# Patient Record
Sex: FEMALE | Race: BLACK OR AFRICAN AMERICAN | NOT HISPANIC OR LATINO | URBAN - METROPOLITAN AREA
[De-identification: names, ages, dates, MRNs, and addresses within clinical notes are randomized per-mention and may not be internally consistent; named-entity substitution may affect disease eponyms.]

---

## 2021-09-09 ENCOUNTER — INPATIENT (INPATIENT)
Facility: HOSPITAL | Age: 66
LOS: 13 days | Discharge: NURSING, REHAB OR LONG-TERM CARE | End: 2021-09-23
Attending: FAMILY MEDICINE | Admitting: FAMILY MEDICINE
Payer: COMMERCIAL

## 2021-09-09 VITALS
HEART RATE: 136 BPM | OXYGEN SATURATION: 100 % | DIASTOLIC BLOOD PRESSURE: 90 MMHG | SYSTOLIC BLOOD PRESSURE: 122 MMHG | WEIGHT: 139.99 LBS | HEIGHT: 64 IN | RESPIRATION RATE: 30 BRPM

## 2021-09-09 LAB
ALBUMIN SERPL ELPH-MCNC: 1.3 G/DL — LOW (ref 3.3–5)
ALP SERPL-CCNC: 137 U/L — HIGH (ref 40–120)
ALT FLD-CCNC: 24 U/L — SIGNIFICANT CHANGE UP (ref 12–78)
AMMONIA BLD-MCNC: 87 UMOL/L — HIGH (ref 11–32)
ANION GAP SERPL CALC-SCNC: 39 MMOL/L — HIGH (ref 5–17)
APPEARANCE UR: ABNORMAL
APTT BLD: 25.2 SEC — LOW (ref 27.5–35.5)
AST SERPL-CCNC: 104 U/L — HIGH (ref 15–37)
BACTERIA # UR AUTO: ABNORMAL
BASE EXCESS BLDA CALC-SCNC: -20.8 MMOL/L — LOW (ref -2–2)
BILIRUB SERPL-MCNC: 0.5 MG/DL — SIGNIFICANT CHANGE UP (ref 0.2–1.2)
BILIRUB UR-MCNC: NEGATIVE — SIGNIFICANT CHANGE UP
BLD GP AB SCN SERPL QL: SIGNIFICANT CHANGE UP
BLOOD GAS COMMENTS: SIGNIFICANT CHANGE UP
BLOOD GAS COMMENTS: SIGNIFICANT CHANGE UP
BLOOD GAS SOURCE: SIGNIFICANT CHANGE UP
BUN SERPL-MCNC: 63 MG/DL — HIGH (ref 7–23)
CALCIUM SERPL-MCNC: 8.4 MG/DL — LOW (ref 8.5–10.1)
CHLORIDE SERPL-SCNC: 90 MMOL/L — LOW (ref 96–108)
CK MB BLD-MCNC: 1.5 % — SIGNIFICANT CHANGE UP (ref 0–3.5)
CK MB CFR SERPL CALC: 39.1 NG/ML — HIGH (ref 0.5–3.6)
CK SERPL-CCNC: 2529 U/L — HIGH (ref 26–192)
CO2 SERPL-SCNC: 8 MMOL/L — CRITICAL LOW (ref 22–31)
COLOR SPEC: YELLOW — SIGNIFICANT CHANGE UP
CREAT SERPL-MCNC: 3.42 MG/DL — HIGH (ref 0.5–1.3)
DIFF PNL FLD: ABNORMAL
GLUCOSE BLDC GLUCOMTR-MCNC: >600 MG/DL — CRITICAL HIGH (ref 70–99)
GLUCOSE SERPL-MCNC: 1466 MG/DL — CRITICAL HIGH (ref 70–99)
GLUCOSE UR QL: 1000 MG/DL
HCO3 BLDA-SCNC: 6 MMOL/L — LOW (ref 21–29)
HCT VFR BLD CALC: 24.8 % — LOW (ref 34.5–45)
HGB BLD-MCNC: 7.6 G/DL — LOW (ref 11.5–15.5)
HOROWITZ INDEX BLDA+IHG-RTO: 100 — SIGNIFICANT CHANGE UP
INR BLD: 1.55 RATIO — HIGH (ref 0.88–1.16)
KETONES UR-MCNC: ABNORMAL
LACTATE SERPL-SCNC: 2.4 MMOL/L — HIGH (ref 0.7–2)
LEUKOCYTE ESTERASE UR-ACNC: NEGATIVE — SIGNIFICANT CHANGE UP
MCHC RBC-ENTMCNC: 28 PG — SIGNIFICANT CHANGE UP (ref 27–34)
MCHC RBC-ENTMCNC: 30.6 GM/DL — LOW (ref 32–36)
MCV RBC AUTO: 91.5 FL — SIGNIFICANT CHANGE UP (ref 80–100)
NITRITE UR-MCNC: NEGATIVE — SIGNIFICANT CHANGE UP
PCO2 BLDA: 18 MMHG — LOW (ref 32–46)
PH BLD: 7.17 — CRITICAL LOW (ref 7.35–7.45)
PH UR: 7 — SIGNIFICANT CHANGE UP (ref 5–8)
PLATELET # BLD AUTO: 443 K/UL — HIGH (ref 150–400)
PO2 BLDA: 107 MMHG — SIGNIFICANT CHANGE UP (ref 74–108)
POTASSIUM SERPL-MCNC: 3.4 MMOL/L — LOW (ref 3.5–5.3)
POTASSIUM SERPL-SCNC: 3.4 MMOL/L — LOW (ref 3.5–5.3)
PROT SERPL-MCNC: 6.6 GM/DL — SIGNIFICANT CHANGE UP (ref 6–8.3)
PROT UR-MCNC: 30 MG/DL
PROTHROM AB SERPL-ACNC: 17.6 SEC — HIGH (ref 10.6–13.6)
RBC # BLD: 2.71 M/UL — LOW (ref 3.8–5.2)
RBC # FLD: 16.8 % — HIGH (ref 10.3–14.5)
RBC CASTS # UR COMP ASSIST: SIGNIFICANT CHANGE UP /HPF (ref 0–4)
SAO2 % BLDA: 97 % — HIGH (ref 92–96)
SODIUM SERPL-SCNC: 137 MMOL/L — SIGNIFICANT CHANGE UP (ref 135–145)
SP GR SPEC: 1.01 — SIGNIFICANT CHANGE UP (ref 1.01–1.02)
TROPONIN I SERPL-MCNC: 0.47 NG/ML — HIGH (ref 0.01–0.04)
UROBILINOGEN FLD QL: NEGATIVE MG/DL — SIGNIFICANT CHANGE UP
WBC # BLD: 15.67 K/UL — HIGH (ref 3.8–10.5)
WBC # FLD AUTO: 15.67 K/UL — HIGH (ref 3.8–10.5)
WBC UR QL: SIGNIFICANT CHANGE UP

## 2021-09-09 PROCEDURE — 99291 CRITICAL CARE FIRST HOUR: CPT

## 2021-09-09 PROCEDURE — 71045 X-RAY EXAM CHEST 1 VIEW: CPT | Mod: 26

## 2021-09-09 PROCEDURE — 93010 ELECTROCARDIOGRAM REPORT: CPT

## 2021-09-09 RX ORDER — PIPERACILLIN AND TAZOBACTAM 4; .5 G/20ML; G/20ML
3.38 INJECTION, POWDER, LYOPHILIZED, FOR SOLUTION INTRAVENOUS ONCE
Refills: 0 | Status: COMPLETED | OUTPATIENT
Start: 2021-09-09 | End: 2021-09-09

## 2021-09-09 RX ORDER — ACETAMINOPHEN 500 MG
650 TABLET ORAL ONCE
Refills: 0 | Status: DISCONTINUED | OUTPATIENT
Start: 2021-09-09 | End: 2021-09-09

## 2021-09-09 RX ORDER — SODIUM BICARBONATE 1 MEQ/ML
50 SYRINGE (ML) INTRAVENOUS ONCE
Refills: 0 | Status: COMPLETED | OUTPATIENT
Start: 2021-09-09 | End: 2021-09-09

## 2021-09-09 RX ORDER — INSULIN HUMAN 100 [IU]/ML
10 INJECTION, SOLUTION SUBCUTANEOUS
Qty: 100 | Refills: 0 | Status: DISCONTINUED | OUTPATIENT
Start: 2021-09-09 | End: 2021-09-10

## 2021-09-09 RX ORDER — SODIUM CHLORIDE 9 MG/ML
3000 INJECTION INTRAMUSCULAR; INTRAVENOUS; SUBCUTANEOUS ONCE
Refills: 0 | Status: COMPLETED | OUTPATIENT
Start: 2021-09-09 | End: 2021-09-09

## 2021-09-09 RX ORDER — SODIUM CHLORIDE 9 MG/ML
2000 INJECTION, SOLUTION INTRAVENOUS ONCE
Refills: 0 | Status: DISCONTINUED | OUTPATIENT
Start: 2021-09-09 | End: 2021-09-09

## 2021-09-09 RX ORDER — VANCOMYCIN HCL 1 G
1000 VIAL (EA) INTRAVENOUS ONCE
Refills: 0 | Status: COMPLETED | OUTPATIENT
Start: 2021-09-09 | End: 2021-09-09

## 2021-09-09 RX ORDER — SODIUM BICARBONATE 1 MEQ/ML
100 SYRINGE (ML) INTRAVENOUS ONCE
Refills: 0 | Status: DISCONTINUED | OUTPATIENT
Start: 2021-09-09 | End: 2021-09-09

## 2021-09-09 RX ORDER — SODIUM BICARBONATE 1 MEQ/ML
50 SYRINGE (ML) INTRAVENOUS ONCE
Refills: 0 | Status: DISCONTINUED | OUTPATIENT
Start: 2021-09-09 | End: 2021-09-10

## 2021-09-09 RX ORDER — INSULIN HUMAN 100 [IU]/ML
10 INJECTION, SOLUTION SUBCUTANEOUS ONCE
Refills: 0 | Status: COMPLETED | OUTPATIENT
Start: 2021-09-09 | End: 2021-09-09

## 2021-09-09 RX ADMIN — Medication 250 MILLIGRAM(S): at 21:53

## 2021-09-09 RX ADMIN — SODIUM CHLORIDE 3000 MILLILITER(S): 9 INJECTION INTRAMUSCULAR; INTRAVENOUS; SUBCUTANEOUS at 21:30

## 2021-09-09 RX ADMIN — INSULIN HUMAN 10 UNIT(S): 100 INJECTION, SOLUTION SUBCUTANEOUS at 23:49

## 2021-09-09 RX ADMIN — INSULIN HUMAN 10 UNIT(S)/HR: 100 INJECTION, SOLUTION SUBCUTANEOUS at 23:57

## 2021-09-09 RX ADMIN — Medication 1000 MILLIGRAM(S): at 22:53

## 2021-09-09 RX ADMIN — SODIUM CHLORIDE 3000 MILLILITER(S): 9 INJECTION INTRAMUSCULAR; INTRAVENOUS; SUBCUTANEOUS at 22:30

## 2021-09-09 RX ADMIN — PIPERACILLIN AND TAZOBACTAM 200 GRAM(S): 4; .5 INJECTION, POWDER, LYOPHILIZED, FOR SOLUTION INTRAVENOUS at 21:53

## 2021-09-09 RX ADMIN — PIPERACILLIN AND TAZOBACTAM 3.38 GRAM(S): 4; .5 INJECTION, POWDER, LYOPHILIZED, FOR SOLUTION INTRAVENOUS at 22:23

## 2021-09-09 RX ADMIN — Medication 50 MILLIEQUIVALENT(S): at 23:35

## 2021-09-09 NOTE — ED PROVIDER NOTE - PHYSICAL EXAMINATION
Vitals: tachy at 136, otherwise WNL  Gen: moving extremities, moaning to pain, withdrawing from pain, moving eyes, drowsy, difficult to arouse  Head: ncat, perrla, eomi b/l  Neck: supple, no lymphadenopathy, no midline deviation  Heart: rrr, no m/r/g  Lungs: CTA b/l, no rales/ronchi/wheezes  Abd: soft, nontender, non-distended, no rebound or guarding  Ext: no clubbing/cyanosis/edema, + necrotic L 2nd digit of foot with muscle and tendon visible, dry gangrene, progressed up to anterior ankle, no trailing erythema, no evidence of trauma   Neuro: not cooperative for exam, tone intact b/l, withdrawing/moaning to pain, no apparent focal deficits

## 2021-09-09 NOTE — ED ADULT NURSE NOTE - NS_SISCREENINGSR_GEN_ALL_ED
Preoperative Assessment Center Medication History Note    Medication history completed via phone call on July 28, 2020 by this writer. See Epic admission navigator for prior to admission medications. Operating room staff will still need to confirm medications and last dose information on day of surgery.     Medication history interview sources:  patient, payor information.     Changes made to PTA medication list (reason)  Added: fenbendazole  Deleted: none  Changed: none    Additional medication history information (including reliability of information, actions taken by pharmacist):    -- No recent (within 30 days) course of antibiotics  -- No recent (within 30 days) course of systemic steroids  -- Patient declines being on any other prescription or over-the-counter medications    Prior to Admission medications    Medication Sig Last Dose Taking? Auth Provider   atorvastatin (LIPITOR) 20 MG tablet TAKE 1 TABLET BY MOUTH ONCE DAILY IN THE MORNING Taking Yes Yusra Chavez APRN CNP   Fenbendazole POWD Take 1 g by mouth daily Taking Yes Unknown, Entered By History   lisinopril (PRINIVIL/ZESTRIL) 10 MG tablet TAKE 1 TABLET BY MOUTH ONCE DAILY Taking Yes Yusra Chavez APRN CNP   rivaroxaban ANTICOAGULANT (XARELTO ANTICOAGULANT) 20 MG TABS tablet Take 1 tablet (20 mg) by mouth daily (with dinner) Taking Yes Yusra Chavez APRN CNP     Medication history completed by: Kaleb Stark [facility]     Negative

## 2021-09-09 NOTE — ED PROVIDER NOTE - OBJECTIVE STATEMENT
64 yo F bibems after being found down in home unresponsive.  Pt. was last seen 1.5 days ago.  Pt. reportedly had cut L foot days ago, apparently infection has developed as L foot is now necrotic.  Pt. cannot provide history due to AMS.   ROS: unobtainable from patient   PMH: HTN, HLD, IDDM; Meds: atenolol, atorvastatin, humalog, tresiba; SH: Denies smoking/drinking/drug use   PCP: [facility]

## 2021-09-09 NOTE — ED ADULT NURSE NOTE - ED STAT RN HANDOFF DETAILS
Report given to ICU RN. Patient in no acute distress with vitals within normal limits. Patient has IV access as documented in the EMR, patient received all medications as ordered with no adverse reactions noted. Patient on insulin drip at 10units/hr. Patient has no stat orders pending. All cocnerns endorsed to oncoming RN. Report given to Bharati ICU RN. Patient in no acute distress with vitals within normal limits. Patient has IV access as documented in the EMR, patient received all medications as ordered with no adverse reactions noted. Patient on insulin drip at 10units/hr. Patient has no stat orders pending. All cocnerns endorsed to oncoming RN. Report given to Bharati ICU RN. Patient in no acute distress with vitals within normal limits. Patient has IV access as documented in the EMR, patient received all medications as ordered with no adverse reactions noted. Patient on insulin drip at 10units/hr and LR bolus running at this time. Patient FS taken q1h as ordered. Patient in no acute distress. Patient has no stat orders pending. All concerns endorsed to oncoming RN.

## 2021-09-09 NOTE — ED ADULT NURSE NOTE - BEFAST LAST WELL KNOWN
History of Present Illness:  Emilie is a 38 year old female,      , seen today for   1. Postop check    .  She returns 2 week(s) after a single site robotic assist hysterectomy with bilateral salpingectomy on 18.  She denies fever, chills, nausea or vomiting.  She has good pain control.  She is still struggling with GI issues and has been seeing GI for this.    I have reviewed the patient's vital signs, medications and allergies, past medical, surgical, social and family history, updating these as appropriate.  See Histories section of the EMR for a display of this information.    PHYSICAL EXAM:    VITALS: Blood pressure 92/58, height 5' 2\" (1.575 m), weight 51.1 kg, last menstrual period 2018.  GENERAL: No acute distress.  ABDOMEN: Benign, Soft, non-tender, non-distended, No masses, organomegaly, hernias, bilaterally.  INCISION:  Clean, dry and intact.  EXTREMITIES: Normal and no edema.      ASSESSMENT:    1. Postop check        PLAN:    Return in about 4 weeks (around 10/10/2018).  
Unknown

## 2021-09-09 NOTE — ED ADULT NURSE NOTE - OBJECTIVE STATEMENT
Patient received at bed Patient received at bed 15. Patient unresponsive, patient BIBA and patient has left foot gangrenous and necrotic tissue to the bone. Left second toe is necrotic and bone/ tendons are exposed on the left foot. Patient has positive pulses on the left ankle and knee, patient has bruising and swelling to the left foot. Patient placed on cardiac monitor, patient vitals are within normal limits. Lines and labs drawn, antibiotics to be started. As per family, patient cut foot on glass-unsure of how long ago but they have not heard from the patient in a day and a half so called ambulance- found patient on the floor. Patient has history of HTN, HLD, and DM.

## 2021-09-09 NOTE — ED PROVIDER NOTE - PROGRESS NOTE DETAILS
called ICU for admit/consult  called vascular, Dr. Storm for consult, agrees Dr. Burrows should eval tomorrow morning after pt. is admitted

## 2021-09-09 NOTE — ED PROVIDER NOTE - CARE PLAN
1 Principal Discharge DX:	Other specified sepsis  Secondary Diagnosis:	Diabetic ulcer of foot with necrosis of muscle   Principal Discharge DX:	Other specified sepsis  Secondary Diagnosis:	Diabetic ulcer of foot with necrosis of muscle  Secondary Diagnosis:	Secondary DM with DKA

## 2021-09-09 NOTE — ED ADULT TRIAGE NOTE - CHIEF COMPLAINT QUOTE
Pt biba, found on floor unresponsive,  pt pw open gangrenous open wound to L foot, ems ( fs HI), Pt received NS( 18 G # L AC). h/o dm, htn, hld

## 2021-09-09 NOTE — ED PROVIDER NOTE - CLINICAL SUMMARY MEDICAL DECISION MAKING FREE TEXT BOX
66 yo F with AMS, likely sepsis, necrosis of L foot as source, concerning for ischemia, cva, acs, gangrene  -basic labs, type and screen, ammonia, abg, blood cx, cpk, ua, cx, lactate, trop, ckmb, CT pan scan including LLE + contrast if feasible, ekg, vanc/zosyn for coverage, hydration bolus for sepsis  -f/u results, reeval

## 2021-09-10 DIAGNOSIS — A41.9 SEPSIS, UNSPECIFIED ORGANISM: ICD-10-CM

## 2021-09-10 DIAGNOSIS — A48.0 GAS GANGRENE: ICD-10-CM

## 2021-09-10 DIAGNOSIS — N17.9 ACUTE KIDNEY FAILURE, UNSPECIFIED: ICD-10-CM

## 2021-09-10 DIAGNOSIS — M62.82 RHABDOMYOLYSIS: ICD-10-CM

## 2021-09-10 DIAGNOSIS — M72.6 NECROTIZING FASCIITIS: ICD-10-CM

## 2021-09-10 DIAGNOSIS — E11.10 TYPE 2 DIABETES MELLITUS WITH KETOACIDOSIS WITHOUT COMA: ICD-10-CM

## 2021-09-10 LAB
A1C WITH ESTIMATED AVERAGE GLUCOSE RESULT: 14.1 % — HIGH (ref 4–5.6)
ALBUMIN SERPL ELPH-MCNC: 1.3 G/DL — LOW (ref 3.3–5)
ALBUMIN SERPL ELPH-MCNC: 1.3 G/DL — LOW (ref 3.3–5)
ALBUMIN SERPL ELPH-MCNC: 1.4 G/DL — LOW (ref 3.3–5)
ALP SERPL-CCNC: 148 U/L — HIGH (ref 40–120)
ALP SERPL-CCNC: 158 U/L — HIGH (ref 40–120)
ALP SERPL-CCNC: 161 U/L — HIGH (ref 40–120)
ALT FLD-CCNC: 64 U/L — SIGNIFICANT CHANGE UP (ref 12–78)
ALT FLD-CCNC: 65 U/L — SIGNIFICANT CHANGE UP (ref 12–78)
ALT FLD-CCNC: 67 U/L — SIGNIFICANT CHANGE UP (ref 12–78)
ANION GAP SERPL CALC-SCNC: 12 MMOL/L — SIGNIFICANT CHANGE UP (ref 5–17)
ANION GAP SERPL CALC-SCNC: 12 MMOL/L — SIGNIFICANT CHANGE UP (ref 5–17)
ANION GAP SERPL CALC-SCNC: 27 MMOL/L — HIGH (ref 5–17)
ANION GAP SERPL CALC-SCNC: 6 MMOL/L — SIGNIFICANT CHANGE UP (ref 5–17)
AST SERPL-CCNC: 185 U/L — HIGH (ref 15–37)
AST SERPL-CCNC: 233 U/L — HIGH (ref 15–37)
AST SERPL-CCNC: 253 U/L — HIGH (ref 15–37)
BASE EXCESS BLDA CALC-SCNC: -17.5 MMOL/L — LOW (ref -2–2)
BASE EXCESS BLDA CALC-SCNC: -3.5 MMOL/L — LOW (ref -2–2)
BASOPHILS # BLD AUTO: 0 K/UL — SIGNIFICANT CHANGE UP (ref 0–0.2)
BASOPHILS # BLD AUTO: 0.03 K/UL — SIGNIFICANT CHANGE UP (ref 0–0.2)
BASOPHILS NFR BLD AUTO: 0 % — SIGNIFICANT CHANGE UP (ref 0–2)
BASOPHILS NFR BLD AUTO: 0.2 % — SIGNIFICANT CHANGE UP (ref 0–2)
BILIRUB SERPL-MCNC: 0.3 MG/DL — SIGNIFICANT CHANGE UP (ref 0.2–1.2)
BILIRUB SERPL-MCNC: 0.3 MG/DL — SIGNIFICANT CHANGE UP (ref 0.2–1.2)
BILIRUB SERPL-MCNC: 0.4 MG/DL — SIGNIFICANT CHANGE UP (ref 0.2–1.2)
BLD GP AB SCN SERPL QL: SIGNIFICANT CHANGE UP
BLOOD GAS COMMENTS: SIGNIFICANT CHANGE UP
BLOOD GAS SOURCE: SIGNIFICANT CHANGE UP
BLOOD GAS SOURCE: SIGNIFICANT CHANGE UP
BUN SERPL-MCNC: 49 MG/DL — HIGH (ref 7–23)
BUN SERPL-MCNC: 51 MG/DL — HIGH (ref 7–23)
BUN SERPL-MCNC: 56 MG/DL — HIGH (ref 7–23)
BUN SERPL-MCNC: 68 MG/DL — HIGH (ref 7–23)
BURR CELLS BLD QL SMEAR: PRESENT — SIGNIFICANT CHANGE UP
CALCIUM SERPL-MCNC: 8.7 MG/DL — SIGNIFICANT CHANGE UP (ref 8.5–10.1)
CALCIUM SERPL-MCNC: 9.3 MG/DL — SIGNIFICANT CHANGE UP (ref 8.5–10.1)
CALCIUM SERPL-MCNC: 9.4 MG/DL — SIGNIFICANT CHANGE UP (ref 8.5–10.1)
CALCIUM SERPL-MCNC: 9.4 MG/DL — SIGNIFICANT CHANGE UP (ref 8.5–10.1)
CHLORIDE SERPL-SCNC: 116 MMOL/L — HIGH (ref 96–108)
CHLORIDE SERPL-SCNC: 119 MMOL/L — HIGH (ref 96–108)
CHLORIDE SERPL-SCNC: 120 MMOL/L — HIGH (ref 96–108)
CHLORIDE SERPL-SCNC: 121 MMOL/L — HIGH (ref 96–108)
CK SERPL-CCNC: 7070 U/L — HIGH (ref 26–192)
CK SERPL-CCNC: 8200 U/L — HIGH (ref 26–192)
CO2 SERPL-SCNC: 10 MMOL/L — CRITICAL LOW (ref 22–31)
CO2 SERPL-SCNC: 22 MMOL/L — SIGNIFICANT CHANGE UP (ref 22–31)
CO2 SERPL-SCNC: 24 MMOL/L — SIGNIFICANT CHANGE UP (ref 22–31)
CO2 SERPL-SCNC: 29 MMOL/L — SIGNIFICANT CHANGE UP (ref 22–31)
CREAT SERPL-MCNC: 1.69 MG/DL — HIGH (ref 0.5–1.3)
CREAT SERPL-MCNC: 2.19 MG/DL — HIGH (ref 0.5–1.3)
CREAT SERPL-MCNC: 2.57 MG/DL — HIGH (ref 0.5–1.3)
CREAT SERPL-MCNC: 3.26 MG/DL — HIGH (ref 0.5–1.3)
CRP SERPL-MCNC: 394 MG/L — HIGH
EOSINOPHIL # BLD AUTO: 0 K/UL — SIGNIFICANT CHANGE UP (ref 0–0.5)
EOSINOPHIL # BLD AUTO: 0 K/UL — SIGNIFICANT CHANGE UP (ref 0–0.5)
EOSINOPHIL NFR BLD AUTO: 0 % — SIGNIFICANT CHANGE UP (ref 0–6)
EOSINOPHIL NFR BLD AUTO: 0 % — SIGNIFICANT CHANGE UP (ref 0–6)
ERYTHROCYTE [SEDIMENTATION RATE] IN BLOOD: 124 MM/HR — HIGH (ref 0–20)
ESTIMATED AVERAGE GLUCOSE: 358 MG/DL — HIGH (ref 68–114)
GLUCOSE BLDC GLUCOMTR-MCNC: 358 MG/DL — HIGH (ref 70–99)
GLUCOSE BLDC GLUCOMTR-MCNC: 362 MG/DL — HIGH (ref 70–99)
GLUCOSE BLDC GLUCOMTR-MCNC: 400 MG/DL — HIGH (ref 70–99)
GLUCOSE BLDC GLUCOMTR-MCNC: 409 MG/DL — HIGH (ref 70–99)
GLUCOSE BLDC GLUCOMTR-MCNC: 449 MG/DL — HIGH (ref 70–99)
GLUCOSE BLDC GLUCOMTR-MCNC: 455 MG/DL — CRITICAL HIGH (ref 70–99)
GLUCOSE BLDC GLUCOMTR-MCNC: 459 MG/DL — CRITICAL HIGH (ref 70–99)
GLUCOSE BLDC GLUCOMTR-MCNC: 463 MG/DL — CRITICAL HIGH (ref 70–99)
GLUCOSE BLDC GLUCOMTR-MCNC: 483 MG/DL — CRITICAL HIGH (ref 70–99)
GLUCOSE BLDC GLUCOMTR-MCNC: 490 MG/DL — CRITICAL HIGH (ref 70–99)
GLUCOSE BLDC GLUCOMTR-MCNC: 494 MG/DL — CRITICAL HIGH (ref 70–99)
GLUCOSE BLDC GLUCOMTR-MCNC: 534 MG/DL — CRITICAL HIGH (ref 70–99)
GLUCOSE BLDC GLUCOMTR-MCNC: 569 MG/DL — CRITICAL HIGH (ref 70–99)
GLUCOSE BLDC GLUCOMTR-MCNC: >600 MG/DL — CRITICAL HIGH (ref 70–99)
GLUCOSE SERPL-MCNC: 429 MG/DL — HIGH (ref 70–99)
GLUCOSE SERPL-MCNC: 475 MG/DL — CRITICAL HIGH (ref 70–99)
GLUCOSE SERPL-MCNC: 527 MG/DL — CRITICAL HIGH (ref 70–99)
GLUCOSE SERPL-MCNC: 802 MG/DL — CRITICAL HIGH (ref 70–99)
HCO3 BLDA-SCNC: 21 MMOL/L — SIGNIFICANT CHANGE UP (ref 21–29)
HCO3 BLDA-SCNC: 9 MMOL/L — LOW (ref 21–29)
HCT VFR BLD CALC: 21.5 % — LOW (ref 34.5–45)
HCT VFR BLD CALC: 27.1 % — LOW (ref 34.5–45)
HGB BLD-MCNC: 7.3 G/DL — LOW (ref 11.5–15.5)
HGB BLD-MCNC: 9.2 G/DL — LOW (ref 11.5–15.5)
HOROWITZ INDEX BLDA+IHG-RTO: 100 — SIGNIFICANT CHANGE UP
HOROWITZ INDEX BLDA+IHG-RTO: 40 — SIGNIFICANT CHANGE UP
HYPOCHROMIA BLD QL: SLIGHT — SIGNIFICANT CHANGE UP
IMM GRANULOCYTES NFR BLD AUTO: 0.9 % — SIGNIFICANT CHANGE UP (ref 0–1.5)
LACTATE SERPL-SCNC: 1.8 MMOL/L — SIGNIFICANT CHANGE UP (ref 0.7–2)
LG PLATELETS BLD QL AUTO: SLIGHT — SIGNIFICANT CHANGE UP
LYMPHOCYTES # BLD AUTO: 0.94 K/UL — LOW (ref 1–3.3)
LYMPHOCYTES # BLD AUTO: 1.39 K/UL — SIGNIFICANT CHANGE UP (ref 1–3.3)
LYMPHOCYTES # BLD AUTO: 10.9 % — LOW (ref 13–44)
LYMPHOCYTES # BLD AUTO: 6 % — LOW (ref 13–44)
MACROCYTES BLD QL: SLIGHT — SIGNIFICANT CHANGE UP
MAGNESIUM SERPL-MCNC: 2.7 MG/DL — HIGH (ref 1.6–2.6)
MAGNESIUM SERPL-MCNC: 2.7 MG/DL — HIGH (ref 1.6–2.6)
MAGNESIUM SERPL-MCNC: 2.8 MG/DL — HIGH (ref 1.6–2.6)
MAGNESIUM SERPL-MCNC: 3.1 MG/DL — HIGH (ref 1.6–2.6)
MANUAL SMEAR VERIFICATION: SIGNIFICANT CHANGE UP
MCHC RBC-ENTMCNC: 28.4 PG — SIGNIFICANT CHANGE UP (ref 27–34)
MCHC RBC-ENTMCNC: 28.5 PG — SIGNIFICANT CHANGE UP (ref 27–34)
MCHC RBC-ENTMCNC: 33.9 GM/DL — SIGNIFICANT CHANGE UP (ref 32–36)
MCHC RBC-ENTMCNC: 34 GM/DL — SIGNIFICANT CHANGE UP (ref 32–36)
MCV RBC AUTO: 83.6 FL — SIGNIFICANT CHANGE UP (ref 80–100)
MCV RBC AUTO: 84 FL — SIGNIFICANT CHANGE UP (ref 80–100)
MONOCYTES # BLD AUTO: 0.39 K/UL — SIGNIFICANT CHANGE UP (ref 0–0.9)
MONOCYTES # BLD AUTO: 0.78 K/UL — SIGNIFICANT CHANGE UP (ref 0–0.9)
MONOCYTES NFR BLD AUTO: 3.1 % — SIGNIFICANT CHANGE UP (ref 2–14)
MONOCYTES NFR BLD AUTO: 5 % — SIGNIFICANT CHANGE UP (ref 2–14)
NEUTROPHILS # BLD AUTO: 10.78 K/UL — HIGH (ref 1.8–7.4)
NEUTROPHILS # BLD AUTO: 13.95 K/UL — HIGH (ref 1.8–7.4)
NEUTROPHILS NFR BLD AUTO: 84.9 % — HIGH (ref 43–77)
NEUTROPHILS NFR BLD AUTO: 87 % — HIGH (ref 43–77)
NEUTS BAND # BLD: 2 % — SIGNIFICANT CHANGE UP (ref 0–8)
NRBC # BLD: 0 /100 WBCS — SIGNIFICANT CHANGE UP (ref 0–0)
NRBC # BLD: 0 /100 WBCS — SIGNIFICANT CHANGE UP (ref 0–0)
NRBC # BLD: 0 /100 — SIGNIFICANT CHANGE UP (ref 0–0)
NRBC # BLD: SIGNIFICANT CHANGE UP /100 WBCS (ref 0–0)
PCO2 BLDA: 21 MMHG — LOW (ref 32–46)
PCO2 BLDA: 39 MMHG — SIGNIFICANT CHANGE UP (ref 32–46)
PH BLD: 7.23 — LOW (ref 7.35–7.45)
PH BLD: 7.35 — SIGNIFICANT CHANGE UP (ref 7.35–7.45)
PHOSPHATE SERPL-MCNC: 1.1 MG/DL — LOW (ref 2.5–4.5)
PHOSPHATE SERPL-MCNC: 1.4 MG/DL — LOW (ref 2.5–4.5)
PHOSPHATE SERPL-MCNC: 3.1 MG/DL — SIGNIFICANT CHANGE UP (ref 2.5–4.5)
PHOSPHATE SERPL-MCNC: 3.6 MG/DL — SIGNIFICANT CHANGE UP (ref 2.5–4.5)
PLAT MORPH BLD: NORMAL — SIGNIFICANT CHANGE UP
PLATELET # BLD AUTO: 367 K/UL — SIGNIFICANT CHANGE UP (ref 150–400)
PLATELET # BLD AUTO: 384 K/UL — SIGNIFICANT CHANGE UP (ref 150–400)
PO2 BLDA: 160 MMHG — HIGH (ref 74–108)
PO2 BLDA: 164 MMHG — HIGH (ref 74–108)
POTASSIUM SERPL-MCNC: 3.1 MMOL/L — LOW (ref 3.5–5.3)
POTASSIUM SERPL-MCNC: 3.3 MMOL/L — LOW (ref 3.5–5.3)
POTASSIUM SERPL-MCNC: 3.5 MMOL/L — SIGNIFICANT CHANGE UP (ref 3.5–5.3)
POTASSIUM SERPL-MCNC: 4.9 MMOL/L — SIGNIFICANT CHANGE UP (ref 3.5–5.3)
POTASSIUM SERPL-SCNC: 3.1 MMOL/L — LOW (ref 3.5–5.3)
POTASSIUM SERPL-SCNC: 3.3 MMOL/L — LOW (ref 3.5–5.3)
POTASSIUM SERPL-SCNC: 3.5 MMOL/L — SIGNIFICANT CHANGE UP (ref 3.5–5.3)
POTASSIUM SERPL-SCNC: 4.9 MMOL/L — SIGNIFICANT CHANGE UP (ref 3.5–5.3)
PROT SERPL-MCNC: 6 GM/DL — SIGNIFICANT CHANGE UP (ref 6–8.3)
PROT SERPL-MCNC: 6.1 GM/DL — SIGNIFICANT CHANGE UP (ref 6–8.3)
PROT SERPL-MCNC: 6.4 GM/DL — SIGNIFICANT CHANGE UP (ref 6–8.3)
RAPID RVP RESULT: SIGNIFICANT CHANGE UP
RBC # BLD: 2.56 M/UL — LOW (ref 3.8–5.2)
RBC # BLD: 3.24 M/UL — LOW (ref 3.8–5.2)
RBC # FLD: 15 % — HIGH (ref 10.3–14.5)
RBC # FLD: 15.5 % — HIGH (ref 10.3–14.5)
RBC BLD AUTO: SIGNIFICANT CHANGE UP
SAO2 % BLDA: 99 % — HIGH (ref 92–96)
SAO2 % BLDA: 99 % — HIGH (ref 92–96)
SARS-COV-2 RNA SPEC QL NAA+PROBE: SIGNIFICANT CHANGE UP
SODIUM SERPL-SCNC: 153 MMOL/L — HIGH (ref 135–145)
SODIUM SERPL-SCNC: 154 MMOL/L — HIGH (ref 135–145)
SODIUM SERPL-SCNC: 155 MMOL/L — HIGH (ref 135–145)
SODIUM SERPL-SCNC: 156 MMOL/L — HIGH (ref 135–145)
TROPONIN I SERPL-MCNC: 0.73 NG/ML — HIGH (ref 0.01–0.04)
TROPONIN I SERPL-MCNC: 0.87 NG/ML — HIGH (ref 0.01–0.04)
TROPONIN I SERPL-MCNC: 1.21 NG/ML — HIGH (ref 0.01–0.04)
TROPONIN I SERPL-MCNC: 1.27 NG/ML — HIGH (ref 0.01–0.04)
VANCOMYCIN TROUGH SERPL-MCNC: 9 UG/ML — LOW (ref 10–20)
WBC # BLD: 12.02 K/UL — HIGH (ref 3.8–10.5)
WBC # BLD: 12.71 K/UL — HIGH (ref 3.8–10.5)
WBC # FLD AUTO: 12.02 K/UL — HIGH (ref 3.8–10.5)
WBC # FLD AUTO: 12.71 K/UL — HIGH (ref 3.8–10.5)

## 2021-09-10 PROCEDURE — 73700 CT LOWER EXTREMITY W/O DYE: CPT | Mod: 26,LT

## 2021-09-10 PROCEDURE — 99291 CRITICAL CARE FIRST HOUR: CPT

## 2021-09-10 PROCEDURE — 99223 1ST HOSP IP/OBS HIGH 75: CPT

## 2021-09-10 PROCEDURE — 88311 DECALCIFY TISSUE: CPT | Mod: 26

## 2021-09-10 PROCEDURE — 71045 X-RAY EXAM CHEST 1 VIEW: CPT | Mod: 26

## 2021-09-10 PROCEDURE — 72125 CT NECK SPINE W/O DYE: CPT | Mod: 26

## 2021-09-10 PROCEDURE — 70450 CT HEAD/BRAIN W/O DYE: CPT | Mod: 26

## 2021-09-10 PROCEDURE — 71250 CT THORAX DX C-: CPT | Mod: 26

## 2021-09-10 PROCEDURE — 93010 ELECTROCARDIOGRAM REPORT: CPT

## 2021-09-10 PROCEDURE — 88307 TISSUE EXAM BY PATHOLOGIST: CPT | Mod: 26

## 2021-09-10 PROCEDURE — 74176 CT ABD & PELVIS W/O CONTRAST: CPT | Mod: 26

## 2021-09-10 PROCEDURE — 99253 IP/OBS CNSLTJ NEW/EST LOW 45: CPT

## 2021-09-10 PROCEDURE — 88304 TISSUE EXAM BY PATHOLOGIST: CPT | Mod: 26

## 2021-09-10 RX ORDER — HEPARIN SODIUM 5000 [USP'U]/ML
5000 INJECTION INTRAVENOUS; SUBCUTANEOUS EVERY 8 HOURS
Refills: 0 | Status: DISCONTINUED | OUTPATIENT
Start: 2021-09-10 | End: 2021-09-10

## 2021-09-10 RX ORDER — SODIUM CHLORIDE 9 MG/ML
1000 INJECTION, SOLUTION INTRAVENOUS
Refills: 0 | Status: DISCONTINUED | OUTPATIENT
Start: 2021-09-10 | End: 2021-09-11

## 2021-09-10 RX ORDER — PIPERACILLIN AND TAZOBACTAM 4; .5 G/20ML; G/20ML
3.38 INJECTION, POWDER, LYOPHILIZED, FOR SOLUTION INTRAVENOUS EVERY 8 HOURS
Refills: 0 | Status: COMPLETED | OUTPATIENT
Start: 2021-09-10 | End: 2021-09-16

## 2021-09-10 RX ORDER — POTASSIUM CHLORIDE 20 MEQ
10 PACKET (EA) ORAL
Refills: 0 | Status: COMPLETED | OUTPATIENT
Start: 2021-09-10 | End: 2021-09-10

## 2021-09-10 RX ORDER — SODIUM CHLORIDE 9 MG/ML
1000 INJECTION, SOLUTION INTRAVENOUS ONCE
Refills: 0 | Status: COMPLETED | OUTPATIENT
Start: 2021-09-10 | End: 2021-09-10

## 2021-09-10 RX ORDER — INSULIN HUMAN 100 [IU]/ML
12 INJECTION, SOLUTION SUBCUTANEOUS
Qty: 100 | Refills: 0 | Status: DISCONTINUED | OUTPATIENT
Start: 2021-09-10 | End: 2021-09-12

## 2021-09-10 RX ORDER — HEPARIN SODIUM 5000 [USP'U]/ML
INJECTION INTRAVENOUS; SUBCUTANEOUS
Qty: 25000 | Refills: 0 | Status: DISCONTINUED | OUTPATIENT
Start: 2021-09-10 | End: 2021-09-12

## 2021-09-10 RX ORDER — POTASSIUM PHOSPHATE, MONOBASIC POTASSIUM PHOSPHATE, DIBASIC 236; 224 MG/ML; MG/ML
30 INJECTION, SOLUTION INTRAVENOUS ONCE
Refills: 0 | Status: COMPLETED | OUTPATIENT
Start: 2021-09-10 | End: 2021-09-10

## 2021-09-10 RX ORDER — VANCOMYCIN HCL 1 G
1000 VIAL (EA) INTRAVENOUS ONCE
Refills: 0 | Status: COMPLETED | OUTPATIENT
Start: 2021-09-10 | End: 2021-09-10

## 2021-09-10 RX ORDER — CHLORHEXIDINE GLUCONATE 213 G/1000ML
1 SOLUTION TOPICAL
Refills: 0 | Status: DISCONTINUED | OUTPATIENT
Start: 2021-09-10 | End: 2021-09-23

## 2021-09-10 RX ORDER — HYDROMORPHONE HYDROCHLORIDE 2 MG/ML
0.5 INJECTION INTRAMUSCULAR; INTRAVENOUS; SUBCUTANEOUS
Refills: 0 | Status: DISCONTINUED | OUTPATIENT
Start: 2021-09-10 | End: 2021-09-10

## 2021-09-10 RX ORDER — OXYCODONE AND ACETAMINOPHEN 5; 325 MG/1; MG/1
1 TABLET ORAL EVERY 4 HOURS
Refills: 0 | Status: DISCONTINUED | OUTPATIENT
Start: 2021-09-10 | End: 2021-09-10

## 2021-09-10 RX ORDER — VANCOMYCIN HCL 1 G
1000 VIAL (EA) INTRAVENOUS EVERY 24 HOURS
Refills: 0 | Status: DISCONTINUED | OUTPATIENT
Start: 2021-09-11 | End: 2021-09-11

## 2021-09-10 RX ORDER — ACETAMINOPHEN 500 MG
650 TABLET ORAL EVERY 6 HOURS
Refills: 0 | Status: DISCONTINUED | OUTPATIENT
Start: 2021-09-10 | End: 2021-09-23

## 2021-09-10 RX ORDER — MORPHINE SULFATE 50 MG/1
2 CAPSULE, EXTENDED RELEASE ORAL ONCE
Refills: 0 | Status: DISCONTINUED | OUTPATIENT
Start: 2021-09-10 | End: 2021-09-10

## 2021-09-10 RX ORDER — PIPERACILLIN AND TAZOBACTAM 4; .5 G/20ML; G/20ML
3.38 INJECTION, POWDER, LYOPHILIZED, FOR SOLUTION INTRAVENOUS EVERY 12 HOURS
Refills: 0 | Status: DISCONTINUED | OUTPATIENT
Start: 2021-09-10 | End: 2021-09-10

## 2021-09-10 RX ORDER — FENTANYL CITRATE 50 UG/ML
0.5 INJECTION INTRAVENOUS
Qty: 2500 | Refills: 0 | Status: DISCONTINUED | OUTPATIENT
Start: 2021-09-10 | End: 2021-09-12

## 2021-09-10 RX ORDER — FENTANYL CITRATE 50 UG/ML
100 INJECTION INTRAVENOUS ONCE
Refills: 0 | Status: DISCONTINUED | OUTPATIENT
Start: 2021-09-10 | End: 2021-09-10

## 2021-09-10 RX ORDER — VANCOMYCIN HCL 1 G
VIAL (EA) INTRAVENOUS
Refills: 0 | Status: DISCONTINUED | OUTPATIENT
Start: 2021-09-10 | End: 2021-09-11

## 2021-09-10 RX ADMIN — Medication 100 MILLIEQUIVALENT(S): at 03:38

## 2021-09-10 RX ADMIN — POTASSIUM PHOSPHATE, MONOBASIC POTASSIUM PHOSPHATE, DIBASIC 83.33 MILLIMOLE(S): 236; 224 INJECTION, SOLUTION INTRAVENOUS at 09:17

## 2021-09-10 RX ADMIN — Medication 100 MILLIEQUIVALENT(S): at 09:46

## 2021-09-10 RX ADMIN — SODIUM CHLORIDE 125 MILLILITER(S): 9 INJECTION, SOLUTION INTRAVENOUS at 19:56

## 2021-09-10 RX ADMIN — Medication 250 MILLIGRAM(S): at 17:41

## 2021-09-10 RX ADMIN — SODIUM CHLORIDE 125 MILLILITER(S): 9 INJECTION, SOLUTION INTRAVENOUS at 02:37

## 2021-09-10 RX ADMIN — MORPHINE SULFATE 2 MILLIGRAM(S): 50 CAPSULE, EXTENDED RELEASE ORAL at 09:16

## 2021-09-10 RX ADMIN — MORPHINE SULFATE 2 MILLIGRAM(S): 50 CAPSULE, EXTENDED RELEASE ORAL at 09:31

## 2021-09-10 RX ADMIN — SODIUM CHLORIDE 1000 MILLILITER(S): 9 INJECTION, SOLUTION INTRAVENOUS at 02:37

## 2021-09-10 RX ADMIN — INSULIN HUMAN 12 UNIT(S)/HR: 100 INJECTION, SOLUTION SUBCUTANEOUS at 07:40

## 2021-09-10 RX ADMIN — Medication 100 MILLIEQUIVALENT(S): at 14:03

## 2021-09-10 RX ADMIN — PIPERACILLIN AND TAZOBACTAM 25 GRAM(S): 4; .5 INJECTION, POWDER, LYOPHILIZED, FOR SOLUTION INTRAVENOUS at 15:40

## 2021-09-10 RX ADMIN — INSULIN HUMAN 12 UNIT(S)/HR: 100 INJECTION, SOLUTION SUBCUTANEOUS at 19:56

## 2021-09-10 RX ADMIN — PIPERACILLIN AND TAZOBACTAM 25 GRAM(S): 4; .5 INJECTION, POWDER, LYOPHILIZED, FOR SOLUTION INTRAVENOUS at 22:31

## 2021-09-10 RX ADMIN — INSULIN HUMAN 12 UNIT(S)/HR: 100 INJECTION, SOLUTION SUBCUTANEOUS at 02:30

## 2021-09-10 RX ADMIN — FENTANYL CITRATE 100 MICROGRAM(S): 50 INJECTION INTRAVENOUS at 15:50

## 2021-09-10 RX ADMIN — Medication 100 MILLIGRAM(S): at 15:41

## 2021-09-10 RX ADMIN — Medication 100 MILLIEQUIVALENT(S): at 10:50

## 2021-09-10 RX ADMIN — HEPARIN SODIUM 5000 UNIT(S): 5000 INJECTION INTRAVENOUS; SUBCUTANEOUS at 15:40

## 2021-09-10 RX ADMIN — FENTANYL CITRATE 100 MICROGRAM(S): 50 INJECTION INTRAVENOUS at 15:39

## 2021-09-10 RX ADMIN — HEPARIN SODIUM 1100 UNIT(S)/HR: 5000 INJECTION INTRAVENOUS; SUBCUTANEOUS at 19:50

## 2021-09-10 RX ADMIN — CHLORHEXIDINE GLUCONATE 1 APPLICATION(S): 213 SOLUTION TOPICAL at 03:00

## 2021-09-10 RX ADMIN — PIPERACILLIN AND TAZOBACTAM 25 GRAM(S): 4; .5 INJECTION, POWDER, LYOPHILIZED, FOR SOLUTION INTRAVENOUS at 05:50

## 2021-09-10 RX ADMIN — Medication 100 MILLIGRAM(S): at 09:16

## 2021-09-10 RX ADMIN — FENTANYL CITRATE 3.03 MICROGRAM(S)/KG/HR: 50 INJECTION INTRAVENOUS at 15:41

## 2021-09-10 RX ADMIN — Medication 100 MILLIEQUIVALENT(S): at 02:50

## 2021-09-10 RX ADMIN — Medication 100 MILLIGRAM(S): at 22:31

## 2021-09-10 RX ADMIN — Medication 100 MILLIEQUIVALENT(S): at 04:37

## 2021-09-10 RX ADMIN — HEPARIN SODIUM 5000 UNIT(S): 5000 INJECTION INTRAVENOUS; SUBCUTANEOUS at 05:50

## 2021-09-10 RX ADMIN — SODIUM CHLORIDE 125 MILLILITER(S): 9 INJECTION, SOLUTION INTRAVENOUS at 09:48

## 2021-09-10 RX ADMIN — SODIUM CHLORIDE 1000 MILLILITER(S): 9 INJECTION, SOLUTION INTRAVENOUS at 01:14

## 2021-09-10 NOTE — ED ADULT NURSE REASSESSMENT NOTE - NS ED NURSE REASSESS COMMENT FT1
Patient placed on insulin drip, patient FS prior to insulin drip was HI- repeated twice. Patient in no acute distress, patient vitals are within normal limits. Patient is pending CT scan- delay in CTscan was due to lab work not resulting, CT now changing shift and new shift to take patient. ICU is pending to see the patient.

## 2021-09-10 NOTE — PROVIDER CONTACT NOTE (EICU) - ASSESSMENT
1) Necrotic left foot/dry gangrene.  2) High anion gap metabolic acidosis  3) Diabetic ketoacidosis  4) Lactic acidosis  5) Acute renal failure  6) Mild rhabdomyolysis

## 2021-09-10 NOTE — H&P ADULT - HISTORY OF PRESENT ILLNESS
64yo F with PMH of IDDM, HTN, HLD, diabetic retinopathy [facility] by her family at home. Pt was last seen by her family 2 days ago. Per daughter pt had a wound on her left foot from glass 1-2 weeks ago, which blistered and eventually popped. Subsequently noted to have worsening necrosis of foot. Pt saw her PMD who referred her for outpatient podiatry visit; has not had podiatry appt yet. Daughter last spoke to pt over telephone on Wednesday at which time she sounded altered and mumbled but was answering "I'm fine". Pt lives alone at home; no pets, no smoking/EtOH/illicit drugs.   In ED, pt with open wound of Lt dorsal foot with necrotic 2nd toe. Pt remained with altered mental status on arrival. Labs show hyperglycemia to 1466, serum bicarb 6, AG 39. Also noted to have troponinemia 0.468, lateral TWIs on EKG. Per vascular surgery consult by ED, no immediate OR intervention indicated; recommended pt to be seen by in-house vascular in the morning.

## 2021-09-10 NOTE — H&P ADULT - NSHPSOCIALHISTORY_GEN_ALL_CORE
Pt lives alone at home. No pets. No EtOH/tobacco/illicit drug use per daughter. 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

## 2021-09-10 NOTE — H&P ADULT - ASSESSMENT
66yo F with PMH of IDDM, HTN, HLD, diabetic retinopathy presents to ED [facility] after being found unresponsive by her family at home. Found to be in DKA, with gangrenous left foot wound.  Per vascular surgery consult by ED, no immediate OR intervention indicated. Will admit to ICU for DKA, sepsis 2/2 gangrenous left foot wound, GEOVANNI, lactic acidosis, rhabdomyolysis, and troponinemia.     -Neuro: Pt found unresponsive but per ED nurse & attending mental status now somewhat improved since initial presentation in ED. AMS likely 2/2 acute toxic metabolic encephalopathy and septic encephalopathy. CT head performed; official read pending.   -Cardio: Hemodynamically stable at present; maintain MAP>65. Tachycardia likely 2/2 dehydration in setting of DKA +/- sepsis/SIRS. May resume low dose beta-blocker if BP continues to be stable. Mild troponinemia, trending up 0.46 -> 0.73; possible demand ischemia. EKG shows lateral TWIs; no prior EKG available on record. F/u repeat EKG, trend cardiac enzymes. Continue home statin when taking PO meds.  -Resp: Protecting airway; saturating 100% on room air. CXR grossly clear.   -GI: NPO for now while on insulin gtt and given AMS. LFTs elevated. CT A/P performed, f/u official read. Per outpatient documents in chart, pt was supposed to see gastroenterologist outpatient but unclear why. F/u with PMD during office hours.   -Renal: GEOVANNI, unclear whether any underlying CKD. Likely prerenal azotemia 2/2 dehydration in setting of DKA. Mild rhabdomyolysis after being found unresponsive with unknown downtime. Continue aggressive fluid resuscitation, trend BUN/Cr/CK, monitor UO, avoid nephrotoxic agents. Frequent lytes monitoring and aggressive repletion while on insulin gtt.   -ID: Presumed sepsis 2/2 left foot infection with necrosis/dry gangrene. CT LLE performed; f/u official read. Continue empiric vanco/zosyn for now. Will consider adding anaerobic coverage pending CT results. F/u culture data, trend temp curve.   -Heme: HSC for DVT ppx given GEOVANNI. Normocytic anemia; trend H/H, transfuse prn to maintain Hgb>7.   -Endocrine: DKA, on insulin gtt. q1h fingersticks, frequent BMPs, aggressive electrolyte repletion & fluid resuscitation. When anion gap closed, will bridge to long-acting insulin. F/u A1C; will need endocrine consult.    -MSK/Vascular: Left foot necrotic/dry gangrene infection. Vascular consulted by ED (Dr. Horn), determined no urgent surgical intervention indicated & recommended pt may be seen by in-house vascular attending in AM. Will get podiatry consult as well.   -Dispo: Admit to ICU    Case discussed with eICU attending Dr. Marquez.  64yo F with PMH of IDDM, HTN, HLD, diabetic retinopathy presents to ED [facility] after being found unresponsive by her family at home. Found to be in DKA, with gangrenous left foot wound.  Per vascular surgery consult by ED, no immediate OR intervention indicated. Will admit to ICU for DKA, sepsis 2/2 gangrenous left foot wound, GEOVANNI, lactic acidosis, rhabdomyolysis, and troponinemia.     -Neuro: Pt found unresponsive but per ED nurse & attending mental status now somewhat improved since initial presentation in ED. AMS likely 2/2 acute toxic metabolic encephalopathy and septic encephalopathy. CT head performed; official read pending.   -Cardio: Hemodynamically stable at present; maintain MAP>65. Tachycardia likely 2/2 dehydration in setting of DKA +/- sepsis/SIRS. May resume low dose beta-blocker if BP continues to be stable. Mild troponinemia, trending up 0.46 -> 0.73; possible demand ischemia. EKG shows lateral TWIs; no prior EKG available on record. F/u repeat EKG, trend cardiac enzymes. Continue home statin when taking PO meds.  -Resp: Protecting airway; saturating 100% on room air. CXR grossly clear.   -GI: NPO for now while on insulin gtt and given AMS. LFTs elevated. CT A/P performed, f/u official read. Per outpatient documents in chart, pt was supposed to see gastroenterologist outpatient but unclear why. F/u with PMD during office hours.   -Renal: GEOVANNI, unclear whether any underlying CKD. Likely prerenal azotemia 2/2 dehydration in setting of DKA. Mild rhabdomyolysis after being found unresponsive with unknown downtime. Continue aggressive fluid resuscitation, trend BUN/Cr/CK, monitor UO, avoid nephrotoxic agents. Frequent lytes monitoring and aggressive repletion while on insulin gtt.   -ID: Presumed sepsis 2/2 left foot infection with necrosis/dry gangrene. CT LLE performed; f/u official read. Continue empiric vanco/zosyn for now. Will consider adding anaerobic coverage pending CT results. F/u culture data, trend temp curve.   -Heme: HSC for DVT ppx given GEOVANNI. Normocytic anemia; trend H/H, transfuse prn to maintain Hgb>7.   -Endocrine: DKA, on insulin gtt. q1h fingersticks, frequent BMPs, aggressive electrolyte repletion & fluid resuscitation. When anion gap closed, will bridge to long-acting insulin. F/u A1C; will need endocrine consult.    -MSK/Vascular: Left foot necrotic/dry gangrene infection. Vascular consulted by ED (Dr. Horn), determined no urgent surgical intervention indicated & recommended pt may be seen by in-house vascular attending in AM. On-call vascular surgeon called again by ICU after pt accepted; no answer. Will get podiatry consult as well.   -Dispo: Admit to ICU    Case discussed with eICU attending Dr. Marquez.  64yo F with PMH of IDDM, HTN, HLD, diabetic retinopathy presents to ED [facility] after being found unresponsive by her family at home. Found to be in DKA, with gangrenous left foot wound.  Per vascular surgery consult by ED, no immediate OR intervention indicated. Will admit to ICU for DKA, sepsis 2/2 gangrenous left foot wound, GEOVANNI, lactic acidosis, rhabdomyolysis, and troponinemia.     -Neuro: Pt found unresponsive but per ED nurse & attending mental status now somewhat improved since initial presentation in ED. AMS likely 2/2 acute toxic metabolic encephalopathy and septic encephalopathy. CT head performed; official read pending.   -Cardio: Hemodynamically stable at present; maintain MAP>65. Tachycardia likely 2/2 dehydration in setting of DKA +/- sepsis/SIRS. May resume low dose beta-blocker if BP continues to be stable. Mild troponinemia, trending up 0.46 -> 0.73; possible demand ischemia. EKG shows lateral TWIs; no prior EKG available on record. F/u repeat EKG, trend cardiac enzymes. Continue home statin when taking PO meds.  -Resp: Protecting airway; saturating 100% on room air. CXR grossly clear.   -GI: NPO for now while on insulin gtt and given AMS. LFTs elevated. CT A/P performed, f/u official read. Per outpatient documents in chart, pt was supposed to see gastroenterologist outpatient but unclear why. F/u with PMD during office hours.   -Renal: GEOVANNI, unclear whether any underlying CKD. Likely prerenal azotemia 2/2 dehydration in setting of DKA. Mild rhabdomyolysis after being found unresponsive with unknown downtime. Continue aggressive fluid resuscitation, trend BUN/Cr/CK, monitor UO, avoid nephrotoxic agents. Frequent lytes monitoring and aggressive repletion while on insulin gtt.   -ID: Presumed sepsis 2/2 left foot infection with necrosis/gangrene. CT LLE performed; f/u official read. Continue empiric vanco/zosyn for now. Will consider adding anaerobic coverage pending CT results. F/u culture data, trend temp curve.   -Heme: HSC for DVT ppx given GEOVANNI. Normocytic anemia; trend H/H, transfuse prn to maintain Hgb>7.   -Endocrine: DKA, on insulin gtt. q1h fingersticks, frequent BMPs, aggressive electrolyte repletion & fluid resuscitation. When anion gap closed, will bridge to long-acting insulin. F/u A1C; will need endocrine consult.    -MSK/Vascular: Left foot necrotic/gangrene infection. Vascular consulted by ED (Dr. Horn), determined no urgent surgical intervention indicated & recommended pt may be seen by in-house vascular attending in AM. On-call vascular surgeon called again by ICU after pt accepted; no answer. Will get podiatry consult as well.   -Dispo: Admit to ICU    Case discussed with eICU attending Dr. Marquez.

## 2021-09-10 NOTE — CONSULT NOTE ADULT - PROBLEM SELECTOR RECOMMENDATION 9
Await OR- the crucial intervention  F/U Cx data  Hope to limit clindamycin use here once source control achieved  Continue Zosyn  For now, vancomycin by levels, redose if 'trough' <20

## 2021-09-10 NOTE — PROVIDER CONTACT NOTE (EICU) - BACKGROUND
65F with hypertension, hyperlipidemia, diabetes founds to be unresponsive, last seen 1.5 days ago.  Developed foot wound 1 - 2 weeks ago due to a cut from glass that has progressively worsened. Foot found to be gangrenous as necrotic. No erythema or tracking to be concerned for necrotizing fasciitis. Also with high anion gap metabolic acidosis in setting of DKA, GEOVANNI, lactic acidosis.

## 2021-09-10 NOTE — BRIEF OPERATIVE NOTE - OPERATION/FINDINGS
After removal of all non viable soft tissue, skin and bone there was a collection of dishwater pus (8-10cc) inferior to the talus. Proximal bone and soft tissue was of good quality

## 2021-09-10 NOTE — BRIEF OPERATIVE NOTE - COMMENTS
High concern for residual infection and high concern fro viability. Dishwater pus was tracking up the PT tendon, fascial planes were intact proximally. Pt will most likely need a proximal amputation in the future.

## 2021-09-10 NOTE — H&P ADULT - ATTENDING COMMENTS
65F w/ DM, HTN, HLD, diabtic retinopathy. Found to be unresponsive/AMS at home. In ED, pt in DKA w/ gangrenous L foot (had recent wound to foot 2 weeks ago with increasing necrosis. Admitted to ICU w/ DKA, GEOVANNI, sepsis, gangrenous L foot, rhabdomyolysis, and troponemia.     #Neuro - pt is altered, likely in setting of sepsis and DKA; pain control as needed  #CV - noted to have elevated troponins w/ EKG showing TWI in inferior and lateral leads concerning for possible ACS/NSTEMI- will get TTE, trend cardiac enzymes and start heparin gtt  #Pulm - satting well on RA  #ID- concern for necrotizing fasciitis which is confirmed on CT officially read by radiology - currently on zosyn and clinda and given vancomycin x1; will need to check vanco level as renal function is improving and pt has good UOP; get ID consult; podiatry/vascular consult for definitive surgical mgmt - podiatry at bedside during rounds and I&D performed at bedside and plan for OR today  #Renal/metabolic - GEOVANNI (unknown baseline) likely prerenal ATN, improving; UOP acceptable; replete electrolytes aggressively and f/u repeat BMP prior to OR  #GI- keep NPO for OR today   #Heme - pt anemic, unknown baseline; no active bleeding  #Endo - DKA on admission - on insulin gtt but k is low so will turn off insulin gtt until K is repleted; continue to monitor FS q2  #Skin - podiatry consult for OR for necrotizing fasciitis  #PPx - HSQ - plan to transition to heparin gtt later  #Dispo- prognosis guarded; admit to ICU for DKA and ID mgmt; full code for now

## 2021-09-10 NOTE — CONSULT NOTE ADULT - SUBJECTIVE AND OBJECTIVE BOX
CHIEF COMPLAINT:  Patient is a 65y old  Female who presents with a chief complaint of AMS, DKA, Necrotic foot infection (10 Sep 2021 10:07)      HPI:  66yo F with DM, HTN, HLD, diabetic retinopathy [facility] being found unresponsive by her family at home. Pt was last seen by her family 2 days ago. Per daughter pt had a wound on her left foot from glass 1-2 weeks ago, which blistered and eventually popped. Subsequently noted to have worsening necrosis of foot. Pt saw her PMD who referred her for outpatient podiatry visit; has not had podiatry appt yet. Daughter last spoke to pt over telephone on Wednesday at which time she sounded altered and mumbled but was answering "I'm fine". Pt lives alone at home; no pets, no smoking/EtOH/illicit drugs.   In ED, pt with open wound of Lt dorsal foot with necrotic 2nd toe. Pt remained with altered mental status on arrival. Labs show hyperglycemia to 1466, serum bicarb 6, AG 39. Also noted to have troponinemia 0.468, lateral TWIs on EKG. s/p vascular surgery.    ALLERGIES:  Allergies    avocado (Pruritus)  Carrots (Pruritus)  No Known Drug Allergies      Home Medications:    PAST MEDICAL & SURGICAL HISTORY:  Hypertension    Family history of diabetes mellitus    No significant past surgical history          FAMILY HISTORY:  n/a    SOCIAL HISTORY:  no tobacco use.    REVIEW OF SYSTEMS:  General:  No wt loss, fevers, chills, night sweats  Eyes:  Good vision, no reported pain  ENT:  No sore throat, pain, runny nose, dysphagia  CV:  No pain, palpitations, hypo/hypertension  Resp:  No dyspnea, cough, tachypnea, wheezing  GI:  No pain, nausea, vomiting, diarrhea, constipation  :  No pain, bleeding, incontinence, nocturia  Muscle:  No pain, weakness  Breast:  No pain, abscess, mass, discharge  Neuro:  No weakness, tingling, memory problems  Psych:  No fatigue, insomnia, mood problems, depression  Endocrine:  No polyuria, polydipsia, cold/heat intolerance  Heme:  No petechiae, ecchymosis, easy bruisability  Skin:  No rash, edema    PHYSICAL EXAM:  Vital Signs:  Vital Signs Last 24 Hrs  T(C): 36.6 (10 Sep 2021 13:34), Max: 36.9 (09 Sep 2021 21:25)  T(F): 97.8 (10 Sep 2021 13:34), Max: 98.5 (09 Sep 2021 21:25)  HR: 97 (10 Sep 2021 16:30) (68 - 139)  BP: 80/44 (10 Sep 2021 16:30) (80/44 - 149/73)  BP(mean): 53 (10 Sep 2021 16:30) (53 - 110)  RR: 20 (10 Sep 2021 16:30) (13 - 34)  SpO2: 100% (10 Sep 2021 16:30) (95% - 100%)  I&O's Summary    09 Sep 2021 07:  -  10 Sep 2021 07:00  --------------------------------------------------------  IN: 2079.1 mL / OUT: 2175 mL / NET: -95.9 mL    10 Sep 2021 07:  -  10 Sep 2021 17:13  --------------------------------------------------------  IN: 2258.1 mL / OUT: 400 mL / NET: 1858.1 mL      I&O's Detail    09 Sep 2021 07:01  -  10 Sep 2021 07:00  --------------------------------------------------------  IN:    Insulin: 45.1 mL    Insulin: 11 mL    IV PiggyBack: 350 mL    Lactated Ringers: 673 mL    Lactated Ringers Bolus: 1000 mL  Total IN: 2079.1 mL    OUT:    Indwelling Catheter - Urethral (mL): 2175 mL  Total OUT: 2175 mL    Total NET: -95.9 mL      10 Sep 2021 07:01  -  10 Sep 2021 17:13  --------------------------------------------------------  IN:    FentaNYL: 15.1 mL    Insulin: 22 mL    IV PiggyBack: 1000 mL    Lactated Ringers: 875 mL    PRBCs (Packed Red Blood Cells): 346 mL  Total IN: 2258.1 mL    OUT:    Indwelling Catheter - Urethral (mL): 400 mL  Total OUT: 400 mL    Total NET: 1858.1 mL          Tele:     Constitutional: well developed, normal appearance, well groomed, well nourished, no deformities and no acute distress.   Eyes: the conjunctiva exhibited no abnormalities and the eyelids demonstrated no xanthelasmas.   HEENT: normal oral mucosa, no oral pallor and no oral cyanosis.   Neck: normal jugular venous A waves present, normal jugular venous V waves present and no jugular venous miramontes A waves.   Pulmonary: no respiratory distress, normal respiratory rhythm and effort, no accessory muscle use and lungs were clear to auscultation bilaterally.   Cardiovascular: heart rate and rhythm were normal, normal S1 and S2 and no murmur, gallop, rub, heave or thrill are present.   Abdomen: soft, non-tender  Musculoskeletal: the gait could not be assessed.   Extremities: no clubbing of the fingernails, no localized cyanosis, no petechial hemorrhages and no ischemic changes.   Skin: normal skin color and pigmentation, no rash, no venous stasis, no skin lesions, no skin ulcer and no xanthoma was observed.       LABORATORY:                          9.2    12.02 )-----------( 367      ( 10 Sep 2021 15:42 )             27.1     09-10    154<H>  |  120<H>  |  49<H>  ----------------------------<  527<HH>  4.9   |  22  |  1.69<H>    Ca    8.7      10 Sep 2021 15:42  Phos  3.6     09-10  Mg     2.7     09-10    TPro  6.0  /  Alb  1.3<L>  /  TBili  0.4  /  DBili  x   /  AST  185<H>  /  ALT  67  /  AlkPhos  148<H>  09-10    ABG - ( 10 Sep 2021 14:17 )  pH, Arterial: x     pH, Blood: 7.35  /  pCO2: 39    /  pO2: 160   / HCO3: 21    / Base Excess: -3.5  /  SaO2: 99                CARDIAC MARKERS ( 10 Sep 2021 15:42 )  1.270 ng/mL / x     / x     / x     / x      CARDIAC MARKERS ( 10 Sep 2021 09:59 )  x     / x     / 8200 U/L / x     / x      CARDIAC MARKERS ( 10 Sep 2021 06:44 )  1.210 ng/mL / x     / 7070 U/L / x     / x      CARDIAC MARKERS ( 10 Sep 2021 01:32 )  .729 ng/mL / x     / x     / x     / x      CARDIAC MARKERS ( 09 Sep 2021 22:39 )  .468 ng/mL / x     / 2529 U/L / x     / 39.1 ng/mL      CAPILLARY BLOOD GLUCOSE      POCT Blood Glucose.: 449 mg/dL (10 Sep 2021 10:31)  POCT Blood Glucose.: 455 mg/dL (10 Sep 2021 08:38)  POCT Blood Glucose.: 400 mg/dL (10 Sep 2021 07:37)  POCT Blood Glucose.: 463 mg/dL (10 Sep 2021 06:29)  POCT Blood Glucose.: 494 mg/dL (10 Sep 2021 05:33)  POCT Blood Glucose.: 569 mg/dL (10 Sep 2021 04:22)  POCT Blood Glucose.: >600 mg/dL (10 Sep 2021 03:25)  POCT Blood Glucose.: >600 mg/dL (10 Sep 2021 02:23)  POCT Blood Glucose.: >600 mg/dL (10 Sep 2021 01:03)  POCT Blood Glucose.: >600 mg/dL (09 Sep 2021 23:56)    LIVER FUNCTIONS - ( 10 Sep 2021 15:42 )  Alb: 1.3 g/dL / Pro: 6.0 gm/dL / ALK PHOS: 148 U/L / ALT: 67 U/L / AST: 185 U/L / GGT: x           PT/INR - ( 09 Sep 2021 22:22 )   PT: 17.6 sec;   INR: 1.55 ratio         PTT - ( 09 Sep 2021 22:22 )  PTT:25.2 sec  Urinalysis Basic - ( 09 Sep 2021 22:22 )    Color: Yellow / Appearance: Slightly Turbid / S.010 / pH: x  Gluc: x / Ketone: Large  / Bili: Negative / Urobili: Negative mg/dL   Blood: x / Protein: 30 mg/dL / Nitrite: Negative   Leuk Esterase: Negative / RBC: 0-2 /HPF / WBC 0-2   Sq Epi: x / Non Sq Epi: x / Bacteria: Moderate      BNP    IMAGING:  < from: 12 Lead ECG (09.10.21 @ 04:08) >  Sinus tachycardia with premature atrial complexes  Abnormal QRS-T angle, consider primary T wave abnormality  Abnormal ECG  When compared with ECG of 09-SEP-2021 21:39,  premature atrial complexes are now present  T wave inversion less evident in Inferior leads    < end of copied text >    < from: CT Lower Extremity No Cont, Left (09.10.21 @ 02:21) >  Findings consistent with necrotizing infection within the plantar soft tissues of the left foot extending from the level of the first/second toes proximally to the level of the anterior calcaneus. The medial collateral extent of this area of necrotizing infection extends from the first metatarsal to the level of the fourth metatarsal. No evidence of air within the soft tissues proximal to this region.    Atrophy of the soft tissues about the second toe with mottled intraosseous air extending into the second proximal phalanx also consistent with necrotizing infection.    < end of copied text >      ASSESSMENT:  66yo F with DM, HTN, HLD, diabetic retinopathy presents to ED [facility] after being found unresponsive by her family at home. Pt was last seen by her family 2 days ago. Per daughter pt had a wound on her left foot from glass 1-2 weeks ago, which blistered and eventually popped. Subsequently noted to have worsening necrosis of foot. Pt saw her PMD who referred her for outpatient podiatry visit; has not had podiatry appt yet. Daughter last spoke to pt over telephone on Wednesday at which time she sounded altered and mumbled but was answering "I'm fine". Pt lives alone at home; no pets, no smoking/EtOH/illicit drugs.   In ED, pt with open wound of Lt dorsal foot with necrotic 2nd toe. Pt remained with altered mental status on arrival. Labs show hyperglycemia to 1466, serum bicarb 6, AG 39. Also noted to have troponinemia 0.468, lateral TWIs on EKG. s/p vascular surgery.    PLAN:     Critical care/mechanical ventilatory support  MEDICATIONS  (STANDING):  chlorhexidine 2% Cloths 1 Application(s) Topical <User Schedule>  clindamycin IVPB      clindamycin IVPB 900 milliGRAM(s) IV Intermittent every 8 hours  fentaNYL   Infusion. 0.5 MICROgram(s)/kG/Hr (3.03 mL/Hr) IV Continuous <Continuous>  heparin   Injectable 5000 Unit(s) SubCutaneous every 8 hours  insulin regular Infusion 12 Unit(s)/Hr (12 mL/Hr) IV Continuous <Continuous>  lactated ringers. 1000 milliLiter(s) (125 mL/Hr) IV Continuous <Continuous>  piperacillin/tazobactam IVPB.. 3.375 Gram(s) IV Intermittent every 8 hours  vancomycin  IVPB        add asa 81 mg daily.  ok to add IV Heparin for nstemi  monitor PTT and labs closely.  add statin.  no bb/acei/arb with low blood pressure.    Giovanni Barlow MD, FACC, LUCI, SUNITHA, FACP  Director, Heart Failure Services  [facility]  , Department of Cardiology  [facility]

## 2021-09-10 NOTE — H&P ADULT - NSHPPHYSICALEXAM_GEN_ALL_CORE
PHYSICAL EXAM:    GENERAL: Pt lying in stretcher in NAD,  HEENT: NCAT, PERRL, dry mucous membranes. Neck supple, FROM, no JVD.   NERVOUS SYSTEM:  Altered mental status, moving all four extremities.  CHEST/LUNG: CTABL, no w/r/r  HEART: +S1S2, tachycardic, regular rhythm, no m/r/g  ABDOMEN: Soft, nontender, nondistended; +BS  EXTREMITIES:  LLE with +DP pulse, no PT pulse palpated. Lt foot with open wound on dorsal aspect; muscle and tendon exposed. +edema and erythema. Lt 2nd toe necrotic.

## 2021-09-10 NOTE — CONSULT NOTE ADULT - SUBJECTIVE AND OBJECTIVE BOX
Podiatry pager #: [phone]/ [phone]    Patient is a 65y old  Female who presents with a chief complaint of AMS, DKA, Necrotic foot infection (10 Sep 2021 01:14)      HPI:  64yo F with PMH of IDDM, HTN, HLD, diabetic retinopathy presents to ED BIBEMS after being found unresponsive by her family at home. Pt was last seen by her family 2 days ago. Per daughter pt had a wound on her left foot from glass 1-2 weeks ago, which blistered and eventually popped. Subsequently noted to have worsening necrosis of foot. Pt saw her PMD who referred her for outpatient podiatry visit; has not had podiatry appt yet. Daughter last spoke to pt over telephone on Wednesday at which time she sounded altered and mumbled but was answering "I'm fine". Pt lives alone at home; no pets, no smoking/EtOH/illicit drugs.   In ED, pt with open wound of Lt dorsal foot with necrotic 2nd toe. Pt remained with altered mental status on arrival. Labs show hyperglycemia to 1466, serum bicarb 6, AG 39. Also noted to have troponinemia 0.468, lateral TWIs on EKG. Per vascular surgery consult by ED, no immediate OR intervention indicated; recommended pt to be seen by in-house vascular in the morning.  (10 Sep 2021 01:14)      PAST MEDICAL & SURGICAL HISTORY:      MEDICATIONS  (STANDING):  chlorhexidine 2% Cloths 1 Application(s) Topical <User Schedule>  clindamycin IVPB      clindamycin IVPB 900 milliGRAM(s) IV Intermittent every 8 hours  heparin   Injectable 5000 Unit(s) SubCutaneous every 8 hours  insulin regular Infusion 12 Unit(s)/Hr (12 mL/Hr) IV Continuous <Continuous>  lactated ringers. 1000 milliLiter(s) (125 mL/Hr) IV Continuous <Continuous>  piperacillin/tazobactam IVPB.. 3.375 Gram(s) IV Intermittent every 8 hours  potassium chloride  10 mEq/100 mL IVPB 10 milliEquivalent(s) IV Intermittent every 1 hour    MEDICATIONS  (PRN):      Allergies    No Known Allergies    Intolerances        VITALS:    Vital Signs Last 24 Hrs  T(C): 36.4 (10 Sep 2021 06:30), Max: 36.9 (09 Sep 2021 21:25)  T(F): 97.6 (10 Sep 2021 06:30), Max: 98.5 (09 Sep 2021 21:25)  HR: 107 (10 Sep 2021 09:00) (107 - 139)  BP: 121/58 (10 Sep 2021 09:00) (96/50 - 149/73)  BP(mean): 71 (10 Sep 2021 09:00) (66 - 110)  RR: 27 (10 Sep 2021 09:00) (23 - 34)  SpO2: 99% (10 Sep 2021 09:00) (98% - 100%)    LABS:                          7.3    12.71 )-----------( 384      ( 10 Sep 2021 05:47 )             21.5       09-10    155<H>  |  119<H>  |  56<H>  ----------------------------<  475<HH>  3.3<L>   |  24  |  2.57<H>    Ca    9.4      10 Sep 2021 06:44  Phos  1.1     09-10  Mg     2.8     09-10    TPro  6.1  /  Alb  1.4<L>  /  TBili  0.3  /  DBili  x   /  AST  253<H>  /  ALT  64  /  AlkPhos  161<H>  09-10      CAPILLARY BLOOD GLUCOSE      POCT Blood Glucose.: 455 mg/dL (10 Sep 2021 08:38)  POCT Blood Glucose.: 400 mg/dL (10 Sep 2021 07:37)  POCT Blood Glucose.: 463 mg/dL (10 Sep 2021 06:29)  POCT Blood Glucose.: 494 mg/dL (10 Sep 2021 05:33)  POCT Blood Glucose.: 569 mg/dL (10 Sep 2021 04:22)  POCT Blood Glucose.: >600 mg/dL (10 Sep 2021 03:25)  POCT Blood Glucose.: >600 mg/dL (10 Sep 2021 02:23)  POCT Blood Glucose.: >600 mg/dL (10 Sep 2021 01:03)  POCT Blood Glucose.: >600 mg/dL (09 Sep 2021 23:56)      PT/INR - ( 09 Sep 2021 22:22 )   PT: 17.6 sec;   INR: 1.55 ratio         PTT - ( 09 Sep 2021 22:22 )  PTT:25.2 sec    LOWER EXTREMITY PHYSICAL EXAM:    Vasular: RF DP 1/4, PT NP, LF NP pulses, B/L,Temperature gradient LF warm to touch   Neuro: Epicritic sensation absent to the level of digits B/L.  Musculoskeletal/Ortho: Unremarkable    Left foot full thickness wet gangrene to forefoot extending to midfoot, wound tracks proximally to level of ankle dorsally and plantarly to the calcaneus, purulent drainage noted, and malodor, abscess noted at plantar foot w/ incision to level of subQ using a 15 blade    RADIOLOGY & ADDITIONAL STUDIES:

## 2021-09-10 NOTE — CONSULT NOTE ADULT - SUBJECTIVE AND OBJECTIVE BOX
[facility] NEPHROLOGY SERVICES, [facility]  NEPHROLOGY AND HYPERTENSION  [address]  [address]  [place] [address]  [phone]    MD JUANITA CHEUGN MD ANDREY GONCHARUK, MD MADHU KORRAPATI, MD YELENA ROSENBERG, MD BINNY KOSHY, MD CHRISTOPHER CAPUTO, MD EDWARD BOVER, MD      Information from chart:  "Patient is a 65y old  Female who presents with a chief complaint of AMS, DKA, Necrotic foot infection (10 Sep 2021 17:12)    HPI:  64yo F with PMH of IDDM, HTN, HLD, diabetic retinopathy presents to ED BIBEMS after being found unresponsive by her family at home. Pt was last seen by her family 2 days ago. Per daughter pt had a wound on her left foot from glass 1-2 weeks ago, which blistered and eventually popped. Subsequently noted to have worsening necrosis of foot. Pt saw her PMD who referred her for outpatient podiatry visit; has not had podiatry appt yet. Daughter last spoke to pt over telephone on Wednesday at which time she sounded altered and mumbled but was answering "I'm fine". Pt lives alone at home; no pets, no smoking/EtOH/illicit drugs.   In ED, pt with open wound of Lt dorsal foot with necrotic 2nd toe. Pt remained with altered mental status on arrival. Labs show hyperglycemia to 1466, serum bicarb 6, AG 39. Also noted to have troponinemia 0.468, lateral TWIs on EKG. Per vascular surgery consult by ED, no immediate OR intervention indicated; recommended pt to be seen by in-house vascular in the morning.  (10 Sep 2021 01:14)   "      Renal indices improving with IVF; IV abx    PAST MEDICAL & SURGICAL HISTORY:  Hypertension    Family history of diabetes mellitus    No significant past surgical history      FAMILY HISTORY:    Allergies    avocado (Pruritus)  Carrots (Pruritus)  No Known Drug Allergies    Intolerances      Home Medications:    MEDICATIONS  (STANDING):  chlorhexidine 2% Cloths 1 Application(s) Topical <User Schedule>  clindamycin IVPB      clindamycin IVPB 900 milliGRAM(s) IV Intermittent every 8 hours  fentaNYL   Infusion. 0.5 MICROgram(s)/kG/Hr (3.03 mL/Hr) IV Continuous <Continuous>  heparin  Infusion.  Unit(s)/Hr (11 mL/Hr) IV Continuous <Continuous>  insulin regular Infusion 12 Unit(s)/Hr (12 mL/Hr) IV Continuous <Continuous>  lactated ringers. 1000 milliLiter(s) (125 mL/Hr) IV Continuous <Continuous>  piperacillin/tazobactam IVPB.. 3.375 Gram(s) IV Intermittent every 8 hours  vancomycin  IVPB        MEDICATIONS  (PRN):  acetaminophen   Tablet .. 650 milliGRAM(s) Oral every 6 hours PRN Mild Pain (1 - 3)    Vital Signs Last 24 Hrs  T(C): 36.3 (10 Sep 2021 19:00), Max: 36.7 (10 Sep 2021 10:37)  T(F): 97.4 (10 Sep 2021 19:00), Max: 98 (10 Sep 2021 10:37)  HR: 109 (10 Sep 2021 23:14) (68 - 134)  BP: 87/53 (10 Sep 2021 23:00) (80/44 - 149/73)  BP(mean): 60 (10 Sep 2021 23:00) (53 - 110)  RR: 22 (10 Sep 2021 23:14) (13 - 34)  SpO2: 100% (10 Sep 2021 23:14) (95% - 100%)  Mode: AC/ CMV (Assist Control/ Continuous Mandatory Ventilation)  RR (machine): 12  TV (machine): 400  FiO2: 30  PEEP: 5  ITime: 0.9  MAP: 9  PIP: 20    Daily     Daily     21 @ :01  -  09-10-21 @ 07:00  --------------------------------------------------------  IN: 2079.1 mL / OUT: 2175 mL / NET: -95.9 mL    09-10-21 @ 07:  -  09-10-21 @ 23:38  --------------------------------------------------------  IN: 3837.1 mL / OUT: 1100 mL / NET: 2737.1 mL      CAPILLARY BLOOD GLUCOSE      POCT Blood Glucose.: 358 mg/dL (10 Sep 2021 22:36)  POCT Blood Glucose.: 409 mg/dL (10 Sep 2021 21:34)  POCT Blood Glucose.: 534 mg/dL (10 Sep 2021 20:52)  POCT Blood Glucose.: 459 mg/dL (10 Sep 2021 19:38)  POCT Blood Glucose.: 490 mg/dL (10 Sep 2021 18:43)  POCT Blood Glucose.: 483 mg/dL (10 Sep 2021 17:35)  POCT Blood Glucose.: 449 mg/dL (10 Sep 2021 10:31)  POCT Blood Glucose.: 455 mg/dL (10 Sep 2021 08:38)  POCT Blood Glucose.: 400 mg/dL (10 Sep 2021 07:37)  POCT Blood Glucose.: 463 mg/dL (10 Sep 2021 06:29)  POCT Blood Glucose.: 494 mg/dL (10 Sep 2021 05:33)  POCT Blood Glucose.: 569 mg/dL (10 Sep 2021 04:22)  POCT Blood Glucose.: >600 mg/dL (10 Sep 2021 03:25)  POCT Blood Glucose.: >600 mg/dL (10 Sep 2021 02:23)  POCT Blood Glucose.: >600 mg/dL (10 Sep 2021 01:03)  POCT Blood Glucose.: >600 mg/dL (09 Sep 2021 23:56)    PHYSICAL EXAM:      T(C): 36.3 (09-10-21 @ 19:00), Max: 36.7 (09-10-21 @ 10:37)  HR: 109 (09-10-21 @ 23:14) (68 - 134)  BP: 87/53 (09-10-21 @ 23:00) (80/44 - 149/73)  RR: 22 (09-10-21 @ 23:14) (13 - 34)  SpO2: 100% (09-10-21 @ 23:14) (95% - 100%)  Wt(kg): --  Lungs clear  Heart S1S2  Abd soft NT ND  Extremities:   dressed left foot edema              09-10    154<H>  |  120<H>  |  49<H>  ----------------------------<  527<HH>  4.9   |  22  |  1.69<H>    Ca    8.7      10 Sep 2021 15:42  Phos  3.6     09-10  Mg     2.7     09-10    TPro  6.0  /  Alb  1.3<L>  /  TBili  0.4  /  DBili  x   /  AST  185<H>  /  ALT  67  /  AlkPhos  148<H>  09-10                          9.2    12.02 )-----------( 367      ( 10 Sep 2021 15:42 )             27.1     Creatinine Trend: 1.69<--, 2.19<--, 2.57<--, 3.26<--, 3.42<--  Urinalysis Basic - ( 09 Sep 2021 22:22 )    Color: Yellow / Appearance: Slightly Turbid / S.010 / pH: x  Gluc: x / Ketone: Large  / Bili: Negative / Urobili: Negative mg/dL   Blood: x / Protein: 30 mg/dL / Nitrite: Negative   Leuk Esterase: Negative / RBC: 0-2 /HPF / WBC 0-2   Sq Epi: x / Non Sq Epi: x / Bacteria: Moderate      ABG - ( 10 Sep 2021 14:17 )  pH, Arterial: x     pH, Blood: 7.35  /  pCO2: 39    /  pO2: 160   / HCO3: 21    / Base Excess: -3.5  /  SaO2: 99                    Assessment   GEOVANNI DKA: gas gangrene left foot  Pre renal azotemia; ATN risk for infectious GN    Plan  IVF; IV abx;   For OR today  Will follow course     Jamison León MD

## 2021-09-10 NOTE — PROVIDER CONTACT NOTE (EICU) - RECOMMENDATIONS
1) Admit to ICU  2) Stat repeat labs, including ABG, lactate  3) Follow up CT   3) Continue vancomycin/Zosyn. Follow up cultures.  4) IVF/Insulin gtt/Q1 hour FS  5) NPO

## 2021-09-10 NOTE — CONSULT NOTE ADULT - SUBJECTIVE AND OBJECTIVE BOX
Full note to follow  Gas gangrene  For emergent chopart amputation for source control  Eventual BKA  Severe sepsis  GEOVANNI  DKA  Rhabdomyolysis  Current antibiotics appropriate  Vanco/levels  Continue zosyn  Once source control achieved, hope to limit clindamycin use  F/U Cx data  D/W CCM team in detail.  D/W Podiatry resident  Thank you for the courtesy of this referral.  Cornel Johansen MD  Attending Physician  [facility]  Division of Infectious Diseases  [phone]  ---------------------  [facility]  Division of Infectious Diseases  [phone]    NELSON ZEPEDA  65y, Female  [id number]    HPI--        PMH/PSH--      Allergies--  No Known Allergies      Medications--  Antibiotics: clindamycin IVPB      clindamycin IVPB 900 milliGRAM(s) IV Intermittent every 8 hours  piperacillin/tazobactam IVPB.. 3.375 Gram(s) IV Intermittent every 8 hours    Immunologic:   Other: chlorhexidine 2% Cloths  heparin   Injectable  insulin regular Infusion  lactated ringers.  potassium chloride  10 mEq/100 mL IVPB    Antimicrobials last 90 days per EMR: MEDICATIONS  (STANDING):    clindamycin IVPB   100 mL/Hr IV Intermittent (09-10-21 @ 09:16)    piperacillin/tazobactam IVPB.   200 mL/Hr IV Intermittent (09-09-21 @ 21:53)    piperacillin/tazobactam IVPB..   25 mL/Hr IV Intermittent (09-10-21 @ 05:50)    vancomycin  IVPB   250 mL/Hr IV Intermittent (09-09-21 @ 21:53)        Social History--  EtOH: denies   Tobacco: denies   Drug Use: denies     Family/Marital History--        Travel/Environmental/Occupational History:      Review of Systems:  A >=10-point review of systems was obtained.     Pertinent positives and negatives--  Constitutional: No fevers. No Chills. No Rigors.   Eyes:  ENMT:  Cardiovascular: No chest pain. No palpitations.  Respiratory: No shortness of breath. No cough.  Gastrointestinal: No nausea or vomiting. No diarrhea or constipation.   Genitourinary:  Musculoskeletal:  Skin:  Neurologic:  Psychiatric: Pleasant. Appropriate affect.  Endocrine:  Heme/Lymphatic:  Allergy/Immunologic:    Review of systems otherwise negative except as previously noted.    Physical Exam--  Vital Signs: T(F): 97.6 (09-10-21 @ 06:30), Max: 98.5 (09-09-21 @ 21:25)  HR: 107 (09-10-21 @ 09:00)  BP: 121/58 (09-10-21 @ 09:00)  RR: 27 (09-10-21 @ 09:00)  SpO2: 99% (09-10-21 @ 09:00)  Wt(kg): --  General: Nontoxic-appearing Female in no acute distress.  HEENT: AT/NC. PERRL. EOMI. Anicteric. Conjunctiva pink and moist. Oropharynx clear. Dentition fair.  Neck: Not rigid. No sense of mass.  Nodes: None palpable.  Lungs: Clear bilaterally without rales, wheezing or rhonchi  Heart: Regular rate and rhythm. No Murmur. No rub. No gallop. No palpable thrill.  Abdomen: Bowel sounds present and normoactive. Soft. Nondistended. Nontender. No sense of mass. No organomegaly.  Back: No spinal tenderness. No costovertebral angle tenderness.   Extremities: No cyanosis or clubbing. No edema.   Skin: Warm. Dry. Good turgor. No rash. No vasculitic stigmata.  Psychiatric: Appropriate affect and mood for situation.         Laboratory & Imaging Data--  CBC                        7.3    12.71 )-----------( 384      ( 10 Sep 2021 05:47 )             21.5       Chemistries  09-10    155<H>  |  119<H>  |  56<H>  ----------------------------<  475<HH>  3.3<L>   |  24  |  2.57<H>    Ca    9.4      10 Sep 2021 06:44  Phos  1.1     09-10  Mg     2.8     09-10    TPro  6.1  /  Alb  1.4<L>  /  TBili  0.3  /  DBili  x   /  AST  253<H>  /  ALT  64  /  AlkPhos  161<H>  09-10      Culture Data       Full note to follow  Gas gangrene  For emergent chopart amputation for source control  Eventual BKA  Severe sepsis  GEOVANNI  DKA  Rhabdomyolysis  Current antibiotics appropriate  Vanco/levels  Continue zosyn  Once source control achieved, hope to limit clindamycin use  F/U Cx data  D/W CCM team in detail.  D/W Podiatry resident  Thank you for the courtesy of this referral.  Cornel Johansen MD  Attending Physician  [facility]  Division of Infectious Diseases  [phone]  ---------------------  [facility]  Division of Infectious Diseases  [phone]    NELSON ZEPEDA  65y, Female  [id number]    HPI--  This is a 65-year-old woman who has a medical history significant for diabetes mellitus, dyslipidemia, high blood pressure who is poorly responsive and a nonhistorian. As per history of present illness:   66yo F with PMH of IDDM, HTN, HLD, diabetic retinopathy presents to ED BIBEMS after being found unresponsive by her family at home. Pt was last seen by her family 2 days ago. Per daughter pt had a wound on her left foot from glass 1-2 weeks ago, which blistered and eventually popped. Subsequently noted to have worsening necrosis of foot. Pt saw her PMD who referred her for outpatient podiatry visit; has not had podiatry appt yet. Daughter last spoke to pt over telephone on Wednesday at which time she sounded altered and mumbled but was answering "I'm fine". Pt lives alone at home; no pets, no smoking/EtOH/illicit drugs.   In ED, pt with open wound of Lt dorsal foot with necrotic 2nd toe. Pt remained with altered mental status on arrival. Labs show hyperglycemia to 1466, serum bicarb 6, AG 39. Also noted to have troponinemia 0.468, lateral TWIs on EKG. Per vascular surgery consult by ED, no immediate OR intervention indicated; recommended pt to be seen by in-house vascular in the morning.  (10 Sep 2021 01:14)    Subsequently gas gangrene noted on CT scan of lower extremity. Patient has been seen by podiatry this morning. Plan is for Chopart amputation for source control, with eventual BKA. Per discussion with the podiatry resident, there is infection tracking up to the ankle.    PMH/PSH--      Allergies--  No Known Allergies      Medications--  Antibiotics: clindamycin IVPB      clindamycin IVPB 900 milliGRAM(s) IV Intermittent every 8 hours  piperacillin/tazobactam IVPB.. 3.375 Gram(s) IV Intermittent every 8 hours    Immunologic:   Other: chlorhexidine 2% Cloths  heparin   Injectable  insulin regular Infusion  lactated ringers.  potassium chloride  10 mEq/100 mL IVPB    Antimicrobials last 90 days per EMR: MEDICATIONS  (STANDING):    clindamycin IVPB   100 mL/Hr IV Intermittent (09-10-21 @ 09:16)    piperacillin/tazobactam IVPB.   200 mL/Hr IV Intermittent (09-09-21 @ 21:53)    piperacillin/tazobactam IVPB..   25 mL/Hr IV Intermittent (09-10-21 @ 05:50)    vancomycin  IVPB   250 mL/Hr IV Intermittent (09-09-21 @ 21:53)        Social History--  EtOH: none known.  Tobacco: none known.  Drug use: none known.     Family/Marital History--  Unable      Travel/Environmental/Occupational History:  Unable    Review of Systems:  Unable    Physical Exam--  Vital Signs: T(F): 97.6 (09-10-21 @ 06:30), Max: 98.5 (09-09-21 @ 21:25)  HR: 107 (09-10-21 @ 09:00)  BP: 121/58 (09-10-21 @ 09:00)  RR: 27 (09-10-21 @ 09:00)  SpO2: 99% (09-10-21 @ 09:00)  Wt(kg): --  General: Nontoxic-appearing Female in no acute distress.  HEENT: AT/NC. Pupils/EOM unable secondary to patient compliance. Oropharynx/dentition unable secondary to patient compliance. Resists conj/sclerae  Neck: Not rigid. No sense of mass.  Nodes: None palpable.  Lungs: Poor effort grossly clear bilaterally  Heart: Tachy w regular rhythm. No Murmur. No rub. No gallop. No palpable thrill.  Abdomen: Bowel sounds present and normoactive. Soft. Nondistended. Nontender. No sense of mass. No organomegaly.  Back: No spinal tenderness. No costovertebral angle tenderness.   Extremities: L foot dressed. R foot warm, dry. No cyanosis or clubbing. No edema.   Skin: Warm. Dry. Good turgor. No rash. No vasculitic stigmata.  Psychiatric: Unable      Laboratory & Imaging Data--  CBC                        7.3    12.71 )-----------( 384      ( 10 Sep 2021 05:47 )             21.5       Chemistries  09-10    155<H>  |  119<H>  |  56<H>  ----------------------------<  475<HH>  3.3<L>   |  24  |  2.57<H>      Carbon Dioxide, Serum: 8 mmol/L (09.09.21 @ 22:39)  Creatinine, Serum: 3.42 mg/dL (09.09.21 @ 22:39)    Creatine Kinase, Serum: 8200: TYPE:(C=Critical, N=Notification, A=Abnormal) n  TESTS: ctni  DATE/TIME CALLED: 09/10/2021 11:01:45 EDT  CALLED TO: guadalupe hay rn  READ BACK (2 Patient Identifiers)(Y/N): y  READ BACK VALUES (Y/N): y  CALLED BY: wchin U/L (09.10.21 @ 09:59)    Lactate, Blood: 1.8 mmol/L (09-10-21 @ 01:32)  Lactate, Blood: 2.4 mmol/L (09-09-21 @ 21:45)    Blood Gas Profile - Arterial (09.09.21 @ 23:20)    pH, Blood: 7.17: TYPE:(C=Critical, N=Notification, A=Abnormal) c  TESTS: _ph  DATE/TIME CALLED: _09/09/2021 23:22:16 EDT  CALLED TO: Mikey Bucio  READ BACK (2 Patient Identifiers)(Y/N): _y  READ BACK VALUES (Y/N): _y  CALLED BY: prabhu connelly [facility]    Blood Gas Comments: site held    Blood Gas Source: Arterial    pCO2, Arterial: 18 mmHg    pO2, Arterial: 107 mmHg    HCO3, Arterial: 6 mmol/L    Base Excess, Arterial: -20.8 mmol/L    Oxygen Saturation, Arterial: 97 %    FIO2, Arterial: 100    Vancomycin Level, Trough: 9.0: Vancomycin trough levels should be rapidly reached and maintained at  15-20 ug/ml for life threatening MRSA  infections such as sepsis, endocarditis, osteomyelitis and pneumonia. A  first trough level should be drawn  before the 3rd or 4th dose.  Risk of renal toxicity is increased for levels >15 ug/ml, in patients on  other nephrotoxic drugs, who are  hemodynamically unstable, have unstable renal function, or are on  Vancomycin therapy for >14 days. Renal function with  creatinine levels should be monitored for those patients. ug/mL (09.10.21 @ 09:59)      Urinalysis (09.09.21 @ 22:22)    pH Urine: 7.0    Glucose Qualitative, Urine: 1000 mg/dL    Blood, Urine: Moderate    Color: Yellow    Urine Appearance: Slightly Turbid    Bilirubin: Negative    Ketone - Urine: Large    Specific Gravity: 1.010    Protein, Urine: 30 mg/dL    Urobilinogen: Negative mg/dL    Nitrite: Negative    Leukocyte Esterase Concentration: Negative  Urine Microscopic-Add On (NC) (09.09.21 @ 22:22)    Bacteria: Moderate    Red Blood Cell - Urine: 0-2 /HPF    White Blood Cell - Urine: 0-2      Ca    9.4      10 Sep 2021 06:44  Phos  1.1     09-10  Mg     2.8     09-10    TPro  6.1  /  Alb  1.4<L>  /  TBili  0.3  /  DBili  x   /  AST  253<H>  /  ALT  64  /  AlkPhos  161<H>  09-10      Culture Data  No data    < from: CT Lower Extremity No Cont, Left (09.10.21 @ 02:21) >  EXAM:  CT LWR EXT LT                        PROCEDURE DATE:  09/10/2021    INTERPRETATION:  HISTORY: Left lower extremity second digit necrosis with gangrene of the ankle anteriorly.  Helical CT imaging of the left lower extremity from the level of the distal femur at the level of the foot was performed without intravenous contrast. Sagittal and coronal reformats were provided.    Findings:  There is patchy mottled air within the plantar subcutaneous tissues extending from the first/second toes proximally to the level of the anterior calcaneus. At the level of the mid foot this area of mottled air extends within the plantar soft tissues from the level of the first metatarsal to the level of the fourth metatarsal. There is intraosseous air tracking into the proximal phalanx of the second toe. There is diffuse atrophy of the soft tissues about the second toe consistent with stated history of gangrene. These findings are consistent with necrotizing fasciitis and necrotizing infection within the second proximal phalanx. There is no tracking of air into the ankle or lower leg. No additional areas of osseous erosion or destruction are demonstrated within the imaged lower extremity.    There is also a dorsal wound along the mid foot extending from the base of the first metatarsal laterally and distally to the level of the fourth/fifth metatarsal phalangeal joints. Evaluation for underlying fluid collection is limited by the lack of intravenous contrast.    The knee joint spaces are preserved. There is no knee joint effusion. The visualized hindfoot articulations are intact. There is mild to moderate first metatarsophalangeal and hallux sesamoid joint arthrosis.    IMPRESSION:  Findings consistent with necrotizing infection within the plantar soft tissues of the left foot extending from the level of the first/second toes proximally to the level of the anterior calcaneus. The medial collateral extent of this area of necrotizing infection extends from the first metatarsal to the level of the fourth metatarsal. No evidence of air within the soft tissues proximal to this region.    Atrophy of the soft tissues about the second toe with mottled intraosseous air extending into the second proximal phalanx also consistent with necrotizing infection.    Findings discussed with nurse practitioner Cirilo on September 10, 2021 at 0828 hours.  --- End of Report ---  RAJESH FINNEGAN MD; Attending Radiologist  This document has been electronically signed. Sep 10 2021  8:30AM    < end of copied text >    < from: CT Abdomen and Pelvis No Cont (09.10.21 @ 02:20) >  IMPRESSION:    Wall thickening at the distal stomach/proximal duodenum, may represent acute inflammatory/infectious disease. Recommend endoscopy.    No acute pulmonary disease.  --- End of Report ---  < end of copied text >      < from: CT Cervical Spine No Cont (09.10.21 @ 02:20) >  IMPRESSION:  No acute fracture or malalignment.    < end of copied text >    < from: CT Head No Cont (09.10.21 @ 02:20) >  IMPRESSION:    1.  No acute intracranial disease.    < end of copied text >

## 2021-09-10 NOTE — CONSULT NOTE ADULT - PROBLEM SELECTOR RECOMMENDATION 2
Management of AG/metabolic derangements per CCM  Eventiually good but not overly tight diabetic control to avoid iatrogenic hypoglycemia. Continued uncontrolled hyperglycemia makes control of infections potentially problematic.   Check HbA1c

## 2021-09-10 NOTE — H&P ADULT - NSHPLABSRESULTS_GEN_ALL_CORE
7.6    15.67 )-----------( 443      ( 09 Sep 2021 22:22 )             24.8     09-10    Comprehensive Metabolic Panel (09.09.21 @ 22:39)    Sodium, Serum: 137 mmol/L    Potassium, Serum: 3.4 mmol/L    Chloride, Serum: 90 mmol/L    Carbon Dioxide, Serum: 8 mmol/L    Anion Gap, Serum: 39 mmol/L    Blood Urea Nitrogen, Serum: 63 mg/dL    Creatinine, Serum: 3.42 mg/dL    Glucose, Serum: 1466    Calcium, Total Serum: 8.4 mg/dL    Protein Total, Serum: 6.6 gm/dL    Albumin, Serum: 1.3 g/dL    Bilirubin Total, Serum: 0.5 mg/dL    Alkaline Phosphatase, Serum: 137 U/L    Aspartate Aminotransferase (AST/SGOT): 104 U/L    Alanine Aminotransferase (ALT/SGPT): 24 U/L    eGFR if Non : 13    Troponin I, Serum (09.10.21 @ 01:32)    Troponin I, Serum: .729    Troponin I, Serum (09.09.21 @ 22:39)    Troponin I, Serum: .468    Creatine Kinase, Serum (09.09.21 @ 22:39)    Creatine Kinase, Serum: 2529 U/L

## 2021-09-10 NOTE — CONSULT NOTE ADULT - PROBLEM SELECTOR RECOMMENDATION 4
Multifactorial  Monitor creatinine  Monitor urine output  Dose medications accordingly  Avoid nephrotoxins

## 2021-09-10 NOTE — CONSULT NOTE ADULT - NSCONSULTADDITIONALINFOA_GEN_ALL_CORE
D/W Dr. Suazo & CCM team    If any ID input is needed over the weekend, please call [phone]. Thanks.    Cornel Johansen MD  Attending Physician  [facility]  Division of Infectious Diseases  [phone]

## 2021-09-11 LAB
ALBUMIN SERPL ELPH-MCNC: 1.1 G/DL — LOW (ref 3.3–5)
ALP SERPL-CCNC: 127 U/L — HIGH (ref 40–120)
ALT FLD-CCNC: 63 U/L — SIGNIFICANT CHANGE UP (ref 12–78)
ANION GAP SERPL CALC-SCNC: 3 MMOL/L — LOW (ref 5–17)
APTT BLD: 33.6 SEC — SIGNIFICANT CHANGE UP (ref 27.5–35.5)
APTT BLD: 44.7 SEC — HIGH (ref 27.5–35.5)
APTT BLD: 50.9 SEC — HIGH (ref 27.5–35.5)
AST SERPL-CCNC: 102 U/L — HIGH (ref 15–37)
BASOPHILS # BLD AUTO: 0.01 K/UL — SIGNIFICANT CHANGE UP (ref 0–0.2)
BASOPHILS NFR BLD AUTO: 0.1 % — SIGNIFICANT CHANGE UP (ref 0–2)
BILIRUB SERPL-MCNC: 0.2 MG/DL — SIGNIFICANT CHANGE UP (ref 0.2–1.2)
BUN SERPL-MCNC: 42 MG/DL — HIGH (ref 7–23)
CALCIUM SERPL-MCNC: 8.7 MG/DL — SIGNIFICANT CHANGE UP (ref 8.5–10.1)
CHLORIDE SERPL-SCNC: 123 MMOL/L — HIGH (ref 96–108)
CO2 SERPL-SCNC: 29 MMOL/L — SIGNIFICANT CHANGE UP (ref 22–31)
CREAT SERPL-MCNC: 1.36 MG/DL — HIGH (ref 0.5–1.3)
CULTURE RESULTS: NO GROWTH — SIGNIFICANT CHANGE UP
EOSINOPHIL # BLD AUTO: 0.01 K/UL — SIGNIFICANT CHANGE UP (ref 0–0.5)
EOSINOPHIL NFR BLD AUTO: 0.1 % — SIGNIFICANT CHANGE UP (ref 0–6)
GLUCOSE BLDC GLUCOMTR-MCNC: 225 MG/DL — HIGH (ref 70–99)
GLUCOSE BLDC GLUCOMTR-MCNC: 226 MG/DL — HIGH (ref 70–99)
GLUCOSE BLDC GLUCOMTR-MCNC: 232 MG/DL — HIGH (ref 70–99)
GLUCOSE BLDC GLUCOMTR-MCNC: 233 MG/DL — HIGH (ref 70–99)
GLUCOSE BLDC GLUCOMTR-MCNC: 234 MG/DL — HIGH (ref 70–99)
GLUCOSE BLDC GLUCOMTR-MCNC: 235 MG/DL — HIGH (ref 70–99)
GLUCOSE BLDC GLUCOMTR-MCNC: 238 MG/DL — HIGH (ref 70–99)
GLUCOSE BLDC GLUCOMTR-MCNC: 252 MG/DL — HIGH (ref 70–99)
GLUCOSE BLDC GLUCOMTR-MCNC: 258 MG/DL — HIGH (ref 70–99)
GLUCOSE BLDC GLUCOMTR-MCNC: 260 MG/DL — HIGH (ref 70–99)
GLUCOSE BLDC GLUCOMTR-MCNC: 265 MG/DL — HIGH (ref 70–99)
GLUCOSE BLDC GLUCOMTR-MCNC: 271 MG/DL — HIGH (ref 70–99)
GLUCOSE BLDC GLUCOMTR-MCNC: 280 MG/DL — HIGH (ref 70–99)
GLUCOSE BLDC GLUCOMTR-MCNC: 281 MG/DL — HIGH (ref 70–99)
GLUCOSE BLDC GLUCOMTR-MCNC: 282 MG/DL — HIGH (ref 70–99)
GLUCOSE BLDC GLUCOMTR-MCNC: 285 MG/DL — HIGH (ref 70–99)
GLUCOSE BLDC GLUCOMTR-MCNC: 289 MG/DL — HIGH (ref 70–99)
GLUCOSE BLDC GLUCOMTR-MCNC: 300 MG/DL — HIGH (ref 70–99)
GLUCOSE BLDC GLUCOMTR-MCNC: 318 MG/DL — HIGH (ref 70–99)
GLUCOSE BLDC GLUCOMTR-MCNC: 322 MG/DL — HIGH (ref 70–99)
GLUCOSE SERPL-MCNC: 244 MG/DL — HIGH (ref 70–99)
HCT VFR BLD CALC: 24.4 % — LOW (ref 34.5–45)
HCT VFR BLD CALC: 24.5 % — LOW (ref 34.5–45)
HGB BLD-MCNC: 8.3 G/DL — LOW (ref 11.5–15.5)
HGB BLD-MCNC: 8.4 G/DL — LOW (ref 11.5–15.5)
IMM GRANULOCYTES NFR BLD AUTO: 1.9 % — HIGH (ref 0–1.5)
LYMPHOCYTES # BLD AUTO: 1.78 K/UL — SIGNIFICANT CHANGE UP (ref 1–3.3)
LYMPHOCYTES # BLD AUTO: 12.2 % — LOW (ref 13–44)
MAGNESIUM SERPL-MCNC: 2.3 MG/DL — SIGNIFICANT CHANGE UP (ref 1.6–2.6)
MCHC RBC-ENTMCNC: 27.9 PG — SIGNIFICANT CHANGE UP (ref 27–34)
MCHC RBC-ENTMCNC: 27.9 PG — SIGNIFICANT CHANGE UP (ref 27–34)
MCHC RBC-ENTMCNC: 34 GM/DL — SIGNIFICANT CHANGE UP (ref 32–36)
MCHC RBC-ENTMCNC: 34.3 GM/DL — SIGNIFICANT CHANGE UP (ref 32–36)
MCV RBC AUTO: 81.4 FL — SIGNIFICANT CHANGE UP (ref 80–100)
MCV RBC AUTO: 81.9 FL — SIGNIFICANT CHANGE UP (ref 80–100)
MONOCYTES # BLD AUTO: 0.51 K/UL — SIGNIFICANT CHANGE UP (ref 0–0.9)
MONOCYTES NFR BLD AUTO: 3.5 % — SIGNIFICANT CHANGE UP (ref 2–14)
NEUTROPHILS # BLD AUTO: 11.98 K/UL — HIGH (ref 1.8–7.4)
NEUTROPHILS NFR BLD AUTO: 82.2 % — HIGH (ref 43–77)
NRBC # BLD: 0 /100 WBCS — SIGNIFICANT CHANGE UP (ref 0–0)
NRBC # BLD: 0 /100 WBCS — SIGNIFICANT CHANGE UP (ref 0–0)
PHOSPHATE SERPL-MCNC: 1.1 MG/DL — LOW (ref 2.5–4.5)
PLATELET # BLD AUTO: 333 K/UL — SIGNIFICANT CHANGE UP (ref 150–400)
PLATELET # BLD AUTO: 345 K/UL — SIGNIFICANT CHANGE UP (ref 150–400)
POTASSIUM SERPL-MCNC: 3.7 MMOL/L — SIGNIFICANT CHANGE UP (ref 3.5–5.3)
POTASSIUM SERPL-SCNC: 3.7 MMOL/L — SIGNIFICANT CHANGE UP (ref 3.5–5.3)
PROT SERPL-MCNC: 5.4 GM/DL — LOW (ref 6–8.3)
RBC # BLD: 2.98 M/UL — LOW (ref 3.8–5.2)
RBC # BLD: 3.01 M/UL — LOW (ref 3.8–5.2)
RBC # FLD: 15.4 % — HIGH (ref 10.3–14.5)
RBC # FLD: 15.5 % — HIGH (ref 10.3–14.5)
SODIUM SERPL-SCNC: 155 MMOL/L — HIGH (ref 135–145)
SPECIMEN SOURCE: SIGNIFICANT CHANGE UP
VANCOMYCIN TROUGH SERPL-MCNC: 10.7 UG/ML — SIGNIFICANT CHANGE UP (ref 10–20)
WBC # BLD: 14.57 K/UL — HIGH (ref 3.8–10.5)
WBC # BLD: 15.02 K/UL — HIGH (ref 3.8–10.5)
WBC # FLD AUTO: 14.57 K/UL — HIGH (ref 3.8–10.5)
WBC # FLD AUTO: 15.02 K/UL — HIGH (ref 3.8–10.5)

## 2021-09-11 PROCEDURE — 99233 SBSQ HOSP IP/OBS HIGH 50: CPT

## 2021-09-11 PROCEDURE — 99232 SBSQ HOSP IP/OBS MODERATE 35: CPT

## 2021-09-11 RX ORDER — NOREPINEPHRINE BITARTRATE/D5W 8 MG/250ML
0.05 PLASTIC BAG, INJECTION (ML) INTRAVENOUS
Qty: 8 | Refills: 0 | Status: DISCONTINUED | OUTPATIENT
Start: 2021-09-11 | End: 2021-09-12

## 2021-09-11 RX ORDER — POTASSIUM PHOSPHATE, MONOBASIC POTASSIUM PHOSPHATE, DIBASIC 236; 224 MG/ML; MG/ML
15 INJECTION, SOLUTION INTRAVENOUS ONCE
Refills: 0 | Status: COMPLETED | OUTPATIENT
Start: 2021-09-11 | End: 2021-09-11

## 2021-09-11 RX ORDER — SODIUM CHLORIDE 9 MG/ML
1000 INJECTION, SOLUTION INTRAVENOUS
Refills: 0 | Status: DISCONTINUED | OUTPATIENT
Start: 2021-09-11 | End: 2021-09-11

## 2021-09-11 RX ORDER — SODIUM CHLORIDE 9 MG/ML
1000 INJECTION, SOLUTION INTRAVENOUS
Refills: 0 | Status: DISCONTINUED | OUTPATIENT
Start: 2021-09-11 | End: 2021-09-12

## 2021-09-11 RX ORDER — POTASSIUM CHLORIDE 20 MEQ
40 PACKET (EA) ORAL ONCE
Refills: 0 | Status: COMPLETED | OUTPATIENT
Start: 2021-09-11 | End: 2021-09-11

## 2021-09-11 RX ORDER — VANCOMYCIN HCL 1 G
1500 VIAL (EA) INTRAVENOUS EVERY 24 HOURS
Refills: 0 | Status: DISCONTINUED | OUTPATIENT
Start: 2021-09-11 | End: 2021-09-13

## 2021-09-11 RX ADMIN — Medication 100 MILLIGRAM(S): at 21:07

## 2021-09-11 RX ADMIN — SODIUM CHLORIDE 125 MILLILITER(S): 9 INJECTION, SOLUTION INTRAVENOUS at 01:39

## 2021-09-11 RX ADMIN — Medication 100 MILLIGRAM(S): at 05:55

## 2021-09-11 RX ADMIN — INSULIN HUMAN 12 UNIT(S)/HR: 100 INJECTION, SOLUTION SUBCUTANEOUS at 01:28

## 2021-09-11 RX ADMIN — Medication 5.68 MICROGRAM(S)/KG/MIN: at 00:50

## 2021-09-11 RX ADMIN — INSULIN HUMAN 12 UNIT(S)/HR: 100 INJECTION, SOLUTION SUBCUTANEOUS at 21:07

## 2021-09-11 RX ADMIN — PIPERACILLIN AND TAZOBACTAM 25 GRAM(S): 4; .5 INJECTION, POWDER, LYOPHILIZED, FOR SOLUTION INTRAVENOUS at 13:51

## 2021-09-11 RX ADMIN — Medication 100 MILLIGRAM(S): at 13:48

## 2021-09-11 RX ADMIN — SODIUM CHLORIDE 125 MILLILITER(S): 9 INJECTION, SOLUTION INTRAVENOUS at 00:35

## 2021-09-11 RX ADMIN — SODIUM CHLORIDE 1000 MILLILITER(S): 9 INJECTION, SOLUTION INTRAVENOUS at 23:50

## 2021-09-11 RX ADMIN — HEPARIN SODIUM 1200 UNIT(S)/HR: 5000 INJECTION INTRAVENOUS; SUBCUTANEOUS at 03:04

## 2021-09-11 RX ADMIN — PIPERACILLIN AND TAZOBACTAM 25 GRAM(S): 4; .5 INJECTION, POWDER, LYOPHILIZED, FOR SOLUTION INTRAVENOUS at 05:55

## 2021-09-11 RX ADMIN — Medication 40 MILLIEQUIVALENT(S): at 03:47

## 2021-09-11 RX ADMIN — CHLORHEXIDINE GLUCONATE 1 APPLICATION(S): 213 SOLUTION TOPICAL at 04:00

## 2021-09-11 RX ADMIN — Medication 300 MILLIGRAM(S): at 19:41

## 2021-09-11 RX ADMIN — SODIUM CHLORIDE 75 MILLILITER(S): 9 INJECTION, SOLUTION INTRAVENOUS at 13:53

## 2021-09-11 RX ADMIN — HEPARIN SODIUM 1500 UNIT(S)/HR: 5000 INJECTION INTRAVENOUS; SUBCUTANEOUS at 12:35

## 2021-09-11 RX ADMIN — HEPARIN SODIUM 1600 UNIT(S)/HR: 5000 INJECTION INTRAVENOUS; SUBCUTANEOUS at 18:56

## 2021-09-11 RX ADMIN — PIPERACILLIN AND TAZOBACTAM 25 GRAM(S): 4; .5 INJECTION, POWDER, LYOPHILIZED, FOR SOLUTION INTRAVENOUS at 21:07

## 2021-09-11 RX ADMIN — POTASSIUM PHOSPHATE, MONOBASIC POTASSIUM PHOSPHATE, DIBASIC 62.5 MILLIMOLE(S): 236; 224 INJECTION, SOLUTION INTRAVENOUS at 07:57

## 2021-09-11 NOTE — PROGRESS NOTE ADULT - ASSESSMENT
Pt s/p LF Choparts amp 9/10/21  -pt was seen and examined  -POD 1: s/p LF choparts amputation left open, no purulent drainage, no malodor, no active bleeding. Soft tissue and skin edges at level of amputation do no appear viable.   -Pt to require more proximal amputation. Recommend vascular consult for evaluation for BKA. BKA would offer more functional amputation.  -Document medical clearance for future amputation  -Will order tib-fib xrays to rule out remnant soft tissue emphysema in the left lower extremity  -Will follow OR data  -Discussed w/ attending

## 2021-09-11 NOTE — PROGRESS NOTE ADULT - SUBJECTIVE AND OBJECTIVE BOX
[facility]  Division of Infectious Diseases  [phone]    Name: NELSON ZEPEDA  Age: 65y  Gender: Female  MRN: [id number]    Interval History--  Notes reviewed.     Past Medical History--  Hypertension    Family history of diabetes mellitus    No significant past surgical history        For details regarding the patient's social history, family history, and other miscellaneous elements, please refer the initial infectious diseases consultation and/or the admitting history and physical examination for this admission.    Allergies    avocado (Pruritus)  Carrots (Pruritus)  No Known Drug Allergies    Intolerances        Medications--  Antibiotics:  clindamycin IVPB      clindamycin IVPB 900 milliGRAM(s) IV Intermittent every 8 hours  piperacillin/tazobactam IVPB.. 3.375 Gram(s) IV Intermittent every 8 hours  vancomycin  IVPB      vancomycin  IVPB 1000 milliGRAM(s) IV Intermittent every 24 hours    Immunologic:    Other:  acetaminophen   Tablet .. PRN  chlorhexidine 2% Cloths  dextrose 5%.  fentaNYL   Infusion.  heparin  Infusion.  insulin regular Infusion  norepinephrine Infusion      Review of Systems--  A 10-point review of systems was obtained.     Pertinent positives and negatives--  Constitutional: No fevers. No Chills. No Rigors.   Cardiovascular: No chest pain. No palpitations.  Respiratory: No shortness of breath. No cough.  Gastrointestinal: No nausea or vomiting. No diarrhea or constipation.   Psychiatric: Pleasant. Appropriate affect.    Review of systems otherwise negative except as previously noted.    Physical Examination--  Vital Signs: T(F): 100.2 (09-11-21 @ 11:30), Max: 100.2 (09-11-21 @ 11:30)  HR: 115 (09-11-21 @ 13:42)  BP: 122/58 (09-11-21 @ 12:30)  RR: 27 (09-11-21 @ 12:30)  SpO2: 99% (09-11-21 @ 13:42)  Wt(kg): --  General: Nontoxic-appearing Female in no acute distress.  HEENT: AT/NC. PERRL. EOMI. Anicteric. Conjunctiva pink and moist. Oropharynx clear. Dentition fair.  Neck: Not rigid. No sense of mass.  Nodes: None palpable.  Lungs: Clear bilaterally without rales, wheezing or rhonchi  Heart: Regular rate and rhythm. No Murmur. No rub. No gallop. No palpable thrill.  Abdomen: Bowel sounds present and normoactive. Soft. Nondistended. Nontender. No sense of mass. No organomegaly.  Back: No spinal tenderness. No costovertebral angle tenderness.   Extremities: No cyanosis or clubbing. No edema.   Skin: Warm. Dry. Good turgor. No rash. No vasculitic stigmata.  Psychiatric: Appropriate affect and mood for situation.         Laboratory Studies--  CBC                        8.3    14.57 )-----------( 333      ( 11 Sep 2021 02:38 )             24.4       Chemistries  09-11    155<H>  |  123<H>  |  42<H>  ----------------------------<  244<H>  3.7   |  29  |  1.36<H>    Ca    8.7      11 Sep 2021 02:38  Phos  1.1     09-11  Mg     2.3     09-11    TPro  5.4<L>  /  Alb  1.1<L>  /  TBili  0.2  /  DBili  x   /  AST  102<H>  /  ALT  63  /  AlkPhos  127<H>  09-11      Culture Data    Culture - Urine (collected 10 Sep 2021 10:40)  Source: Clean Catch Clean Catch (Midstream)  Final Report (11 Sep 2021 06:51):    No growth    Culture - Blood (collected 10 Sep 2021 10:33)  Source: .Blood Blood-Peripheral  Preliminary Report (11 Sep 2021 11:01):    No growth to date.    Culture - Blood (collected 10 Sep 2021 10:33)  Source: .Blood Blood-Peripheral  Preliminary Report (11 Sep 2021 11:01):    No growth to date.             [facility]  Division of Infectious Diseases  [phone]    Name: NELSON ZEPEDA  Age: 65y  Gender: Female  MRN: [id number]    Interval History--  Notes reviewed. Seen earlier today.  Patient intubated and sedated in the ICU.  Status post Chopart amputation.  Family at bedside, discussed with them in layman's terms in detail and all questions were answered to the best my ability.    Past Medical History--  Hypertension    Family history of diabetes mellitus    No significant past surgical history        For details regarding the patient's social history, family history, and other miscellaneous elements, please refer the initial infectious diseases consultation and/or the admitting history and physical examination for this admission.    Allergies    avocado (Pruritus)  Carrots (Pruritus)  No Known Drug Allergies    Intolerances        Medications--  Antibiotics:  clindamycin IVPB      clindamycin IVPB 900 milliGRAM(s) IV Intermittent every 8 hours  piperacillin/tazobactam IVPB.. 3.375 Gram(s) IV Intermittent every 8 hours  vancomycin  IVPB      vancomycin  IVPB 1000 milliGRAM(s) IV Intermittent every 24 hours    Immunologic:    Other:  acetaminophen   Tablet .. PRN  chlorhexidine 2% Cloths  dextrose 5%.  fentaNYL   Infusion.  heparin  Infusion.  insulin regular Infusion  norepinephrine Infusion      Review of Systems--  Review of systems unable secondary to clinical condition.     Physical Examination--  Vital Signs: T(F): 100.2 (09-11-21 @ 11:30), Max: 100.2 (09-11-21 @ 11:30)  HR: 115 (09-11-21 @ 13:42)  BP: 122/58 (09-11-21 @ 12:30)  RR: 27 (09-11-21 @ 12:30)  SpO2: 99% (09-11-21 @ 13:42)  Wt(kg): --  General: Nontoxic-appearing Female in no acute distress.  HEENT: AT/NC. OETT.  Pupils/EOM unable secondary to patient compliance. Oropharynx/dentition unable secondary to patient compliance.   Neck: Not rigid. No sense of mass.  Nodes: None palpable.  Lungs: Grossly clear bilaterally  Heart: Tachy w regular rhythm. No Murmur. No rub. No gallop. No palpable thrill.  Abdomen: Bowel sounds present and normoactive. Soft. Nondistended. Nontender. No sense of mass. No organomegaly.  Back: No spinal tenderness. No costovertebral angle tenderness.   Extremities: L foot dressed w/ ACE, s/p chopart amputation. R foot warm, dry. No cyanosis or clubbing. No edema.   Skin: Warm. Dry. Good turgor. No rash. No vasculitic stigmata.  Psychiatric: Unable        Laboratory Studies--  CBC                        8.3    14.57 )-----------( 333      ( 11 Sep 2021 02:38 )             24.4     WBC trend  WBC Count: 14.57 K/uL (09-11-21 @ 02:38)  WBC Count: 15.02 K/uL (09-11-21 @ 02:38)  WBC Count: 12.02 K/uL (09-10-21 @ 15:42)  WBC Count: 12.71 K/uL (09-10-21 @ 05:47)  WBC Count: 15.67 K/uL (09-09-21 @ 22:22)    Chemistries  09-11    155<H>  |  123<H>  |  42<H>  ----------------------------<  244<H>  3.7   |  29  |  1.36<H>    Creatinine Trend  1.36 mg/dL (09-11-21 @ 02:38)  1.69 mg/dL (09-10-21 @ 15:42)  2.19 mg/dL (09-10-21 @ 09:59)  2.57 mg/dL (09-10-21 @ 06:44)  3.26 mg/dL (09-10-21 @ 01:32)  3.42 mg/dL (09-09-21 @ 22:39)      Ca    8.7      11 Sep 2021 02:38  Phos  1.1     09-11  Mg     2.3     09-11    TPro  5.4<L>  /  Alb  1.1<L>  /  TBili  0.2  /  DBili  x   /  AST  102<H>  /  ALT  63  /  AlkPhos  127<H>  09-11    Vancomycin Level, Trough: 9.0: Vancomycin trough levels should be rapidly reached and maintained at  15-20 ug/ml for life threatening MRSA  infections such as sepsis, endocarditis, osteomyelitis and pneumonia. A  first trough level should be drawn  before the 3rd or 4th dose.  Risk of renal toxicity is increased for levels >15 ug/ml, in patients on  other nephrotoxic drugs, who are  hemodynamically unstable, have unstable renal function, or are on  Vancomycin therapy for >14 days. Renal function with  creatinine levels should be monitored for those patients. ug/mL (09.10.21 @ 09:59)      Culture Data    Culture - Urine (collected 10 Sep 2021 10:40)  Source: Clean Catch Clean Catch (Midstream)  Final Report (11 Sep 2021 06:51):    No growth    Culture - Blood (collected 10 Sep 2021 10:33)  Source: .Blood Blood-Peripheral  Preliminary Report (11 Sep 2021 11:01):    No growth to date.    Culture - Blood (collected 10 Sep 2021 10:33)  Source: .Blood Blood-Peripheral  Preliminary Report (11 Sep 2021 11:01):    No growth to date.

## 2021-09-11 NOTE — PROGRESS NOTE ADULT - ASSESSMENT
Gas gangrene/?necrotizing fasciitis  Sepsis  DKA  Rhabdomyolysis  GEOVANNI  Hypernatremia- higher than values reported given pseudohyponatremia from hyperglycemia?  Elevated troponin << CK, suspect MI not cause of CK  Encephalopathy, multifactorial  Crucial aspect of management here is source control. Antibiotics indicated but of secondary importance to surgical Rx in the setting of gas gangrene.    9/11: Postoperative day #1, adequate progress given clinical scenario.  Kidney function is better.  White blood cell count is about the same.  Source control remains the central aspect of management, with antibiotics adjunctive.  She is intubated, but FiO2 is only 30%, likely for airway management/metabolic control/likely need for further surgery imminently.  Anion gap has closed and glycemic control appears improved.  Culture data is pending.  No repeat CK level thus far today.  Sodium remains elevated.  Mental status not able to be assessed in the setting of intubation and sedation.

## 2021-09-11 NOTE — PROGRESS NOTE ADULT - SUBJECTIVE AND OBJECTIVE BOX
CHIEF COMPLAINT:  Patient is a 65y old  Female who presents with a chief complaint of AMS, DKA, Necrotic foot infection (10 Sep 2021 10:07)      HPI:  64yo F with DM, HTN, HLD, diabetic retinopathy presents to ED [facility] after being found unresponsive by her family at home. Pt was last seen by her family 2 days ago. Per daughter pt had a wound on her left foot from glass 1-2 weeks ago, which blistered and eventually popped. Subsequently noted to have worsening necrosis of foot. Pt saw her PMD who referred her for outpatient podiatry visit; has not had podiatry appt yet. Daughter last spoke to pt over telephone on Wednesday at which time she sounded altered and mumbled but was answering "I'm fine". Pt lives alone at home; no pets, no smoking/EtOH/illicit drugs.   In ED, pt with open wound of Lt dorsal foot with necrotic 2nd toe. Pt remained with altered mental status on arrival. Labs show hyperglycemia to 1466, serum bicarb 6, AG 39. Also noted to have troponinemia 0.468, lateral TWIs on EKG. s/p vascular surgery.      REVIEW OF SYSTEMS:  unable    PHYSICAL EXAM:  Vital Signs:  Vital Signs Last 24 Hrs  T(C): 36.6 (10 Sep 2021 13:34), Max: 36.9 (09 Sep 2021 21:25)  T(F): 97.8 (10 Sep 2021 13:34), Max: 98.5 (09 Sep 2021 21:25)  HR: 97 (10 Sep 2021 16:30) (68 - 139)  BP: 80/44 (10 Sep 2021 16:30) (80/44 - 149/73)  BP(mean): 53 (10 Sep 2021 16:30) (53 - 110)  RR: 20 (10 Sep 2021 16:30) (13 - 34)  SpO2: 100% (10 Sep 2021 16:30) (95% - 100%)  I&O's Summary    09 Sep 2021 07:01  -  10 Sep 2021 07:00  --------------------------------------------------------  IN: 2079.1 mL / OUT: 2175 mL / NET: -95.9 mL    10 Sep 2021 07:01  -  10 Sep 2021 17:13  --------------------------------------------------------  IN: 2258.1 mL / OUT: 400 mL / NET: 1858.1 mL      I&O's Detail    09 Sep 2021 07:  -  10 Sep 2021 07:00  --------------------------------------------------------  IN:    Insulin: 45.1 mL    Insulin: 11 mL    IV PiggyBack: 350 mL    Lactated Ringers: 673 mL    Lactated Ringers Bolus: 1000 mL  Total IN: .1 mL    OUT:    Indwelling Catheter - Urethral (mL): 2175 mL  Total OUT: 2175 mL    Total NET: -95.9 mL      10 Sep 2021 07:  -  10 Sep 2021 17:13  --------------------------------------------------------  IN:    FentaNYL: 15.1 mL    Insulin: 22 mL    IV PiggyBack: 1000 mL    Lactated Ringers: 875 mL    PRBCs (Packed Red Blood Cells): 346 mL  Total IN: 2258.1 mL    OUT:    Indwelling Catheter - Urethral (mL): 400 mL  Total OUT: 400 mL    Total NET: 1858.1 mL          Tele: SR, PACS    Constitutional: well developed, normal appearance, well groomed, well nourished, no deformities and no acute distress.   Eyes: the conjunctiva exhibited no abnormalities and the eyelids demonstrated no xanthelasmas.   HEENT: normal oral mucosa, no oral pallor and no oral cyanosis.   Neck: normal jugular venous A waves present, normal jugular venous V waves present and no jugular venous miramontes A waves.   Pulmonary: no respiratory distress, normal respiratory rhythm and effort, no accessory muscle use and lungs were clear to auscultation bilaterally.   Cardiovascular: heart rate and rhythm were normal, normal S1 and S2 and no murmur, gallop, rub, heave or thrill are present.   Abdomen: soft, non-tender  Musculoskeletal: the gait could not be assessed.   Extremities: no clubbing of the fingernails, no localized cyanosis, no petechial hemorrhages and no ischemic changes.   Skin: normal skin color and pigmentation, no rash, no venous stasis, no skin lesions, no skin ulcer and no xanthoma was observed.       LABORATORY:                          9.2    12.02 )-----------( 367      ( 10 Sep 2021 15:42 )             27.1     09-10    154<H>  |  120<H>  |  49<H>  ----------------------------<  527<HH>  4.9   |  22  |  1.69<H>    Ca    8.7      10 Sep 2021 15:42  Phos  3.6     09-10  Mg     2.7     09-10    TPro  6.0  /  Alb  1.3<L>  /  TBili  0.4  /  DBili  x   /  AST  185<H>  /  ALT  67  /  AlkPhos  148<H>  09-10    ABG - ( 10 Sep 2021 14:17 )  pH, Arterial: x     pH, Blood: 7.35  /  pCO2: 39    /  pO2: 160   / HCO3: 21    / Base Excess: -3.5  /  SaO2: 99                CARDIAC MARKERS ( 10 Sep 2021 15:42 )  1.270 ng/mL / x     / x     / x     / x      CARDIAC MARKERS ( 10 Sep 2021 09:59 )  x     / x     / 8200 U/L / x     / x      CARDIAC MARKERS ( 10 Sep 2021 06:44 )  1.210 ng/mL / x     / 7070 U/L / x     / x      CARDIAC MARKERS ( 10 Sep 2021 01:32 )  .729 ng/mL / x     / x     / x     / x      CARDIAC MARKERS ( 09 Sep 2021 22:39 )  .468 ng/mL / x     / 2529 U/L / x     / 39.1 ng/mL      CAPILLARY BLOOD GLUCOSE      POCT Blood Glucose.: 449 mg/dL (10 Sep 2021 10:31)  POCT Blood Glucose.: 455 mg/dL (10 Sep 2021 08:38)  POCT Blood Glucose.: 400 mg/dL (10 Sep 2021 07:37)  POCT Blood Glucose.: 463 mg/dL (10 Sep 2021 06:29)  POCT Blood Glucose.: 494 mg/dL (10 Sep 2021 05:33)  POCT Blood Glucose.: 569 mg/dL (10 Sep 2021 04:22)  POCT Blood Glucose.: >600 mg/dL (10 Sep 2021 03:25)  POCT Blood Glucose.: >600 mg/dL (10 Sep 2021 02:23)  POCT Blood Glucose.: >600 mg/dL (10 Sep 2021 01:03)  POCT Blood Glucose.: >600 mg/dL (09 Sep 2021 23:56)    LIVER FUNCTIONS - ( 10 Sep 2021 15:42 )  Alb: 1.3 g/dL / Pro: 6.0 gm/dL / ALK PHOS: 148 U/L / ALT: 67 U/L / AST: 185 U/L / GGT: x           PT/INR - ( 09 Sep 2021 22:22 )   PT: 17.6 sec;   INR: 1.55 ratio         PTT - ( 09 Sep 2021 22:22 )  PTT:25.2 sec  Urinalysis Basic - ( 09 Sep 2021 22:22 )    Color: Yellow / Appearance: Slightly Turbid / S.010 / pH: x  Gluc: x / Ketone: Large  / Bili: Negative / Urobili: Negative mg/dL   Blood: x / Protein: 30 mg/dL / Nitrite: Negative   Leuk Esterase: Negative / RBC: 0-2 /HPF / WBC 0-2   Sq Epi: x / Non Sq Epi: x / Bacteria: Moderate      BNP    IMAGING:  < from: 12 Lead ECG (09.10.21 @ 04:08) >  Sinus tachycardia with premature atrial complexes  Abnormal QRS-T angle, consider primary T wave abnormality  Abnormal ECG  When compared with ECG of 09-SEP-2021 21:39,  premature atrial complexes are now present  T wave inversion less evident in Inferior leads    < end of copied text >    < from: CT Lower Extremity No Cont, Left (09.10.21 @ 02:21) >  Findings consistent with necrotizing infection within the plantar soft tissues of the left foot extending from the level of the first/second toes proximally to the level of the anterior calcaneus. The medial collateral extent of this area of necrotizing infection extends from the first metatarsal to the level of the fourth metatarsal. No evidence of air within the soft tissues proximal to this region.    Atrophy of the soft tissues about the second toe with mottled intraosseous air extending into the second proximal phalanx also consistent with necrotizing infection.    < end of copied text >      ASSESSMENT:  64yo F with DM, HTN, HLD, diabetic retinopathy presents to ED [facility] after being found unresponsive by her family at home. Pt was last seen by her family 2 days ago. Per daughter pt had a wound on her left foot from glass 1-2 weeks ago, which blistered and eventually popped. Subsequently noted to have worsening necrosis of foot. Pt saw her PMD who referred her for outpatient podiatry visit; has not had podiatry appt yet. Daughter last spoke to pt over telephone on Wednesday at which time she sounded altered and mumbled but was answering "I'm fine". Pt lives alone at home; no pets, no smoking/EtOH/illicit drugs.   In ED, pt with open wound of Lt dorsal foot with necrotic 2nd toe. Pt remained with altered mental status on arrival. Labs show hyperglycemia to 1466, serum bicarb 6, AG 39. Also noted to have troponinemia 0.468, lateral TWIs on EKG. s/p vascular surgery.    PLAN:     Critical care/mechanical ventilatory support    MEDICATIONS  (STANDING):  chlorhexidine 2% Cloths 1 Application(s) Topical <User Schedule>  clindamycin IVPB      clindamycin IVPB 900 milliGRAM(s) IV Intermittent every 8 hours  fentaNYL   Infusion. 0.5 MICROgram(s)/kG/Hr (3.03 mL/Hr) IV Continuous <Continuous>  heparin  Infusion.  Unit(s)/Hr (11 mL/Hr) IV Continuous <Continuous>  insulin regular Infusion 12 Unit(s)/Hr (12 mL/Hr) IV Continuous <Continuous>  norepinephrine Infusion 0.05 MICROgram(s)/kG/Min (5.68 mL/Hr) IV Continuous <Continuous>  piperacillin/tazobactam IVPB.. 3.375 Gram(s) IV Intermittent every 8 hours  vancomycin  IVPB      vancomycin  IVPB 1000 milliGRAM(s) IV Intermittent every 24 hours      Giovanni Barlow MD, FACC, FASE, FASNC, FACP  Director, Heart Failure Services  [facility]  , Department of Cardiology  [facility]     CHIEF COMPLAINT:  Patient is a 65y old  Female who presents with a chief complaint of AMS, DKA, Necrotic foot infection (10 Sep 2021 10:07)      HPI:  64yo F with DM, HTN, HLD, diabetic retinopathy, found unresponsive by her family at home. Had a wound on her left foot from glass 1-2 weeks ago, which blistered and eventually popped, noted to have worsening necrosis of foot. Labs showed hyperglycemia to 1466. Also noted to have troponin elevation of 0.468, lateral TWIs on EKG. s/p vascular surgery.      REVIEW OF SYSTEMS:  unable    PHYSICAL EXAM:  ICU Vital Signs Last 24 Hrs  T(C): 37.9 (11 Sep 2021 11:30), Max: 37.9 (11 Sep 2021 11:30)  T(F): 100.2 (11 Sep 2021 11:30), Max: 100.2 (11 Sep 2021 11:30)  HR: 115 (11 Sep 2021 13:42) (91 - 119)  BP: 122/58 (11 Sep 2021 12:30) (80/44 - 138/84)  BP(mean): 74 (11 Sep 2021 12:30) (50 - 96)  RR: 27 (11 Sep 2021 12:30) (17 - 27)  SpO2: 99% (11 Sep 2021 13:42) (98% - 100%)    Tele: SR, PACS    Constitutional: well developed, normal appearance, well groomed, well nourished, no deformities.  HEENT: normal oral mucosa, no oral pallor and no oral cyanosis.   Neck: normal jugular venous A waves present, normal jugular venous V waves present and no jugular venous miramontes A waves.   Pulmonary: no respiratory distress, normal respiratory rhythm and effort, no accessory muscle use and lungs were clear.  Cardiovascular: heart rate and rhythm were normal, normal S1 and S2 and no murmur.  Abdomen: soft, non-tender  Musculoskeletal: the gait could not be assessed.   Extremities: no clubbing of the fingernails, no localized cyanosis, no petechial hemorrhages and no ischemic changes.   Skin: normal skin color and pigmentation, no rash, no venous stasis.      LABORATORY:                                      8.3    14.57 )-----------( 333      ( 11 Sep 2021 02:38 )             24.4       09-11    155<H>  |  123<H>  |  42<H>  ----------------------------<  244<H>  3.7   |  29  |  1.36<H>    Ca    8.7      11 Sep 2021 02:38  Phos  1.1     09-11  Mg     2.3     09-11    TPro  5.4<L>  /  Alb  1.1<L>  /  TBili  0.2  /  DBili  x   /  AST  102<H>  /  ALT  63  /  AlkPhos  127<H>  09-11        CARDIAC MARKERS ( 10 Sep 2021 15:42 )  1.270 ng/mL / x     / x     / x     / x      CARDIAC MARKERS ( 10 Sep 2021 09:59 )  x     / x     / 8200 U/L / x     / x      CARDIAC MARKERS ( 10 Sep 2021 06:44 )  1.210 ng/mL / x     / 7070 U/L / x     / x      CARDIAC MARKERS ( 10 Sep 2021 01:32 )  .729 ng/mL / x     / x     / x     / x      CARDIAC MARKERS ( 09 Sep 2021 22:39 )  .468 ng/mL / x     / 2529 U/L / x     / 39.1 ng/mL      CAPILLARY BLOOD GLUCOSE      POCT Blood Glucose.: 449 mg/dL (10 Sep 2021 10:31)  POCT Blood Glucose.: 455 mg/dL (10 Sep 2021 08:38)  POCT Blood Glucose.: 400 mg/dL (10 Sep 2021 07:37)  POCT Blood Glucose.: 463 mg/dL (10 Sep 2021 06:29)  POCT Blood Glucose.: 494 mg/dL (10 Sep 2021 05:33)  POCT Blood Glucose.: 569 mg/dL (10 Sep 2021 04:22)  POCT Blood Glucose.: >600 mg/dL (10 Sep 2021 03:25)  POCT Blood Glucose.: >600 mg/dL (10 Sep 2021 02:23)  POCT Blood Glucose.: >600 mg/dL (10 Sep 2021 01:03)  POCT Blood Glucose.: >600 mg/dL (09 Sep 2021 23:56)      IMAGING:  < from: 12 Lead ECG (09.10.21 @ 04:08) >  Sinus tachycardia with premature atrial complexes  Abnormal QRS-T angle, consider primary T wave abnormality  Abnormal ECG  When compared with ECG of 09-SEP-2021 21:39,  premature atrial complexes are now present  T wave inversion less evident in Inferior leads    < end of copied text >    < from: CT Lower Extremity No Cont, Left (09.10.21 @ 02:21) >  Findings consistent with necrotizing infection within the plantar soft tissues of the left foot extending from the level of the first/second toes proximally to the level of the anterior calcaneus. The medial collateral extent of this area of necrotizing infection extends from the first metatarsal to the level of the fourth metatarsal. No evidence of air within the soft tissues proximal to this region.    Atrophy of the soft tissues about the second toe with mottled intraosseous air extending into the second proximal phalanx also consistent with necrotizing infection.    < end of copied text >    < from: TTE Echo Complete w/o Contrast w/ Doppler (09.10.21 @ 10:00) >  Summary:   1. Left ventricular ejection fraction, by visual estimation, is 60 to 65%.   2. Technically fair study.   3. Normal global left ventricular systolic function.   4. Normal left ventricular internal cavity size.   5. Spectral Doppler shows pseudonormal pattern of left ventricular myocardial filling (Grade II diastolic dysfunction).   6. Normal right ventricular size and function.   7. Normal left atrial size.   8. Normal right atrial size.   9. There is no evidence of pericardial effusion.  10. Structurally normal mitral valve, with normal leaflet excursion.  11. Trace mitral valve regurgitation.  12. Mild tricuspid regurgitation.  13. Estimated pulmonary artery systolic pressure is 62.8 mmHg assuming a right atrial pressure of 5 mmHg, which is consistent with severe pulmonary hypertension.    < end of copied text >    ASSESSMENT:  64yo F with DM, HTN, HLD, diabetic retinopathy, found unresponsive by her family at home. Had a wound on her left foot from glass 1-2 weeks ago, which blistered and eventually popped, noted to have worsening necrosis of foot. Labs showed hyperglycemia to 1466. Also noted to have troponin elevation of 0.468, lateral TWIs on EKG. s/p vascular surgery.    PLAN:     Critical care/mechanical ventilatory support    clindamycin IVPB 900 milliGRAM(s) IV Intermittent every 8 hours  dextrose 5%. 1000 milliLiter(s) (75 mL/Hr) IV Continuous <Continuous>  fentaNYL   Infusion. 0.5 MICROgram(s)/kG/Hr (3.03 mL/Hr) IV Continuous <Continuous>  heparin  Infusion.  Unit(s)/Hr (11 mL/Hr) IV Continuous <Continuous>  insulin regular Infusion 12 Unit(s)/Hr (12 mL/Hr) IV Continuous <Continuous>  norepinephrine Infusion 0.05 MICROgram(s)/kG/Min (5.68 mL/Hr) IV Continuous <Continuous>  piperacillin/tazobactam IVPB.. 3.375 Gram(s) IV Intermittent every 8 hours  vancomycin  IVPB 1000 milliGRAM(s) IV Intermittent every 24 hours    c/w ICU management.  vasopressor support.  Low-grade troponin release possibly nstemi.  stay on IV heparin infusion.  abx/post op care.  f/u  labs.    Giovanni Barlow MD, FACC, SUNITHA VERMA, FACP  Director, Heart Failure Services  [facility]  , Department of Cardiology  [facility]

## 2021-09-11 NOTE — PROGRESS NOTE ADULT - SUBJECTIVE AND OBJECTIVE BOX
Podiatry pager #: [phone]/ [phone]    Patient is a 65y old  Female who presents with a chief complaint of AMS, DKA, Necrotic foot infection (10 Sep 2021 17:12)      HPI:  64yo F with PMH of IDDM, HTN, HLD, diabetic retinopathy presents to ED BIBEMS after being found unresponsive by her family at home. Pt was last seen by her family 2 days ago. Per daughter pt had a wound on her left foot from glass 1-2 weeks ago, which blistered and eventually popped. Subsequently noted to have worsening necrosis of foot. Pt saw her PMD who referred her for outpatient podiatry visit; has not had podiatry appt yet. Daughter last spoke to pt over telephone on Wednesday at which time she sounded altered and mumbled but was answering "I'm fine". Pt lives alone at home; no pets, no smoking/EtOH/illicit drugs.   In ED, pt with open wound of Lt dorsal foot with necrotic 2nd toe. Pt remained with altered mental status on arrival. Labs show hyperglycemia to 1466, serum bicarb 6, AG 39. Also noted to have troponinemia 0.468, lateral TWIs on EKG. Per vascular surgery consult by ED, no immediate OR intervention indicated; recommended pt to be seen by in-house vascular in the morning.  (10 Sep 2021 01:14)      PAST MEDICAL & SURGICAL HISTORY:  Hypertension    Family history of diabetes mellitus    No significant past surgical history        MEDICATIONS  (STANDING):  chlorhexidine 2% Cloths 1 Application(s) Topical <User Schedule>  clindamycin IVPB      clindamycin IVPB 900 milliGRAM(s) IV Intermittent every 8 hours  dextrose 5% + lactated ringers. 1000 milliLiter(s) (125 mL/Hr) IV Continuous <Continuous>  fentaNYL   Infusion. 0.5 MICROgram(s)/kG/Hr (3.03 mL/Hr) IV Continuous <Continuous>  heparin  Infusion.  Unit(s)/Hr (11 mL/Hr) IV Continuous <Continuous>  insulin regular Infusion 12 Unit(s)/Hr (12 mL/Hr) IV Continuous <Continuous>  norepinephrine Infusion 0.05 MICROgram(s)/kG/Min (5.68 mL/Hr) IV Continuous <Continuous>  piperacillin/tazobactam IVPB.. 3.375 Gram(s) IV Intermittent every 8 hours  vancomycin  IVPB      vancomycin  IVPB 1000 milliGRAM(s) IV Intermittent every 24 hours    MEDICATIONS  (PRN):  acetaminophen   Tablet .. 650 milliGRAM(s) Oral every 6 hours PRN Mild Pain (1 - 3)      Allergies    avocado (Pruritus)  Carrots (Pruritus)  No Known Drug Allergies    Intolerances        VITALS:    Vital Signs Last 24 Hrs  T(C): 37 (11 Sep 2021 08:05), Max: 37 (11 Sep 2021 04:30)  T(F): 98.6 (11 Sep 2021 08:05), Max: 98.6 (11 Sep 2021 04:30)  HR: 115 (11 Sep 2021 09:00) (68 - 116)  BP: 115/62 (11 Sep 2021 09:00) (80/44 - 138/84)  BP(mean): 75 (11 Sep 2021 09:00) (50 - 96)  RR: 23 (11 Sep 2021 09:00) (13 - 28)  SpO2: 100% (11 Sep 2021 09:00) (95% - 100%)    LABS:                          8.3    14.57 )-----------( 333      ( 11 Sep 2021 02:38 )             24.4       09-11    155<H>  |  123<H>  |  42<H>  ----------------------------<  244<H>  3.7   |  29  |  1.36<H>    Ca    8.7      11 Sep 2021 02:38  Phos  1.1     09-11  Mg     2.3     09-11    TPro  5.4<L>  /  Alb  1.1<L>  /  TBili  0.2  /  DBili  x   /  AST  102<H>  /  ALT  63  /  AlkPhos  127<H>  09-11      CAPILLARY BLOOD GLUCOSE      POCT Blood Glucose.: 260 mg/dL (11 Sep 2021 09:01)  POCT Blood Glucose.: 233 mg/dL (11 Sep 2021 08:00)  POCT Blood Glucose.: 322 mg/dL (11 Sep 2021 06:42)  POCT Blood Glucose.: 232 mg/dL (11 Sep 2021 05:45)  POCT Blood Glucose.: 238 mg/dL (11 Sep 2021 04:41)  POCT Blood Glucose.: 281 mg/dL (11 Sep 2021 03:32)  POCT Blood Glucose.: 258 mg/dL (11 Sep 2021 02:33)  POCT Blood Glucose.: 234 mg/dL (11 Sep 2021 01:32)  POCT Blood Glucose.: 289 mg/dL (11 Sep 2021 00:41)  POCT Blood Glucose.: 362 mg/dL (10 Sep 2021 23:35)  POCT Blood Glucose.: 358 mg/dL (10 Sep 2021 22:36)  POCT Blood Glucose.: 409 mg/dL (10 Sep 2021 21:34)  POCT Blood Glucose.: 534 mg/dL (10 Sep 2021 20:52)  POCT Blood Glucose.: 459 mg/dL (10 Sep 2021 19:38)  POCT Blood Glucose.: 490 mg/dL (10 Sep 2021 18:43)  POCT Blood Glucose.: 483 mg/dL (10 Sep 2021 17:35)  POCT Blood Glucose.: 449 mg/dL (10 Sep 2021 10:31)      PT/INR - ( 09 Sep 2021 22:22 )   PT: 17.6 sec;   INR: 1.55 ratio         PTT - ( 11 Sep 2021 02:38 )  PTT:44.7 sec    LOWER EXTREMITY PHYSICAL EXAM:    Vasular: RF DP 1/4, PT NP, LF NP pulses, B/L,Temperature gradient LF warm to touch   Neuro: Epicritic sensation absent to the level of digits B/L.  Musculoskeletal/Ortho: Unremarkable    Left foot full thickness wet gangrene to forefoot extending to midfoot, wound tracks proximally to level of ankle dorsally and plantarly to the calcaneus, purulent drainage noted, and malodor, abscess noted at plantar foot w/ incision to level of subQ using a 15 blade    POD 1: s/p LF choparts amputation left open, no purulent drainage, no malodor, no active bleeding. Soft tissue and skin edges at level of amputation do no appear viable.     RADIOLOGY & ADDITIONAL STUDIES:

## 2021-09-11 NOTE — PROGRESS NOTE ADULT - SUBJECTIVE AND OBJECTIVE BOX
Intubated in ICU    Vital Signs Last 24 Hrs  T(C): 37.9 (21 @ 11:30), Max: 37.9 (21 @ 11:30)  T(F): 100.2 (21 @ 11:30), Max: 100.2 (21 @ 11:30)  HR: 118 (21 @ 12:30) (91 - 119)  BP: 122/58 (21 @ 12:30) (80/44 - 138/84)  BP(mean): 74 (21 @ 12:30) (50 - 96)  RR: 27 (21 @ :30) (15 - 27)  SpO2: 99% (21 @ :30) (98% - 100%)    I&O's Detail    10 Sep 2021 07:01  -  11 Sep 2021 07:00  --------------------------------------------------------  IN:    dextrose 5% + lactated ringers: 875 mL    FentaNYL: 63.1 mL    Glucerna: 280 mL    Heparin Infusion: 159 mL    Insulin: 182 mL    IV PiggyBack: 200 mL    IV PiggyBack: 1350 mL    Lactated Ringers: 2125 mL    Lactated Ringers Bolus: 1000 mL    Norepinephrine: 45.6 mL    PRBCs (Packed Red Blood Cells): 346 mL  Total IN: 6625.7 mL    OUT:    Indwelling Catheter - Urethral (mL): 1700 mL  Total OUT: 1700 mL    Total NET: 4925.7 mL    11 Sep 2021 07:01  -  11 Sep 2021 13:43  --------------------------------------------------------  IN:    dextrose 5% + lactated ringers: 500 mL    FentaNYL: 18 mL    Glucerna: 120 mL    Heparin Infusion: 75 mL    Insulin: 26 mL    Norepinephrine: 11.4 mL  Total IN: 750.4 mL    Lungs b/l air entry  Heart S1S2  Abd soft ND  Extremities: no edema,  dressed LLE, s/p TMA                        8.3    14.57 )-----------( 333      ( 11 Sep 2021 02:38 )             24.4     11 Sep 2021 02:38    155    |  123    |  42     ----------------------------<  244    3.7     |  29     |  1.36     Ca    8.7        11 Sep 2021 02:38  Phos  1.1       11 Sep 2021 02:38  Mg     2.3       11 Sep 2021 02:38    TPro  5.4    /  Alb  1.1    /  TBili  0.2    /  DBili  x      /  AST  102    /  ALT  63     /  AlkPhos  127    11 Sep 2021 02:38    LIVER FUNCTIONS - ( 11 Sep 2021 02:38 )  Alb: 1.1 g/dL / Pro: 5.4 gm/dL / ALK PHOS: 127 U/L / ALT: 63 U/L / AST: 102 U/L / GGT: x           PT/INR - ( 09 Sep 2021 22:22 )   PT: 17.6 sec;   INR: 1.55 ratio      Urinalysis Basic - ( 09 Sep 2021 22:22 )    Color: Yellow / Appearance: Slightly Turbid / S.010 / pH: x  Gluc: x / Ketone: Large  / Bili: Negative / Urobili: Negative mg/dL   Blood: x / Protein: 30 mg/dL / Nitrite: Negative   Leuk Esterase: Negative / RBC: 0-2 /HPF / WBC 0-2   Sq Epi: x / Non Sq Epi: x / Bacteria: Moderate    Culture - Urine (collected 10 Sep 2021 10:40)  Source: Clean Catch Clean Catch (Midstream)  Final Report (11 Sep 2021 06:51):    No growth    Culture - Blood (collected 10 Sep 2021 10:33)  Source: .Blood Blood-Peripheral  Preliminary Report (11 Sep 2021 11:01):    No growth to date.    Culture - Blood (collected 10 Sep 2021 10:33)  Source: .Blood Blood-Peripheral  Preliminary Report (11 Sep 2021 11:01):    No growth to date.    acetaminophen   Tablet .. 650 milliGRAM(s) Oral every 6 hours PRN  chlorhexidine 2% Cloths 1 Application(s) Topical <User Schedule>  clindamycin IVPB      clindamycin IVPB 900 milliGRAM(s) IV Intermittent every 8 hours  dextrose 5%. 1000 milliLiter(s) IV Continuous <Continuous>  fentaNYL   Infusion. 0.5 MICROgram(s)/kG/Hr IV Continuous <Continuous>  heparin  Infusion.  Unit(s)/Hr IV Continuous <Continuous>  insulin regular Infusion 12 Unit(s)/Hr IV Continuous <Continuous>  norepinephrine Infusion 0.05 MICROgram(s)/kG/Min IV Continuous <Continuous>  piperacillin/tazobactam IVPB.. 3.375 Gram(s) IV Intermittent every 8 hours  vancomycin  IVPB      vancomycin  IVPB 1000 milliGRAM(s) IV Intermittent every 24 hours    Assessment/Plan:    GEOVANNI, DKA, gas gangrene left foot, s/p OR  Pre renal azotemia, improving  Hypernatremia  Hypotonic IVF  Avoid nephrotoxins  Avoid hypotension  Abx per ID  F/u BMP, UO    [phone]

## 2021-09-11 NOTE — PROGRESS NOTE ADULT - SUBJECTIVE AND OBJECTIVE BOX
# CC: Patient is a 65y old  Female who presents with a chief complaint of AMS, DKA, Necrotic foot infection (11 Sep 2021 14:28)    ## HPI:  66yo F with PMH of IDDM, HTN, HLD, diabetic retinopathy presents to ED BIBEMS after being found unresponsive by her family at home. Pt was last seen by her family 2 days ago. Per daughter pt had a wound on her left foot from glass 1-2 weeks ago, which blistered and eventually popped. Subsequently noted to have worsening necrosis of foot. Pt saw her PMD who referred her for outpatient podiatry visit; has not had podiatry appt yet. Daughter last spoke to pt over telephone on Wednesday at which time she sounded altered and mumbled but was answering "I'm fine". Pt lives alone at home; no pets, no smoking/EtOH/illicit drugs.   In ED, pt with open wound of Lt dorsal foot with necrotic 2nd toe. Pt remained with altered mental status on arrival. Labs show hyperglycemia to 1466, serum bicarb 6, AG 39. Also noted to have troponinemia 0.468, lateral TWIs on EKG. Per vascular surgery consult by ED, no immediate OR intervention indicated; recommended pt to be seen by in-house vascular in the morning.    **O/N:**  Remains intubated 12/400/30%/+5    ## ROS: Unobtainable    ## Labs:  ** CBC: **             8.3    14.57 )-----------( 333      ( 11 Sep 2021 02:38 )             24.4     ** Chem:  ** 09-11  155<H>  |  123<H>  |  42<H>  ----------------------------<  244<H>  3.7   |  29  |  1.36<H>    Ca    8.7      11 Sep 2021 02:38  Phos  1.1     09-11  Mg     2.3     09-11    TPro  5.4<L>  /  Alb  1.1<L>  /  TBili  0.2  /  DBili  x   /  AST  102<H>  /  ALT  63  /  AlkPhos  127<H>  09-11    ** Coags: **  PTT - ( 11 Sep 2021 18:37 )  PTT:50.9 sec    POCT Blood Glucose.: 235 mg/dL (11 Sep 2021 13:01)  POCT Blood Glucose.: 225 mg/dL (11 Sep 2021 12:14)  POCT Blood Glucose.: 252 mg/dL (11 Sep 2021 11:00)  POCT Blood Glucose.: 265 mg/dL (11 Sep 2021 10:09)  POCT Blood Glucose.: 260 mg/dL (11 Sep 2021 09:01)  POCT Blood Glucose.: 233 mg/dL (11 Sep 2021 08:00)  POCT Blood Glucose.: 322 mg/dL (11 Sep 2021 06:42)  POCT Blood Glucose.: 232 mg/dL (11 Sep 2021 05:45)    Culture - Other (collected 10 Sep 2021 18:46): No growth  Culture - Abscess with Gram Stain (collected 10 Sep 2021 14:48):    Few Proteus mirabilis    Moderate Enterococcus faecalis    Moderate Streptococcus agalactiae (Group B) isolated    Culture - Urine (collected 10 Sep 2021 10:40): No growth  Culture - Blood (collected 10 Sep 2021 10:33): No growth to date.  Culture - Blood (collected 10 Sep 2021 10:33): No growth to date.    ## Meds:  norepinephrine Infusion 0.05 MICROgram(s)/kG/Min IV Continuous <Continuous>  clindamycin IVPB 900 milliGRAM(s) IV Intermittent every 8 hours  piperacillin/tazobactam IVPB.. 3.375 Gram(s) IV Intermittent every 8 hours  vancomycin  IVPB 1500 milliGRAM(s) IV Intermittent every 24 hours    acetaminophen   Tablet .. 650 milliGRAM(s) Oral every 6 hours PRN  fentaNYL   Infusion. 0.5 MICROgram(s)/kG/Hr IV Continuous <Continuous>  heparin  Infusion.  Unit(s)/Hr IV Continuous <Continuous>  insulin regular Infusion 12 Unit(s)/Hr IV Continuous <Continuous>    ## O/E:  T(C): 37.6 (11 Sep 2021 23:19), Max: 38.3 (11 Sep 2021 19:25)  HR: 93 (11 Sep 2021 23:00) (91 - 119)  BP: 108/47 (11 Sep 2021 23:00) (85/40 - 138/84)  BP(mean): 63 (11 Sep 2021 23:00) (50 - 96)  RR: 22 (11 Sep 2021 23:00) (18 - 27)  SpO2: 100% (11 Sep 2021 23:00) (98% - 100%)  IN: 6625.7 mL / OUT: 1700 mL / NET: +4926 mL    Gen: intubated on vent  HEENT: ETT in place  Resp: mechanical breath sounds B/L  CVS: S1S2 no m/r/g  Abd: soft NT/ND +BS  Ext: LLE dressing in place s/p TMA, RLE warm and dry, no edema  Neuro: sedated    ## Assessment / Plan:  65F with nec fasc s/p LLE partial amputation  - Remains intubated. SAT / SBT for possible extubation  - Remains in insulin gtt for hyperglycemia  - c/w heparin gtt for possible NSTEMI  - Podiatry consult appreciated: patient will likely need more proximal amputation, needs vascular consult for possible BKA  - c/w clinda / vanco / Zosyn. Culture shows polymicrobial  - Patient will remain in ICU while intubated    I have personally provided 45 minutes of critical care time.     ## Code status:  Goals of care discussion: [x] yes [ ] no  [x] full code  [ ] DNR  [ ] DNI  [ ] MOLST

## 2021-09-12 LAB
-  AMIKACIN: SIGNIFICANT CHANGE UP
-  AMOXICILLIN/CLAVULANIC ACID: SIGNIFICANT CHANGE UP
-  AMPICILLIN/SULBACTAM: SIGNIFICANT CHANGE UP
-  AMPICILLIN: SIGNIFICANT CHANGE UP
-  AMPICILLIN: SIGNIFICANT CHANGE UP
-  AZTREONAM: SIGNIFICANT CHANGE UP
-  CEFAZOLIN: SIGNIFICANT CHANGE UP
-  CEFEPIME: SIGNIFICANT CHANGE UP
-  CEFOXITIN: SIGNIFICANT CHANGE UP
-  CEFTRIAXONE: SIGNIFICANT CHANGE UP
-  CIPROFLOXACIN: SIGNIFICANT CHANGE UP
-  ERTAPENEM: SIGNIFICANT CHANGE UP
-  GENTAMICIN: SIGNIFICANT CHANGE UP
-  LEVOFLOXACIN: SIGNIFICANT CHANGE UP
-  MEROPENEM: SIGNIFICANT CHANGE UP
-  PIPERACILLIN/TAZOBACTAM: SIGNIFICANT CHANGE UP
-  TETRACYCLINE: SIGNIFICANT CHANGE UP
-  TOBRAMYCIN: SIGNIFICANT CHANGE UP
-  TRIMETHOPRIM/SULFAMETHOXAZOLE: SIGNIFICANT CHANGE UP
-  VANCOMYCIN: SIGNIFICANT CHANGE UP
ALBUMIN SERPL ELPH-MCNC: 1 G/DL — LOW (ref 3.3–5)
ALP SERPL-CCNC: 184 U/L — HIGH (ref 40–120)
ALT FLD-CCNC: 48 U/L — SIGNIFICANT CHANGE UP (ref 12–78)
ANION GAP SERPL CALC-SCNC: 2 MMOL/L — LOW (ref 5–17)
ANION GAP SERPL CALC-SCNC: 4 MMOL/L — LOW (ref 5–17)
APTT BLD: 34.1 SEC — SIGNIFICANT CHANGE UP (ref 27.5–35.5)
APTT BLD: 57.1 SEC — HIGH (ref 27.5–35.5)
APTT BLD: 66 SEC — HIGH (ref 27.5–35.5)
APTT BLD: 72.6 SEC — HIGH (ref 27.5–35.5)
AST SERPL-CCNC: 57 U/L — HIGH (ref 15–37)
BILIRUB SERPL-MCNC: 0.3 MG/DL — SIGNIFICANT CHANGE UP (ref 0.2–1.2)
BUN SERPL-MCNC: 35 MG/DL — HIGH (ref 7–23)
BUN SERPL-MCNC: 36 MG/DL — HIGH (ref 7–23)
CALCIUM SERPL-MCNC: 8 MG/DL — LOW (ref 8.5–10.1)
CALCIUM SERPL-MCNC: 8.1 MG/DL — LOW (ref 8.5–10.1)
CHLORIDE SERPL-SCNC: 118 MMOL/L — HIGH (ref 96–108)
CHLORIDE SERPL-SCNC: 122 MMOL/L — HIGH (ref 96–108)
CK SERPL-CCNC: 875 U/L — HIGH (ref 26–192)
CK SERPL-CCNC: 973 U/L — HIGH (ref 26–192)
CO2 SERPL-SCNC: 26 MMOL/L — SIGNIFICANT CHANGE UP (ref 22–31)
CO2 SERPL-SCNC: 29 MMOL/L — SIGNIFICANT CHANGE UP (ref 22–31)
CREAT SERPL-MCNC: 0.93 MG/DL — SIGNIFICANT CHANGE UP (ref 0.5–1.3)
CREAT SERPL-MCNC: 0.95 MG/DL — SIGNIFICANT CHANGE UP (ref 0.5–1.3)
CULTURE RESULTS: NO GROWTH — SIGNIFICANT CHANGE UP
GLUCOSE BLDC GLUCOMTR-MCNC: 198 MG/DL — HIGH (ref 70–99)
GLUCOSE BLDC GLUCOMTR-MCNC: 211 MG/DL — HIGH (ref 70–99)
GLUCOSE BLDC GLUCOMTR-MCNC: 214 MG/DL — HIGH (ref 70–99)
GLUCOSE BLDC GLUCOMTR-MCNC: 243 MG/DL — HIGH (ref 70–99)
GLUCOSE BLDC GLUCOMTR-MCNC: 243 MG/DL — HIGH (ref 70–99)
GLUCOSE BLDC GLUCOMTR-MCNC: 244 MG/DL — HIGH (ref 70–99)
GLUCOSE BLDC GLUCOMTR-MCNC: 247 MG/DL — HIGH (ref 70–99)
GLUCOSE BLDC GLUCOMTR-MCNC: 253 MG/DL — HIGH (ref 70–99)
GLUCOSE BLDC GLUCOMTR-MCNC: 254 MG/DL — HIGH (ref 70–99)
GLUCOSE BLDC GLUCOMTR-MCNC: 263 MG/DL — HIGH (ref 70–99)
GLUCOSE BLDC GLUCOMTR-MCNC: 265 MG/DL — HIGH (ref 70–99)
GLUCOSE BLDC GLUCOMTR-MCNC: 302 MG/DL — HIGH (ref 70–99)
GLUCOSE BLDC GLUCOMTR-MCNC: 369 MG/DL — HIGH (ref 70–99)
GLUCOSE BLDC GLUCOMTR-MCNC: 376 MG/DL — HIGH (ref 70–99)
GLUCOSE BLDC GLUCOMTR-MCNC: >600 MG/DL — CRITICAL HIGH (ref 70–99)
GLUCOSE SERPL-MCNC: 214 MG/DL — HIGH (ref 70–99)
GLUCOSE SERPL-MCNC: 314 MG/DL — HIGH (ref 70–99)
HCT VFR BLD CALC: 22.4 % — LOW (ref 34.5–45)
HCT VFR BLD CALC: 24.3 % — LOW (ref 34.5–45)
HCV AB S/CO SERPL IA: 0.09 S/CO — SIGNIFICANT CHANGE UP (ref 0–0.99)
HCV AB SERPL-IMP: SIGNIFICANT CHANGE UP
HGB BLD-MCNC: 7.5 G/DL — LOW (ref 11.5–15.5)
HGB BLD-MCNC: 8.2 G/DL — LOW (ref 11.5–15.5)
MAGNESIUM SERPL-MCNC: 2.1 MG/DL — SIGNIFICANT CHANGE UP (ref 1.6–2.6)
MCHC RBC-ENTMCNC: 28.1 PG — SIGNIFICANT CHANGE UP (ref 27–34)
MCHC RBC-ENTMCNC: 28.4 PG — SIGNIFICANT CHANGE UP (ref 27–34)
MCHC RBC-ENTMCNC: 33.5 GM/DL — SIGNIFICANT CHANGE UP (ref 32–36)
MCHC RBC-ENTMCNC: 33.7 GM/DL — SIGNIFICANT CHANGE UP (ref 32–36)
MCV RBC AUTO: 83.9 FL — SIGNIFICANT CHANGE UP (ref 80–100)
MCV RBC AUTO: 84.1 FL — SIGNIFICANT CHANGE UP (ref 80–100)
METHOD TYPE: SIGNIFICANT CHANGE UP
METHOD TYPE: SIGNIFICANT CHANGE UP
NRBC # BLD: 0 /100 WBCS — SIGNIFICANT CHANGE UP (ref 0–0)
NRBC # BLD: 0 /100 WBCS — SIGNIFICANT CHANGE UP (ref 0–0)
PHOSPHATE SERPL-MCNC: 1.5 MG/DL — LOW (ref 2.5–4.5)
PLATELET # BLD AUTO: 266 K/UL — SIGNIFICANT CHANGE UP (ref 150–400)
PLATELET # BLD AUTO: 276 K/UL — SIGNIFICANT CHANGE UP (ref 150–400)
POTASSIUM SERPL-MCNC: 4 MMOL/L — SIGNIFICANT CHANGE UP (ref 3.5–5.3)
POTASSIUM SERPL-MCNC: 4.3 MMOL/L — SIGNIFICANT CHANGE UP (ref 3.5–5.3)
POTASSIUM SERPL-SCNC: 4 MMOL/L — SIGNIFICANT CHANGE UP (ref 3.5–5.3)
POTASSIUM SERPL-SCNC: 4.3 MMOL/L — SIGNIFICANT CHANGE UP (ref 3.5–5.3)
PROT SERPL-MCNC: 5.2 GM/DL — LOW (ref 6–8.3)
RBC # BLD: 2.67 M/UL — LOW (ref 3.8–5.2)
RBC # BLD: 2.89 M/UL — LOW (ref 3.8–5.2)
RBC # FLD: 16.1 % — HIGH (ref 10.3–14.5)
RBC # FLD: 16.3 % — HIGH (ref 10.3–14.5)
SODIUM SERPL-SCNC: 148 MMOL/L — HIGH (ref 135–145)
SODIUM SERPL-SCNC: 153 MMOL/L — HIGH (ref 135–145)
SPECIMEN SOURCE: SIGNIFICANT CHANGE UP
WBC # BLD: 13.69 K/UL — HIGH (ref 3.8–10.5)
WBC # BLD: 15.79 K/UL — HIGH (ref 3.8–10.5)
WBC # FLD AUTO: 13.69 K/UL — HIGH (ref 3.8–10.5)
WBC # FLD AUTO: 15.79 K/UL — HIGH (ref 3.8–10.5)

## 2021-09-12 PROCEDURE — 99232 SBSQ HOSP IP/OBS MODERATE 35: CPT

## 2021-09-12 PROCEDURE — 99233 SBSQ HOSP IP/OBS HIGH 50: CPT

## 2021-09-12 RX ORDER — DEXTROSE 50 % IN WATER 50 %
15 SYRINGE (ML) INTRAVENOUS ONCE
Refills: 0 | Status: DISCONTINUED | OUTPATIENT
Start: 2021-09-12 | End: 2021-09-23

## 2021-09-12 RX ORDER — DEXTROSE 50 % IN WATER 50 %
15 SYRINGE (ML) INTRAVENOUS ONCE
Refills: 0 | Status: DISCONTINUED | OUTPATIENT
Start: 2021-09-12 | End: 2021-09-12

## 2021-09-12 RX ORDER — SODIUM CHLORIDE 9 MG/ML
1000 INJECTION, SOLUTION INTRAVENOUS
Refills: 0 | Status: DISCONTINUED | OUTPATIENT
Start: 2021-09-12 | End: 2021-09-23

## 2021-09-12 RX ORDER — HEPARIN SODIUM 5000 [USP'U]/ML
5000 INJECTION INTRAVENOUS; SUBCUTANEOUS EVERY 6 HOURS
Refills: 0 | Status: DISCONTINUED | OUTPATIENT
Start: 2021-09-12 | End: 2021-09-13

## 2021-09-12 RX ORDER — INSULIN GLARGINE 100 [IU]/ML
10 INJECTION, SOLUTION SUBCUTANEOUS AT BEDTIME
Refills: 0 | Status: DISCONTINUED | OUTPATIENT
Start: 2021-09-12 | End: 2021-09-14

## 2021-09-12 RX ORDER — GLUCAGON INJECTION, SOLUTION 0.5 MG/.1ML
1 INJECTION, SOLUTION SUBCUTANEOUS ONCE
Refills: 0 | Status: DISCONTINUED | OUTPATIENT
Start: 2021-09-12 | End: 2021-09-23

## 2021-09-12 RX ORDER — DEXTROSE 50 % IN WATER 50 %
25 SYRINGE (ML) INTRAVENOUS ONCE
Refills: 0 | Status: DISCONTINUED | OUTPATIENT
Start: 2021-09-12 | End: 2021-09-23

## 2021-09-12 RX ORDER — DEXTROSE 50 % IN WATER 50 %
25 SYRINGE (ML) INTRAVENOUS ONCE
Refills: 0 | Status: DISCONTINUED | OUTPATIENT
Start: 2021-09-12 | End: 2021-09-12

## 2021-09-12 RX ORDER — DEXTROSE 50 % IN WATER 50 %
12.5 SYRINGE (ML) INTRAVENOUS ONCE
Refills: 0 | Status: DISCONTINUED | OUTPATIENT
Start: 2021-09-12 | End: 2021-09-12

## 2021-09-12 RX ORDER — INSULIN LISPRO 100/ML
VIAL (ML) SUBCUTANEOUS EVERY 6 HOURS
Refills: 0 | Status: DISCONTINUED | OUTPATIENT
Start: 2021-09-12 | End: 2021-09-12

## 2021-09-12 RX ORDER — HEPARIN SODIUM 5000 [USP'U]/ML
1700 INJECTION INTRAVENOUS; SUBCUTANEOUS
Qty: 25000 | Refills: 0 | Status: DISCONTINUED | OUTPATIENT
Start: 2021-09-12 | End: 2021-09-13

## 2021-09-12 RX ORDER — HEPARIN SODIUM 5000 [USP'U]/ML
2500 INJECTION INTRAVENOUS; SUBCUTANEOUS EVERY 6 HOURS
Refills: 0 | Status: DISCONTINUED | OUTPATIENT
Start: 2021-09-12 | End: 2021-09-13

## 2021-09-12 RX ORDER — FENTANYL CITRATE 50 UG/ML
25 INJECTION INTRAVENOUS EVERY 6 HOURS
Refills: 0 | Status: DISCONTINUED | OUTPATIENT
Start: 2021-09-12 | End: 2021-09-19

## 2021-09-12 RX ORDER — INSULIN LISPRO 100/ML
VIAL (ML) SUBCUTANEOUS EVERY 6 HOURS
Refills: 0 | Status: DISCONTINUED | OUTPATIENT
Start: 2021-09-12 | End: 2021-09-13

## 2021-09-12 RX ORDER — DEXTROSE 50 % IN WATER 50 %
12.5 SYRINGE (ML) INTRAVENOUS ONCE
Refills: 0 | Status: DISCONTINUED | OUTPATIENT
Start: 2021-09-12 | End: 2021-09-23

## 2021-09-12 RX ADMIN — Medication 100 MILLIGRAM(S): at 05:22

## 2021-09-12 RX ADMIN — Medication 100 MILLIGRAM(S): at 21:44

## 2021-09-12 RX ADMIN — Medication 62.5 MILLIMOLE(S): at 06:11

## 2021-09-12 RX ADMIN — PIPERACILLIN AND TAZOBACTAM 25 GRAM(S): 4; .5 INJECTION, POWDER, LYOPHILIZED, FOR SOLUTION INTRAVENOUS at 21:45

## 2021-09-12 RX ADMIN — FENTANYL CITRATE 25 MICROGRAM(S): 50 INJECTION INTRAVENOUS at 20:11

## 2021-09-12 RX ADMIN — PIPERACILLIN AND TAZOBACTAM 25 GRAM(S): 4; .5 INJECTION, POWDER, LYOPHILIZED, FOR SOLUTION INTRAVENOUS at 05:22

## 2021-09-12 RX ADMIN — CHLORHEXIDINE GLUCONATE 1 APPLICATION(S): 213 SOLUTION TOPICAL at 05:22

## 2021-09-12 RX ADMIN — Medication 650 MILLIGRAM(S): at 04:30

## 2021-09-12 RX ADMIN — HEPARIN SODIUM 1700 UNIT(S)/HR: 5000 INJECTION INTRAVENOUS; SUBCUTANEOUS at 08:28

## 2021-09-12 RX ADMIN — HEPARIN SODIUM 1700 UNIT(S)/HR: 5000 INJECTION INTRAVENOUS; SUBCUTANEOUS at 07:13

## 2021-09-12 RX ADMIN — Medication 5: at 17:05

## 2021-09-12 RX ADMIN — Medication 100 MILLIGRAM(S): at 13:00

## 2021-09-12 RX ADMIN — Medication 300 MILLIGRAM(S): at 17:48

## 2021-09-12 RX ADMIN — HEPARIN SODIUM 1700 UNIT(S)/HR: 5000 INJECTION INTRAVENOUS; SUBCUTANEOUS at 14:47

## 2021-09-12 RX ADMIN — HEPARIN SODIUM 2000 UNIT(S)/HR: 5000 INJECTION INTRAVENOUS; SUBCUTANEOUS at 21:33

## 2021-09-12 RX ADMIN — Medication 650 MILLIGRAM(S): at 06:42

## 2021-09-12 RX ADMIN — FENTANYL CITRATE 25 MICROGRAM(S): 50 INJECTION INTRAVENOUS at 18:47

## 2021-09-12 RX ADMIN — INSULIN GLARGINE 10 UNIT(S): 100 INJECTION, SOLUTION SUBCUTANEOUS at 21:45

## 2021-09-12 RX ADMIN — Medication 10: at 17:44

## 2021-09-12 RX ADMIN — PIPERACILLIN AND TAZOBACTAM 25 GRAM(S): 4; .5 INJECTION, POWDER, LYOPHILIZED, FOR SOLUTION INTRAVENOUS at 13:00

## 2021-09-12 NOTE — PROGRESS NOTE ADULT - ASSESSMENT
## Assessment / Plan:  65F with nec fasc s/p LLE partial amputation, possible NSTEMI,         #Neuro  -Can answer yes/no questions by nodding head: Monitor Neuro status post extubation/sedation  -Left sided facial asymmetry, LUE weakness possible hypernatremia vs rhabdo vs CVA: monitor neuro status if not improving obtain head CT R/O CVA.     #Pulm  -Successful SBT today, PT extubated on 2L NC 28% FiO2.     #CV  - c/w heparin gtt for possible NSTEMI    #Metabolic  - Hypernatremia (153 on 9/12): Insert NG tube 300ml free water bolus q6  - Hyperglycemia: Insulin infusion & D5 D/C'd, initiate sliding scale   - Monitor FS q4.     #Vascular  - Podiatry consult appreciated: patient will likely need more proximal amputation, needs vascular consult for possible BK    #ID  - c/w clinda / vanco / Zosyn. Culture shows polymicrobia    #DVT PPX  -on Heparin gtt  - RLE SCD ## Assessment / Plan:  65F with nec fasc s/p LLE partial amputation        #Neuro  -Can answer yes/no questions by nodding head: Monitor Neuro status post extubation/sedation  -Left sided facial asymmetry, LUE weakness possible hypernatremia vs rhabdo vs CVA: monitor neuro status if not improving obtain head CT R/O CVA.     #Pulm  -Successful SBT today, PT extubated on 2L NC 28% FiO2.     #CV  - C/W heparin gtt for possible NSTEMI    #Metabolic  - Hypernatremia (153 on 9/12): Insert NG tube 300ml free water bolus q6  - Hyperglycemia: Insulin infusion & D5 D/C'd, initiate sliding scale   - Monitor FS q4.     #Vascular  - Podiatry consult appreciated: patient will likely need more proximal amputation, needs vascular consult for possible BK    #ID  - c/w clinda / vanco / Zosyn. Culture shows polymicrobia    #DVT PPX  -on Heparin gtt  - RLE SCD ## Assessment / Plan:  65F with nec fasc s/p LLE partial amputation    #Neuro  -Can answer yes/no questions by nodding head: Monitor Neuro status post extubation/sedation  -Left sided facial asymmetry, LUE weakness possible hypernatremia vs rhabdo vs CVA: monitor neuro status if not improving obtain head CT R/O CVA.     #Pulm  -Successful SBT today, PT extubated on 2L NC 28% FiO2.     #CV  - C/W heparin gtt for possible NSTEMI    #Metabolic  - Hypernatremia (153 on 9/12): Insert NG tube 300ml free water bolus q6  - Hyperglycemia: Insulin infusion & D5 D/C'd, initiate sliding scale   - Monitor FS q4.     #Vascular  - Podiatry consult appreciated: patient will likely need more proximal amputation, needs vascular consult for possible BKA    #ID  - c/w clinda / vanco / Zosyn. Culture shows polymicrobia    #DVT PPX  -on Heparin gtt  - RLE SCD ## Assessment / Plan:  65F with nec fasc s/p LLE partial amputation    #Neuro  -Can answer yes/no questions by nodding head: Monitor Neuro status post extubation/sedation  -Left sided facial asymmetry, possible hypernatremia vs rhabdo: monitor neuro status if not improving obtain head CT R/O CVA.     #Pulm  -Successful SBT today, PT extubated on 2L NC 28% FiO2.     #CV  - C/W heparin gtt for possible NSTEMI    #Metabolic  - Hypernatremia (153 on 9/12): Insert NG tube 300ml free water bolus q6  - Hyperglycemia: Insulin infusion & D5 D/C'd, initiate sliding scale   - Monitor FS q4.     #Vascular  - Podiatry consult appreciated: patient will likely need more proximal amputation, needs vascular consult for possible BKA    #ID  - c/w clinda / vanco / Zosyn. Culture shows polymicrobia    #DVT PPX  -on Heparin gtt  - RLE SCD ## Assessment / Plan:  65F with nec fasc s/p LLE partial amputation    #Neuro  -Can answer yes/no questions by nodding head: Monitor Neuro status post extubation/sedation  -PT alert, intermittently following commands: unable to fully assess Neuro status possibly due to Hypernatremia (153 today) vs post sedation effects.   -Left sided lower lip asymmetry (Unknown baseline), no other focal neuro deficits at this time.    #Pulm  -Successful SBT today, PT extubated on 2L NC 28% FiO2.     #CV  - C/W heparin gtt for another 24hours for likely NSTEMI (Troponin peaked @ 1.270)     #Metabolic  - Hypernatremia (153 on 9/12): Monitor NA level. encourage PO fluids, official speech and swallow evaluation tomorrow. May need to resume D5W if unable to correct NA with free fluid.   - Hyperglycemia: Insulin infusion & D5 D/C'd, initiate sliding scale  - Monitor FS q4.     #Vascular  - Podiatry consult appreciated: patient will likely need more proximal amputation, needs vascular consult for possible BKA    #ID  - c/w clinda / vanco / Zosyn. Culture shows polymicrobia:  (Few Proteus mirabilis, Moderate Enterococcus faecalis, Moderate Streptococcus agalactiae (Group B) isolated)    #DVT PPX  -on Heparin gtt  - RLE SCD

## 2021-09-12 NOTE — PROGRESS NOTE ADULT - ATTENDING COMMENTS
Agree with above  Extubated today successfully  Had some facial asymmetry and weakness afterwards, but largely resolved over time as sedation resolved.  F/U surgery re: possible BKA  Place on sliding scale for hyperglycemia  Hypernatremia with free water deficit ~2.8L. Will check in AM for speech / swallow eval. If she fails, may need NGT placed for feeds and free water bolus. In the interim, will monitor and may need D5W to cover insensible losses and deficit. Would prefer free water enterally vs D5W given persistent hyperglycemia.  Continue to monitor in ICU post-extubation.    I have personally provided 45 minutes of critical care time.

## 2021-09-12 NOTE — PROGRESS NOTE ADULT - SUBJECTIVE AND OBJECTIVE BOX
# CC: Patient is a 65y old  Female who presents with a chief complaint of AMS, DKA, Necrotic foot infection (11 Sep 2021 14:28)    ## HPI:  66yo F with PMH of IDDM, HTN, HLD, diabetic retinopathy presents to ED BIBEMS after being found unresponsive by her family at home. Pt was last seen by her family 2 days ago. Per daughter pt had a wound on her left foot from glass 1-2 weeks ago, which blistered and eventually popped. Subsequently noted to have worsening necrosis of foot. Pt saw her PMD who referred her for outpatient podiatry visit; has not had podiatry appt yet. Daughter last spoke to pt over telephone on Wednesday at which time she sounded altered and mumbled but was answering "I'm fine". Pt lives alone at home; no pets, no smoking/EtOH/illicit drugs.   In ED, pt with open wound of Lt dorsal foot with necrotic 2nd toe. Pt remained with altered mental status on arrival. Labs show hyperglycemia to 1466, serum bicarb 6, AG 39. Also noted to have troponinemia 0.468, lateral TWIs on EKG. Per vascular surgery consult by ED, no immediate OR intervention indicated; recommended pt to be seen by in-house vascular in the morning.    **O/N:**  No Significant overnight events. PT extubated today after successful SBT. Remains on 2L NC at 28 % FiO2 at this time. Sedation (Fentanyl infusion) D/C. Denies any SOB, CP, ABD pain, nausea, vomiting.     ##ROS          	  ## Labs:  CBC Full  -  ( 12 Sep 2021 04:43 )  WBC Count : 13.69 K/uL  RBC Count : 2.67 M/uL  Hemoglobin : 7.5 g/dL  Hematocrit : 22.4 %  Platelet Count - Automated : 276 K/uL  Mean Cell Volume : 83.9 fl  Mean Cell Hemoglobin : 28.1 pg  Mean Cell Hemoglobin Concentration : 33.5 gm/dL  Auto Neutrophil # : x  Auto Lymphocyte # : x  Auto Monocyte # : x  Auto Eosinophil # : x  Auto Basophil # : x  Auto Neutrophil % : x  Auto Lymphocyte % : x  Auto Monocyte % : x  Auto Eosinophil % : x  Auto Basophil % : x      09-12    153<H>  |  122<H>  |  36<H>  ----------------------------<  214<H>  4.0   |  29  |  0.93    Ca    8.1<L>      12 Sep 2021 04:43  Phos  1.5     09-12  Mg     2.1     09-12    TPro  5.2<L>  /  Alb  1.0<L>  /  TBili  0.3  /  DBili  x   /  AST  57<H>  /  ALT  48  /  AlkPhos  184<H>  09-12    LIVER FUNCTIONS - ( 12 Sep 2021 04:43 )  Alb: 1.0 g/dL / Pro: 5.2 gm/dL / ALK PHOS: 184 U/L / ALT: 48 U/L / AST: 57 U/L / GGT: x           CAPILLARY BLOOD GLUCOSE      POCT Blood Glucose.: 254 mg/dL (12 Sep 2021 11:27)  POCT Blood Glucose.: 265 mg/dL (12 Sep 2021 09:32)  POCT Blood Glucose.: 253 mg/dL (12 Sep 2021 08:30)  POCT Blood Glucose.: 244 mg/dL (12 Sep 2021 07:28)  POCT Blood Glucose.: 302 mg/dL (12 Sep 2021 06:21)  POCT Blood Glucose.: 243 mg/dL (12 Sep 2021 05:26)  POCT Blood Glucose.: 214 mg/dL (12 Sep 2021 04:26)  POCT Blood Glucose.: 198 mg/dL (12 Sep 2021 03:22)  POCT Blood Glucose.: 211 mg/dL (12 Sep 2021 02:26)  POCT Blood Glucose.: >600 mg/dL (12 Sep 2021 02:24)  POCT Blood Glucose.: 247 mg/dL (12 Sep 2021 01:23)  POCT Blood Glucose.: 243 mg/dL (12 Sep 2021 00:23)      PTT - ( 12 Sep 2021 07:51 )  PTT:66.0 sec      Culture - Other (collected 10 Sep 2021 18:46): No growth  Culture - Abscess with Gram Stain (collected 10 Sep 2021 14:48):    Few Proteus mirabilis    Moderate Enterococcus faecalis    Moderate Streptococcus agalactiae (Group B) isolated    Culture - Urine (collected 10 Sep 2021 10:40): No growth  Culture - Blood (collected 10 Sep 2021 10:33): No growth to date.  Culture - Blood (collected 10 Sep 2021 10:33): No growth to date.    ## Meds:  MEDICATIONS  (STANDING):  chlorhexidine 2% Cloths 1 Application(s) Topical <User Schedule>  clindamycin IVPB      clindamycin IVPB 900 milliGRAM(s) IV Intermittent every 8 hours  dextrose 40% Gel 15 Gram(s) Oral once  dextrose 5%. 1000 milliLiter(s) (50 mL/Hr) IV Continuous <Continuous>  dextrose 5%. 1000 milliLiter(s) (100 mL/Hr) IV Continuous <Continuous>  dextrose 50% Injectable 25 Gram(s) IV Push once  dextrose 50% Injectable 12.5 Gram(s) IV Push once  dextrose 50% Injectable 25 Gram(s) IV Push once  glucagon  Injectable 1 milliGRAM(s) IntraMuscular once  heparin  Infusion. 1700 Unit(s)/Hr (17 mL/Hr) IV Continuous <Continuous>  insulin lispro (ADMELOG) corrective regimen sliding scale   SubCutaneous every 6 hours  piperacillin/tazobactam IVPB.. 3.375 Gram(s) IV Intermittent every 8 hours  vancomycin  IVPB 1500 milliGRAM(s) IV Intermittent every 24 hours    MEDICATIONS  (PRN):  acetaminophen   Tablet .. 650 milliGRAM(s) Oral every 6 hours PRN Mild Pain (1 - 3)  fentaNYL    Injectable 25 MICROGram(s) IV Push every 6 hours PRN Severe Pain (7 - 10)  heparin   Injectable 5000 Unit(s) IV Push every 6 hours PRN For aPTT less than 40  heparin   Injectable 2500 Unit(s) IV Push every 6 hours PRN For aPTT between 40 - 57        ## O/E:  ICU Vital Signs Last 24 Hrs  T(C): 36.9 (12 Sep 2021 11:00), Max: 38.3 (11 Sep 2021 19:25)  T(F): 98.5 (12 Sep 2021 11:00), Max: 100.9 (11 Sep 2021 19:25)  HR: 96 (12 Sep 2021 12:00) (87 - 117)  BP: 124/59 (12 Sep 2021 12:00) (90/49 - 146/66)  BP(mean): 74 (12 Sep 2021 12:00) (57 - 83)  ABP: --  ABP(mean): --  RR: 17 (12 Sep 2021 12:00) (14 - 27)  SpO2: 100% (12 Sep 2021 12:00) (95% - 100%)      Gen: Drowsy, weak   HEENT: NCAT, PERRL, Unable to accommodate, No EOM when prompted, Left sided facial asymmetry  Resp: Breath sounds clear bilaterally  CVS: RRR, S1S2 no m/r/g  Abd: soft NT/ND +BS  Ext: LLE dressing in place s/p TMA, RLE warm and dry, no edema, + Right DP pulse,   Neuro: Answers yes/no questions by nodding head, unable to fully follow commands. Able to move all extremities with maximum prompting       # CC: Patient is a 65y old  Female who presents with a chief complaint of AMS, DKA, Necrotic foot infection (11 Sep 2021 14:28)    ## HPI:  66yo F with PMH of IDDM, HTN, HLD, diabetic retinopathy presents to ED BIBEMS after being found unresponsive by her family at home. Pt was last seen by her family 2 days ago. Per daughter pt had a wound on her left foot from glass 1-2 weeks ago, which blistered and eventually popped. Subsequently noted to have worsening necrosis of foot. Pt saw her PMD who referred her for outpatient podiatry visit; has not had podiatry appt yet. Daughter last spoke to pt over telephone on Wednesday at which time she sounded altered and mumbled but was answering "I'm fine". Pt lives alone at home; no pets, no smoking/EtOH/illicit drugs.   In ED, pt with open wound of Lt dorsal foot with necrotic 2nd toe. Pt remained with altered mental status on arrival. Labs show hyperglycemia to 1466, serum bicarb 6, AG 39. Also noted to have troponinemia 0.468, lateral TWIs on EKG. Per vascular surgery consult by ED, no immediate OR intervention indicated; recommended pt to be seen by in-house vascular in the morning.    **O/N:**  No Significant overnight events. PT extubated today after successful SBT. Remains on 2L NC at 28 % FiO2 at this time. Sedation (Fentanyl infusion) D/C. Denies any SOB, CP, ABD pain, nausea, vomiting.     ##ROS  CONSTITUTIONAL: No fever, + fatigue  EYES: No eye pain, unable to assess visual disturbances  ENMT:  No difficulty hearing, head/ facial/ throat pain  NECK: No pain or stiffness  RESPIRATORY: No SOB, dyspnea, no cough  CARDIOVASCULAR: No CP, palpitations, dizziness, or leg swelling  GASTROINTESTINAL:  denies abdominal pain  GENITOURINARY: denies dysuria, painful urination  NEUROLOGICAL: No headaches   SKIN: No itching, burning, rashes, or lesions   MUSCULOSKELETAL: No joint pain or swelling; No muscle, back, or extremity pain            	  ## Labs:  CBC Full  -  ( 12 Sep 2021 04:43 )  WBC Count : 13.69 K/uL  RBC Count : 2.67 M/uL  Hemoglobin : 7.5 g/dL  Hematocrit : 22.4 %  Platelet Count - Automated : 276 K/uL  Mean Cell Volume : 83.9 fl  Mean Cell Hemoglobin : 28.1 pg  Mean Cell Hemoglobin Concentration : 33.5 gm/dL  Auto Neutrophil # : x  Auto Lymphocyte # : x  Auto Monocyte # : x  Auto Eosinophil # : x  Auto Basophil # : x  Auto Neutrophil % : x  Auto Lymphocyte % : x  Auto Monocyte % : x  Auto Eosinophil % : x  Auto Basophil % : x      09-12    153<H>  |  122<H>  |  36<H>  ----------------------------<  214<H>  4.0   |  29  |  0.93    Ca    8.1<L>      12 Sep 2021 04:43  Phos  1.5     09-12  Mg     2.1     09-12    TPro  5.2<L>  /  Alb  1.0<L>  /  TBili  0.3  /  DBili  x   /  AST  57<H>  /  ALT  48  /  AlkPhos  184<H>  09-12    LIVER FUNCTIONS - ( 12 Sep 2021 04:43 )  Alb: 1.0 g/dL / Pro: 5.2 gm/dL / ALK PHOS: 184 U/L / ALT: 48 U/L / AST: 57 U/L / GGT: x           CAPILLARY BLOOD GLUCOSE      POCT Blood Glucose.: 254 mg/dL (12 Sep 2021 11:27)  POCT Blood Glucose.: 265 mg/dL (12 Sep 2021 09:32)  POCT Blood Glucose.: 253 mg/dL (12 Sep 2021 08:30)  POCT Blood Glucose.: 244 mg/dL (12 Sep 2021 07:28)  POCT Blood Glucose.: 302 mg/dL (12 Sep 2021 06:21)  POCT Blood Glucose.: 243 mg/dL (12 Sep 2021 05:26)  POCT Blood Glucose.: 214 mg/dL (12 Sep 2021 04:26)  POCT Blood Glucose.: 198 mg/dL (12 Sep 2021 03:22)  POCT Blood Glucose.: 211 mg/dL (12 Sep 2021 02:26)  POCT Blood Glucose.: >600 mg/dL (12 Sep 2021 02:24)  POCT Blood Glucose.: 247 mg/dL (12 Sep 2021 01:23)  POCT Blood Glucose.: 243 mg/dL (12 Sep 2021 00:23)      PTT - ( 12 Sep 2021 07:51 )  PTT:66.0 sec      Culture - Other (collected 10 Sep 2021 18:46): No growth  Culture - Abscess with Gram Stain (collected 10 Sep 2021 14:48):    Few Proteus mirabilis    Moderate Enterococcus faecalis    Moderate Streptococcus agalactiae (Group B) isolated    Culture - Urine (collected 10 Sep 2021 10:40): No growth  Culture - Blood (collected 10 Sep 2021 10:33): No growth to date.  Culture - Blood (collected 10 Sep 2021 10:33): No growth to date.    ## Meds:  MEDICATIONS  (STANDING):  chlorhexidine 2% Cloths 1 Application(s) Topical <User Schedule>  clindamycin IVPB      clindamycin IVPB 900 milliGRAM(s) IV Intermittent every 8 hours  dextrose 40% Gel 15 Gram(s) Oral once  dextrose 5%. 1000 milliLiter(s) (50 mL/Hr) IV Continuous <Continuous>  dextrose 5%. 1000 milliLiter(s) (100 mL/Hr) IV Continuous <Continuous>  dextrose 50% Injectable 25 Gram(s) IV Push once  dextrose 50% Injectable 12.5 Gram(s) IV Push once  dextrose 50% Injectable 25 Gram(s) IV Push once  glucagon  Injectable 1 milliGRAM(s) IntraMuscular once  heparin  Infusion. 1700 Unit(s)/Hr (17 mL/Hr) IV Continuous <Continuous>  insulin lispro (ADMELOG) corrective regimen sliding scale   SubCutaneous every 6 hours  piperacillin/tazobactam IVPB.. 3.375 Gram(s) IV Intermittent every 8 hours  vancomycin  IVPB 1500 milliGRAM(s) IV Intermittent every 24 hours    MEDICATIONS  (PRN):  acetaminophen   Tablet .. 650 milliGRAM(s) Oral every 6 hours PRN Mild Pain (1 - 3)  fentaNYL    Injectable 25 MICROGram(s) IV Push every 6 hours PRN Severe Pain (7 - 10)  heparin   Injectable 5000 Unit(s) IV Push every 6 hours PRN For aPTT less than 40  heparin   Injectable 2500 Unit(s) IV Push every 6 hours PRN For aPTT between 40 - 57        ## O/E:  ICU Vital Signs Last 24 Hrs  T(C): 36.9 (12 Sep 2021 11:00), Max: 38.3 (11 Sep 2021 19:25)  T(F): 98.5 (12 Sep 2021 11:00), Max: 100.9 (11 Sep 2021 19:25)  HR: 96 (12 Sep 2021 12:00) (87 - 117)  BP: 124/59 (12 Sep 2021 12:00) (90/49 - 146/66)  BP(mean): 74 (12 Sep 2021 12:00) (57 - 83)  ABP: --  ABP(mean): --  RR: 17 (12 Sep 2021 12:00) (14 - 27)  SpO2: 100% (12 Sep 2021 12:00) (95% - 100%)      Gen: Drowsy, weak   HEENT: NCAT, PERRL, Unable to accommodate, No EOM when prompted, Left sided facial asymmetry  Resp: Breath sounds clear bilaterally  CVS: RRR, S1S2 no m/r/g  Abd: soft NT/ND +BS  : + indwelling mortensen catheter  Ext: LLE dressing in place s/p TMA, RLE warm and dry, no edema, + Right DP pulse,   Neuro: Answers yes/no questions by nodding head, unable to fully follow commands. Able to move all extremities with maximum prompting       #Interval Events:  No Significant overnight events. PT extubated today after successful SBT. Remains on 2L NC at 28 % FiO2 at this time. Sedation (Fentanyl infusion) D/C. Denies any SOB, CP, ABD pain, nausea, vomiting.     ##ROS  CONSTITUTIONAL: No fever, + fatigue  EYES: No eye pain, unable to assess visual disturbances  ENMT:  No difficulty hearing, head/ facial/ throat pain  NECK: No pain or stiffness  RESPIRATORY: No SOB, dyspnea, no cough  CARDIOVASCULAR: No CP, palpitations, dizziness, or leg swelling  GASTROINTESTINAL:  denies abdominal pain  GENITOURINARY: denies dysuria, painful urination  NEUROLOGICAL: No headaches   SKIN: No itching, burning, rashes, or lesions   MUSCULOSKELETAL: No joint pain or swelling; No muscle, back, or extremity pain      ## O/E:  ICU Vital Signs Last 24 Hrs  T(C): 36.9 (12 Sep 2021 11:00), Max: 38.3 (11 Sep 2021 19:25)  T(F): 98.5 (12 Sep 2021 11:00), Max: 100.9 (11 Sep 2021 19:25)  HR: 96 (12 Sep 2021 12:00) (87 - 117)  BP: 124/59 (12 Sep 2021 12:00) (90/49 - 146/66)  BP(mean): 74 (12 Sep 2021 12:00) (57 - 83)  ABP: --  ABP(mean): --  RR: 17 (12 Sep 2021 12:00) (14 - 27)  SpO2: 100% (12 Sep 2021 12:00) (95% - 100%)    ##Physical Exam   Gen: Drowsy, weak   HEENT: NCAT, PERRL, Unable to accommodate, No EOM when prompted, Left sided facial asymmetry  Resp: Breath sounds clear bilaterally  CVS: RRR, S1S2 no m/r/g  Abd: soft NT/ND +BS  : + indwelling mortensen catheter  Ext: LLE dressing in place s/p TMA, RLE warm and dry, no edema, + Right DP pulse,   Neuro: Answers yes/no questions by nodding head, unable to fully follow commands. Able to move all extremities with maximum prompting        ## Labs:  CBC Full  -  ( 12 Sep 2021 04:43 )  WBC Count : 13.69 K/uL  RBC Count : 2.67 M/uL  Hemoglobin : 7.5 g/dL  Hematocrit : 22.4 %  Platelet Count - Automated : 276 K/uL  Mean Cell Volume : 83.9 fl  Mean Cell Hemoglobin : 28.1 pg  Mean Cell Hemoglobin Concentration : 33.5 gm/dL  Auto Neutrophil # : x  Auto Lymphocyte # : x  Auto Monocyte # : x  Auto Eosinophil # : x  Auto Basophil # : x  Auto Neutrophil % : x  Auto Lymphocyte % : x  Auto Monocyte % : x  Auto Eosinophil % : x  Auto Basophil % : x      09-12    153<H>  |  122<H>  |  36<H>  ----------------------------<  214<H>  4.0   |  29  |  0.93    Ca    8.1<L>      12 Sep 2021 04:43  Phos  1.5     09-12  Mg     2.1     09-12    TPro  5.2<L>  /  Alb  1.0<L>  /  TBili  0.3  /  DBili  x   /  AST  57<H>  /  ALT  48  /  AlkPhos  184<H>  09-12    LIVER FUNCTIONS - ( 12 Sep 2021 04:43 )  Alb: 1.0 g/dL / Pro: 5.2 gm/dL / ALK PHOS: 184 U/L / ALT: 48 U/L / AST: 57 U/L / GGT: x           CAPILLARY BLOOD GLUCOSE      POCT Blood Glucose.: 254 mg/dL (12 Sep 2021 11:27)  POCT Blood Glucose.: 265 mg/dL (12 Sep 2021 09:32)  POCT Blood Glucose.: 253 mg/dL (12 Sep 2021 08:30)  POCT Blood Glucose.: 244 mg/dL (12 Sep 2021 07:28)  POCT Blood Glucose.: 302 mg/dL (12 Sep 2021 06:21)  POCT Blood Glucose.: 243 mg/dL (12 Sep 2021 05:26)  POCT Blood Glucose.: 214 mg/dL (12 Sep 2021 04:26)  POCT Blood Glucose.: 198 mg/dL (12 Sep 2021 03:22)  POCT Blood Glucose.: 211 mg/dL (12 Sep 2021 02:26)  POCT Blood Glucose.: >600 mg/dL (12 Sep 2021 02:24)  POCT Blood Glucose.: 247 mg/dL (12 Sep 2021 01:23)  POCT Blood Glucose.: 243 mg/dL (12 Sep 2021 00:23)      PTT - ( 12 Sep 2021 07:51 )  PTT:66.0 sec      Culture - Other (collected 10 Sep 2021 18:46): No growth  Culture - Abscess with Gram Stain (collected 10 Sep 2021 14:48):    Few Proteus mirabilis    Moderate Enterococcus faecalis    Moderate Streptococcus agalactiae (Group B) isolated    Culture - Urine (collected 10 Sep 2021 10:40): No growth  Culture - Blood (collected 10 Sep 2021 10:33): No growth to date.  Culture - Blood (collected 10 Sep 2021 10:33): No growth to date.      < from: CT Lower Extremity No Cont, Left (09.10.21 @ 02:21) >  IMPRESSION:    Findings consistent with necrotizing infection within the plantar soft tissues of the left foot extending from the level of the first/second toes proximally to the level of the anterior calcaneus. The medial collateral extent of this area of necrotizing infection extends from the first metatarsal to the level of the fourth metatarsal. No evidence of air within the soft tissues proximal to this region.    Atrophy of the soft tissues about the second toe with mottled intraosseous air extending into the second proximal phalanx also consistent with necrotizing infection.    Findings discussed with nurse practitioner Cirilo on September 10, 2021 at 0828 hours.    --- End of Report ---    < end of copied text >      ## Meds:  MEDICATIONS  (STANDING):  chlorhexidine 2% Cloths 1 Application(s) Topical <User Schedule>  clindamycin IVPB      clindamycin IVPB 900 milliGRAM(s) IV Intermittent every 8 hours  dextrose 40% Gel 15 Gram(s) Oral once  dextrose 5%. 1000 milliLiter(s) (50 mL/Hr) IV Continuous <Continuous>  dextrose 5%. 1000 milliLiter(s) (100 mL/Hr) IV Continuous <Continuous>  dextrose 50% Injectable 25 Gram(s) IV Push once  dextrose 50% Injectable 12.5 Gram(s) IV Push once  dextrose 50% Injectable 25 Gram(s) IV Push once  glucagon  Injectable 1 milliGRAM(s) IntraMuscular once  heparin  Infusion. 1700 Unit(s)/Hr (17 mL/Hr) IV Continuous <Continuous>  insulin lispro (ADMELOG) corrective regimen sliding scale   SubCutaneous every 6 hours  piperacillin/tazobactam IVPB.. 3.375 Gram(s) IV Intermittent every 8 hours  vancomycin  IVPB 1500 milliGRAM(s) IV Intermittent every 24 hours    MEDICATIONS  (PRN):  acetaminophen   Tablet .. 650 milliGRAM(s) Oral every 6 hours PRN Mild Pain (1 - 3)  fentaNYL    Injectable 25 MICROGram(s) IV Push every 6 hours PRN Severe Pain (7 - 10)  heparin   Injectable 5000 Unit(s) IV Push every 6 hours PRN For aPTT less than 40  heparin   Injectable 2500 Unit(s) IV Push every 6 hours PRN For aPTT between 40 - 57           #Interval Events:  PT extubated today after successful SBT. Remains on 2L NC at 28 % FiO2 at this time. Sedation (Fentanyl infusion) D/C. OG tube removed. Denies any SOB, CP, ABD pain, nausea, vomiting.     ##ROS  CONSTITUTIONAL: No fever, + fatigue  EYES: No eye pain, unable to assess visual disturbances  ENMT:  No difficulty hearing, head/ facial/ throat pain  NECK: No pain or stiffness  RESPIRATORY: No SOB, dyspnea, no cough  CARDIOVASCULAR: No CP, palpitations, dizziness, or leg swelling  GASTROINTESTINAL:  denies abdominal pain  GENITOURINARY: denies dysuria, painful urination  NEUROLOGICAL: No headaches   SKIN: No itching, burning, rashes, or lesions   MUSCULOSKELETAL: No joint pain or swelling; No muscle, back, or extremity pain      ## O/E:  ICU Vital Signs Last 24 Hrs  T(C): 36.9 (12 Sep 2021 11:00), Max: 38.3 (11 Sep 2021 19:25)  T(F): 98.5 (12 Sep 2021 11:00), Max: 100.9 (11 Sep 2021 19:25)  HR: 96 (12 Sep 2021 12:00) (87 - 117)  BP: 124/59 (12 Sep 2021 12:00) (90/49 - 146/66)  BP(mean): 74 (12 Sep 2021 12:00) (57 - 83)  ABP: --  ABP(mean): --  RR: 17 (12 Sep 2021 12:00) (14 - 27)  SpO2: 100% (12 Sep 2021 12:00) (95% - 100%)    ##Physical Exam   Gen: Drowsy, weak   HEENT: NCAT, PERRL, unable to assess EOM due to poor effort, Left sided lower lip assymetry  Resp: Breath sounds clear bilaterally  CVS: RRR, S1S2 no m/r/g  Abd: soft NT/ND +BS  : + indwelling mortensen catheter  Ext: LLE dressing in place s/p TMA, warm and dry, no edema, + popliteal pulse. RLE warm and dry, no edema, + Right DP pulse,   Neuro: PT alert, unable to assess orientation: Answers yes/no questions by nodding head, unable to fully follow commands. Able to move all extremities with maximum prompting        ## Labs:  CBC Full  -  ( 12 Sep 2021 04:43 )  WBC Count : 13.69 K/uL  RBC Count : 2.67 M/uL  Hemoglobin : 7.5 g/dL  Hematocrit : 22.4 %  Platelet Count - Automated : 276 K/uL  Mean Cell Volume : 83.9 fl  Mean Cell Hemoglobin : 28.1 pg  Mean Cell Hemoglobin Concentration : 33.5 gm/dL  Auto Neutrophil # : x  Auto Lymphocyte # : x  Auto Monocyte # : x  Auto Eosinophil # : x  Auto Basophil # : x  Auto Neutrophil % : x  Auto Lymphocyte % : x  Auto Monocyte % : x  Auto Eosinophil % : x  Auto Basophil % : x      09-12    153<H>  |  122<H>  |  36<H>  ----------------------------<  214<H>  4.0   |  29  |  0.93    Ca    8.1<L>      12 Sep 2021 04:43  Phos  1.5     09-12  Mg     2.1     09-12    TPro  5.2<L>  /  Alb  1.0<L>  /  TBili  0.3  /  DBili  x   /  AST  57<H>  /  ALT  48  /  AlkPhos  184<H>  09-12    LIVER FUNCTIONS - ( 12 Sep 2021 04:43 )  Alb: 1.0 g/dL / Pro: 5.2 gm/dL / ALK PHOS: 184 U/L / ALT: 48 U/L / AST: 57 U/L / GGT: x           CAPILLARY BLOOD GLUCOSE      POCT Blood Glucose.: 254 mg/dL (12 Sep 2021 11:27)  POCT Blood Glucose.: 265 mg/dL (12 Sep 2021 09:32)  POCT Blood Glucose.: 253 mg/dL (12 Sep 2021 08:30)  POCT Blood Glucose.: 244 mg/dL (12 Sep 2021 07:28)  POCT Blood Glucose.: 302 mg/dL (12 Sep 2021 06:21)  POCT Blood Glucose.: 243 mg/dL (12 Sep 2021 05:26)  POCT Blood Glucose.: 214 mg/dL (12 Sep 2021 04:26)  POCT Blood Glucose.: 198 mg/dL (12 Sep 2021 03:22)  POCT Blood Glucose.: 211 mg/dL (12 Sep 2021 02:26)  POCT Blood Glucose.: >600 mg/dL (12 Sep 2021 02:24)  POCT Blood Glucose.: 247 mg/dL (12 Sep 2021 01:23)  POCT Blood Glucose.: 243 mg/dL (12 Sep 2021 00:23)      PTT - ( 12 Sep 2021 07:51 )  PTT:66.0 sec      Culture - Other (collected 10 Sep 2021 18:46): No growth  Culture - Abscess with Gram Stain (collected 10 Sep 2021 14:48):    Few Proteus mirabilis    Moderate Enterococcus faecalis    Moderate Streptococcus agalactiae (Group B) isolated    Culture - Urine (collected 10 Sep 2021 10:40): No growth  Culture - Blood (collected 10 Sep 2021 10:33): No growth to date.  Culture - Blood (collected 10 Sep 2021 10:33): No growth to date.      < from: CT Lower Extremity No Cont, Left (09.10.21 @ 02:21) >  IMPRESSION:    Findings consistent with necrotizing infection within the plantar soft tissues of the left foot extending from the level of the first/second toes proximally to the level of the anterior calcaneus. The medial collateral extent of this area of necrotizing infection extends from the first metatarsal to the level of the fourth metatarsal. No evidence of air within the soft tissues proximal to this region.    Atrophy of the soft tissues about the second toe with mottled intraosseous air extending into the second proximal phalanx also consistent with necrotizing infection.    Findings discussed with nurse practitioner Cirilo on September 10, 2021 at 0828 hours.    --- End of Report ---    < end of copied text >      < from: TTE Echo Complete w/o Contrast w/ Doppler (09.10.21 @ 10:00) >  Summary:   1. Left ventricular ejection fraction, by visual estimation, is 60 to 65%.   2. Technically fair study.   3. Normal global left ventricular systolic function.   4. Normal left ventricular internal cavity size.   5. Spectral Doppler shows pseudonormal pattern of left ventricular myocardial filling (Grade II diastolic dysfunction).   6. Normal right ventricular size and function.   7. Normal left atrial size.   8. Normal right atrial size.   9. There is no evidence of pericardial effusion.  10. Structurally normal mitral valve, with normal leaflet excursion.  11. Trace mitral valve regurgitation.  12. Mild tricuspid regurgitation.  13. Estimated pulmonary artery systolic pressure is 62.8 mmHg assuming a right atrial pressure of 5 mmHg, which is consistent with severe pulmonary hypertension.    Giovanni Barlow MD FAC, FASE, FACP  Electronically signed on 9/10/2021 at 7:53:33 PM            *** Final ***    < end of copied text >      ## Meds:  MEDICATIONS  (STANDING):  chlorhexidine 2% Cloths 1 Application(s) Topical <User Schedule>  clindamycin IVPB      clindamycin IVPB 900 milliGRAM(s) IV Intermittent every 8 hours  dextrose 40% Gel 15 Gram(s) Oral once  dextrose 5%. 1000 milliLiter(s) (50 mL/Hr) IV Continuous <Continuous>  dextrose 5%. 1000 milliLiter(s) (100 mL/Hr) IV Continuous <Continuous>  dextrose 50% Injectable 25 Gram(s) IV Push once  dextrose 50% Injectable 12.5 Gram(s) IV Push once  dextrose 50% Injectable 25 Gram(s) IV Push once  glucagon  Injectable 1 milliGRAM(s) IntraMuscular once  heparin  Infusion. 1700 Unit(s)/Hr (17 mL/Hr) IV Continuous <Continuous>  insulin lispro (ADMELOG) corrective regimen sliding scale   SubCutaneous every 6 hours  piperacillin/tazobactam IVPB.. 3.375 Gram(s) IV Intermittent every 8 hours  vancomycin  IVPB 1500 milliGRAM(s) IV Intermittent every 24 hours    MEDICATIONS  (PRN):  acetaminophen   Tablet .. 650 milliGRAM(s) Oral every 6 hours PRN Mild Pain (1 - 3)  fentaNYL    Injectable 25 MICROGram(s) IV Push every 6 hours PRN Severe Pain (7 - 10)  heparin   Injectable 5000 Unit(s) IV Push every 6 hours PRN For aPTT less than 40  heparin   Injectable 2500 Unit(s) IV Push every 6 hours PRN For aPTT between 40 - 57           #Interval Events:  PT extubated today after successful SBT. Remains on 2L NC at 28 % FiO2 at this time. Sedation (Fentanyl infusion) D/C. OG tube removed. Denies any SOB, CP, ABD pain, nausea, vomiting.     ##ROS  CONSTITUTIONAL: No fever, + fatigue  EYES: No eye pain, unable to assess visual disturbances  ENMT:  No difficulty hearing, head/ facial/ throat pain  NECK: No pain or stiffness  RESPIRATORY: No SOB, dyspnea, no cough  CARDIOVASCULAR: No CP, palpitations, dizziness, or leg swelling  GASTROINTESTINAL:  denies abdominal pain  GENITOURINARY: denies dysuria, painful urination  NEUROLOGICAL: No headaches   SKIN: No itching, burning, rashes, or lesions   MUSCULOSKELETAL: No joint pain or swelling; No muscle, back, or extremity pain      ## O/E:  ICU Vital Signs Last 24 Hrs  T(C): 36.9 (12 Sep 2021 11:00), Max: 38.3 (11 Sep 2021 19:25)  T(F): 98.5 (12 Sep 2021 11:00), Max: 100.9 (11 Sep 2021 19:25)  HR: 96 (12 Sep 2021 12:00) (87 - 117)  BP: 124/59 (12 Sep 2021 12:00) (90/49 - 146/66)  BP(mean): 74 (12 Sep 2021 12:00) (57 - 83)  ABP: --  ABP(mean): --  RR: 17 (12 Sep 2021 12:00) (14 - 27)  SpO2: 100% (12 Sep 2021 12:00) (95% - 100%)    ##Physical Exam   Gen: Drowsy, weak   HEENT: NCAT, PERRL, unable to assess EOM due to poor effort, Left sided lower lip assymetry  Resp: Breath sounds clear bilaterally  CVS: RRR, S1S2 no m/r/g  Abd: soft NT/ND +BS  : + indwelling mortensen catheter  Ext: LLE dressing in place s/p TMA, warm and dry, no edema, + popliteal pulse. RLE warm and dry, no edema, + Right DP pulse,   Neuro: PT alert, unable to assess orientation: Answers yes/no questions by nodding head, unable to fully follow commands. Able to move all extremities with maximum prompting    ## Labs:  ** CBC: **                        8.2    15.79 )-----------( 266      ( 12 Sep 2021 14:17 )             24.3     ** Chem:  **  09-12    148<H>  |  118<H>  |  35<H>  ----------------------------<  314<H>  4.3   |  26  |  0.95    Ca    8.0<L>      12 Sep 2021 14:17  Phos  1.5     09-12  Mg     2.1     09-12    TPro  5.2<L>  /  Alb  1.0<L>  /  TBili  0.3  /  DBili  x   /  AST  57<H>  /  ALT  48  /  AlkPhos  184<H>  09-12    ** Coags: **  PTT - ( 12 Sep 2021 20:59 )  PTT:34.1 sec    LIVER FUNCTIONS - ( 12 Sep 2021 04:43 )  Alb: 1.0 g/dL / Pro: 5.2 gm/dL / ALK PHOS: 184 U/L / ALT: 48 U/L / AST: 57 U/L / GGT: x           POCT Blood Glucose.: 254 mg/dL (12 Sep 2021 11:27)  POCT Blood Glucose.: 265 mg/dL (12 Sep 2021 09:32)  POCT Blood Glucose.: 253 mg/dL (12 Sep 2021 08:30)  POCT Blood Glucose.: 244 mg/dL (12 Sep 2021 07:28)  POCT Blood Glucose.: 302 mg/dL (12 Sep 2021 06:21)  POCT Blood Glucose.: 243 mg/dL (12 Sep 2021 05:26)  POCT Blood Glucose.: 214 mg/dL (12 Sep 2021 04:26)  POCT Blood Glucose.: 198 mg/dL (12 Sep 2021 03:22)  POCT Blood Glucose.: 211 mg/dL (12 Sep 2021 02:26)  POCT Blood Glucose.: >600 mg/dL (12 Sep 2021 02:24)  POCT Blood Glucose.: 247 mg/dL (12 Sep 2021 01:23)  POCT Blood Glucose.: 243 mg/dL (12 Sep 2021 00:23)    PTT - ( 12 Sep 2021 07:51 )  PTT:66.0 sec    Culture - Other (collected 10 Sep 2021 18:46): No growth  Culture - Abscess with Gram Stain (collected 10 Sep 2021 14:48):    Few Proteus mirabilis    Moderate Enterococcus faecalis    Moderate Streptococcus agalactiae (Group B) isolated    Culture - Urine (collected 10 Sep 2021 10:40): No growth  Culture - Blood (collected 10 Sep 2021 10:33): No growth to date.  Culture - Blood (collected 10 Sep 2021 10:33): No growth to date.    < from: CT Lower Extremity No Cont, Left (09.10.21 @ 02:21) >  IMPRESSION:    Findings consistent with necrotizing infection within the plantar soft tissues of the left foot extending from the level of the first/second toes proximally to the level of the anterior calcaneus. The medial collateral extent of this area of necrotizing infection extends from the first metatarsal to the level of the fourth metatarsal. No evidence of air within the soft tissues proximal to this region.    Atrophy of the soft tissues about the second toe with mottled intraosseous air extending into the second proximal phalanx also consistent with necrotizing infection.    Findings discussed with nurse practitioner Cirilo on September 10, 2021 at 0828 hours.    --- End of Report ---    < end of copied text >      < from: TTE Echo Complete w/o Contrast w/ Doppler (09.10.21 @ 10:00) >  Summary:   1. Left ventricular ejection fraction, by visual estimation, is 60 to 65%.   2. Technically fair study.   3. Normal global left ventricular systolic function.   4. Normal left ventricular internal cavity size.   5. Spectral Doppler shows pseudonormal pattern of left ventricular myocardial filling (Grade II diastolic dysfunction).   6. Normal right ventricular size and function.   7. Normal left atrial size.   8. Normal right atrial size.   9. There is no evidence of pericardial effusion.  10. Structurally normal mitral valve, with normal leaflet excursion.  11. Trace mitral valve regurgitation.  12. Mild tricuspid regurgitation.  13. Estimated pulmonary artery systolic pressure is 62.8 mmHg assuming a right atrial pressure of 5 mmHg, which is consistent with severe pulmonary hypertension.    Giovanni Barlow MD FACC, FASE, FACP  Electronically signed on 9/10/2021 at 7:53:33 PM  < end of copied text >    ## Meds:  MEDICATIONS  (STANDING):  chlorhexidine 2% Cloths 1 Application(s) Topical <User Schedule>  clindamycin IVPB      clindamycin IVPB 900 milliGRAM(s) IV Intermittent every 8 hours  dextrose 40% Gel 15 Gram(s) Oral once  heparin  Infusion. 1700 Unit(s)/Hr (17 mL/Hr) IV Continuous <Continuous>  insulin lispro (ADMELOG) corrective regimen sliding scale   SubCutaneous every 6 hours  piperacillin/tazobactam IVPB.. 3.375 Gram(s) IV Intermittent every 8 hours  vancomycin  IVPB 1500 milliGRAM(s) IV Intermittent every 24 hours    MEDICATIONS  (PRN):  acetaminophen   Tablet .. 650 milliGRAM(s) Oral every 6 hours PRN Mild Pain (1 - 3)  fentaNYL    Injectable 25 MICROGram(s) IV Push every 6 hours PRN Severe Pain (7 - 10)  heparin   Injectable 5000 Unit(s) IV Push every 6 hours PRN For aPTT less than 40  heparin   Injectable 2500 Unit(s) IV Push every 6 hours PRN For aPTT between 40 - 57

## 2021-09-12 NOTE — PROGRESS NOTE ADULT - SUBJECTIVE AND OBJECTIVE BOX
CHIEF COMPLAINT:  Patient is a 65y old  Female who presents with a chief complaint of AMS, DKA, Necrotic foot infection (10 Sep 2021 10:07)      HPI:  66yo F with DM, HTN, HLD, diabetic retinopathy, found unresponsive by her family at home. Had a wound on her left foot from glass 1-2 weeks ago, which blistered and eventually popped, noted to have worsening necrosis of foot. Labs showed hyperglycemia to 1466. Also noted to have troponin elevation of 0.468, lateral TWIs on EKG. s/p vascular surgery.      REVIEW OF SYSTEMS:  unable    PHYSICAL EXAM:  ICU Vital Signs Last 24 Hrs  T(C): 37.9 (11 Sep 2021 11:30), Max: 37.9 (11 Sep 2021 11:30)  T(F): 100.2 (11 Sep 2021 11:30), Max: 100.2 (11 Sep 2021 11:30)  HR: 115 (11 Sep 2021 13:42) (91 - 119)  BP: 122/58 (11 Sep 2021 12:30) (80/44 - 138/84)  BP(mean): 74 (11 Sep 2021 12:30) (50 - 96)  RR: 27 (11 Sep 2021 12:30) (17 - 27)  SpO2: 99% (11 Sep 2021 13:42) (98% - 100%)    Tele: SR, PACS    Constitutional: well developed, normal appearance, well groomed, well nourished, no deformities.  HEENT: normal oral mucosa, no oral pallor and no oral cyanosis.   Neck: normal jugular venous A waves present, normal jugular venous V waves present and no jugular venous miramontes A waves.   Pulmonary: no respiratory distress, normal respiratory rhythm and effort, no accessory muscle use and lungs were clear.  Cardiovascular: heart rate and rhythm were normal, normal S1 and S2 and no murmur.  Abdomen: soft, non-tender  Musculoskeletal: the gait could not be assessed.   Extremities: no clubbing of the fingernails, no localized cyanosis, no petechial hemorrhages and no ischemic changes.   Skin: normal skin color and pigmentation, no rash, no venous stasis.      LABORATORY:                                                         8.2    15.79 )-----------( 266      ( 12 Sep 2021 14:17 )             24.3       09-12    148<H>  |  118<H>  |  35<H>  ----------------------------<  314<H>  4.3   |  26  |  0.95    Ca    8.0<L>      12 Sep 2021 14:17  Phos  1.5     09-12  Mg     2.1     09-12    TPro  5.2<L>  /  Alb  1.0<L>  /  TBili  0.3  /  DBili  x   /  AST  57<H>  /  ALT  48  /  AlkPhos  184<H>  09-12          CARDIAC MARKERS ( 10 Sep 2021 15:42 )  1.270 ng/mL / x     / x     / x     / x      CARDIAC MARKERS ( 10 Sep 2021 09:59 )  x     / x     / 8200 U/L / x     / x      CARDIAC MARKERS ( 10 Sep 2021 06:44 )  1.210 ng/mL / x     / 7070 U/L / x     / x      CARDIAC MARKERS ( 10 Sep 2021 01:32 )  .729 ng/mL / x     / x     / x     / x      CARDIAC MARKERS ( 09 Sep 2021 22:39 )  .468 ng/mL / x     / 2529 U/L / x     / 39.1 ng/mL      CAPILLARY BLOOD GLUCOSE      POCT Blood Glucose.: 449 mg/dL (10 Sep 2021 10:31)  POCT Blood Glucose.: 455 mg/dL (10 Sep 2021 08:38)  POCT Blood Glucose.: 400 mg/dL (10 Sep 2021 07:37)  POCT Blood Glucose.: 463 mg/dL (10 Sep 2021 06:29)  POCT Blood Glucose.: 494 mg/dL (10 Sep 2021 05:33)  POCT Blood Glucose.: 569 mg/dL (10 Sep 2021 04:22)  POCT Blood Glucose.: >600 mg/dL (10 Sep 2021 03:25)  POCT Blood Glucose.: >600 mg/dL (10 Sep 2021 02:23)  POCT Blood Glucose.: >600 mg/dL (10 Sep 2021 01:03)  POCT Blood Glucose.: >600 mg/dL (09 Sep 2021 23:56)      IMAGING:  < from: 12 Lead ECG (09.10.21 @ 04:08) >  Sinus tachycardia with premature atrial complexes  Abnormal QRS-T angle, consider primary T wave abnormality  Abnormal ECG  When compared with ECG of 09-SEP-2021 21:39,  premature atrial complexes are now present  T wave inversion less evident in Inferior leads    < end of copied text >    < from: CT Lower Extremity No Cont, Left (09.10.21 @ 02:21) >  Findings consistent with necrotizing infection within the plantar soft tissues of the left foot extending from the level of the first/second toes proximally to the level of the anterior calcaneus. The medial collateral extent of this area of necrotizing infection extends from the first metatarsal to the level of the fourth metatarsal. No evidence of air within the soft tissues proximal to this region.    Atrophy of the soft tissues about the second toe with mottled intraosseous air extending into the second proximal phalanx also consistent with necrotizing infection.    < end of copied text >    < from: TTE Echo Complete w/o Contrast w/ Doppler (09.10.21 @ 10:00) >  Summary:   1. Left ventricular ejection fraction, by visual estimation, is 60 to 65%.   2. Technically fair study.   3. Normal global left ventricular systolic function.   4. Normal left ventricular internal cavity size.   5. Spectral Doppler shows pseudonormal pattern of left ventricular myocardial filling (Grade II diastolic dysfunction).   6. Normal right ventricular size and function.   7. Normal left atrial size.   8. Normal right atrial size.   9. There is no evidence of pericardial effusion.  10. Structurally normal mitral valve, with normal leaflet excursion.  11. Trace mitral valve regurgitation.  12. Mild tricuspid regurgitation.  13. Estimated pulmonary artery systolic pressure is 62.8 mmHg assuming a right atrial pressure of 5 mmHg, which is consistent with severe pulmonary hypertension.    < end of copied text >    ASSESSMENT:  66yo F with DM, HTN, HLD, diabetic retinopathy, found unresponsive by her family at home. Had a wound on her left foot from glass 1-2 weeks ago, which blistered and eventually popped, noted to have worsening necrosis of foot. Labs showed hyperglycemia to 1466. Also noted to have troponin elevation of 0.468, lateral TWIs on EKG. s/p vascular surgery.    PLAN:     Critical care/mechanical ventilatory support  c/w ICU management.  vasopressor support.  Low-grade troponin release possibly nstemi.  stay on IV heparin infusion.  abx/post op care.  f/u  labs.      chlorhexidine 2% Cloths 1 Application(s) Topical <User Schedule>    clindamycin IVPB 900 milliGRAM(s) IV Intermittent every 8 hours  heparin  Infusion. 1700 Unit(s)/Hr (17 mL/Hr) IV Continuous <Continuous>  insulin lispro (ADMELOG) corrective regimen sliding scale   SubCutaneous every 6 hours  piperacillin/tazobactam IVPB.. 3.375 Gram(s) IV Intermittent every 8 hours  vancomycin  IVPB 1500 milliGRAM(s) IV Intermittent every 24 hours    Giovanni Barlow MD, FACC, LUCI, SUNITHA, FACP  Director, Heart Failure Services  [facility]  , Department of Cardiology  [facility]     CHIEF COMPLAINT:  Patient is a 65y old  Female who presents with a chief complaint of AMS, DKA, Necrotic foot infection (10 Sep 2021 10:07)      HPI:  65-year-old with hypertension, diabetes, dyslipidemia, diabetic retinopathy and found unresponsive at home. Recent left foot wounds eventually worsened with necrosis of left foot and hyperglycemia on admission along with troponin elevation and T wave abnormalities on ECG. She status post left foot vascular surgery. On IV infusion of heparin for non-STEMI.      REVIEW OF SYSTEMS:  unable    PHYSICAL EXAM:  ICU Vital Signs Last 24 Hrs  T(C): 37.9 (11 Sep 2021 11:30), Max: 37.9 (11 Sep 2021 11:30)  T(F): 100.2 (11 Sep 2021 11:30), Max: 100.2 (11 Sep 2021 11:30)  HR: 115 (11 Sep 2021 13:42) (91 - 119)  BP: 122/58 (11 Sep 2021 12:30) (80/44 - 138/84)  BP(mean): 74 (11 Sep 2021 12:30) (50 - 96)  RR: 27 (11 Sep 2021 12:30) (17 - 27)  SpO2: 99% (11 Sep 2021 13:42) (98% - 100%)    Tele: SR, PACS    Constitutional: well developed, normal appearance, well groomed, well nourished, no deformities.  HEENT: normal oral mucosa, no oral pallor and no oral cyanosis.   Neck: normal jugular venous A waves present, normal jugular venous V waves present and no jugular venous miramontes A waves.   Pulmonary: no respiratory distress, normal respiratory rhythm and effort, no accessory muscle use and lungs were clear.  Cardiovascular: heart rate and rhythm were normal, normal S1 and S2 and no murmur.  Abdomen: soft, non-tender  Musculoskeletal: the gait could not be assessed.   Extremities: no clubbing of the fingernails, no localized cyanosis, no petechial hemorrhages and no ischemic changes.   Skin: normal skin color and pigmentation, no rash, no venous stasis.      LABORATORY:                                                         8.2    15.79 )-----------( 266      ( 12 Sep 2021 14:17 )             24.3       09-12    148<H>  |  118<H>  |  35<H>  ----------------------------<  314<H>  4.3   |  26  |  0.95    Ca    8.0<L>      12 Sep 2021 14:17  Phos  1.5     09-12  Mg     2.1     09-12    TPro  5.2<L>  /  Alb  1.0<L>  /  TBili  0.3  /  DBili  x   /  AST  57<H>  /  ALT  48  /  AlkPhos  184<H>  09-12          CARDIAC MARKERS ( 10 Sep 2021 15:42 )  1.270 ng/mL / x     / x     / x     / x      CARDIAC MARKERS ( 10 Sep 2021 09:59 )  x     / x     / 8200 U/L / x     / x      CARDIAC MARKERS ( 10 Sep 2021 06:44 )  1.210 ng/mL / x     / 7070 U/L / x     / x      CARDIAC MARKERS ( 10 Sep 2021 01:32 )  .729 ng/mL / x     / x     / x     / x      CARDIAC MARKERS ( 09 Sep 2021 22:39 )  .468 ng/mL / x     / 2529 U/L / x     / 39.1 ng/mL      CAPILLARY BLOOD GLUCOSE      POCT Blood Glucose.: 449 mg/dL (10 Sep 2021 10:31)  POCT Blood Glucose.: 455 mg/dL (10 Sep 2021 08:38)  POCT Blood Glucose.: 400 mg/dL (10 Sep 2021 07:37)  POCT Blood Glucose.: 463 mg/dL (10 Sep 2021 06:29)  POCT Blood Glucose.: 494 mg/dL (10 Sep 2021 05:33)  POCT Blood Glucose.: 569 mg/dL (10 Sep 2021 04:22)  POCT Blood Glucose.: >600 mg/dL (10 Sep 2021 03:25)  POCT Blood Glucose.: >600 mg/dL (10 Sep 2021 02:23)  POCT Blood Glucose.: >600 mg/dL (10 Sep 2021 01:03)  POCT Blood Glucose.: >600 mg/dL (09 Sep 2021 23:56)      IMAGING:  < from: 12 Lead ECG (09.10.21 @ 04:08) >  Sinus tachycardia with premature atrial complexes  Abnormal QRS-T angle, consider primary T wave abnormality  Abnormal ECG  When compared with ECG of 09-SEP-2021 21:39,  premature atrial complexes are now present  T wave inversion less evident in Inferior leads    < end of copied text >    < from: CT Lower Extremity No Cont, Left (09.10.21 @ 02:21) >  Findings consistent with necrotizing infection within the plantar soft tissues of the left foot extending from the level of the first/second toes proximally to the level of the anterior calcaneus. The medial collateral extent of this area of necrotizing infection extends from the first metatarsal to the level of the fourth metatarsal. No evidence of air within the soft tissues proximal to this region.    Atrophy of the soft tissues about the second toe with mottled intraosseous air extending into the second proximal phalanx also consistent with necrotizing infection.    < end of copied text >    < from: TTE Echo Complete w/o Contrast w/ Doppler (09.10.21 @ 10:00) >  Summary:   1. Left ventricular ejection fraction, by visual estimation, is 60 to 65%.   2. Technically fair study.   3. Normal global left ventricular systolic function.   4. Normal left ventricular internal cavity size.   5. Spectral Doppler shows pseudonormal pattern of left ventricular myocardial filling (Grade II diastolic dysfunction).   6. Normal right ventricular size and function.   7. Normal left atrial size.   8. Normal right atrial size.   9. There is no evidence of pericardial effusion.  10. Structurally normal mitral valve, with normal leaflet excursion.  11. Trace mitral valve regurgitation.  12. Mild tricuspid regurgitation.  13. Estimated pulmonary artery systolic pressure is 62.8 mmHg assuming a right atrial pressure of 5 mmHg, which is consistent with severe pulmonary hypertension.    < end of copied text >    ASSESSMENT:    65-year-old with hypertension, diabetes, dyslipidemia, diabetic retinopathy and found unresponsive at home. Recent left foot wounds eventually worsened with necrosis of left foot and hyperglycemia on admission along with troponin elevation and T wave abnormalities on ECG. She status post left foot vascular surgery. On IV infusion of heparin for non-STEMI.    PLAN:     Continue IV infusion of heparin for non-STEMI. Broad-spectrum antibiotic coverage and postoperative care as per critical care management. Vasopressors weaned. Follow labs. Insulin sliding scale for diabetic management.  Consider eventual ischemic cardiac workup when more stable.    Giovanni Barlow MD, FACC, FASE, FASNC, FACP  Director, Heart Failure Services  [facility]  , Department of Cardiology  [facility]

## 2021-09-12 NOTE — PROGRESS NOTE ADULT - SUBJECTIVE AND OBJECTIVE BOX
Extubated today    Vital Signs Last 24 Hrs  T(C): 36.9 (09-12-21 @ 11:00), Max: 38.3 (09-11-21 @ 19:25)  T(F): 98.5 (09-12-21 @ 11:00), Max: 100.9 (09-11-21 @ 19:25)  HR: 96 (09-12-21 @ 12:00) (87 - 110)  BP: 124/59 (09-12-21 @ 12:00) (90/49 - 146/66)  BP(mean): 74 (09-12-21 @ 12:00) (57 - 83)  RR: 17 (09-12-21 @ 12:00) (14 - 27)  SpO2: 100% (09-12-21 @ 12:00) (95% - 100%)    I&O's Detail    11 Sep 2021 07:01  -  12 Sep 2021 07:00  --------------------------------------------------------  IN:    dextrose 5%: 1350 mL    dextrose 5% + lactated ringers: 500 mL    FentaNYL: 106.1 mL    Glucerna: 600 mL    Heparin Infusion: 358 mL    Insulin: 123 mL    IV PiggyBack: 1450 mL    Norepinephrine: 57 mL  Total IN: 4544.1 mL    OUT:    Indwelling Catheter - Urethral (mL): 1410 mL  Total OUT: 1410 mL    Total NET: 3134.1 mL    12 Sep 2021 07:01  -  12 Sep 2021 14:20  --------------------------------------------------------  IN:    dextrose 5%: 225 mL    FentaNYL: 17.2 mL    Glucerna: 120 mL    Heparin Infusion: 85 mL    Heparin Infusion: 17 mL    Insulin: 16 mL    IV PiggyBack: 50 mL  Total IN: 530.2 mL    OUT:    Indwelling Catheter - Urethral (mL): 120 mL    Norepinephrine: 0 mL  Total OUT: 120 mL    Total NET: 410.2 mL    Lungs b/l air entry  Heart S1S2  Abd soft ND  Extremities: no edema,  dressed LLE, s/p TMA                               7.5    13.69 )-----------( 276      ( 12 Sep 2021 04:43 )             22.4     12 Sep 2021 04:43    153    |  122    |  36     ----------------------------<  214    4.0     |  29     |  0.93     Ca    8.1        12 Sep 2021 04:43  Phos  1.5       12 Sep 2021 04:43  Mg     2.1       12 Sep 2021 04:43    TPro  5.2    /  Alb  1.0    /  TBili  0.3    /  DBili  x      /  AST  57     /  ALT  48     /  AlkPhos  184    12 Sep 2021 04:43    LIVER FUNCTIONS - ( 12 Sep 2021 04:43 )  Alb: 1.0 g/dL / Pro: 5.2 gm/dL / ALK PHOS: 184 U/L / ALT: 48 U/L / AST: 57 U/L / GGT: x           CARDIAC MARKERS ( 12 Sep 2021 04:43 )  x     / x     / 875 U/L / x     / x      CARDIAC MARKERS ( 12 Sep 2021 01:34 )  x     / x     / 973 U/L / x     / x      CARDIAC MARKERS ( 10 Sep 2021 23:16 )  .867 ng/mL / x     / x     / x     / x      CARDIAC MARKERS ( 10 Sep 2021 15:42 )  1.270 ng/mL / x     / x     / x     / x        acetaminophen   Tablet .. 650 milliGRAM(s) Oral every 6 hours PRN  chlorhexidine 2% Cloths 1 Application(s) Topical <User Schedule>  clindamycin IVPB      clindamycin IVPB 900 milliGRAM(s) IV Intermittent every 8 hours  dextrose 40% Gel 15 Gram(s) Oral once  dextrose 5%. 1000 milliLiter(s) IV Continuous <Continuous>  dextrose 5%. 1000 milliLiter(s) IV Continuous <Continuous>  dextrose 50% Injectable 25 Gram(s) IV Push once  dextrose 50% Injectable 12.5 Gram(s) IV Push once  dextrose 50% Injectable 25 Gram(s) IV Push once  fentaNYL    Injectable 25 MICROGram(s) IV Push every 6 hours PRN  glucagon  Injectable 1 milliGRAM(s) IntraMuscular once  heparin   Injectable 5000 Unit(s) IV Push every 6 hours PRN  heparin   Injectable 2500 Unit(s) IV Push every 6 hours PRN  heparin  Infusion. 1700 Unit(s)/Hr IV Continuous <Continuous>  insulin lispro (ADMELOG) corrective regimen sliding scale   SubCutaneous every 6 hours  piperacillin/tazobactam IVPB.. 3.375 Gram(s) IV Intermittent every 8 hours  vancomycin  IVPB 1500 milliGRAM(s) IV Intermittent every 24 hours    Assessment/Plan:    GEOVANNI, DKA, gas gangrene left foot, s/p OR  Pre renal azotemia resolved  Hypernatremia  Would give D5W IV  Avoid nephrotoxins  Avoid hypotension  Abx per ID  F/u BMP, UO  D/w ICU team    [phone]

## 2021-09-13 DIAGNOSIS — I21.4 NON-ST ELEVATION (NSTEMI) MYOCARDIAL INFARCTION: ICD-10-CM

## 2021-09-13 LAB
-  AMIKACIN: SIGNIFICANT CHANGE UP
-  AMIKACIN: SIGNIFICANT CHANGE UP
-  AMOXICILLIN/CLAVULANIC ACID: SIGNIFICANT CHANGE UP
-  AMOXICILLIN/CLAVULANIC ACID: SIGNIFICANT CHANGE UP
-  AMPICILLIN/SULBACTAM: SIGNIFICANT CHANGE UP
-  AMPICILLIN/SULBACTAM: SIGNIFICANT CHANGE UP
-  AMPICILLIN: SIGNIFICANT CHANGE UP
-  AMPICILLIN: SIGNIFICANT CHANGE UP
-  AZTREONAM: SIGNIFICANT CHANGE UP
-  AZTREONAM: SIGNIFICANT CHANGE UP
-  CEFAZOLIN: SIGNIFICANT CHANGE UP
-  CEFAZOLIN: SIGNIFICANT CHANGE UP
-  CEFEPIME: SIGNIFICANT CHANGE UP
-  CEFEPIME: SIGNIFICANT CHANGE UP
-  CEFOTAXIME: SIGNIFICANT CHANGE UP
-  CEFOXITIN: SIGNIFICANT CHANGE UP
-  CEFOXITIN: SIGNIFICANT CHANGE UP
-  CEFTAZIDIME: SIGNIFICANT CHANGE UP
-  CEFTRIAXONE: SIGNIFICANT CHANGE UP
-  CEFTRIAXONE: SIGNIFICANT CHANGE UP
-  CEFUROXIME: SIGNIFICANT CHANGE UP
-  CIPROFLOXACIN: SIGNIFICANT CHANGE UP
-  CIPROFLOXACIN: SIGNIFICANT CHANGE UP
-  ERTAPENEM: SIGNIFICANT CHANGE UP
-  ERTAPENEM: SIGNIFICANT CHANGE UP
-  GENTAMICIN: SIGNIFICANT CHANGE UP
-  GENTAMICIN: SIGNIFICANT CHANGE UP
-  IMIPENEM: SIGNIFICANT CHANGE UP
-  LEVOFLOXACIN: SIGNIFICANT CHANGE UP
-  LEVOFLOXACIN: SIGNIFICANT CHANGE UP
-  MEROPENEM: SIGNIFICANT CHANGE UP
-  MEROPENEM: SIGNIFICANT CHANGE UP
-  MINOCYCLINE: SIGNIFICANT CHANGE UP
-  PIPERACILLIN/TAZOBACTAM: SIGNIFICANT CHANGE UP
-  PIPERACILLIN/TAZOBACTAM: SIGNIFICANT CHANGE UP
-  TIGECYCLINE: SIGNIFICANT CHANGE UP
-  TOBRAMYCIN: SIGNIFICANT CHANGE UP
-  TOBRAMYCIN: SIGNIFICANT CHANGE UP
-  TRIMETHOPRIM/SULFAMETHOXAZOLE: SIGNIFICANT CHANGE UP
-  TRIMETHOPRIM/SULFAMETHOXAZOLE: SIGNIFICANT CHANGE UP
ALBUMIN SERPL ELPH-MCNC: 1 G/DL — LOW (ref 3.3–5)
ALP SERPL-CCNC: 140 U/L — HIGH (ref 40–120)
ALT FLD-CCNC: 47 U/L — SIGNIFICANT CHANGE UP (ref 12–78)
ANION GAP SERPL CALC-SCNC: 2 MMOL/L — LOW (ref 5–17)
APTT BLD: 103.6 SEC — HIGH (ref 27.5–35.5)
APTT BLD: 103.8 SEC — HIGH (ref 27.5–35.5)
AST SERPL-CCNC: 45 U/L — HIGH (ref 15–37)
BILIRUB SERPL-MCNC: 0.4 MG/DL — SIGNIFICANT CHANGE UP (ref 0.2–1.2)
BUN SERPL-MCNC: 30 MG/DL — HIGH (ref 7–23)
CALCIUM SERPL-MCNC: 8.3 MG/DL — LOW (ref 8.5–10.1)
CHLORIDE SERPL-SCNC: 113 MMOL/L — HIGH (ref 96–108)
CO2 SERPL-SCNC: 30 MMOL/L — SIGNIFICANT CHANGE UP (ref 22–31)
COVID-19 SPIKE DOMAIN AB INTERP: POSITIVE
COVID-19 SPIKE DOMAIN ANTIBODY RESULT: >250 U/ML — HIGH
CREAT SERPL-MCNC: 0.95 MG/DL — SIGNIFICANT CHANGE UP (ref 0.5–1.3)
CULTURE RESULTS: SIGNIFICANT CHANGE UP
GLUCOSE BLDC GLUCOMTR-MCNC: 246 MG/DL — HIGH (ref 70–99)
GLUCOSE BLDC GLUCOMTR-MCNC: 247 MG/DL — HIGH (ref 70–99)
GLUCOSE BLDC GLUCOMTR-MCNC: 282 MG/DL — HIGH (ref 70–99)
GLUCOSE BLDC GLUCOMTR-MCNC: 328 MG/DL — HIGH (ref 70–99)
GLUCOSE BLDC GLUCOMTR-MCNC: 341 MG/DL — HIGH (ref 70–99)
GLUCOSE BLDC GLUCOMTR-MCNC: 357 MG/DL — HIGH (ref 70–99)
GLUCOSE BLDC GLUCOMTR-MCNC: 409 MG/DL — HIGH (ref 70–99)
GLUCOSE SERPL-MCNC: 277 MG/DL — HIGH (ref 70–99)
HCT VFR BLD CALC: 23.3 % — LOW (ref 34.5–45)
HGB BLD-MCNC: 7.8 G/DL — LOW (ref 11.5–15.5)
MAGNESIUM SERPL-MCNC: 2.2 MG/DL — SIGNIFICANT CHANGE UP (ref 1.6–2.6)
MCHC RBC-ENTMCNC: 28.1 PG — SIGNIFICANT CHANGE UP (ref 27–34)
MCHC RBC-ENTMCNC: 33.5 GM/DL — SIGNIFICANT CHANGE UP (ref 32–36)
MCV RBC AUTO: 83.8 FL — SIGNIFICANT CHANGE UP (ref 80–100)
METHOD TYPE: SIGNIFICANT CHANGE UP
METHOD TYPE: SIGNIFICANT CHANGE UP
NRBC # BLD: 0 /100 WBCS — SIGNIFICANT CHANGE UP (ref 0–0)
ORGANISM # SPEC MICROSCOPIC CNT: SIGNIFICANT CHANGE UP
PHOSPHATE SERPL-MCNC: 2 MG/DL — LOW (ref 2.5–4.5)
PLATELET # BLD AUTO: 249 K/UL — SIGNIFICANT CHANGE UP (ref 150–400)
POTASSIUM SERPL-MCNC: 3.6 MMOL/L — SIGNIFICANT CHANGE UP (ref 3.5–5.3)
POTASSIUM SERPL-SCNC: 3.6 MMOL/L — SIGNIFICANT CHANGE UP (ref 3.5–5.3)
PROT SERPL-MCNC: 5.4 GM/DL — LOW (ref 6–8.3)
RBC # BLD: 2.78 M/UL — LOW (ref 3.8–5.2)
RBC # FLD: 16 % — HIGH (ref 10.3–14.5)
SARS-COV-2 IGG+IGM SERPL QL IA: >250 U/ML — HIGH
SARS-COV-2 IGG+IGM SERPL QL IA: POSITIVE
SODIUM SERPL-SCNC: 145 MMOL/L — SIGNIFICANT CHANGE UP (ref 135–145)
SPECIMEN SOURCE: SIGNIFICANT CHANGE UP
VANCOMYCIN TROUGH SERPL-MCNC: 16.8 UG/ML — SIGNIFICANT CHANGE UP (ref 10–20)
WBC # BLD: 12.75 K/UL — HIGH (ref 3.8–10.5)
WBC # FLD AUTO: 12.75 K/UL — HIGH (ref 3.8–10.5)

## 2021-09-13 PROCEDURE — 73630 X-RAY EXAM OF FOOT: CPT | Mod: 26,LT

## 2021-09-13 PROCEDURE — 99232 SBSQ HOSP IP/OBS MODERATE 35: CPT

## 2021-09-13 PROCEDURE — 99233 SBSQ HOSP IP/OBS HIGH 50: CPT

## 2021-09-13 PROCEDURE — 99223 1ST HOSP IP/OBS HIGH 75: CPT

## 2021-09-13 PROCEDURE — 73590 X-RAY EXAM OF LOWER LEG: CPT | Mod: 26,LT

## 2021-09-13 RX ORDER — SODIUM CHLORIDE 9 MG/ML
1000 INJECTION, SOLUTION INTRAVENOUS
Refills: 0 | Status: DISCONTINUED | OUTPATIENT
Start: 2021-09-13 | End: 2021-09-23

## 2021-09-13 RX ORDER — INSULIN LISPRO 100/ML
VIAL (ML) SUBCUTANEOUS
Refills: 0 | Status: DISCONTINUED | OUTPATIENT
Start: 2021-09-13 | End: 2021-09-23

## 2021-09-13 RX ORDER — INSULIN LISPRO 100/ML
VIAL (ML) SUBCUTANEOUS AT BEDTIME
Refills: 0 | Status: DISCONTINUED | OUTPATIENT
Start: 2021-09-13 | End: 2021-09-23

## 2021-09-13 RX ORDER — ASPIRIN/CALCIUM CARB/MAGNESIUM 324 MG
81 TABLET ORAL DAILY
Refills: 0 | Status: DISCONTINUED | OUTPATIENT
Start: 2021-09-13 | End: 2021-09-23

## 2021-09-13 RX ORDER — POTASSIUM PHOSPHATE, MONOBASIC POTASSIUM PHOSPHATE, DIBASIC 236; 224 MG/ML; MG/ML
15 INJECTION, SOLUTION INTRAVENOUS ONCE
Refills: 0 | Status: COMPLETED | OUTPATIENT
Start: 2021-09-13 | End: 2021-09-13

## 2021-09-13 RX ORDER — ATORVASTATIN CALCIUM 80 MG/1
40 TABLET, FILM COATED ORAL AT BEDTIME
Refills: 0 | Status: DISCONTINUED | OUTPATIENT
Start: 2021-09-13 | End: 2021-09-23

## 2021-09-13 RX ORDER — DEXTROSE 50 % IN WATER 50 %
15 SYRINGE (ML) INTRAVENOUS ONCE
Refills: 0 | Status: DISCONTINUED | OUTPATIENT
Start: 2021-09-13 | End: 2021-09-23

## 2021-09-13 RX ORDER — ENOXAPARIN SODIUM 100 MG/ML
40 INJECTION SUBCUTANEOUS
Refills: 0 | Status: DISCONTINUED | OUTPATIENT
Start: 2021-09-13 | End: 2021-09-18

## 2021-09-13 RX ADMIN — PIPERACILLIN AND TAZOBACTAM 25 GRAM(S): 4; .5 INJECTION, POWDER, LYOPHILIZED, FOR SOLUTION INTRAVENOUS at 14:56

## 2021-09-13 RX ADMIN — PIPERACILLIN AND TAZOBACTAM 25 GRAM(S): 4; .5 INJECTION, POWDER, LYOPHILIZED, FOR SOLUTION INTRAVENOUS at 21:25

## 2021-09-13 RX ADMIN — INSULIN GLARGINE 10 UNIT(S): 100 INJECTION, SOLUTION SUBCUTANEOUS at 21:26

## 2021-09-13 RX ADMIN — Medication 6: at 06:04

## 2021-09-13 RX ADMIN — FENTANYL CITRATE 25 MICROGRAM(S): 50 INJECTION INTRAVENOUS at 04:00

## 2021-09-13 RX ADMIN — Medication 8: at 00:27

## 2021-09-13 RX ADMIN — CHLORHEXIDINE GLUCONATE 1 APPLICATION(S): 213 SOLUTION TOPICAL at 05:58

## 2021-09-13 RX ADMIN — Medication 4: at 11:26

## 2021-09-13 RX ADMIN — Medication 8: at 21:35

## 2021-09-13 RX ADMIN — FENTANYL CITRATE 25 MICROGRAM(S): 50 INJECTION INTRAVENOUS at 11:35

## 2021-09-13 RX ADMIN — Medication 81 MILLIGRAM(S): at 11:39

## 2021-09-13 RX ADMIN — FENTANYL CITRATE 25 MICROGRAM(S): 50 INJECTION INTRAVENOUS at 03:44

## 2021-09-13 RX ADMIN — HEPARIN SODIUM 1900 UNIT(S)/HR: 5000 INJECTION INTRAVENOUS; SUBCUTANEOUS at 05:09

## 2021-09-13 RX ADMIN — Medication 650 MILLIGRAM(S): at 23:44

## 2021-09-13 RX ADMIN — ENOXAPARIN SODIUM 40 MILLIGRAM(S): 100 INJECTION SUBCUTANEOUS at 17:30

## 2021-09-13 RX ADMIN — PIPERACILLIN AND TAZOBACTAM 25 GRAM(S): 4; .5 INJECTION, POWDER, LYOPHILIZED, FOR SOLUTION INTRAVENOUS at 05:58

## 2021-09-13 RX ADMIN — ATORVASTATIN CALCIUM 40 MILLIGRAM(S): 80 TABLET, FILM COATED ORAL at 21:26

## 2021-09-13 RX ADMIN — Medication 10: at 17:30

## 2021-09-13 RX ADMIN — POTASSIUM PHOSPHATE, MONOBASIC POTASSIUM PHOSPHATE, DIBASIC 62.5 MILLIMOLE(S): 236; 224 INJECTION, SOLUTION INTRAVENOUS at 05:58

## 2021-09-13 RX ADMIN — Medication 100 MILLIGRAM(S): at 05:58

## 2021-09-13 RX ADMIN — FENTANYL CITRATE 25 MICROGRAM(S): 50 INJECTION INTRAVENOUS at 11:17

## 2021-09-13 NOTE — DIETITIAN INITIAL EVALUATION ADULT. - NUTRITIONGOAL OUTCOME1
pt/family to verbalize understanding of consistent CHO nutrition therapy, blood glucose goals. pt to consume >75% of meals & supplement

## 2021-09-13 NOTE — DIETITIAN INITIAL EVALUATION ADULT. - OTHER INFO
Pt was intubated & was on enteral feeding c Glucerna 1.2 via OGT.  Pt s/p extubation 09/12.  As per H & P, pt's daughter reported that pt had a wound on her left foot from glass 1-2 weeks PTA.   As per pt., she had hx of DM x 9 years.  Pt stated that she took regular insulin 10 units 4 x day another insulin(unsure of name of insulin)32 units daily.   Food allergies noted, pt also reported allergy to plums c reaction of itching.  Pt is not appropriate candidate for nutrition education due to AMS, acute illness.  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

## 2021-09-13 NOTE — PROGRESS NOTE ADULT - ASSESSMENT
Pt s/p LF Choparts amp 9/10/21  -pt was seen and examined  - post operative X rays pending   -POD 3: s/p LF choparts amputation left open, no purulent drainage, no malodor, no active bleeding. Soft tissue and skin edges at level of amputation do no appear viable.   -Pt to require more proximal amputation. Recommend vascular consult for evaluation for BKA.   - BKA would offer more functional amputation.  - Document medical clearance for future amputation  -Will order tib-fib xrays to rule out remnant soft tissue emphysema in the left lower extremity  -Will follow OR data  -Discussed w/ attending    Pt s/p LF Choparts amp 9/10/21  -pt was seen and examined  - post operative X rays pending   -POD 3: s/p LF choparts amputation left open, no purulent drainage, no malodor, no active bleeding. Soft tissue and skin edges at level of amputation do no appear viable.   -Podiatry will continue to monitor and assess for salvageability of the L foot  - Document medical clearance for future amputation  -Will order tib-fib xrays to rule out remnant soft tissue emphysema in the left lower extremity  -Will follow OR data  -Discussed w/ attending

## 2021-09-13 NOTE — SWALLOW BEDSIDE ASSESSMENT ADULT - H & P REVIEW
66 yo F bibems after being found down in home unresponsive.  Pt. was last seen 1.5 days ago.  Pt. reportedly had cut L foot days ago, apparently infection has developed as L foot is now necrotic.  Pt. cannot provide history due to AMS. In ED, pt with open wound of Lt dorsal foot with necrotic 2nd toe. Pt remained with altered mental status on arrival. Labs show hyperglycemia to 1466, serum bicarb 6, AG 39. Also noted to have troponinemia 0.468, lateral TWIs on EKG. Per vascular surgery consult by ED, no immediate OR intervention indicated; recommended pt to be seen by in-house vascular in the morning. Pt s/p LF Choparts amp 9/10/21, and extubated 9/13/yes

## 2021-09-13 NOTE — SWALLOW BEDSIDE ASSESSMENT ADULT - SLP PERTINENT HISTORY OF CURRENT PROBLEM
DKA, Necrotic foot infection, acute toxic metabolic encephalopathy and septic encephalopathy. pt denied difficulty with eating and swallowing

## 2021-09-13 NOTE — PROGRESS NOTE ADULT - SUBJECTIVE AND OBJECTIVE BOX
65-year-old with hypertension, diabetes, dyslipidemia, diabetic retinopathy and found unresponsive at home. Recent left foot wounds eventually worsened with necrosis of left foot and hyperglycemia on admission along with troponin elevation and T wave abnormalities on ECG. She status post left foot vascular surgery. On IV infusion of heparin for non-STEMI.   REVIEW OF SYSTEMS: unable  MEDICATIONS  (STANDING): aspirin enteric coated 81 milliGRAM(s) Oral daily atorvastatin 40 milliGRAM(s) Oral at bedtime enoxaparin Injectable 40 milliGRAM(s) SubCutaneous <User Schedule> piperacillin/tazobactam IVPB.. 3.375 Gram(s) IV Intermittent every 8 hours   PHYSICAL EXAM: Vital Signs Last 24 Hrs T(C): 37.4 (13 Sep 2021 08:00), Max: 37.4 (13 Sep 2021 04:00) T(F): 99.3 (13 Sep 2021 08:00), Max: 99.4 (13 Sep 2021 04:00) HR: 95 (13 Sep 2021 15:00) (91 - 117) BP: 139/67 (13 Sep 2021 15:00) (108/54 - 150/123) BP(mean): 87 (13 Sep 2021 15:00) (57 - 130) RR: 18 (13 Sep 2021 15:00) (15 - 26) SpO2: 98% (13 Sep 2021 15:00) (98% - 100%) Tele: SR, PACS  Constitutional: well developed, normal appearance, well groomed, well nourished, no deformities. HEENT: normal oral mucosa, no oral pallor and no oral cyanosis.  Neck: normal jugular venous A waves present, normal jugular venous V waves present and no jugular venous miramontes A waves.  Pulmonary: no respiratory distress, normal respiratory rhythm and effort, no accessory muscle use and lungs were clear. Cardiovascular: heart rate and rhythm were normal, normal S1 and S2 and no murmur. Abdomen: soft, non-tender Musculoskeletal: the gait could not be assessed.  Extremities: no clubbing of the fingernails, no localized cyanosis, no petechial hemorrhages and no ischemic changes.  Skin: normal skin color and pigmentation, no rash, no venous stasis.   LABORATORY:                   09-13  145  |  113<H>  |  30<H> ----------------------------<  277<H> 3.6   |  30  |  0.95  Ca    8.3<L>      13 Sep 2021 03:46 Phos  2.0     09-13 Mg     2.2     09-13  TPro  5.4<L>  /  Alb  1.0<L>  /  TBili  0.4  /  DBili  x   /  AST  45<H>  /  ALT  47  /  AlkPhos  140<H>  09-13                       7.8   12.75 )-----------( 249      ( 13 Sep 2021 03:46 )            23.3            CARDIAC MARKERS ( 10 Sep 2021 15:42 ) 1.270 ng/mL / x     / x     / x     / x     CARDIAC MARKERS ( 10 Sep 2021 09:59 ) x     / x     / 8200 U/L / x     / x     CARDIAC MARKERS ( 10 Sep 2021 06:44 ) 1.210 ng/mL / x     / 7070 U/L / x     / x     CARDIAC MARKERS ( 10 Sep 2021 01:32 ) .729 ng/mL / x     / x     / x     / x     CARDIAC MARKERS ( 09 Sep 2021 22:39 ) .468 ng/mL / x     / 2529 U/L / x     / 39.1 ng/mL     IMAGING: < from: 12 Lead ECG (09.10.21 @ 04:08) > Sinus tachycardia with premature atrial complexes Abnormal QRS-T angle, consider primary T wave abnormality Abnormal ECG When compared with ECG of 09-SEP-2021 21:39, premature atrial complexes are now present T wave inversion less evident in Inferior leads  < end of copied text >  < from: CT Lower Extremity No Cont, Left (09.10.21 @ 02:21) > Findings consistent with necrotizing infection within the plantar soft tissues of the left foot extending from the level of the first/second toes proximally to the level of the anterior calcaneus. The medial collateral extent of this area of necrotizing infection extends from the first metatarsal to the level of the fourth metatarsal. No evidence of air within the soft tissues proximal to this region.  Atrophy of the soft tissues about the second toe with mottled intraosseous air extending into the second proximal phalanx also consistent with necrotizing infection.  < end of copied text >  < from: TTE Echo Complete w/o Contrast w/ Doppler (09.10.21 @ 10:00) > Summary:  1. Left ventricular ejection fraction, by visual estimation, is 60 to 65%.  2. Technically fair study.  3. Normal global left ventricular systolic function.  4. Normal left ventricular internal cavity size.  5. Spectral Doppler shows pseudonormal pattern of left ventricular myocardial filling (Grade II diastolic dysfunction).  6. Normal right ventricular size and function.  7. Normal left atrial size.  8. Normal right atrial size.  9. There is no evidence of pericardial effusion. 10. Structurally normal mitral valve, with normal leaflet excursion. 11. Trace mitral valve regurgitation. 12. Mild tricuspid regurgitation. 13. Estimated pulmonary artery systolic pressure is 62.8 mmHg assuming a right atrial pressure of 5 mmHg, which is consistent with severe pulmonary hypertension.  < end of copied text >

## 2021-09-13 NOTE — CONSULT NOTE ADULT - SUBJECTIVE AND OBJECTIVE BOX
HPI:  64yo F with PMH of IDDM, HTN, HLD, diabetic retinopathy presents [facility] ED [facility] after being found unresponsive by her family at home. Pt was last seen by her family 2 days ago. Per daughter pt had a wound on her left foot from glass 1-2 weeks ago, which blistered and eventually popped. Subsequently noted to have worsening necrosis of foot. Pt saw her PMD who referred her for outpatient podiatry visit; has not had podiatry appt yet. Daughter last spoke to pt over telephone on Wednesday at which time she sounded altered and mumbled but was answering "I'm fine". Pt lives alone at home; no pets, no smoking/EtOH/illicit drugs.   In ED, pt with open wound of Lt dorsal foot with necrotic 2nd toe. Pt remained with altered mental status on arrival. Labs show hyperglycemia to 1466, serum bicarb 6, AG 39. Also noted to have troponinemia 0.468, lateral TWIs on EKG. Per vascular surgery consult by ED, no immediate OR intervention indicated; recommended pt to be seen by in-house vascular in the morning.  (10 Sep 2021 01:14)    Patient seen and examined at bedside in the ICU with Dr. Dasilva.  Comfortable. No complaints offered.      PAST MEDICAL & SURGICAL HISTORY:  Hypertension  Family history of diabetes mellitus  No significant past surgical history      REVIEW OF SYSTEMS  Contained within [facility]    MEDICATIONS  (STANDING):  aspirin enteric coated 81 milliGRAM(s) Oral daily  atorvastatin 40 milliGRAM(s) Oral at bedtime  chlorhexidine 2% Cloths 1 Application(s) Topical <User Schedule>  dextrose 40% Gel 15 Gram(s) Oral once  dextrose 5%. 1000 milliLiter(s) (50 mL/Hr) IV Continuous <Continuous>  dextrose 5%. 1000 milliLiter(s) (50 mL/Hr) IV Continuous <Continuous>  dextrose 5%. 1000 milliLiter(s) (100 mL/Hr) IV Continuous <Continuous>  dextrose 5%. 1000 milliLiter(s) (100 mL/Hr) IV Continuous <Continuous>  dextrose 50% Injectable 25 Gram(s) IV Push once  dextrose 50% Injectable 12.5 Gram(s) IV Push once  dextrose 50% Injectable 25 Gram(s) IV Push once  enoxaparin Injectable 40 milliGRAM(s) SubCutaneous <User Schedule>  glucagon  Injectable 1 milliGRAM(s) IntraMuscular once  glucagon  Injectable 1 milliGRAM(s) IntraMuscular once  insulin glargine Injectable (LANTUS) 10 Unit(s) SubCutaneous at bedtime  insulin lispro (ADMELOG) corrective regimen sliding scale   SubCutaneous every 6 hours  piperacillin/tazobactam IVPB.. 3.375 Gram(s) IV Intermittent every 8 hours    MEDICATIONS  (PRN):  acetaminophen   Tablet .. 650 milliGRAM(s) Oral every 6 hours PRN Mild Pain (1 - 3)  fentaNYL    Injectable 25 MICROGram(s) IV Push every 6 hours PRN Severe Pain (7 - 10)      Allergies    avocado (Pruritus)  Carrots (Pruritus)  No Known Drug Allergies  Plums (Pruritus)    Vital Signs Last 24 Hrs  T(C): 37.4 (13 Sep 2021 08:00), Max: 37.4 (13 Sep 2021 04:00)  T(F): 99.3 (13 Sep 2021 08:00), Max: 99.4 (13 Sep 2021 04:00)  HR: 99 (13 Sep 2021 12:00) (91 - 117)  BP: 136/58 (13 Sep 2021 12:00) (108/54 - 150/123)  BP(mean): 77 (13 Sep 2021 12:00) (57 - 130)  RR: 17 (13 Sep 2021 12:00) (15 - 26)  SpO2: 99% (13 Sep 2021 12:00) (98% - 100%)    PHYSICAL EXAM:    Constitutional: Alert, NAD    Respiratory: Respirations nonlabored    Cardiovascular: S1S2 RRR    Gastrointestinal: Soft    Extremities: L foot ACE removed, dressing removed. Wound bed clean, no drainage/bleeding noted. Nonodorous. +bounding pulse. Wound dressed    LABS:                        7.8    12.75 )-----------( 249      ( 13 Sep 2021 03:46 )             23.3     09-13    145  |  113<H>  |  30<H>  ----------------------------<  277<H>  3.6   |  30  |  0.95    Ca    8.3<L>      13 Sep 2021 03:46  Phos  2.0     09-13  Mg     2.2     09-13    TPro  5.4<L>  /  Alb  1.0<L>  /  TBili  0.4  /  DBili  x   /  AST  45<H>  /  ALT  47  /  AlkPhos  140<H>  09-13    PTT - ( 13 Sep 2021 10:59 )  PTT:103.6 sec      RADIOLOGY & ADDITIONAL STUDIES    A/P: 65F s/p LF Choparts amputation 9/10/21  - no acute need for BKA at this time  - continue daily dressing changes with possible wound vac placement  - Dr. Dasilva discussed with patient's daughter Monica Shelley; explained possibility of BKA in future pending patient's course  - continue podiatry care

## 2021-09-13 NOTE — SWALLOW BEDSIDE ASSESSMENT ADULT - COMMENTS
CXR 9/10/2021FINDINGS: ET tube tip above tracheal bifurcation. NG tube tip beyond GE junction.  The lungs show LEFT lung base a linear atelectasis. Remaining lung parenchyma clear.. No pneumothorax.  The heart and mediastinum size and configuration are within normal limits.Visualized osseous structures are intact.  IMPRESSION:   ET tube tip above tracheal bifurcation.NG tube tip beyond GE junction.. LEFT basilar linear atelectasis.    CT head no contrast 9/10/2021 IMPRESSION:1.  No acute intracranial disease.

## 2021-09-13 NOTE — PHARMACOTHERAPY INTERVENTION NOTE - COMMENTS
Recommended discontinuation of vancomycin IV at this time based on susceptibility results
Recommended discontinuation of clindamycin IV since anti-toxin effects unlikely to provide further benefit at this time
Recommended discontinuation of full anticoagulation with heparin infusion at this time and switching to venous thromboembolic prophylaxis only (enoxaparin 40 mg daily).
Recommended changing vancomycin to 1gm q24h from q12h since q12h will give approx trough of 44

## 2021-09-13 NOTE — DIETITIAN INITIAL EVALUATION ADULT. - PERTINENT LABORATORY DATA
09-13 Na145 mmol/L Glu 277 mg/dL<H> K+ 3.6 mmol/L Cr  0.95 mg/dL BUN 30 mg/dL<H> 09-13 Phos 2.0 mg/dL<L> 09-13 Alb 1.0 g/dL<L>09-13 ALT 47 U/L AST 45 U/L<H> Alkaline Phosphatase 140 U/L<H>  09-10-21 @ 09:27 a1c 14.1/estimated average glucose=358 mg/dL<H>

## 2021-09-13 NOTE — PROGRESS NOTE ADULT - SUBJECTIVE AND OBJECTIVE BOX
#Interval Events:  Tolerated extubation yesterday. Doing well this AM    ##ROS  CONSTITUTIONAL: No fever, + fatigue  EYES: No eye pain  ENMT:  No difficulty hearing, head/ facial/ throat pain  NECK: No pain or stiffness  RESPIRATORY: No SOB, dyspnea, no cough  CARDIOVASCULAR: No CP, palpitations, dizziness, or leg swelling  GASTROINTESTINAL:  denies abdominal pain  GENITOURINARY: denies dysuria, painful urination  NEUROLOGICAL: No headaches   SKIN: No itching, burning, rashes, or lesions   MUSCULOSKELETAL: No joint pain or swelling; No muscle, back, or extremity pain    ## O/E:  T(F): 99.2 (09-13-21 @ 23:40), Max: 99.8 (09-13-21 @ 19:56)  HR: 76 (09-13-21 @ 23:40) (76 - 113)  BP: 144/71 (09-13-21 @ 23:40) (108/54 - 144/71)  RR: 18 (09-13-21 @ 23:40) (15 - 21)  SpO2: 94% (09-13-21 @ 23:40) (94% - 100%)  IN: 1914.2 mL / OUT: 2190 mL / NET: -275.8 mL    Gen: Alert, weak   HEENT: NCAT, PERRL  Resp: Breath sounds clear bilaterally  CVS: RRR, S1S2 no m/r/g  Abd: soft NT/ND +BS  : + indwelling mortensen catheter  Ext: LLE dressing in place s/p TMA, warm and dry, no edema, + popliteal pulse. RLE warm and dry, no edema, + Right DP pulse,   Neuro: alert responsive    ## Labs:  ** CBC: **             7.8    12.75 )-----------( 249      ( 13 Sep 2021 03:46 )             23.3     ** Chem:  **  09-13  145  |  113<H>  |  30<H>  ----------------------------<  277<H>  3.6   |  30  |  0.95    Ca    8.3<L>      13 Sep 2021 03:46  Phos  2.0     09-13  Mg     2.2     09-13    TPro  5.4<L>  /  Alb  1.0<L>  /  TBili  0.4  /  DBili  x   /  AST  45<H>  /  ALT  47  /  AlkPhos  140<H>  09-13    ** Coags: **  PTT - ( 13 Sep 2021 10:59 )  PTT:103.6 sec    POCT Blood Glucose.: 409 mg/dL (13 Sep 2021 21:09)  POCT Blood Glucose.: 357 mg/dL (13 Sep 2021 17:25)  POCT Blood Glucose.: 247 mg/dL (13 Sep 2021 11:25)  POCT Blood Glucose.: 282 mg/dL (13 Sep 2021 06:01)    Culture - Other (collected 10 Sep 2021 18:46): No growth  Culture - Abscess with Gram Stain (collected 10 Sep 2021 14:48):    Few Proteus mirabilis    Moderate Enterococcus faecalis    Moderate Streptococcus agalactiae (Group B) isolated    Culture - Urine (collected 10 Sep 2021 10:40): No growth  Culture - Blood (collected 10 Sep 2021 10:33): No growth to date.  Culture - Blood (collected 10 Sep 2021 10:33): No growth to date.    ## Meds:  piperacillin/tazobactam IVPB.. 3.375 Gram(s) IV Intermittent every 8 hours  acetaminophen   Tablet .. 650 milliGRAM(s) Oral every 6 hours PRN  fentaNYL    Injectable 25 MICROGram(s) IV Push every 6 hours PRN  aspirin enteric coated 81 milliGRAM(s) Oral daily  enoxaparin Injectable 40 milliGRAM(s) SubCutaneous <User Schedule>  atorvastatin 40 milliGRAM(s) Oral at bedtime  insulin glargine Injectable (LANTUS) 10 Unit(s) SubCutaneous at bedtime  insulin lispro (ADMELOG) corrective regimen sliding scale   SubCutaneous three times a day before meals  insulin lispro (ADMELOG) corrective regimen sliding scale   SubCutaneous at bedtime

## 2021-09-13 NOTE — PROGRESS NOTE ADULT - ASSESSMENT
ASSESSMENT:    65-year-old with hypertension, diabetes, dyslipidemia, diabetic retinopathy and found unresponsive at home. Recent left foot wounds eventually worsened with necrosis of left foot and hyperglycemia on admission along with troponin elevation and T wave abnormalities on ECG. She status post left foot vascular surgery.     PLAN:     NSTEMI - hemodynamically stable. On asa, statin. BP's adequate, no chest pain noted.  Broad-spectrum antibiotic coverage and postoperative care as per medical management.   Off  pressors.  Consider eventual ischemic cardiac workup prior to discharge, when more stable.

## 2021-09-13 NOTE — DIETITIAN INITIAL EVALUATION ADULT. - OTHER CALCULATIONS
IBW:  56.6 kg  used for calculation of estimated needs. Wt:  09/10, 60.6 kg     %IBW: 107%    UBW: ?    % UBW: ?, wt. gain of 6.9 kg since adm noted, edema noted

## 2021-09-13 NOTE — PROGRESS NOTE ADULT - SUBJECTIVE AND OBJECTIVE BOX
[facility]  Division of Infectious Diseases  [phone]    Name: NELSON ZEPEDA  Age: 65y  Gender: Female  MRN: [id number]    Interval History--  Notes reviewed. Seen earlier today. She has been extubated and is asking for food. She does not want a BKA. Reports pain is 10/10 in her leg     Past Medical History--  Hypertension    Family history of diabetes mellitus    No significant past surgical history        For details regarding the patient's social history, family history, and other miscellaneous elements, please refer the initial infectious diseases consultation and/or the admitting history and physical examination for this admission.    Allergies    avocado (Pruritus)  Carrots (Pruritus)  No Known Drug Allergies    Intolerances        Medications--  Antibiotics:    piperacillin/tazobactam IVPB.. 3.375 every 8 hours      MEDICATIONS  (STANDING):  aspirin enteric coated 81 daily  atorvastatin 40 at bedtime  dextrose 40% Gel 15 once  dextrose 50% Injectable 25 once  dextrose 50% Injectable 12.5 once  dextrose 50% Injectable 25 once  enoxaparin Injectable 40 <User Schedule>  glucagon  Injectable 1 once  glucagon  Injectable 1 once  insulin glargine Injectable (LANTUS) 10 at bedtime  insulin lispro (ADMELOG) corrective regimen sliding scale  every 6 hours      PRN  acetaminophen   Tablet .. 650 milliGRAM(s) Oral every 6 hours PRN  fentaNYL    Injectable 25 MICROGram(s) IV Push every 6 hours PRN      Physical Exam:  Vital Signs Last 24 Hrs  T(F): 99.3 (09-13-21 @ 08:00), Max: 99.4 (09-13-21 @ 04:00)  HR: 95 (09-13-21 @ 15:00)  BP: 139/67 (09-13-21 @ 15:00)  RR: 18 (09-13-21 @ 15:00)  SpO2: 98% (09-13-21 @ 15:00) (98% - 100%)  Wt(kg): --    General: Nontoxic-appearing Female in no acute distress.  HEENT: AT/NC.   Neck: Not rigid. No sense of mass.  Nodes: None palpable.  Lungs: Grossly clear bilaterally  Heart: RRR normal s1s2 No Murmur. No rub. No gallop.   Abdomen: Bowel sounds present and normoactive. Soft. Nondistended. Nontender. No sense of mass. No organomegaly.  Extremities: L foot dressed w/ ACE, s/p chopart amputation. R foot warm, dry. No cyanosis or clubbing. No edema.   Skin: Warm. Dry. Good turgor. No rash. No vasculitic stigmata.  Psychiatric: Unable        Laboratory Studies--  WBC Count: 12.75 K/uL (09-13 @ 03:46)  WBC Count: 15.79 K/uL (09-12 @ 14:17)  WBC Count: 13.69 K/uL (09-12 @ 04:43)  WBC Count: 14.57 K/uL (09-11 @ 02:38)  WBC Count: 15.02 K/uL (09-11 @ 02:38)  WBC Count: 12.02 K/uL (09-10 @ 15:42)  WBC Count: 12.71 K/uL (09-10 @ 05:47)                            7.8    12.75 )-----------( 249      ( 13 Sep 2021 03:46 )             23.3       09-13    145  |  113<H>  |  30<H>  ----------------------------<  277<H>  3.6   |  30  |  0.95    Ca    8.3<L>      13 Sep 2021 03:46  Phos  2.0     09-13  Mg     2.2     09-13    TPro  5.4<L>  /  Alb  1.0<L>  /  TBili  0.4  /  DBili  x   /  AST  45<H>  /  ALT  47  /  AlkPhos  140<H>  09-13      Creatinine Trend: 0.95<--, 0.95<--, 0.93<--, 1.36<--, 1.69<--, 2.19<--        Culture Data    Culture - Abscess with Gram Stain (09.10.21 @ 14:48)    -  Piperacillin/Tazobactam: S <=8    Specimen Source: .Abscess left foot wound    Culture Results:   Few Proteus mirabilis  Moderate Enterococcus faecalis  Rare Klebsiella pneumoniae  Few Non Fermentering Gram Negative Rods Most closely resembling  Wohlfahrtiimonas Species  Moderate Bacteroides fragilis "Susceptibilities not performed"  Moderate Streptococcus agalactiae (Group B) isolated    Organism Identification: Non Fermentering Gram Negative Rods  Proteus mirabilis  Enterococcus faecalis  Klebsiella pneumoniae    Organism: Enterococcus faecalis    Organism: Non Fermentering Gram Negative Rods    Organism: Proteus mirabilis    Organism: Klebsiella pneumoniae          Culture - Urine (collected 10 Sep 2021 10:40)  Source: Clean Catch Clean Catch (Midstream)  Final Report (11 Sep 2021 06:51):    No growth    Culture - Blood (collected 10 Sep 2021 10:33)  Source: .Blood Blood-Peripheral  Preliminary Report (11 Sep 2021 11:01):    No growth to date.    Culture - Blood (collected 10 Sep 2021 10:33)  Source: .Blood Blood-Peripheral  Preliminary Report (11 Sep 2021 11:01):    No growth to date.

## 2021-09-13 NOTE — PROGRESS NOTE ADULT - SUBJECTIVE AND OBJECTIVE BOX
Patient is a 65y old  Female who presents with a chief complaint of AMS, DKA, Necrotic foot infection (12 Sep 2021 15:17)       INTERVAL HPI/OVERNIGHT EVENTS:  Patient seen and evaluated at bedside.  Pt is resting comfortable in NAD. Denies N/V/F/C.  Pain rated at X/10    Allergies    avocado (Pruritus)  Carrots (Pruritus)  No Known Drug Allergies    Intolerances        Vital Signs Last 24 Hrs  T(C): 37.4 (13 Sep 2021 04:00), Max: 37.4 (13 Sep 2021 04:00)  T(F): 99.4 (13 Sep 2021 04:00), Max: 99.4 (13 Sep 2021 04:00)  HR: 91 (13 Sep 2021 07:00) (88 - 117)  BP: 112/61 (13 Sep 2021 07:00) (97/45 - 150/123)  BP(mean): 74 (13 Sep 2021 07:00) (57 - 130)  RR: 20 (13 Sep 2021 07:00) (14 - 26)  SpO2: 100% (13 Sep 2021 07:00) (100% - 100%)    LABS:                        7.8    12.75 )-----------( 249      ( 13 Sep 2021 03:46 )             23.3     09-13    145  |  113<H>  |  30<H>  ----------------------------<  277<H>  3.6   |  30  |  0.95    Ca    8.3<L>      13 Sep 2021 03:46  Phos  2.0     09-13  Mg     2.2     09-13    TPro  5.4<L>  /  Alb  1.0<L>  /  TBili  0.4  /  DBili  x   /  AST  45<H>  /  ALT  47  /  AlkPhos  140<H>  09-13    PTT - ( 13 Sep 2021 03:46 )  PTT:103.8 sec    CAPILLARY BLOOD GLUCOSE      POCT Blood Glucose.: 282 mg/dL (13 Sep 2021 06:01)  POCT Blood Glucose.: 341 mg/dL (13 Sep 2021 00:26)  POCT Blood Glucose.: 328 mg/dL (13 Sep 2021 00:05)  POCT Blood Glucose.: 369 mg/dL (12 Sep 2021 17:42)  POCT Blood Glucose.: 376 mg/dL (12 Sep 2021 17:02)  POCT Blood Glucose.: 254 mg/dL (12 Sep 2021 11:27)  POCT Blood Glucose.: 265 mg/dL (12 Sep 2021 09:32)  POCT Blood Glucose.: 253 mg/dL (12 Sep 2021 08:30)      Lower Extremity Physical Exam:  Vascular: RF DP 1/4, PT NP, LF NP pulses, B/L,Temperature gradient LF warm to touch   Neuro: Epicritic sensation absent to the level of digits B/L.  Musculoskeletal/Ortho: Unremarkable    Left foot full thickness wet gangrene to forefoot extending to midfoot, wound tracks proximally to level of ankle dorsally and plantarly to the calcaneus, purulent drainage noted, and malodor, abscess noted at plantar foot w/ incision to level of subQ using a 15 blade    POD 3: s/p LF choparts amputation left open, no purulent drainage, no malodor, no active bleeding. Soft tissue and skin edges at level of amputation do no appear viable.       RADIOLOGY & ADDITIONAL TESTS:

## 2021-09-13 NOTE — DIETITIAN INITIAL EVALUATION ADULT. - ORAL INTAKE PTA/DIET HISTORY
Pt appeared forgetful, was able to state date of birth, as per H & P, pt lived alone.  Pt reported good appetite in general, did her own shopping/cooking.  Pt appears to be a poor historian at present.

## 2021-09-13 NOTE — DIETITIAN INITIAL EVALUATION ADULT. - DIET TYPE
low sodium/consistent carbohydrate (evening snack)/dysphagia 2, mechanical soft, thin liquids + will add no sugar added magic cup 4 oz 2 x day= 520 calories, 18 grams protein/low sodium/consistent carbohydrate (evening snack)/dysphagia 2, mechanical soft, thin liquids

## 2021-09-13 NOTE — DIETITIAN INITIAL EVALUATION ADULT. - PERTINENT MEDS FT
MEDICATIONS  (STANDING):  aspirin enteric coated 81 milliGRAM(s) Oral daily  atorvastatin 40 milliGRAM(s) Oral at bedtime  chlorhexidine 2% Cloths 1 Application(s) Topical <User Schedule>  dextrose 40% Gel 15 Gram(s) Oral once  dextrose 5%. 1000 milliLiter(s) (50 mL/Hr) IV Continuous <Continuous>  dextrose 5%. 1000 milliLiter(s) (50 mL/Hr) IV Continuous <Continuous>  dextrose 5%. 1000 milliLiter(s) (100 mL/Hr) IV Continuous <Continuous>  dextrose 5%. 1000 milliLiter(s) (100 mL/Hr) IV Continuous <Continuous>  dextrose 50% Injectable 25 Gram(s) IV Push once  dextrose 50% Injectable 12.5 Gram(s) IV Push once  dextrose 50% Injectable 25 Gram(s) IV Push once  enoxaparin Injectable 40 milliGRAM(s) SubCutaneous <User Schedule>  glucagon  Injectable 1 milliGRAM(s) IntraMuscular once  glucagon  Injectable 1 milliGRAM(s) IntraMuscular once  insulin glargine Injectable (LANTUS) 10 Unit(s) SubCutaneous at bedtime  insulin lispro (ADMELOG) corrective regimen sliding scale   SubCutaneous every 6 hours  piperacillin/tazobactam IVPB.. 3.375 Gram(s) IV Intermittent every 8 hours    MEDICATIONS  (PRN):  acetaminophen   Tablet .. 650 milliGRAM(s) Oral every 6 hours PRN Mild Pain (1 - 3)  fentaNYL    Injectable 25 MICROGram(s) IV Push every 6 hours PRN Severe Pain (7 - 10)

## 2021-09-13 NOTE — SWALLOW BEDSIDE ASSESSMENT ADULT - SLP GENERAL OBSERVATIONS
pt seen bedside, alert and oriented to x3. pt responded to simple questions with good accuracy, she verbalized wants and was able to follow one step directions. noted good speech intelligibility and voice WNL

## 2021-09-13 NOTE — PROGRESS NOTE ADULT - ASSESSMENT
## Assessment / Plan:  65F with nec fasc s/p LLE partial amputation    - alert responsive  - passed speech / swallow, start on mechanical soft  - Na downtrending. Encourage PO intake of water (patient expressing appropriate thirst response)  - Minimal oxygen requirements  - Can D/C heparin gtt for NSTEMI. F/U with cardiology  - Sliding scale AC/QHS for hyperglycemia  - Podiatry consult appreciated: patient will likely need more proximal amputation, needs vascular consult for possible BKA  - c/w clinda / vanco / Zosyn. Culture shows polymicrobia:  (Few Proteus mirabilis, Moderate Enterococcus faecalis, Moderate Streptococcus agalactiae (Group B) isolated)    Transfer to general medicine ## Assessment / Plan:  65F with nec fasc s/p LLE partial amputation    - alert responsive  - passed speech / swallow, start on mechanical soft  - Na downtrending. Encourage PO intake of water (patient expressing appropriate thirst response)  - Minimal oxygen requirements  - Can D/C heparin gtt for NSTEMI. F/U with cardiology  - Sliding scale AC/QHS for hyperglycemia  - Podiatry consult appreciated: patient will likely need more proximal amputation, needs vascular consult for possible BKA  - c/w Zosyn. Culture shows polymicrobial infection (few Proteus mirabilis, Moderate Enterococcus faecalis, Moderate Streptococcus agalactiae (Group B) isolated). Can D/C vanco and clinda    Transfer to general medicine

## 2021-09-13 NOTE — DIETITIAN INITIAL EVALUATION ADULT. - FACTORS AFF FOOD INTAKE
as per pt., dentures are @ home, 09/13, swallow evaluation c recommendation for Dysphagia 2 Mechanical Soft - Thin Liquids/change in mental status/difficulty chewing/other (specify)

## 2021-09-14 DIAGNOSIS — R73.9 HYPERGLYCEMIA, UNSPECIFIED: ICD-10-CM

## 2021-09-14 LAB
ALBUMIN SERPL ELPH-MCNC: 1.2 G/DL — LOW (ref 3.3–5)
ALP SERPL-CCNC: 158 U/L — HIGH (ref 40–120)
ALT FLD-CCNC: 52 U/L — SIGNIFICANT CHANGE UP (ref 12–78)
ANION GAP SERPL CALC-SCNC: 5 MMOL/L — SIGNIFICANT CHANGE UP (ref 5–17)
AST SERPL-CCNC: 57 U/L — HIGH (ref 15–37)
BILIRUB SERPL-MCNC: 0.5 MG/DL — SIGNIFICANT CHANGE UP (ref 0.2–1.2)
BUN SERPL-MCNC: 19 MG/DL — SIGNIFICANT CHANGE UP (ref 7–23)
CALCIUM SERPL-MCNC: 8.3 MG/DL — LOW (ref 8.5–10.1)
CHLORIDE SERPL-SCNC: 112 MMOL/L — HIGH (ref 96–108)
CO2 SERPL-SCNC: 29 MMOL/L — SIGNIFICANT CHANGE UP (ref 22–31)
CREAT SERPL-MCNC: 0.89 MG/DL — SIGNIFICANT CHANGE UP (ref 0.5–1.3)
GLUCOSE BLDC GLUCOMTR-MCNC: 212 MG/DL — HIGH (ref 70–99)
GLUCOSE BLDC GLUCOMTR-MCNC: 268 MG/DL — HIGH (ref 70–99)
GLUCOSE BLDC GLUCOMTR-MCNC: 288 MG/DL — HIGH (ref 70–99)
GLUCOSE BLDC GLUCOMTR-MCNC: 294 MG/DL — HIGH (ref 70–99)
GLUCOSE SERPL-MCNC: 280 MG/DL — HIGH (ref 70–99)
HCT VFR BLD CALC: 24.8 % — LOW (ref 34.5–45)
HGB BLD-MCNC: 8.3 G/DL — LOW (ref 11.5–15.5)
MAGNESIUM SERPL-MCNC: 2.1 MG/DL — SIGNIFICANT CHANGE UP (ref 1.6–2.6)
MCHC RBC-ENTMCNC: 28.3 PG — SIGNIFICANT CHANGE UP (ref 27–34)
MCHC RBC-ENTMCNC: 33.5 GM/DL — SIGNIFICANT CHANGE UP (ref 32–36)
MCV RBC AUTO: 84.6 FL — SIGNIFICANT CHANGE UP (ref 80–100)
NRBC # BLD: 0 /100 WBCS — SIGNIFICANT CHANGE UP (ref 0–0)
PHOSPHATE SERPL-MCNC: 2.6 MG/DL — SIGNIFICANT CHANGE UP (ref 2.5–4.5)
PLATELET # BLD AUTO: 216 K/UL — SIGNIFICANT CHANGE UP (ref 150–400)
POTASSIUM SERPL-MCNC: 3.4 MMOL/L — LOW (ref 3.5–5.3)
POTASSIUM SERPL-SCNC: 3.4 MMOL/L — LOW (ref 3.5–5.3)
PROT SERPL-MCNC: 6 GM/DL — SIGNIFICANT CHANGE UP (ref 6–8.3)
RBC # BLD: 2.93 M/UL — LOW (ref 3.8–5.2)
RBC # FLD: 15.3 % — HIGH (ref 10.3–14.5)
SODIUM SERPL-SCNC: 146 MMOL/L — HIGH (ref 135–145)
SURGICAL PATHOLOGY STUDY: SIGNIFICANT CHANGE UP
WBC # BLD: 7.92 K/UL — SIGNIFICANT CHANGE UP (ref 3.8–10.5)
WBC # FLD AUTO: 7.92 K/UL — SIGNIFICANT CHANGE UP (ref 3.8–10.5)

## 2021-09-14 PROCEDURE — 99232 SBSQ HOSP IP/OBS MODERATE 35: CPT

## 2021-09-14 PROCEDURE — 99233 SBSQ HOSP IP/OBS HIGH 50: CPT

## 2021-09-14 RX ORDER — INSULIN LISPRO 100/ML
5 VIAL (ML) SUBCUTANEOUS
Refills: 0 | Status: DISCONTINUED | OUTPATIENT
Start: 2021-09-15 | End: 2021-09-19

## 2021-09-14 RX ORDER — INSULIN GLARGINE 100 [IU]/ML
15 INJECTION, SOLUTION SUBCUTANEOUS AT BEDTIME
Refills: 0 | Status: DISCONTINUED | OUTPATIENT
Start: 2021-09-14 | End: 2021-09-16

## 2021-09-14 RX ORDER — INSULIN GLARGINE 100 [IU]/ML
14 INJECTION, SOLUTION SUBCUTANEOUS AT BEDTIME
Refills: 0 | Status: DISCONTINUED | OUTPATIENT
Start: 2021-09-14 | End: 2021-09-14

## 2021-09-14 RX ORDER — METOPROLOL TARTRATE 50 MG
12.5 TABLET ORAL EVERY 12 HOURS
Refills: 0 | Status: DISCONTINUED | OUTPATIENT
Start: 2021-09-14 | End: 2021-09-23

## 2021-09-14 RX ADMIN — PIPERACILLIN AND TAZOBACTAM 25 GRAM(S): 4; .5 INJECTION, POWDER, LYOPHILIZED, FOR SOLUTION INTRAVENOUS at 22:38

## 2021-09-14 RX ADMIN — ENOXAPARIN SODIUM 40 MILLIGRAM(S): 100 INJECTION SUBCUTANEOUS at 17:00

## 2021-09-14 RX ADMIN — Medication 6: at 17:00

## 2021-09-14 RX ADMIN — PIPERACILLIN AND TAZOBACTAM 25 GRAM(S): 4; .5 INJECTION, POWDER, LYOPHILIZED, FOR SOLUTION INTRAVENOUS at 05:13

## 2021-09-14 RX ADMIN — Medication 650 MILLIGRAM(S): at 19:43

## 2021-09-14 RX ADMIN — Medication 650 MILLIGRAM(S): at 18:43

## 2021-09-14 RX ADMIN — Medication 81 MILLIGRAM(S): at 12:13

## 2021-09-14 RX ADMIN — Medication 6: at 12:13

## 2021-09-14 RX ADMIN — PIPERACILLIN AND TAZOBACTAM 25 GRAM(S): 4; .5 INJECTION, POWDER, LYOPHILIZED, FOR SOLUTION INTRAVENOUS at 16:52

## 2021-09-14 RX ADMIN — ATORVASTATIN CALCIUM 40 MILLIGRAM(S): 80 TABLET, FILM COATED ORAL at 21:30

## 2021-09-14 RX ADMIN — Medication 12.5 MILLIGRAM(S): at 18:43

## 2021-09-14 RX ADMIN — CHLORHEXIDINE GLUCONATE 1 APPLICATION(S): 213 SOLUTION TOPICAL at 05:13

## 2021-09-14 RX ADMIN — INSULIN GLARGINE 15 UNIT(S): 100 INJECTION, SOLUTION SUBCUTANEOUS at 21:51

## 2021-09-14 RX ADMIN — Medication 6: at 08:51

## 2021-09-14 RX ADMIN — Medication 650 MILLIGRAM(S): at 00:44

## 2021-09-14 NOTE — CONSULT NOTE ADULT - REASON FOR ADMISSION
AMS, DKA, Necrotic foot infection

## 2021-09-14 NOTE — CONSULT NOTE ADULT - CONSULT REASON
Gas gangrene
GEOVANNI
nstemi
diabetes uncontrolled   Infection driven Hyperglycemia
LF nec fasc
LLE wound s/p amputation

## 2021-09-14 NOTE — PROGRESS NOTE ADULT - ASSESSMENT
Pt s/p LF Choparts amp 9/10/21  -pt was seen and examined  - post operative X rays pending   -POD 3: s/p LF choparts amputation left open, no purulent drainage, no malodor, no active bleeding. Soft tissue and skin edges at level of amputation do no appear viable, increased dusky changes to the dorsal and plantar flaps.   -Podiatry will continue to monitor and assess for salvageability of the L foot  - Left tib/fib xrays: no tracking or soft tissue gas   -Will follow OR data  -Discussed w/ attending

## 2021-09-14 NOTE — CONSULT NOTE ADULT - ASSESSMENT
hyperglycemia 
Pt presents w/ LF nec fasc  -pt was seen and examined  -Left foot full thickness wet gangrene to forefoot extending to midfoot, wound tracks proximally to level of ankle dorsally and plantarly to the calcaneus, purulent drainage noted, and malodor, abscess noted at plantar foot w/ incision to level of subQ using a 15 blade  -Pt added on for emergent LF chopart amputation today w/ Dr. Robb as part of a staged procedure. Will need another more proximal amputation as the foot is non-salvageable  -Pt NPO  -Consented w/ daughter over the phone, consent in chart  -LF wound culture taken  -Discussed w/ attending 
Gas gangrene/?necrotizing fasciitis  Sepsis  DKA  Rhabdomyolysis  GEOVANNI  Hypernatremia- higher than values reported given pseudohyponatremia from hyperglycemia?  Elevated troponin << CK, suspect MI not cause of CK  Encephalopathy, multifactorial  Crucial aspect of management here is source control. Antibiotics indicated but of secondary importance to surgical Rx in the setting of gas gangrene.

## 2021-09-14 NOTE — PROGRESS NOTE ADULT - ASSESSMENT
65-year-old F with hypertension, diabetes, dyslipidemia, diabetic retinopathy and found unresponsive at home. Recent left foot wounds eventually worsened with necrosis of left foot and hyperglycemia on admission along with troponin elevation and T wave abnormalities on ECG.     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 On asa, statin. BP's adequate, no chest pain noted.  antibiotic coverage and postoperative care as per primary/surgery team  will add low dose bbl and titrate up as tolerated   Consider eventual ischemic cardiac workup prior to discharge, when more stable.   65-year-old F with hypertension, diabetes, dyslipidemia, diabetic retinopathy and found unresponsive at home. Recent left foot wounds eventually worsened with necrosis of left foot and hyperglycemia on admission along with troponin elevation and T wave abnormalities on ECG.     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 On asa, statin, no chest pain  antibiotic coverage and postoperative care as per primary/surgery team  will add low dose bbl and titrate up as tolerated   Consider eventual ischemic cardiac workup prior to discharge, when more stable.
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
## 2021-09-14 NOTE — PROGRESS NOTE ADULT - SUBJECTIVE AND OBJECTIVE BOX
Patient is a 65y old  Female who presents with a chief complaint of AMS, DKA, Necrotic foot infection (13 Sep 2021 16:24)       INTERVAL HPI/OVERNIGHT EVENTS:  Patient seen and evaluated at bedside.  Pt is resting comfortable in NAD. Denies N/V/F/C.  Pain rated at X/10    Allergies    avocado (Pruritus)  Carrots (Pruritus)  No Known Drug Allergies  Plums (Pruritus)    Intolerances        Vital Signs Last 24 Hrs  T(C): 37.6 (14 Sep 2021 05:33), Max: 37.7 (13 Sep 2021 19:56)  T(F): 99.7 (14 Sep 2021 05:33), Max: 99.8 (13 Sep 2021 19:56)  HR: 98 (14 Sep 2021 05:33) (76 - 113)  BP: 145/72 (14 Sep 2021 05:33) (108/54 - 145/72)  BP(mean): 80 (13 Sep 2021 18:04) (57 - 99)  RR: 17 (14 Sep 2021 05:33) (15 - 20)  SpO2: 96% (14 Sep 2021 05:33) (94% - 100%)    LABS:                        7.8    12.75 )-----------( 249      ( 13 Sep 2021 03:46 )             23.3     09-13    145  |  113<H>  |  30<H>  ----------------------------<  277<H>  3.6   |  30  |  0.95    Ca    8.3<L>      13 Sep 2021 03:46  Phos  2.0     09-13  Mg     2.2     09-13    TPro  5.4<L>  /  Alb  1.0<L>  /  TBili  0.4  /  DBili  x   /  AST  45<H>  /  ALT  47  /  AlkPhos  140<H>  09-13    PTT - ( 13 Sep 2021 10:59 )  PTT:103.6 sec    CAPILLARY BLOOD GLUCOSE      POCT Blood Glucose.: 409 mg/dL (13 Sep 2021 21:09)  POCT Blood Glucose.: 357 mg/dL (13 Sep 2021 17:25)  POCT Blood Glucose.: 247 mg/dL (13 Sep 2021 11:25)      Lower Extremity Physical Exam:  Vascular: RF DP NP, PT NP, LF NP pulses, B/L,Temperature gradient LF warm to touch   Neuro: Epicritic sensation absent to the level of digits B/L.  Musculoskeletal/Ortho: Unremarkable    Left foot full thickness wet gangrene to forefoot extending to midfoot, wound tracks proximally to level of ankle dorsally and plantarly to the calcaneus, purulent drainage noted, and malodor, abscess noted at plantar foot w/ incision to level of subQ using a 15 blade    9/14 POD 4: s/p LF choparts amputation left open, no purulent drainage, no malodor, no active bleeding. Soft tissue and skin edges at level of amputation do no appear viable, increased dusky changes to the dorsal and plantar flaps.       RADIOLOGY & ADDITIONAL TESTS:      EXAM:  XR FOOT COMP MIN 3 VIEWS LT                          EXAM:  XR TIB FIB AP LAT 2 VIEWS LT                            PROCEDURE DATE:  09/13/2021          INTERPRETATION:  Left tib-fib and left foot. Patient has a diabetic ulcer of the foot.    Left tib-fib. 4 views.    There is a moderate knee effusion. Otherwise the knee is unremarkable.    The tib-fib are intact. No soft tissue finding.    Left foot. 3 views.    There has been amputation at the distal talar and calcaneal levels. This isnew since September 10.    Resection margins are clear. There is an inferior calcaneal spur. No soft tissue gas.    IMPRESSION: Recent left foot amputation.    --- End of Report ---            EFRA KRAUSE MD; Attending Radiologist  This document has been electronically signed. Sep 13 2021  3:41PM

## 2021-09-14 NOTE — PROGRESS NOTE ADULT - SUBJECTIVE AND OBJECTIVE BOX
Patient: NELSON ZEPEDA [id number] 65y Female                            Hospitalist Attending Note    No complaints.  Pain controlled.     ____________________PHYSICAL EXAM:  GENERAL:  NAD Alert and Oriented  to person, place.   HEENT: NCAT  CARDIOVASCULAR:  S1, S2  LUNGS: CTAB  ABDOMEN:  soft, (-) tenderness, (-) distension, (+) bowel sounds, (-) guarding, (-) rebound (-) rigidity  EXTREMITIES:  no cyanosis / clubbing / edema.   R stump dressing in place.   NEURO: strength symmetric.   ____________________     VITALS:  Vital Signs Last 24 Hrs  T(C): 36.1 (14 Sep 2021 11:49), Max: 37.7 (13 Sep 2021 19:56)  T(F): 97 (14 Sep 2021 11:49), Max: 99.8 (13 Sep 2021 19:56)  HR: 89 (14 Sep 2021 11:49) (76 - 107)  BP: 153/87 (14 Sep 2021 11:49) (125/63 - 153/87)  BP(mean): 80 (13 Sep 2021 18:04) (80 - 80)  RR: 16 (14 Sep 2021 11:49) (16 - 19)  SpO2: 97% (14 Sep 2021 11:49) (94% - 98%) Daily     Daily   CAPILLARY BLOOD GLUCOSE      POCT Blood Glucose.: 268 mg/dL (14 Sep 2021 16:58)  POCT Blood Glucose.: 288 mg/dL (14 Sep 2021 11:33)  POCT Blood Glucose.: 294 mg/dL (14 Sep 2021 08:43)  POCT Blood Glucose.: 409 mg/dL (13 Sep 2021 21:09)    I&O's Summary    13 Sep 2021 07:01  -  14 Sep 2021 07:00  --------------------------------------------------------  IN: 856 mL / OUT: 1350 mL / NET: -494 mL        HISTORY:  PAST MEDICAL & SURGICAL HISTORY:  Hypertension    Family history of diabetes mellitus    No significant past surgical history    Allergies    avocado (Pruritus)  Carrots (Pruritus)  No Known Drug Allergies  Plums (Pruritus)    Intolerances       LABS:                        8.3    7.92  )-----------( 216      ( 14 Sep 2021 10:12 )             24.8     09-14    146<H>  |  112<H>  |  19  ----------------------------<  280<H>  3.4<L>   |  29  |  0.89    Ca    8.3<L>      14 Sep 2021 08:53  Phos  2.6     09-14  Mg     2.1     09-14    TPro  6.0  /  Alb  1.2<L>  /  TBili  0.5  /  DBili  x   /  AST  57<H>  /  ALT  52  /  AlkPhos  158<H>  09-14    PTT - ( 13 Sep 2021 10:59 )  PTT:103.6 sec  LIVER FUNCTIONS - ( 14 Sep 2021 08:53 )  Alb: 1.2 g/dL / Pro: 6.0 gm/dL / ALK PHOS: 158 U/L / ALT: 52 U/L / AST: 57 U/L / GGT: x                     MEDICATIONS:  MEDICATIONS  (STANDING):  aspirin enteric coated 81 milliGRAM(s) Oral daily  atorvastatin 40 milliGRAM(s) Oral at bedtime  chlorhexidine 2% Cloths 1 Application(s) Topical <User Schedule>  dextrose 40% Gel 15 Gram(s) Oral once  dextrose 40% Gel 15 Gram(s) Oral once  dextrose 5%. 1000 milliLiter(s) (100 mL/Hr) IV Continuous <Continuous>  dextrose 5%. 1000 milliLiter(s) (50 mL/Hr) IV Continuous <Continuous>  dextrose 5%. 1000 milliLiter(s) (100 mL/Hr) IV Continuous <Continuous>  dextrose 5%. 1000 milliLiter(s) (50 mL/Hr) IV Continuous <Continuous>  dextrose 5%. 1000 milliLiter(s) (50 mL/Hr) IV Continuous <Continuous>  dextrose 50% Injectable 25 Gram(s) IV Push once  dextrose 50% Injectable 12.5 Gram(s) IV Push once  dextrose 50% Injectable 25 Gram(s) IV Push once  enoxaparin Injectable 40 milliGRAM(s) SubCutaneous <User Schedule>  glucagon  Injectable 1 milliGRAM(s) IntraMuscular once  glucagon  Injectable 1 milliGRAM(s) IntraMuscular once  insulin glargine Injectable (LANTUS) 10 Unit(s) SubCutaneous at bedtime  insulin lispro (ADMELOG) corrective regimen sliding scale   SubCutaneous three times a day before meals  insulin lispro (ADMELOG) corrective regimen sliding scale   SubCutaneous at bedtime  piperacillin/tazobactam IVPB.. 3.375 Gram(s) IV Intermittent every 8 hours    MEDICATIONS  (PRN):  acetaminophen   Tablet .. 650 milliGRAM(s) Oral every 6 hours PRN Mild Pain (1 - 3)  fentaNYL    Injectable 25 MICROGram(s) IV Push every 6 hours PRN Severe Pain (7 - 10)

## 2021-09-14 NOTE — CONSULT NOTE ADULT - PROBLEM SELECTOR RECOMMENDATION 9
increase Lantus to 15 units but add prandial lispro 5 units   while inpatient Finger Sticks should be in 140-180 range   Infection driven Hyperglycemia   while inpatient Finger Sticks should be in 140-180 range which is difficult to achieve as the patient has increased insulin resistance, increased glucose toxicity and has Infection driven Hyperglycemia   Encourage more nutrition   Thank You for the courtesy of this consultation !!!

## 2021-09-14 NOTE — CONSULT NOTE ADULT - SUBJECTIVE AND OBJECTIVE BOX
Patient is a 65y old  Female who presents with a chief complaint of AMS, DKA, Necrotic foot infection (14 Sep 2021 18:07)      Reason For Consult: hyperglycemia     HPI:  66yo F with PMH of IDDM, HTN, HLD, diabetic retinopathy presents to ED BIBEMS after being found unresponsive by her family at home. Pt was last seen by her family 2 days ago. Per daughter pt had a wound on her left foot from glass 1-2 weeks ago, which blistered and eventually popped. Subsequently noted to have worsening necrosis of foot. Pt saw her PMD who referred her for outpatient podiatry visit; has not had podiatry appt yet. Daughter last spoke to pt over telephone on Wednesday at which time she sounded altered and mumbled but was answering "I'm fine". Pt lives alone at home; no pets, no smoking/EtOH/illicit drugs.   In ED, pt with open wound of Lt dorsal foot with necrotic 2nd toe. Pt remained with altered mental status on arrival. Labs show hyperglycemia to 1466, serum bicarb 6, AG 39. Also noted to have high troponin 0.468, lateral Wave inversions  on EKG. Per vascular surgery consult by ED, no immediate OR intervention indicated; recommended pt to be seen by in-house vascular in the morning.  (10 Sep 2021 01:14)  hyperglycemia with finger sticks in 400s   on Lantus 14 units and Lispro sliding scale coverage with meals with finger sticks in 200s   HbA1C 14.1   diet advanced     PAST MEDICAL & SURGICAL HISTORY:  Hypertension    Family history of diabetes mellitus    No significant past surgical history        FAMILY HISTORY:        Social History:    MEDICATIONS  (STANDING):  aspirin enteric coated 81 milliGRAM(s) Oral daily  atorvastatin 40 milliGRAM(s) Oral at bedtime  chlorhexidine 2% Cloths 1 Application(s) Topical <User Schedule>  dextrose 40% Gel 15 Gram(s) Oral once  dextrose 40% Gel 15 Gram(s) Oral once  dextrose 5%. 1000 milliLiter(s) (50 mL/Hr) IV Continuous <Continuous>  dextrose 5%. 1000 milliLiter(s) (100 mL/Hr) IV Continuous <Continuous>  dextrose 5%. 1000 milliLiter(s) (50 mL/Hr) IV Continuous <Continuous>  dextrose 5%. 1000 milliLiter(s) (100 mL/Hr) IV Continuous <Continuous>  dextrose 5%. 1000 milliLiter(s) (50 mL/Hr) IV Continuous <Continuous>  dextrose 50% Injectable 25 Gram(s) IV Push once  dextrose 50% Injectable 12.5 Gram(s) IV Push once  dextrose 50% Injectable 25 Gram(s) IV Push once  enoxaparin Injectable 40 milliGRAM(s) SubCutaneous <User Schedule>  glucagon  Injectable 1 milliGRAM(s) IntraMuscular once  glucagon  Injectable 1 milliGRAM(s) IntraMuscular once  insulin glargine Injectable (LANTUS) 15 Unit(s) SubCutaneous at bedtime  insulin lispro (ADMELOG) corrective regimen sliding scale   SubCutaneous three times a day before meals  insulin lispro (ADMELOG) corrective regimen sliding scale   SubCutaneous at bedtime  metoprolol tartrate 12.5 milliGRAM(s) Oral every 12 hours  piperacillin/tazobactam IVPB.. 3.375 Gram(s) IV Intermittent every 8 hours    MEDICATIONS  (PRN):  acetaminophen   Tablet .. 650 milliGRAM(s) Oral every 6 hours PRN Mild Pain (1 - 3)  fentaNYL    Injectable 25 MICROGram(s) IV Push every 6 hours PRN Severe Pain (7 - 10)      REVIEW OF SYSTEMS:  CONSTITUTIONAL:  as per HPI  HEENT:  Eyes:  No diplopia or blurred vision.   ENT:  No earache, sore throat or runny nose.  CARDIOVASCULAR:  No chest pain .  RESPIRATORY:  No cough, shortness of breath, PND or orthopnea.  GASTROINTESTINAL:  No nausea, vomiting or diarrhea.  GENITOURINARY:  No dysuria, frequency or urgency. No Blood in urine  MUSCULOSKELETAL:  no joint aches, no muscle pain, myalgia  SKIN:  as per the progress notes of Primary Team   NEUROLOGIC:  No paresthesias, fasciculations, seizures or weakness.  PSYCHIATRIC:  No disorder of thought or mood.  ENDOCRINE:  No heat or cold intolerance, polyuria or polydipsia. abnormal weight gain or loss, oral thrush  HEMATOLOGICAL:  No easy bruising or bleeding.     T(C): 37.3 (09-14-21 @ 17:35), Max: 37.7 (09-13-21 @ 19:56)  HR: 96 (09-14-21 @ 17:35) (76 - 105)  BP: 157/74 (09-14-21 @ 17:35) (125/63 - 157/74)  RR: 17 (09-14-21 @ 17:35) (16 - 18)  SpO2: 96% (09-14-21 @ 17:35) (94% - 97%)  Wt(kg): --    PHYSICAL EXAM:  GENERAL: NAD, well-groomed, well-developed  HEAD:  Atraumatic, Normocephalic  EYES: PERRLA, conjunctiva and sclera clear  ENMT: No  exudates,, Moist mucous membranes,, No lesions  NECK: Supple, No JVD,   NERVOUS SYSTEM:  Alert & Oriented   CHEST/LUNG: Clear to percussion bilaterally; No rales, rhonchi, wheezing, or rubs  HEART: Regular rate and rhythm; No murmurs, rubs, or gallops  ABDOMEN: Soft, Nontender, Nondistended; Bowel sounds present    CAPILLARY BLOOD GLUCOSE      POCT Blood Glucose.: 268 mg/dL (14 Sep 2021 16:58)  POCT Blood Glucose.: 288 mg/dL (14 Sep 2021 11:33)  POCT Blood Glucose.: 294 mg/dL (14 Sep 2021 08:43)  POCT Blood Glucose.: 409 mg/dL (13 Sep 2021 21:09)                            8.3    7.92  )-----------( 216      ( 14 Sep 2021 10:12 )             24.8       CMP:  09-14 @ 08:53  SGPT 52  Albumin 1.2   Alk Phos 158   Anion Gap 5   SGOT 57   Total Bili 0.5   BUN 19   Calcium Total 8.3   CO2 29   Chloride 112   Creatinine 0.89   eGFR if AA 79   eGFR if non AA 68   Glucose 280   Potassium 3.4   Protein 6.0   Sodium 146      Thyroid Function Tests:      Diabetes Tests:     Parathyroids:     Adrenals:       Radiology:

## 2021-09-14 NOTE — CONSULT NOTE ADULT - CONSULT REQUESTED DATE/TIME
10-Sep-2021 10:07
10-Sep-2021 09:58
10-Sep-2021 17:12
13-Sep-2021
10-Sep-2021 23:38
14-Sep-2021 18:55

## 2021-09-14 NOTE — PROGRESS NOTE ADULT - SUBJECTIVE AND OBJECTIVE BOX
Patient is a 65y old  Female who presents with unresponsiveness (14 Sep 2021 07:31)    PAST MEDICAL & SURGICAL HISTORY:    Hypertension  DM  HLD    INTERVAL HISTORY:  	  MEDICATIONS:  MEDICATIONS  (STANDING):  aspirin enteric coated 81 milliGRAM(s) Oral daily  atorvastatin 40 milliGRAM(s) Oral at bedtime  chlorhexidine 2% Cloths 1 Application(s) Topical <User Schedule>  enoxaparin Injectable 40 milliGRAM(s) SubCutaneous <User Schedule>  insulin glargine Injectable (LANTUS) 10 Unit(s) SubCutaneous at bedtime  insulin lispro (ADMELOG) corrective regimen sliding scale   SubCutaneous three times a day before meals  insulin lispro (ADMELOG) corrective regimen sliding scale   SubCutaneous at bedtime  piperacillin/tazobactam IVPB.. 3.375 Gram(s) IV Intermittent every 8 hours    MEDICATIONS  (PRN):  acetaminophen   Tablet .. 650 milliGRAM(s) Oral every 6 hours PRN Mild Pain (1 - 3)  fentaNYL    Injectable 25 MICROGram(s) IV Push every 6 hours PRN Severe Pain (7 - 10)      Vitals:  T(F): 99.7 (09-14-21 @ 05:33), Max: 99.8 (09-13-21 @ 19:56)  HR: 98 (09-14-21 @ 05:33) (76 - 113)  BP: 145/72 (09-14-21 @ 05:33) (108/59 - 145/72)  RR: 17 (09-14-21 @ 05:33) (16 - 20)  SpO2: 96% (09-14-21 @ 05:33) (94% - 100%)    09-13 @ 07:01  -  09-14 @ 07:00  --------------------------------------------------------  IN:    Heparin Infusion: 76 mL    IV PiggyBack: 100 mL    IV PiggyBack: 200 mL    Oral Fluid: 480 mL  Total IN: 856 mL    OUT:    Indwelling Catheter - Urethral (mL): 700 mL    Voided (mL): 650 mL  Total OUT: 1350 mL    Total NET: -494 mL    PHYSICAL EXAM:  Neuro: Awake, responsive  CV: S1 S2 RRR  Lungs: CTABL  GI: Soft, BS +, ND, NT  Extremities: No edema    RADIOLOGY: < from: Xray Chest 1 View- PORTABLE-Urgent (Xray Chest 1 View- PORTABLE-Urgent .) (09.10.21 @ 15:52) >  LEFT basilar linear atelectasis.    < end of copied text >    DIAGNOSTIC TESTING:    [x ] Echocardiogram: < from: TTE Echo Complete w/o Contrast w/ Doppler (09.10.21 @ 10:00) >  Left Ventricle: The left ventricular internal cavity size is normal. Left ventricular wall thickness is normal.  Global LV systolic function was normal. Left ventricular ejection fraction, by visual estimation, is 60 to 65%. Spectral Doppler shows pseudonormal pattern of left ventricular myocardial filling (Grade II diastolic dysfunction).  Right Ventricle: Normal right ventricular size and function.  Left Atrium: Normal left atrial size.  Right Atrium: Normal right atrial size.  Pericardium: There is no evidence of pericardial effusion.  Mitral Valve: Structurally normal mitral valve, with normal leaflet excursion. Mitral leaflet mobility is normal. Trace mitral valve regurgitation is seen.  Tricuspid Valve: Structurally normal tricuspid valve, with normal leaflet excursion. The tricuspid valve is normal in structure. Mild tricuspid regurgitation is visualized. Estimated pulmonary artery systolic pressure is 62.8 mmHg assuming a right atrial pressure of 5 mmHg, which is consistent with severe pulmonary hypertension.  Aortic Valve: The aortic valve is trileaflet. No evidence of aortic valve regurgitation is seen.  Pulmonic Valve: The pulmonic valve was not well visualized. No indication of pulmonic valve regurgitation.  Aorta: Aortic root measured at Sinus of Valsalva is normal.  Venous: The inferior vena cava was normal sized, with respiratory size variation greater than 50%.      Summary:   1. Left ventricular ejection fraction, by visual estimation, is 60 to 65%.   2. Technically fair study.   3. Normal global left ventricular systolic function.   4. Normal left ventricular internal cavity size.   5. Spectral Doppler shows pseudonormal pattern of left ventricular myocardial filling (Grade II diastolic dysfunction).   6. Normal right ventricular size and function.   7. Normal left atrial size.   8. Normal right atrial size.   9. There is no evidence of pericardial effusion.  10. Structurally normal mitral valve, with normal leaflet excursion.  11. Trace mitral valve regurgitation.  12. Mild tricuspid regurgitation.  13. Estimated pulmonary artery systolic pressure is 62.8 mmHg assuming a right atrial pressure of 5 mmHg, which is consistent with severe pulmonary hypertension.    < end of copied text >    LABS:	 	    13 Sep 2021 03:46    145    |  113    |  30     ----------------------------<  277    3.6     |  30     |  0.95   12 Sep 2021 14:17    148    |  118    |  35     ----------------------------<  314    4.3     |  26     |  0.95   12 Sep 2021 04:43    153    |  122    |  36     ----------------------------<  214    4.0     |  29     |  0.93     Ca    8.3        13 Sep 2021 03:46  Phos  2.0       13 Sep 2021 03:46  Mg     2.2       13 Sep 2021 03:46    TPro  5.4    /  Alb  1.0    /  TBili  0.4    /  DBili  x      /  AST  45     /  ALT  47     /  AlkPhos  140    13 Sep 2021 03:46                          7.8    12.75 )-----------( 249      ( 13 Sep 2021 03:46 )             23.3 ,                       8.2    15.79 )-----------( 266      ( 12 Sep 2021 14:17 )             24.3 ,                       7.5    13.69 )-----------( 276      ( 12 Sep 2021 04:43 )             22.4   PT/PTT- ( 13 Sep 2021 10:59 )   PT: x    ;  PTT: 103.6 sec                  Patient is a 65y old  Female who presents with unresponsiveness (14 Sep 2021 07:31)    PAST MEDICAL & SURGICAL HISTORY:    Hypertension  DM  HLD    INTERVAL HISTORY: In no acute distress, c/o left foot pain post op  	  MEDICATIONS:  MEDICATIONS  (STANDING):  aspirin enteric coated 81 milliGRAM(s) Oral daily  atorvastatin 40 milliGRAM(s) Oral at bedtime  chlorhexidine 2% Cloths 1 Application(s) Topical <User Schedule>  enoxaparin Injectable 40 milliGRAM(s) SubCutaneous <User Schedule>  insulin glargine Injectable (LANTUS) 10 Unit(s) SubCutaneous at bedtime  insulin lispro (ADMELOG) corrective regimen sliding scale   SubCutaneous three times a day before meals  insulin lispro (ADMELOG) corrective regimen sliding scale   SubCutaneous at bedtime  piperacillin/tazobactam IVPB.. 3.375 Gram(s) IV Intermittent every 8 hours    MEDICATIONS  (PRN):  acetaminophen   Tablet .. 650 milliGRAM(s) Oral every 6 hours PRN Mild Pain (1 - 3)  fentaNYL    Injectable 25 MICROGram(s) IV Push every 6 hours PRN Severe Pain (7 - 10)      Vitals:  T(F): 99.7 (09-14-21 @ 05:33), Max: 99.8 (09-13-21 @ 19:56)  HR: 98 (09-14-21 @ 05:33) (76 - 113)  BP: 145/72 (09-14-21 @ 05:33) (108/59 - 145/72)  RR: 17 (09-14-21 @ 05:33) (16 - 20)  SpO2: 96% (09-14-21 @ 05:33) (94% - 100%)    09-13 @ 07:01  -  09-14 @ 07:00  --------------------------------------------------------  IN:    Heparin Infusion: 76 mL    IV PiggyBack: 100 mL    IV PiggyBack: 200 mL    Oral Fluid: 480 mL  Total IN: 856 mL    OUT:    Indwelling Catheter - Urethral (mL): 700 mL    Voided (mL): 650 mL  Total OUT: 1350 mL    Total NET: -494 mL    PHYSICAL EXAM:  Neuro: Awake, responsive  CV: S1 S2 RRR  Lungs: CTABL  GI: Soft, BS +, ND, NT  Extremities: No edema, Lt foot dressing intact     RADIOLOGY: < from: Xray Chest 1 View- PORTABLE-Urgent (Xray Chest 1 View- PORTABLE-Urgent .) (09.10.21 @ 15:52) >  LEFT basilar linear atelectasis.    < end of copied text >    DIAGNOSTIC TESTING:    [x ] Echocardiogram: < from: TTE Echo Complete w/o Contrast w/ Doppler (09.10.21 @ 10:00) >  Left Ventricle: The left ventricular internal cavity size is normal. Left ventricular wall thickness is normal.  Global LV systolic function was normal. Left ventricular ejection fraction, by visual estimation, is 60 to 65%. Spectral Doppler shows pseudonormal pattern of left ventricular myocardial filling (Grade II diastolic dysfunction).  Right Ventricle: Normal right ventricular size and function.  Left Atrium: Normal left atrial size.  Right Atrium: Normal right atrial size.  Pericardium: There is no evidence of pericardial effusion.  Mitral Valve: Structurally normal mitral valve, with normal leaflet excursion. Mitral leaflet mobility is normal. Trace mitral valve regurgitation is seen.  Tricuspid Valve: Structurally normal tricuspid valve, with normal leaflet excursion. The tricuspid valve is normal in structure. Mild tricuspid regurgitation is visualized. Estimated pulmonary artery systolic pressure is 62.8 mmHg assuming a right atrial pressure of 5 mmHg, which is consistent with severe pulmonary hypertension.  Aortic Valve: The aortic valve is trileaflet. No evidence of aortic valve regurgitation is seen.  Pulmonic Valve: The pulmonic valve was not well visualized. No indication of pulmonic valve regurgitation.  Aorta: Aortic root measured at Sinus of Valsalva is normal.  Venous: The inferior vena cava was normal sized, with respiratory size variation greater than 50%.      Summary:   1. Left ventricular ejection fraction, by visual estimation, is 60 to 65%.   2. Technically fair study.   3. Normal global left ventricular systolic function.   4. Normal left ventricular internal cavity size.   5. Spectral Doppler shows pseudonormal pattern of left ventricular myocardial filling (Grade II diastolic dysfunction).   6. Normal right ventricular size and function.   7. Normal left atrial size.   8. Normal right atrial size.   9. There is no evidence of pericardial effusion.  10. Structurally normal mitral valve, with normal leaflet excursion.  11. Trace mitral valve regurgitation.  12. Mild tricuspid regurgitation.  13. Estimated pulmonary artery systolic pressure is 62.8 mmHg assuming a right atrial pressure of 5 mmHg, which is consistent with severe pulmonary hypertension.    < end of copied text >    LABS:	 	    13 Sep 2021 03:46    145    |  113    |  30     ----------------------------<  277    3.6     |  30     |  0.95   12 Sep 2021 14:17    148    |  118    |  35     ----------------------------<  314    4.3     |  26     |  0.95   12 Sep 2021 04:43    153    |  122    |  36     ----------------------------<  214    4.0     |  29     |  0.93     Ca    8.3        13 Sep 2021 03:46  Phos  2.0       13 Sep 2021 03:46  Mg     2.2       13 Sep 2021 03:46    TPro  5.4    /  Alb  1.0    /  TBili  0.4    /  DBili  x      /  AST  45     /  ALT  47     /  AlkPhos  140    13 Sep 2021 03:46                          7.8    12.75 )-----------( 249      ( 13 Sep 2021 03:46 )             23.3 ,                       8.2    15.79 )-----------( 266      ( 12 Sep 2021 14:17 )             24.3 ,                       7.5    13.69 )-----------( 276      ( 12 Sep 2021 04:43 )             22.4   PT/PTT- ( 13 Sep 2021 10:59 )   PT: x    ;  PTT: 103.6 sec

## 2021-09-14 NOTE — PROGRESS NOTE ADULT - ASSESSMENT
64yo F PMH of IDDM, HTN, HLD, diabetic retinopathy initially admitted to MICU for sepsis due to R foot necrosis, gas gangrene and necrotizing fascitis.  S/p R foot TMA, seen by vascular, podiatry and ID.  Started on IV Abx.  Noted to have troponinemia 0.468, lateral TWIs on EKG.     # R foot necrosis / Necrotizing fasciitis / Gas Gangrene - s/p TMA 9/10.  Remains on IV Zosyn.  Observe for source control.  Probable BKA per vascular.   # NSTEMI - trop now stable.  Cardiology following.  Possible ischemic workup prior to discharge.  Continue ASA, Statin.  Add BBlocker.  # Diabetes Type II - monitor fingersticks.  Insulin coverage for hyperglycemia.  FS still elevated.  Will increase Lantus dose.   # Essential HTN - Monitor BP.  Continue oral antihypertensives.  # Hyperlipidemia - diet modification.  Continue Statin.  # Inpatient DVT Prophylaxis - Lovenox subcut

## 2021-09-14 NOTE — PROGRESS NOTE ADULT - ASSESSMENT
Gas gangrene/?necrotizing fasciitis  Sepsis  DKA  Rhabdomyolysis  GEOVANNI  Hypernatremia- higher than values reported given pseudohyponatremia from hyperglycemia?  Elevated troponin << CK, suspect MI not cause of CK  Encephalopathy, multifactorial  Crucial aspect of management here is source control. Antibiotics indicated but of secondary importance to surgical Rx in the setting of gas gangrene.    9/11: Postoperative day #1, adequate progress given clinical scenario.  Kidney function is better.  White blood cell count is about the same.  Source control remains the central aspect of management, with antibiotics adjunctive.  She is intubated, but FiO2 is only 30%, likely for airway management/metabolic control/likely need for further surgery imminently.  Anion gap has closed and glycemic control appears improved.  Culture data is pending.  No repeat CK level thus far today.  Sodium remains elevated.  Mental status not able to be assessed in the setting of intubation and sedation.    9/14: no fevers, on RA, WBC normalized, Cr ok, Vancomycin stopped as there is no evidence of MRSA, abscess culture with proteus, enterococcus, Klebsiella, all sensitive to Zosyn. IV Zosyn continued. Pt denies any pruritus, sob, cough, swelling, will monitor rash on pt's forehead. Pt has new loose bowel movements, abd exam benign, no leukocytosis - C. diff doubtful, will monitor.

## 2021-09-15 ENCOUNTER — TRANSCRIPTION ENCOUNTER (OUTPATIENT)
Age: 66
End: 2021-09-15

## 2021-09-15 LAB
CULTURE RESULTS: SIGNIFICANT CHANGE UP
CULTURE RESULTS: SIGNIFICANT CHANGE UP
GLUCOSE BLDC GLUCOMTR-MCNC: 244 MG/DL — HIGH (ref 70–99)
GLUCOSE BLDC GLUCOMTR-MCNC: 250 MG/DL — HIGH (ref 70–99)
GLUCOSE BLDC GLUCOMTR-MCNC: 254 MG/DL — HIGH (ref 70–99)
GLUCOSE BLDC GLUCOMTR-MCNC: 302 MG/DL — HIGH (ref 70–99)
HCT VFR BLD CALC: 29.3 % — LOW (ref 34.5–45)
HGB BLD-MCNC: 9.4 G/DL — LOW (ref 11.5–15.5)
MCHC RBC-ENTMCNC: 27.3 PG — SIGNIFICANT CHANGE UP (ref 27–34)
MCHC RBC-ENTMCNC: 32.1 GM/DL — SIGNIFICANT CHANGE UP (ref 32–36)
MCV RBC AUTO: 85.2 FL — SIGNIFICANT CHANGE UP (ref 80–100)
NRBC # BLD: 0 /100 WBCS — SIGNIFICANT CHANGE UP (ref 0–0)
PLATELET # BLD AUTO: 250 K/UL — SIGNIFICANT CHANGE UP (ref 150–400)
RBC # BLD: 3.44 M/UL — LOW (ref 3.8–5.2)
RBC # FLD: 14.7 % — HIGH (ref 10.3–14.5)
SPECIMEN SOURCE: SIGNIFICANT CHANGE UP
SPECIMEN SOURCE: SIGNIFICANT CHANGE UP
WBC # BLD: 7.5 K/UL — SIGNIFICANT CHANGE UP (ref 3.8–10.5)
WBC # FLD AUTO: 7.5 K/UL — SIGNIFICANT CHANGE UP (ref 3.8–10.5)

## 2021-09-15 PROCEDURE — 99233 SBSQ HOSP IP/OBS HIGH 50: CPT

## 2021-09-15 PROCEDURE — 99232 SBSQ HOSP IP/OBS MODERATE 35: CPT

## 2021-09-15 RX ADMIN — Medication 4: at 16:49

## 2021-09-15 RX ADMIN — ATORVASTATIN CALCIUM 40 MILLIGRAM(S): 80 TABLET, FILM COATED ORAL at 21:35

## 2021-09-15 RX ADMIN — PIPERACILLIN AND TAZOBACTAM 25 GRAM(S): 4; .5 INJECTION, POWDER, LYOPHILIZED, FOR SOLUTION INTRAVENOUS at 22:54

## 2021-09-15 RX ADMIN — INSULIN GLARGINE 15 UNIT(S): 100 INJECTION, SOLUTION SUBCUTANEOUS at 21:34

## 2021-09-15 RX ADMIN — Medication 5 UNIT(S): at 16:49

## 2021-09-15 RX ADMIN — Medication 650 MILLIGRAM(S): at 06:45

## 2021-09-15 RX ADMIN — Medication 650 MILLIGRAM(S): at 21:38

## 2021-09-15 RX ADMIN — PIPERACILLIN AND TAZOBACTAM 25 GRAM(S): 4; .5 INJECTION, POWDER, LYOPHILIZED, FOR SOLUTION INTRAVENOUS at 05:48

## 2021-09-15 RX ADMIN — Medication 4: at 08:05

## 2021-09-15 RX ADMIN — Medication 650 MILLIGRAM(S): at 13:58

## 2021-09-15 RX ADMIN — ENOXAPARIN SODIUM 40 MILLIGRAM(S): 100 INJECTION SUBCUTANEOUS at 18:14

## 2021-09-15 RX ADMIN — Medication 650 MILLIGRAM(S): at 12:58

## 2021-09-15 RX ADMIN — Medication 650 MILLIGRAM(S): at 22:38

## 2021-09-15 RX ADMIN — Medication 12.5 MILLIGRAM(S): at 05:47

## 2021-09-15 RX ADMIN — Medication 5 UNIT(S): at 08:06

## 2021-09-15 RX ADMIN — Medication 8: at 11:44

## 2021-09-15 RX ADMIN — Medication 81 MILLIGRAM(S): at 11:45

## 2021-09-15 RX ADMIN — Medication 12.5 MILLIGRAM(S): at 18:14

## 2021-09-15 RX ADMIN — Medication 2: at 21:34

## 2021-09-15 RX ADMIN — Medication 5 UNIT(S): at 11:45

## 2021-09-15 RX ADMIN — CHLORHEXIDINE GLUCONATE 1 APPLICATION(S): 213 SOLUTION TOPICAL at 05:48

## 2021-09-15 RX ADMIN — PIPERACILLIN AND TAZOBACTAM 25 GRAM(S): 4; .5 INJECTION, POWDER, LYOPHILIZED, FOR SOLUTION INTRAVENOUS at 14:40

## 2021-09-15 RX ADMIN — Medication 650 MILLIGRAM(S): at 05:47

## 2021-09-15 NOTE — DISCHARGE NOTE PROVIDER - NSDCMRMEDTOKEN_GEN_ALL_CORE_FT
Levemir FlexPen 100 units/mL subcutaneous solution: 21 unit(s) subcutaneous once a day  Levemir pen needles:   NovoLOG FlexPen 100 units/mL subcutaneous solution: 7 unit(s) subcutaneous 3 times a day (with meals)  Novolog pen needles:   one touch lancets:   one touch test strips:    aspirin 81 mg oral delayed release tablet: 1 tab(s) orally once a day  atorvastatin 40 mg oral tablet: 1 tab(s) orally once a day (at bedtime)  bisacodyl 10 mg rectal suppository: 1 suppository(ies) rectal once a day, As needed, Constipation  collagenase 250 units/g topical ointment: 1 application topically once a day  gabapentin 100 mg oral capsule: 1 cap(s) orally 3 times a day  insulin glargine: 16 unit(s) subcutaneous once a day (at bedtime)  metFORMIN 500 mg oral tablet: 1 tab(s) orally 2 times a day  metoprolol: 12.5 milligram(s) orally 2 times a day  NovoLOG FlexPen 100 units/mL subcutaneous solution: 7 unit(s) subcutaneous 3 times a day (with meals)  polyethylene glycol 3350 oral powder for reconstitution: 17 gram(s) orally once a day  senna oral tablet: 2 tab(s) orally once a day (at bedtime)   aspirin 81 mg oral delayed release tablet: 1 tab(s) orally once a day  atorvastatin 40 mg oral tablet: 1 tab(s) orally once a day (at bedtime)  bisacodyl 10 mg rectal suppository: 1 suppository(ies) rectal once a day, As needed, Constipation  cefdinir 300 mg oral capsule: 1 cap(s) orally every 12 hours   collagenase 250 units/g topical ointment: 1 application topically once a day  gabapentin 100 mg oral capsule: 1 cap(s) orally 3 times a day  insulin glargine: 16 unit(s) subcutaneous once a day (at bedtime)  metFORMIN 500 mg oral tablet: 1 tab(s) orally 2 times a day  metoprolol: 12.5 milligram(s) orally 2 times a day  NovoLOG FlexPen 100 units/mL subcutaneous solution: 7 unit(s) subcutaneous 3 times a day (with meals)  polyethylene glycol 3350 oral powder for reconstitution: 17 gram(s) orally once a day  senna oral tablet: 2 tab(s) orally once a day (at bedtime)

## 2021-09-15 NOTE — PROGRESS NOTE ADULT - ASSESSMENT
65-year-old F with hypertension, diabetes, dyslipidemia, diabetic retinopathy and found unresponsive at home. Recent left foot wounds eventually worsened with necrosis of left foot and hyperglycemia on admission along with troponin elevation and T wave abnormalities on ECG.     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 On asa, statin, low dose bbl, no c/o chest pain  antibiotic coverage and postoperative care as per primary/surgery team  Consider eventual ischemic cardiac workup prior to discharge, when h/h more stable.   65-year-old F with hypertension, diabetes, dyslipidemia, diabetic retinopathy and found unresponsive at home. Recent left foot wounds eventually worsened with necrosis of left foot and hyperglycemia on admission along with troponin elevation and T wave abnormalities on ECG.     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 On asa, statin, low dose bbl, no c/o chest pain  antibiotic coverage and postoperative care as per primary/surgery team  Consider eventual ischemic cardiac workup, h/h noted to be low, stress test can be falsely resulted in setting of anemia. would consider cardiac CTA as outpatient, can follow up with Dr. Barlow within 7-10 days of discharge for further w/u   65-year-old F with hypertension, diabetes, dyslipidemia, diabetic retinopathy and found unresponsive at home. Recent left foot wounds eventually worsened with necrosis of left foot and hyperglycemia on admission along with troponin elevation and T wave abnormalities on ECG.     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 On asa, statin, low dose bbl, no c/o chest pain  consider adding low dose ACE-I/ARB in setting of DM/HTN  antibiotic coverage and postoperative care as per primary/surgery team  Consider eventual ischemic cardiac workup, h/h noted to be low, stress test can be falsely resulted in setting of anemia. would consider cardiac CTA as outpatient, can follow up with Dr. Barlow within 7-10 days of discharge for further w/u. plan d/w pt and medicine team.  signing off now, please reconsult as needed   65-year-old F with hypertension, diabetes, dyslipidemia, diabetic retinopathy and found unresponsive at home. Recent left foot wounds eventually worsened with necrosis of left foot and hyperglycemia on admission along with troponin elevation and T wave abnormalities on ECG.     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 On asa, statin, low dose bbl, no c/o chest pain  consider adding low dose ACE-I/ARB in setting of DM/HTN if BP can toleartes  antibiotic coverage and postoperative care as per primary/surgery team  Consider eventual ischemic cardiac workup, h/h noted to be low, stress test can be falsely resulted in setting of anemia. would consider cardiac CTA as outpatient, can follow up with Dr. Barlow within 7-10 days of discharge for further w/u. plan d/w pt and medicine team.  signing off now, please reconsult as needed

## 2021-09-15 NOTE — PROGRESS NOTE ADULT - SUBJECTIVE AND OBJECTIVE BOX
[facility]  Division of Infectious Diseases  [phone]    Name: NELSON ZEPEDA  Age: 65y  Gender: Female  MRN: [id number]    Interval History--  Notes reviewed.     Past Medical History--  Hypertension    Family history of diabetes mellitus    No significant past surgical history        For details regarding the patient's social history, family history, and other miscellaneous elements, please refer the initial infectious diseases consultation and/or the admitting history and physical examination for this admission.    Allergies    avocado (Pruritus)  Carrots (Pruritus)  No Known Drug Allergies  Plums (Pruritus)    Intolerances        Medications--  Antibiotics:  piperacillin/tazobactam IVPB.. 3.375 Gram(s) IV Intermittent every 8 hours    Immunologic:    Other:  acetaminophen   Tablet .. PRN  aspirin enteric coated  atorvastatin  chlorhexidine 2% Cloths  dextrose 40% Gel  dextrose 40% Gel  dextrose 5%.  dextrose 5%.  dextrose 5%.  dextrose 5%.  dextrose 5%.  dextrose 50% Injectable  dextrose 50% Injectable  dextrose 50% Injectable  enoxaparin Injectable  fentaNYL    Injectable PRN  glucagon  Injectable  glucagon  Injectable  insulin glargine Injectable (LANTUS)  insulin lispro (ADMELOG) corrective regimen sliding scale  insulin lispro (ADMELOG) corrective regimen sliding scale  insulin lispro Injectable (ADMELOG)  insulin lispro Injectable (ADMELOG)  insulin lispro Injectable (ADMELOG)  metoprolol tartrate      Review of Systems--  A 10-point review of systems was obtained.     Pertinent positives and negatives--  Constitutional: No fevers. No Chills. No Rigors.   Cardiovascular: No chest pain. No palpitations.  Respiratory: No shortness of breath. No cough.  Gastrointestinal: No nausea or vomiting. No diarrhea or constipation.   Psychiatric: Pleasant. Appropriate affect.    Review of systems otherwise negative except as previously noted.    Physical Examination--  Vital Signs: T(F): 98.7 (09-15-21 @ 05:23), Max: 99.2 (09-14-21 @ 17:35)  HR: 87 (09-15-21 @ 05:23)  BP: 138/96 (09-15-21 @ 05:23)  RR: 17 (09-15-21 @ 05:23)  SpO2: 97% (09-15-21 @ 05:23)  Wt(kg): --  General: Nontoxic-appearing Female in no acute distress.  HEENT: AT/NC. PERRL. EOMI. Anicteric. Conjunctiva pink and moist. Oropharynx clear. Dentition fair.  Neck: Not rigid. No sense of mass.  Nodes: None palpable.  Lungs: Clear bilaterally without rales, wheezing or rhonchi  Heart: Regular rate and rhythm. No Murmur. No rub. No gallop. No palpable thrill.  Abdomen: Bowel sounds present and normoactive. Soft. Nondistended. Nontender. No sense of mass. No organomegaly.  Back: No spinal tenderness. No costovertebral angle tenderness.   Extremities: No cyanosis or clubbing. No edema.   Skin: Warm. Dry. Good turgor. No rash. No vasculitic stigmata.  Psychiatric: Appropriate affect and mood for situation.         Laboratory Studies--  CBC                        9.4    7.50  )-----------( 250      ( 15 Sep 2021 07:10 )             29.3       Chemistries  09-14    146<H>  |  112<H>  |  19  ----------------------------<  280<H>  3.4<L>   |  29  |  0.89    Ca    8.3<L>      14 Sep 2021 08:53  Phos  2.6     09-14  Mg     2.1     09-14    TPro  6.0  /  Alb  1.2<L>  /  TBili  0.5  /  DBili  x   /  AST  57<H>  /  ALT  52  /  AlkPhos  158<H>  09-14      Culture Data    Culture - Other (collected 10 Sep 2021 18:46)  Source: .Other left foot  Final Report (12 Sep 2021 08:53):    No growth    Culture - Abscess with Gram Stain (collected 10 Sep 2021 14:48)  Source: .Abscess left foot wound  Final Report (13 Sep 2021 11:10):    Few Proteus mirabilis    Moderate Enterococcus faecalis    Rare Klebsiella pneumoniae    Few Non Fermentering Gram Negative Rods Most closely resembling    Wohlfahrtiimonas Species    Moderate Bacteroides fragilis "Susceptibilities not performed"    Moderate Streptococcus agalactiae (Group B) isolated    Group B streptococci are susceptible to ampicillin,    penicillin and cefazolin, but may be resistant to    erythromycin and clindamycin.    Recommendations for intrapartum prophylaxis for Group B    streptococci are penicillin or ampicillin.  Organism: Non Fermentering Gram Negative Rods  Proteus mirabilis  Enterococcus faecalis  Klebsiella pneumoniae (13 Sep 2021 11:10)  Organism: Non Fermentering Gram Negative Rods (13 Sep 2021 11:09)  Organism: Klebsiella pneumoniae (13 Sep 2021 09:17)  Organism: Enterococcus faecalis (12 Sep 2021 14:17)  Organism: Proteus mirabilis (12 Sep 2021 12:30)    Culture - Urine (collected 10 Sep 2021 10:40)  Source: Clean Catch Clean Catch (Midstream)  Final Report (11 Sep 2021 06:51):    No growth    Culture - Blood (collected 10 Sep 2021 10:33)  Source: .Blood Blood-Peripheral  Preliminary Report (11 Sep 2021 11:01):    No growth to date.    Culture - Blood (collected 10 Sep 2021 10:33)  Source: .Blood Blood-Peripheral  Preliminary Report (11 Sep 2021 11:01):    No growth to date.             [facility]  Division of Infectious Diseases  [phone]    Name: NELSON ZEPEDA  Age: 65y  Gender: Female  MRN: [id number]    Interval History--  Notes reviewed. Seen earlier today.  Has no complaints.  Asking for a little bit of pain medication, but pain control is not an issue.  Denies any fevers, chills, or rigors.  Appetite is okay.  She denies any diarrhea.      Past Medical History--  Hypertension    Family history of diabetes mellitus    No significant past surgical history        For details regarding the patient's social history, family history, and other miscellaneous elements, please refer the initial infectious diseases consultation and/or the admitting history and physical examination for this admission.    Allergies    avocado (Pruritus)  Carrots (Pruritus)  No Known Drug Allergies  Plums (Pruritus)    Intolerances        Medications--  Antibiotics:  piperacillin/tazobactam IVPB.. 3.375 Gram(s) IV Intermittent every 8 hours    Immunologic:    Other:  acetaminophen   Tablet .. PRN  aspirin enteric coated  atorvastatin  chlorhexidine 2% Cloths  dextrose 40% Gel  dextrose 40% Gel  dextrose 5%.  dextrose 5%.  dextrose 5%.  dextrose 5%.  dextrose 5%.  dextrose 50% Injectable  dextrose 50% Injectable  dextrose 50% Injectable  enoxaparin Injectable  fentaNYL    Injectable PRN  glucagon  Injectable  glucagon  Injectable  insulin glargine Injectable (LANTUS)  insulin lispro (ADMELOG) corrective regimen sliding scale  insulin lispro (ADMELOG) corrective regimen sliding scale  insulin lispro Injectable (ADMELOG)  insulin lispro Injectable (ADMELOG)  insulin lispro Injectable (ADMELOG)  metoprolol tartrate      Review of Systems--  A 10-point review of systems was obtained.   Review of systems otherwise negative except as previously noted.    Physical Examination--  Vital Signs: T(F): 98.7 (09-15-21 @ 05:23), Max: 99.2 (09-14-21 @ 17:35)  HR: 87 (09-15-21 @ 05:23)  BP: 138/96 (09-15-21 @ 05:23)  RR: 17 (09-15-21 @ 05:23)  SpO2: 97% (09-15-21 @ 05:23)  Wt(kg): --  General: Nontoxic-appearing Female in no acute distress.  HEENT: AT/NC. Anicteric.  Conjunctiva pink and moist.  Oropharynx grossly clear.  Neck: Not rigid. No sense of mass.  Nodes: None palpable.  Lungs: Diminished breath sounds bilaterally without rales wheezes or rhonchi  Heart: Regular rate and rhythm  Abdomen: Bowel sounds present and normoactive. Soft. Nondistended. Nontender. No sense of mass. No organomegaly.  Back: No spinal tenderness. No costovertebral angle tenderness.   Extremities: L foot wrapped w/ ACE. No cyanosis or clubbing. No edema.   Skin: Warm. Dry. Good turgor. No rash. No vasculitic stigmata.  Psychiatric: Grossly appropriate mood and affect    Laboratory Studies--  CBC                        9.4    7.50  )-----------( 250      ( 15 Sep 2021 07:10 )             29.3       Chemistries  09-14    146<H>  |  112<H>  |  19  ----------------------------<  280<H>  3.4<L>   |  29  |  0.89    Ca    8.3<L>      14 Sep 2021 08:53  Phos  2.6     09-14  Mg     2.1     09-14    TPro  6.0  /  Alb  1.2<L>  /  TBili  0.5  /  DBili  x   /  AST  57<H>  /  ALT  52  /  AlkPhos  158<H>  09-14      Culture Data    Culture - Other (collected 10 Sep 2021 18:46)  Source: .Other left foot  Final Report (12 Sep 2021 08:53):    No growth    Culture - Abscess with Gram Stain (collected 10 Sep 2021 14:48)  Source: .Abscess left foot wound  Final Report (13 Sep 2021 11:10):    Few Proteus mirabilis    Moderate Enterococcus faecalis    Rare Klebsiella pneumoniae    Few Non Fermentering Gram Negative Rods Most closely resembling    Wohlfahrtiimonas Species    Moderate Bacteroides fragilis "Susceptibilities not performed"    Moderate Streptococcus agalactiae (Group B) isolated    Group B streptococci are susceptible to ampicillin,    penicillin and cefazolin, but may be resistant to    erythromycin and clindamycin.    Recommendations for intrapartum prophylaxis for Group B    streptococci are penicillin or ampicillin.  Organism: Non Fermentering Gram Negative Rods  Proteus mirabilis  Enterococcus faecalis  Klebsiella pneumoniae (13 Sep 2021 11:10)  Organism: Non Fermentering Gram Negative Rods (13 Sep 2021 11:09)  Organism: Klebsiella pneumoniae (13 Sep 2021 09:17)  Organism: Enterococcus faecalis (12 Sep 2021 14:17)  Organism: Proteus mirabilis (12 Sep 2021 12:30)    Culture - Urine (collected 10 Sep 2021 10:40)  Source: Clean Catch Clean Catch (Midstream)  Final Report (11 Sep 2021 06:51):    No growth    Culture - Blood (collected 10 Sep 2021 10:33)  Source: .Blood Blood-Peripheral  Preliminary Report (11 Sep 2021 11:01):    No growth to date.    Culture - Blood (collected 10 Sep 2021 10:33)  Source: .Blood Blood-Peripheral  Preliminary Report (11 Sep 2021 11:01):    No growth to date.

## 2021-09-15 NOTE — DISCHARGE NOTE PROVIDER - NSDCFUADDAPPT_GEN_ALL_CORE_FT
Podiatry Discharge Instructions:  Follow up: Please follow up with Dr. Robb within 1 week of discharge from the hospital, please call [phone] for appointment and discuss that you recently were seen in the hospital.  Wound Care: Please leave your dressing clean dry intact until your follow up appointment  Weight bearing: Please no weight bearing to the left foot.  Antibiotics: Please continue as instructed.

## 2021-09-15 NOTE — PROGRESS NOTE ADULT - SUBJECTIVE AND OBJECTIVE BOX
Patient is a 65y old  Female who presents with unresponsiveness (14 Sep 2021 18:55)    PAST MEDICAL & SURGICAL HISTORY:  Hypertension  DM  HLD    INTERVAL HISTORY:  	  MEDICATIONS:  MEDICATIONS  (STANDING):  aspirin enteric coated 81 milliGRAM(s) Oral daily  atorvastatin 40 milliGRAM(s) Oral at bedtime  chlorhexidine 2% Cloths 1 Application(s) Topical <User Schedule>  enoxaparin Injectable 40 milliGRAM(s) SubCutaneous <User Schedule>  insulin glargine Injectable (LANTUS) 15 Unit(s) SubCutaneous at bedtime  insulin lispro (ADMELOG) corrective regimen sliding scale   SubCutaneous three times a day before meals  insulin lispro (ADMELOG) corrective regimen sliding scale   SubCutaneous at bedtime  insulin lispro Injectable (ADMELOG) 5 Unit(s) SubCutaneous before breakfast  insulin lispro Injectable (ADMELOG) 5 Unit(s) SubCutaneous before lunch  insulin lispro Injectable (ADMELOG) 5 Unit(s) SubCutaneous before dinner  metoprolol tartrate 12.5 milliGRAM(s) Oral every 12 hours  piperacillin/tazobactam IVPB.. 3.375 Gram(s) IV Intermittent every 8 hours    MEDICATIONS  (PRN):  acetaminophen   Tablet .. 650 milliGRAM(s) Oral every 6 hours PRN Mild Pain (1 - 3)  fentaNYL    Injectable 25 MICROGram(s) IV Push every 6 hours PRN Severe Pain (7 - 10)    Vitals:  T(F): 98.7 (09-15-21 @ 05:23), Max: 99.2 (09-14-21 @ 17:35)  HR: 87 (09-15-21 @ 05:23) (82 - 96)  BP: 138/96 (09-15-21 @ 05:23) (125/70 - 157/74)  RR: 17 (09-15-21 @ 05:23) (16 - 17)  SpO2: 97% (09-15-21 @ 05:23) (96% - 97%)    09-14 @ 07:01  -  09-15 @ 07:00  --------------------------------------------------------  IN:    Oral Fluid: 240 mL  Total IN: 240 mL    OUT:    Voided (mL): 400 mL  Total OUT: 400 mL    Total NET: -160 mL    PHYSICAL EXAM:  Neuro: Awake, responsive  CV: S1 S2 RRR  Lungs: CTABL  GI: Soft, BS +, ND, NT  Extremities: No edema    RADIOLOGY: < from: Xray Chest 1 View- PORTABLE-Urgent (Xray Chest 1 View- PORTABLE-Urgent .) (09.10.21 @ 15:52) >  . LEFT basilar linear atelectasis.    < end of copied text >    DIAGNOSTIC TESTING:    [x ] Echocardiogram:< from: TTE Echo Complete w/o Contrast w/ Doppler (09.10.21 @ 10:00) >  Left Ventricle: The left ventricular internal cavity size is normal. Left ventricular wall thickness is normal.  Global LV systolic function was normal. Left ventricular ejection fraction, by visual estimation, is 60 to 65%. Spectral Doppler shows pseudonormal pattern of left ventricular myocardial filling (Grade II diastolic dysfunction).  Right Ventricle: Normal right ventricular size and function.  Left Atrium: Normal left atrial size.  Right Atrium: Normal right atrial size.  Pericardium: There is no evidence of pericardial effusion.  Mitral Valve: Structurally normal mitral valve, with normal leaflet excursion. Mitral leaflet mobility is normal. Trace mitral valve regurgitation is seen.  Tricuspid Valve: Structurally normal tricuspid valve, with normal leaflet excursion. The tricuspid valve is normal in structure. Mild tricuspid regurgitation is visualized. Estimated pulmonary artery systolic pressure is 62.8 mmHg assuming a right atrial pressure of 5 mmHg, which is consistent with severe pulmonary hypertension.  Aortic Valve: The aortic valve is trileaflet. No evidence of aortic valve regurgitation is seen.  Pulmonic Valve: The pulmonic valve was not well visualized. No indication of pulmonic valve regurgitation.  Aorta: Aortic root measured at Sinus of Valsalva is normal.  Venous: The inferior vena cava was normal sized, with respiratory size variation greater than 50%.      Summary:   1. Left ventricular ejection fraction, by visual estimation, is 60 to 65%.   2. Technically fair study.   3. Normal global left ventricular systolic function.   4. Normal left ventricular internal cavity size.   5. Spectral Doppler shows pseudonormal pattern of left ventricular myocardial filling (Grade II diastolic dysfunction).   6. Normal right ventricular size and function.   7. Normal left atrial size.   8. Normal right atrial size.   9. There is no evidence of pericardial effusion.  10. Structurally normal mitral valve, with normal leaflet excursion.  11. Trace mitral valve regurgitation.  12. Mild tricuspid regurgitation.  13. Estimated pulmonary artery systolic pressure is 62.8 mmHg assuming a right atrial pressure of 5 mmHg, which is consistent with severe pulmonary hypertension.    < end of copied text >    LABS:	 	    14 Sep 2021 08:53    146    |  112    |  19     ----------------------------<  280    3.4     |  29     |  0.89   13 Sep 2021 03:46    145    |  113    |  30     ----------------------------<  277    3.6     |  30     |  0.95   12 Sep 2021 14:17    148    |  118    |  35     ----------------------------<  314    4.3     |  26     |  0.95     Ca    8.3        14 Sep 2021 08:53  Phos  2.6       14 Sep 2021 08:53  Mg     2.1       14 Sep 2021 08:53    TPro  6.0    /  Alb  1.2    /  TBili  0.5    /  DBili  x      /  AST  57     /  ALT  52     /  AlkPhos  158    14 Sep 2021 08:53                          9.4    7.50  )-----------( 250      ( 15 Sep 2021 07:10 )             29.3 ,                       8.3    7.92  )-----------( 216      ( 14 Sep 2021 10:12 )             24.8 ,                       7.8    12.75 )-----------( 249      ( 13 Sep 2021 03:46 )             23.3 ,                       8.2    15.79 )-----------( 266      ( 12 Sep 2021 14:17 )             24.3          Patient is a 65y old  Female who presents with unresponsiveness (14 Sep 2021 18:55)    PAST MEDICAL & SURGICAL HISTORY:  Hypertension  DM  HLD    INTERVAL HISTORY: denies any chest pain or sob, + post op pain   	  MEDICATIONS:  MEDICATIONS  (STANDING):  aspirin enteric coated 81 milliGRAM(s) Oral daily  atorvastatin 40 milliGRAM(s) Oral at bedtime  chlorhexidine 2% Cloths 1 Application(s) Topical <User Schedule>  enoxaparin Injectable 40 milliGRAM(s) SubCutaneous <User Schedule>  insulin glargine Injectable (LANTUS) 15 Unit(s) SubCutaneous at bedtime  insulin lispro (ADMELOG) corrective regimen sliding scale   SubCutaneous three times a day before meals  insulin lispro (ADMELOG) corrective regimen sliding scale   SubCutaneous at bedtime  insulin lispro Injectable (ADMELOG) 5 Unit(s) SubCutaneous before breakfast  insulin lispro Injectable (ADMELOG) 5 Unit(s) SubCutaneous before lunch  insulin lispro Injectable (ADMELOG) 5 Unit(s) SubCutaneous before dinner  metoprolol tartrate 12.5 milliGRAM(s) Oral every 12 hours  piperacillin/tazobactam IVPB.. 3.375 Gram(s) IV Intermittent every 8 hours    MEDICATIONS  (PRN):  acetaminophen   Tablet .. 650 milliGRAM(s) Oral every 6 hours PRN Mild Pain (1 - 3)  fentaNYL    Injectable 25 MICROGram(s) IV Push every 6 hours PRN Severe Pain (7 - 10)    Vitals:  T(F): 98.7 (09-15-21 @ 05:23), Max: 99.2 (09-14-21 @ 17:35)  HR: 87 (09-15-21 @ 05:23) (82 - 96)  BP: 138/96 (09-15-21 @ 05:23) (125/70 - 157/74)  RR: 17 (09-15-21 @ 05:23) (16 - 17)  SpO2: 97% (09-15-21 @ 05:23) (96% - 97%)    09-14 @ 07:01  -  09-15 @ 07:00  --------------------------------------------------------  IN:    Oral Fluid: 240 mL  Total IN: 240 mL    OUT:    Voided (mL): 400 mL  Total OUT: 400 mL    Total NET: -160 mL    PHYSICAL EXAM:  Neuro: Awake, responsive  CV: S1 S2 RRR  Lungs: CTABL  GI: Soft, BS +, ND, NT  Extremities: No edema, Lt foot dressing intact    RADIOLOGY: < from: Xray Chest 1 View- PORTABLE-Urgent (Xray Chest 1 View- PORTABLE-Urgent .) (09.10.21 @ 15:52) >  . LEFT basilar linear atelectasis.    < end of copied text >    DIAGNOSTIC TESTING:    [x ] Echocardiogram:< from: TTE Echo Complete w/o Contrast w/ Doppler (09.10.21 @ 10:00) >  Left Ventricle: The left ventricular internal cavity size is normal. Left ventricular wall thickness is normal.  Global LV systolic function was normal. Left ventricular ejection fraction, by visual estimation, is 60 to 65%. Spectral Doppler shows pseudonormal pattern of left ventricular myocardial filling (Grade II diastolic dysfunction).  Right Ventricle: Normal right ventricular size and function.  Left Atrium: Normal left atrial size.  Right Atrium: Normal right atrial size.  Pericardium: There is no evidence of pericardial effusion.  Mitral Valve: Structurally normal mitral valve, with normal leaflet excursion. Mitral leaflet mobility is normal. Trace mitral valve regurgitation is seen.  Tricuspid Valve: Structurally normal tricuspid valve, with normal leaflet excursion. The tricuspid valve is normal in structure. Mild tricuspid regurgitation is visualized. Estimated pulmonary artery systolic pressure is 62.8 mmHg assuming a right atrial pressure of 5 mmHg, which is consistent with severe pulmonary hypertension.  Aortic Valve: The aortic valve is trileaflet. No evidence of aortic valve regurgitation is seen.  Pulmonic Valve: The pulmonic valve was not well visualized. No indication of pulmonic valve regurgitation.  Aorta: Aortic root measured at Sinus of Valsalva is normal.  Venous: The inferior vena cava was normal sized, with respiratory size variation greater than 50%.      Summary:   1. Left ventricular ejection fraction, by visual estimation, is 60 to 65%.   2. Technically fair study.   3. Normal global left ventricular systolic function.   4. Normal left ventricular internal cavity size.   5. Spectral Doppler shows pseudonormal pattern of left ventricular myocardial filling (Grade II diastolic dysfunction).   6. Normal right ventricular size and function.   7. Normal left atrial size.   8. Normal right atrial size.   9. There is no evidence of pericardial effusion.  10. Structurally normal mitral valve, with normal leaflet excursion.  11. Trace mitral valve regurgitation.  12. Mild tricuspid regurgitation.  13. Estimated pulmonary artery systolic pressure is 62.8 mmHg assuming a right atrial pressure of 5 mmHg, which is consistent with severe pulmonary hypertension.    < end of copied text >    LABS:	 	    14 Sep 2021 08:53    146    |  112    |  19     ----------------------------<  280    3.4     |  29     |  0.89   13 Sep 2021 03:46    145    |  113    |  30     ----------------------------<  277    3.6     |  30     |  0.95   12 Sep 2021 14:17    148    |  118    |  35     ----------------------------<  314    4.3     |  26     |  0.95     Ca    8.3        14 Sep 2021 08:53  Phos  2.6       14 Sep 2021 08:53  Mg     2.1       14 Sep 2021 08:53    TPro  6.0    /  Alb  1.2    /  TBili  0.5    /  DBili  x      /  AST  57     /  ALT  52     /  AlkPhos  158    14 Sep 2021 08:53                          9.4    7.50  )-----------( 250      ( 15 Sep 2021 07:10 )             29.3 ,                       8.3    7.92  )-----------( 216      ( 14 Sep 2021 10:12 )             24.8 ,                       7.8    12.75 )-----------( 249      ( 13 Sep 2021 03:46 )             23.3 ,                       8.2    15.79 )-----------( 266      ( 12 Sep 2021 14:17 )             24.3

## 2021-09-15 NOTE — DISCHARGE NOTE PROVIDER - CARE PROVIDER_API CALL
Giovanni Barlow)  Medicine  Cardiology  [address] [place] [address]  Phone: [phone]  Fax: [phone]  Follow Up Time: 1 week

## 2021-09-15 NOTE — DISCHARGE NOTE PROVIDER - NSDCACTIVITY_GEN_ALL_CORE
66yo F PMH of IDDM, HTN, HLD, diabetic retinopathy initially admitted to MICU for sepsis due to R foot necrosis, gas gangrene and necrotizing fascitis.  S/p R foot TMA, seen by vascular, podiatry and ID.  Started on IV Abx.  Noted to have troponinemia 0.468, lateral TWIs on EKG.     # Fe deficiency Anemia - likely due to chronic disease.  H/H has been fluctuating, dropped to 6.8 with Stool OB negative.  Lovenox stopped.  Continue ASA.  Pt was agreeable to transfusion, transfused 2 units pRBCs on 9/20.  Hgb now stable ~ 10.    # R foot necrosis / Necrotizing fasciitis / Gas Gangrene - s/p TMA 9/10.  Off antibiotics.  Observe for source control.  Wound vac placed, podiatry still concerned for possible need for BKA.    # Metabolic Encephalopathy - improving.  Mental status closer to baseline per family.    # NSTEMI - trop now stable.  Cardiology following.  Ischemic workup as outpatient discussed with cardiology.  Continue ASA, Statin, BBlocker.  # Diabetes Type II - monitor fingersticks.  Insulin coverage for hyperglycemia.  FS better controlled.  Continue Lantus, premeal insulin.  Endocrine following Dr. Urbina.   # Essential HTN - Monitor BP.  Continue oral antihypertensives.  # Hyperlipidemia - diet modification.  Continue Statin.  # Inpatient DVT Prophylaxis - ICDs.     Plan of care d/w son, daughter Monica [phone] / [phone] on 9/15, 9/19  Stable for ARETHA placement.

## 2021-09-15 NOTE — PROGRESS NOTE ADULT - SUBJECTIVE AND OBJECTIVE BOX
Patient: NELSON ZEPEDA [id number] 65y Female                            Hospitalist Attending Note    No complaints.  c/o pain in extremity, which has been chronic, slowly improving since surgery.  Daughter, son at bedside, they report she is more alert, closer to baseline.     ____________________PHYSICAL EXAM:  GENERAL:  NAD Alert and Oriented  to person, place.   HEENT: NCAT  CARDIOVASCULAR:  S1, S2  LUNGS: CTAB  ABDOMEN:  soft, (-) tenderness, (-) distension, (+) bowel sounds, (-) guarding, (-) rebound (-) rigidity  EXTREMITIES:  no cyanosis / clubbing / edema.   R stump dressing in place.   NEURO: strength symmetric.   ____________________    VITALS:  Vital Signs Last 24 Hrs  T(C): 37.1 (15 Sep 2021 17:05), Max: 37.1 (14 Sep 2021 23:13)  T(F): 98.7 (15 Sep 2021 17:05), Max: 98.7 (14 Sep 2021 23:13)  HR: 88 (15 Sep 2021 17:05) (82 - 94)  BP: 137/65 (15 Sep 2021 17:05) (125/70 - 138/96)  BP(mean): --  RR: 17 (15 Sep 2021 17:05) (16 - 17)  SpO2: 97% (15 Sep 2021 17:05) (97% - 98%) Daily     Daily   CAPILLARY BLOOD GLUCOSE      POCT Blood Glucose.: 244 mg/dL (15 Sep 2021 15:58)  POCT Blood Glucose.: 302 mg/dL (15 Sep 2021 11:12)  POCT Blood Glucose.: 250 mg/dL (15 Sep 2021 07:48)  POCT Blood Glucose.: 212 mg/dL (14 Sep 2021 21:28)    I&O's Summary    14 Sep 2021 07:01  -  15 Sep 2021 07:00  --------------------------------------------------------  IN: 240 mL / OUT: 400 mL / NET: -160 mL    15 Sep 2021 07:01  -  15 Sep 2021 18:03  --------------------------------------------------------  IN: 200 mL / OUT: 0 mL / NET: 200 mL        LABS:                        9.4    7.50  )-----------( 250      ( 15 Sep 2021 07:10 )             29.3     09-14    146<H>  |  112<H>  |  19  ----------------------------<  280<H>  3.4<L>   |  29  |  0.89    Ca    8.3<L>      14 Sep 2021 08:53  Phos  2.6     09-14  Mg     2.1     09-14    TPro  6.0  /  Alb  1.2<L>  /  TBili  0.5  /  DBili  x   /  AST  57<H>  /  ALT  52  /  AlkPhos  158<H>  09-14      LIVER FUNCTIONS - ( 14 Sep 2021 08:53 )  Alb: 1.2 g/dL / Pro: 6.0 gm/dL / ALK PHOS: 158 U/L / ALT: 52 U/L / AST: 57 U/L / GGT: x                     MEDICATIONS:  acetaminophen   Tablet .. 650 milliGRAM(s) Oral every 6 hours PRN  aspirin enteric coated 81 milliGRAM(s) Oral daily  atorvastatin 40 milliGRAM(s) Oral at bedtime  chlorhexidine 2% Cloths 1 Application(s) Topical <User Schedule>  dextrose 40% Gel 15 Gram(s) Oral once  dextrose 40% Gel 15 Gram(s) Oral once  dextrose 5%. 1000 milliLiter(s) IV Continuous <Continuous>  dextrose 5%. 1000 milliLiter(s) IV Continuous <Continuous>  dextrose 5%. 1000 milliLiter(s) IV Continuous <Continuous>  dextrose 5%. 1000 milliLiter(s) IV Continuous <Continuous>  dextrose 5%. 1000 milliLiter(s) IV Continuous <Continuous>  dextrose 50% Injectable 25 Gram(s) IV Push once  dextrose 50% Injectable 12.5 Gram(s) IV Push once  dextrose 50% Injectable 25 Gram(s) IV Push once  enoxaparin Injectable 40 milliGRAM(s) SubCutaneous <User Schedule>  fentaNYL    Injectable 25 MICROGram(s) IV Push every 6 hours PRN  glucagon  Injectable 1 milliGRAM(s) IntraMuscular once  glucagon  Injectable 1 milliGRAM(s) IntraMuscular once  insulin glargine Injectable (LANTUS) 15 Unit(s) SubCutaneous at bedtime  insulin lispro (ADMELOG) corrective regimen sliding scale   SubCutaneous three times a day before meals  insulin lispro (ADMELOG) corrective regimen sliding scale   SubCutaneous at bedtime  insulin lispro Injectable (ADMELOG) 5 Unit(s) SubCutaneous before breakfast  insulin lispro Injectable (ADMELOG) 5 Unit(s) SubCutaneous before lunch  insulin lispro Injectable (ADMELOG) 5 Unit(s) SubCutaneous before dinner  metoprolol tartrate 12.5 milliGRAM(s) Oral every 12 hours  piperacillin/tazobactam IVPB.. 3.375 Gram(s) IV Intermittent every 8 hours

## 2021-09-15 NOTE — PROGRESS NOTE ADULT - ASSESSMENT
64yo F PMH of IDDM, HTN, HLD, diabetic retinopathy initially admitted to MICU for sepsis due to R foot necrosis, gas gangrene and necrotizing fascitis.  S/p R foot TMA, seen by vascular, podiatry and ID.  Started on IV Abx.  Noted to have troponinemia 0.468, lateral TWIs on EKG.     # R foot necrosis / Necrotizing fasciitis / Gas Gangrene - s/p TMA 9/10.  Remains on IV Zosyn.  Observe for source control.  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   # Metabolic Encephalopathy - improving.  Mental status closer to baseline per family.    # NSTEMI - trop now stable.  Cardiology following.  Ischemic workup as outpatient discussed with cardiology.  Continue ASA, Statin, BBlocker.  # Diabetes Type II - monitor fingersticks.  Insulin coverage for hyperglycemia.  FS still elevated.  On Lantus.  Add premeal insulin.   # Essential HTN - Monitor BP.  Continue oral antihypertensives.  # Hyperlipidemia - diet modification.  Continue Statin.  # Inpatient DVT Prophylaxis - Lovenox subcut    Plan of care d/w son, daughter Monica [phone] / [phone]

## 2021-09-15 NOTE — DISCHARGE NOTE PROVIDER - NSDCCPCAREPLAN_GEN_ALL_CORE_FT
PRINCIPAL DISCHARGE DIAGNOSIS  Diagnosis: Diabetes mellitus with ketoacidosis  Assessment and Plan of Treatment:       SECONDARY DISCHARGE DIAGNOSES  Diagnosis: Diabetic ulcer of foot with necrosis of muscle  Assessment and Plan of Treatment:     Diagnosis: Secondary DM with DKA  Assessment and Plan of Treatment:

## 2021-09-15 NOTE — DISCHARGE NOTE PROVIDER - HOSPITAL COURSE
64yo F PMH of IDDM, HTN, HLD, diabetic retinopathy initially admitted to MICU for sepsis due to R foot necrosis, gas gangrene and necrotizing fascitis.  S/p R foot TMA, seen by vascular, podiatry and ID.  Started on IV Abx.  Noted to have troponinemia 0.468, lateral TWIs on EKG.     # Fe deficiency Anemia - likely due to chronic disease.  H/H has been fluctuating, dropped to 6.8 with Stool OB negative.  Lovenox stopped.  Continue ASA.  Pt was agreeable to transfusion, transfused 2 units pRBCs on 9/20.  Hgb now stable ~ 10.    # R foot necrosis / Necrotizing fasciitis / Gas Gangrene - s/p TMA 9/10.  Off antibiotics.  Observe for source control.  Wound vac placed, podiatry still concerned for possible need for BKA.    # Metabolic Encephalopathy - improving.  Mental status closer to baseline per family.    # NSTEMI - trop now stable.  Cardiology following.  Ischemic workup as outpatient discussed with cardiology.  Continue ASA, Statin, BBlocker.  # Diabetes Type II - monitor fingersticks.  Insulin coverage for hyperglycemia.  FS better controlled.  Continue Lantus, premeal insulin.  Endocrine following Dr. Urbina.   # Essential HTN - Monitor BP.  Continue oral antihypertensives.  # Hyperlipidemia - diet modification.  Continue Statin.  # Inpatient DVT Prophylaxis - ICDs.     Plan of care d/w son, daughter Monica [phone] / [phone] on 9/15, 9/19  Stable for [facility] placement. 66yo F PMH of IDDM, HTN, HLD, diabetic retinopathy initially admitted to MICU for sepsis due to R foot necrosis, gas gangrene and necrotizing fascitis.  S/p R foot TMA, seen by vascular, podiatry and ID.  Started on IV Abx.  Noted to have troponinemia 0.468, lateral TWIs on EKG.   Patient is a 65y old  Female who presents with a chief complaint of AMS, DKA, Necrotic foot infection (22 Sep 2021 16:00)    HPI:  66yo F with PMH of IDDM, HTN, HLD, diabetic retinopathy presents to ED BIBEMS after being found unresponsive by her family at home. Pt was last seen by her family 2 days ago. Per daughter pt had a wound on her left foot from glass 1-2 weeks ago, which blistered and eventually popped. Subsequently noted to have worsening necrosis of foot. Pt saw her PMD who referred her for outpatient podiatry visit; has not had podiatry appt yet. Daughter last spoke to pt over telephone on Wednesday at which time she sounded altered and mumbled but was answering "I'm fine". Pt lives alone at home; no pets, no smoking/EtOH/illicit drugs.   In ED, pt with open wound of Lt dorsal foot with necrotic 2nd toe. Pt remained with altered mental status on arrival. Labs show hyperglycemia to 1466, serum bicarb 6, AG 39. Also noted to have troponinemia 0.468, lateral TWIs on EKG.   Patient denies any pain or sob today. Patient states that she is happy to go to rehab.  SUBJECTIVE & OBJECTIVE: Pt seen and examined at bedside.   PHYSICAL EXAM:  Vital Signs Last 24 Hrs  T(C): 37.4 (23 Sep 2021 11:27), Max: 37.5 (22 Sep 2021 17:15)  T(F): 99.3 (23 Sep 2021 11:27), Max: 99.5 (22 Sep 2021 17:15)  HR: 88 (23 Sep 2021 11:27) (88 - 114)  BP: 132/64 (23 Sep 2021 11:27) (132/64 - 159/77)  BP(mean): --  RR: 17 (23 Sep 2021 11:27) (17 - 18)  SpO2: 96% (23 Sep 2021 11:27) (95% - 97%)   Daily     Daily I&O's Detail    22 Sep 2021 07:01  -  23 Sep 2021 07:00  --------------------------------------------------------  IN:    Oral Fluid: 720 mL  Total IN: 720 mL    OUT:    VAC (Vacuum Assisted Closure) System (mL): 150 mL    Voided (mL): 1500 mL  Total OUT: 1650 mL    Total NET: -930 mL      Patient is awake and alert, calm and comfortable.  GENERAL: NAD, well-groomed, well-developed  HEAD:  Atraumatic, Normocephalic  EYES: EOMI, PERRLA, conjunctiva and sclera clear  ENMT: Moist mucous membranes  NECK: Supple, No JVD  NERVOUS SYSTEM:  Alert & Oriented X3,   CHEST/LUNG: Clear to auscultation bilaterally; No rales, rhonchi, wheezing, or rubs  HEART: Regular rate and rhythm; No murmurs, rubs, or gallops  ABDOMEN: Soft, Nontender, Nondistended; Bowel sounds present  EXTREMITIES:  + Peripheral Pulses, No clubbing, cyanosis, or edema  Lower Ext with dressing and wound in place.  LABS:                        9.7    7.16  )-----------( 496      ( 23 Sep 2021 07:42 )             29.6   CAPILLARY BLOOD GLUCOSE      POCT Blood Glucose.: 267 mg/dL (23 Sep 2021 10:44)  POCT Blood Glucose.: 317 mg/dL (23 Sep 2021 07:29)  POCT Blood Glucose.: 325 mg/dL (23 Sep 2021 06:07)  POCT Blood Glucose.: 221 mg/dL (22 Sep 2021 23:20)  POCT Blood Glucose.: 164 mg/dL (22 Sep 2021 16:33)  RADIOLOGY & ADDITIONAL TESTS:  66 yo woman withDKA now resolved and s/p TMA is now stable for discharge to rehab.   # Fe deficiency Anemia - likely due to chronic disease.  H/H has been fluctuating, dropped to 6.8 with Stool OB negative.  Lovenox stopped.  Continue ASA.  Pt was agreeable to transfusion, transfused 2 units pRBCs on 9/20.  Hgb now stable ~ 10.    # R foot necrosis / Necrotizing fasciitis / Gas Gangrene - s/p TMA 9/10.  Off antibiotics.  Observe for source control.  Wound vac placed, podiatry still concerned for possible need for BKA.    # Metabolic Encephalopathy - improving.  Mental status closer to baseline per family.    # NSTEMI - trop now stable.  Cardiology following.  Ischemic workup as outpatient discussed with cardiology.  Continue ASA, Statin, BBlocker.  # Diabetes Type II - monitor fingersticks.  Insulin coverage for hyperglycemia.  FS better controlled.  Continue Lantus, premeal insulin.  Endocrine following Dr. Urbina.   # Essential HTN - Monitor BP.  Continue oral antihypertensives.  # Hyperlipidemia - diet modification.  Continue Statin.  # Inpatient DVT Prophylaxis - ICDs.     Plan of care d/w son, daughter Monica [phone] / [phone] on 9/15, 9/19  Stable for [facility] placement. 30-Mar-2021 13:57

## 2021-09-15 NOTE — PROGRESS NOTE ADULT - SUBJECTIVE AND OBJECTIVE BOX
Patient is a 65y old  Female who presents with a chief complaint of AMS, DKA, Necrotic foot infection (15 Sep 2021 18:00)      Interval History: finger sticks are in mid 200s   Creatinine and GFR normal   Insulin Resistance and glucose toxicity present , HbA1C  14.1   on Lantus 15 units and prandial lispro 5 units and Lispro sliding scale coverage with meals       MEDICATIONS  (STANDING):  aspirin enteric coated 81 milliGRAM(s) Oral daily  atorvastatin 40 milliGRAM(s) Oral at bedtime  chlorhexidine 2% Cloths 1 Application(s) Topical <User Schedule>  dextrose 40% Gel 15 Gram(s) Oral once  dextrose 40% Gel 15 Gram(s) Oral once  dextrose 5%. 1000 milliLiter(s) (50 mL/Hr) IV Continuous <Continuous>  dextrose 5%. 1000 milliLiter(s) (100 mL/Hr) IV Continuous <Continuous>  dextrose 5%. 1000 milliLiter(s) (50 mL/Hr) IV Continuous <Continuous>  dextrose 5%. 1000 milliLiter(s) (100 mL/Hr) IV Continuous <Continuous>  dextrose 5%. 1000 milliLiter(s) (50 mL/Hr) IV Continuous <Continuous>  dextrose 50% Injectable 25 Gram(s) IV Push once  dextrose 50% Injectable 12.5 Gram(s) IV Push once  dextrose 50% Injectable 25 Gram(s) IV Push once  enoxaparin Injectable 40 milliGRAM(s) SubCutaneous <User Schedule>  glucagon  Injectable 1 milliGRAM(s) IntraMuscular once  glucagon  Injectable 1 milliGRAM(s) IntraMuscular once  insulin glargine Injectable (LANTUS) 15 Unit(s) SubCutaneous at bedtime  insulin lispro (ADMELOG) corrective regimen sliding scale   SubCutaneous three times a day before meals  insulin lispro (ADMELOG) corrective regimen sliding scale   SubCutaneous at bedtime  insulin lispro Injectable (ADMELOG) 5 Unit(s) SubCutaneous before breakfast  insulin lispro Injectable (ADMELOG) 5 Unit(s) SubCutaneous before lunch  insulin lispro Injectable (ADMELOG) 5 Unit(s) SubCutaneous before dinner  metoprolol tartrate 12.5 milliGRAM(s) Oral every 12 hours  piperacillin/tazobactam IVPB.. 3.375 Gram(s) IV Intermittent every 8 hours    MEDICATIONS  (PRN):  acetaminophen   Tablet .. 650 milliGRAM(s) Oral every 6 hours PRN Mild Pain (1 - 3)  fentaNYL    Injectable 25 MICROGram(s) IV Push every 6 hours PRN Severe Pain (7 - 10)      Allergies    avocado (Pruritus)  Carrots (Pruritus)  No Known Drug Allergies  Plums (Pruritus)    Intolerances        REVIEW OF SYSTEMS:  CONSTITUTIONAL: no changes  EYES: No eye pain, visual disturbances, or discharge  ENMT:  No difficulty hearing, No sinus or throat pain  NECK: No pain or stiffness  RESPIRATORY: No cough, wheezing, chills or hemoptysis; No shortness of breath  CARDIOVASCULAR: No chest pain, palpitations or leg swelling  GASTROINTESTINAL: No abdominal or epigastric pain. No nausea, vomiting, or hematemesis; No diarrhea or constipation. No melena or hematochezia.  GENITOURINARY: No dysuria, frequency, hematuria, or incontinence  NEUROLOGICAL: No headaches, memory loss, loss of strength, numbness, or tremors  SKIN: No itching, burning, rashes, or lesions   ENDOCRINE: No heat or cold intolerance; No hair loss  MUSCULOSKELETAL: No joint pain or swelling; No muscle, back, or extremity pain  PSYCHIATRIC: No depression, anxiety, mood swings, or difficulty sleeping  HEME/LYMPH: No easy bruising, or bleeding gums  ALLERY AND IMMUNOLOGIC: No hives or eczema    Vital Signs Last 24 Hrs  T(C): 37.1 (15 Sep 2021 17:05), Max: 37.1 (14 Sep 2021 23:13)  T(F): 98.7 (15 Sep 2021 17:05), Max: 98.7 (14 Sep 2021 23:13)  HR: 88 (15 Sep 2021 17:05) (82 - 94)  BP: 137/65 (15 Sep 2021 17:05) (125/70 - 138/96)  BP(mean): --  RR: 17 (15 Sep 2021 17:05) (16 - 17)  SpO2: 97% (15 Sep 2021 17:05) (97% - 98%)    PHYSICAL EXAM:  GENERAL:   HEAD: Atraumatic, Normocephalic  EYES: PERRLA, conjunctiva and sclera clear  ENMT: No  exudates,; Moist mucous membranes,, No lesions  NECK: Supple, No JVD, Normal thyroid  NERVOUS SYSTEM:  Alert & Oriented,   CHEST/LUNG: Clear to auscultation bilaterally; No rales, rhonchi, wheezing, or rubs  HEART: Regular rate and rhythm; No murmurs, rubs, or gallops  ABDOMEN: Soft, Nontender, Nondistended; Bowel sounds present  EXTREMITIES:  2+ Peripheral Pulses, no edema  SKIN: No rashes or lesions    LABS:        CAPILLARY BLOOD GLUCOSE      POCT Blood Glucose.: 244 mg/dL (15 Sep 2021 15:58)  POCT Blood Glucose.: 302 mg/dL (15 Sep 2021 11:12)  POCT Blood Glucose.: 250 mg/dL (15 Sep 2021 07:48)  POCT Blood Glucose.: 212 mg/dL (14 Sep 2021 21:28)    Lipid panel:           Thyroid:  Diabetes Tests:  Parathyroid Panel:  Adrenals:  RADIOLOGY & ADDITIONAL TESTS:    Imaging Personally Reviewed:  [ ] YES  [ ] NO    Consultant(s) Notes Reviewed:  [ ] YES  [ ] NO    Care Discussed with Consultants/Other Providers [ ] YES  [ ] NO

## 2021-09-16 LAB
ANION GAP SERPL CALC-SCNC: 5 MMOL/L — SIGNIFICANT CHANGE UP (ref 5–17)
BUN SERPL-MCNC: 14 MG/DL — SIGNIFICANT CHANGE UP (ref 7–23)
CALCIUM SERPL-MCNC: 8.2 MG/DL — LOW (ref 8.5–10.1)
CHLORIDE SERPL-SCNC: 103 MMOL/L — SIGNIFICANT CHANGE UP (ref 96–108)
CO2 SERPL-SCNC: 32 MMOL/L — HIGH (ref 22–31)
CREAT SERPL-MCNC: 0.99 MG/DL — SIGNIFICANT CHANGE UP (ref 0.5–1.3)
FLUAV AG NPH QL: SIGNIFICANT CHANGE UP
FLUBV AG NPH QL: SIGNIFICANT CHANGE UP
GLUCOSE BLDC GLUCOMTR-MCNC: 253 MG/DL — HIGH (ref 70–99)
GLUCOSE BLDC GLUCOMTR-MCNC: 275 MG/DL — HIGH (ref 70–99)
GLUCOSE BLDC GLUCOMTR-MCNC: 347 MG/DL — HIGH (ref 70–99)
GLUCOSE BLDC GLUCOMTR-MCNC: 354 MG/DL — HIGH (ref 70–99)
GLUCOSE SERPL-MCNC: 295 MG/DL — HIGH (ref 70–99)
HCT VFR BLD CALC: 25.2 % — LOW (ref 34.5–45)
HGB BLD-MCNC: 8.4 G/DL — LOW (ref 11.5–15.5)
MCHC RBC-ENTMCNC: 28.5 PG — SIGNIFICANT CHANGE UP (ref 27–34)
MCHC RBC-ENTMCNC: 33.3 GM/DL — SIGNIFICANT CHANGE UP (ref 32–36)
MCV RBC AUTO: 85.4 FL — SIGNIFICANT CHANGE UP (ref 80–100)
NRBC # BLD: 0 /100 WBCS — SIGNIFICANT CHANGE UP (ref 0–0)
PLATELET # BLD AUTO: 310 K/UL — SIGNIFICANT CHANGE UP (ref 150–400)
POTASSIUM SERPL-MCNC: 2.8 MMOL/L — CRITICAL LOW (ref 3.5–5.3)
POTASSIUM SERPL-SCNC: 2.8 MMOL/L — CRITICAL LOW (ref 3.5–5.3)
RBC # BLD: 2.95 M/UL — LOW (ref 3.8–5.2)
RBC # FLD: 14.6 % — HIGH (ref 10.3–14.5)
SARS-COV-2 RNA SPEC QL NAA+PROBE: SIGNIFICANT CHANGE UP
SODIUM SERPL-SCNC: 140 MMOL/L — SIGNIFICANT CHANGE UP (ref 135–145)
WBC # BLD: 7.94 K/UL — SIGNIFICANT CHANGE UP (ref 3.8–10.5)
WBC # FLD AUTO: 7.94 K/UL — SIGNIFICANT CHANGE UP (ref 3.8–10.5)

## 2021-09-16 PROCEDURE — 99233 SBSQ HOSP IP/OBS HIGH 50: CPT

## 2021-09-16 RX ORDER — INSULIN GLARGINE 100 [IU]/ML
18 INJECTION, SOLUTION SUBCUTANEOUS AT BEDTIME
Refills: 0 | Status: DISCONTINUED | OUTPATIENT
Start: 2021-09-16 | End: 2021-09-18

## 2021-09-16 RX ORDER — POTASSIUM CHLORIDE 20 MEQ
40 PACKET (EA) ORAL EVERY 4 HOURS
Refills: 0 | Status: COMPLETED | OUTPATIENT
Start: 2021-09-16 | End: 2021-09-16

## 2021-09-16 RX ORDER — POTASSIUM CHLORIDE 20 MEQ
10 PACKET (EA) ORAL
Refills: 0 | Status: COMPLETED | OUTPATIENT
Start: 2021-09-16 | End: 2021-09-16

## 2021-09-16 RX ADMIN — Medication 10: at 11:37

## 2021-09-16 RX ADMIN — Medication 8: at 08:38

## 2021-09-16 RX ADMIN — INSULIN GLARGINE 18 UNIT(S): 100 INJECTION, SOLUTION SUBCUTANEOUS at 21:47

## 2021-09-16 RX ADMIN — Medication 40 MILLIEQUIVALENT(S): at 22:58

## 2021-09-16 RX ADMIN — ENOXAPARIN SODIUM 40 MILLIGRAM(S): 100 INJECTION SUBCUTANEOUS at 17:08

## 2021-09-16 RX ADMIN — Medication 12.5 MILLIGRAM(S): at 17:09

## 2021-09-16 RX ADMIN — Medication 5 UNIT(S): at 11:37

## 2021-09-16 RX ADMIN — Medication 2: at 21:48

## 2021-09-16 RX ADMIN — PIPERACILLIN AND TAZOBACTAM 25 GRAM(S): 4; .5 INJECTION, POWDER, LYOPHILIZED, FOR SOLUTION INTRAVENOUS at 13:28

## 2021-09-16 RX ADMIN — PIPERACILLIN AND TAZOBACTAM 25 GRAM(S): 4; .5 INJECTION, POWDER, LYOPHILIZED, FOR SOLUTION INTRAVENOUS at 21:49

## 2021-09-16 RX ADMIN — Medication 6: at 17:07

## 2021-09-16 RX ADMIN — Medication 650 MILLIGRAM(S): at 14:25

## 2021-09-16 RX ADMIN — Medication 12.5 MILLIGRAM(S): at 05:42

## 2021-09-16 RX ADMIN — CHLORHEXIDINE GLUCONATE 1 APPLICATION(S): 213 SOLUTION TOPICAL at 05:42

## 2021-09-16 RX ADMIN — Medication 5 UNIT(S): at 08:38

## 2021-09-16 RX ADMIN — PIPERACILLIN AND TAZOBACTAM 25 GRAM(S): 4; .5 INJECTION, POWDER, LYOPHILIZED, FOR SOLUTION INTRAVENOUS at 05:43

## 2021-09-16 RX ADMIN — ATORVASTATIN CALCIUM 40 MILLIGRAM(S): 80 TABLET, FILM COATED ORAL at 21:48

## 2021-09-16 RX ADMIN — Medication 81 MILLIGRAM(S): at 11:37

## 2021-09-16 RX ADMIN — Medication 100 MILLIEQUIVALENT(S): at 18:25

## 2021-09-16 RX ADMIN — Medication 40 MILLIEQUIVALENT(S): at 17:09

## 2021-09-16 RX ADMIN — Medication 5 UNIT(S): at 17:08

## 2021-09-16 RX ADMIN — Medication 650 MILLIGRAM(S): at 13:25

## 2021-09-16 RX ADMIN — Medication 100 MILLIEQUIVALENT(S): at 17:08

## 2021-09-16 NOTE — PHYSICAL THERAPY INITIAL EVALUATION ADULT - IMPAIRMENTS FOUND, PT EVAL
aerobic capacity/endurance/gait, locomotion, and balance/integumentary integrity/muscle strength/posture

## 2021-09-16 NOTE — PROGRESS NOTE ADULT - ASSESSMENT
Pt s/p LF Choparts amp 9/10/21  -pt was seen and examined  -s/p LF choparts amputation left open: No purulent drainage, no malodor, no active bleeding. Soft tissue and skin edges at level of amputation do no appear viable but increased dusky changes to the dorsal and plantar flaps.   -Podiatry will continue to monitor and assess for salvageability of the L foot  -Plan for woundVAC application tomorrow  -Will reassess viability tomorrow with attending rounding  -Discussed w/ attending

## 2021-09-16 NOTE — PROGRESS NOTE ADULT - SUBJECTIVE AND OBJECTIVE BOX
Patient: NELSON ZEPEDA [id number] 65y Female                            Hospitalist Attending Note    No complaints.  Pain controlled.  Appears to be more alert.    ____________________PHYSICAL EXAM:  GENERAL:  NAD Alert and Oriented  to person, place.   HEENT: NCAT  CARDIOVASCULAR:  S1, S2  LUNGS: CTAB  ABDOMEN:  soft, (-) tenderness, (-) distension, (+) bowel sounds, (-) guarding, (-) rebound (-) rigidity  EXTREMITIES:  no cyanosis / clubbing / edema.   R stump dressing in place.   NEURO: strength symmetric.   ____________________    VITALS:  Vital Signs Last 24 Hrs  T(C): 37.4 (16 Sep 2021 12:13), Max: 37.4 (16 Sep 2021 12:13)  T(F): 99.4 (16 Sep 2021 12:13), Max: 99.4 (16 Sep 2021 12:13)  HR: 94 (16 Sep 2021 12:13) (76 - 94)  BP: 126/73 (16 Sep 2021 12:13) (126/73 - 139/66)  BP(mean): --  RR: 18 (16 Sep 2021 12:13) (17 - 18)  SpO2: 99% (16 Sep 2021 12:13) (96% - 99%) Daily     Daily   CAPILLARY BLOOD GLUCOSE      POCT Blood Glucose.: 354 mg/dL (16 Sep 2021 11:15)  POCT Blood Glucose.: 347 mg/dL (16 Sep 2021 08:21)  POCT Blood Glucose.: 254 mg/dL (15 Sep 2021 21:19)  POCT Blood Glucose.: 244 mg/dL (15 Sep 2021 15:58)    I&O's Summary    15 Sep 2021 07:01  -  16 Sep 2021 07:00  --------------------------------------------------------  IN: 300 mL / OUT: 400 mL / NET: -100 mL        LABS:                        8.4    7.94  )-----------( 310      ( 16 Sep 2021 07:16 )             25.2                         MEDICATIONS:  acetaminophen   Tablet .. 650 milliGRAM(s) Oral every 6 hours PRN  aspirin enteric coated 81 milliGRAM(s) Oral daily  atorvastatin 40 milliGRAM(s) Oral at bedtime  chlorhexidine 2% Cloths 1 Application(s) Topical <User Schedule>  dextrose 40% Gel 15 Gram(s) Oral once  dextrose 40% Gel 15 Gram(s) Oral once  dextrose 5%. 1000 milliLiter(s) IV Continuous <Continuous>  dextrose 5%. 1000 milliLiter(s) IV Continuous <Continuous>  dextrose 5%. 1000 milliLiter(s) IV Continuous <Continuous>  dextrose 5%. 1000 milliLiter(s) IV Continuous <Continuous>  dextrose 5%. 1000 milliLiter(s) IV Continuous <Continuous>  dextrose 50% Injectable 25 Gram(s) IV Push once  dextrose 50% Injectable 12.5 Gram(s) IV Push once  dextrose 50% Injectable 25 Gram(s) IV Push once  enoxaparin Injectable 40 milliGRAM(s) SubCutaneous <User Schedule>  fentaNYL    Injectable 25 MICROGram(s) IV Push every 6 hours PRN  glucagon  Injectable 1 milliGRAM(s) IntraMuscular once  glucagon  Injectable 1 milliGRAM(s) IntraMuscular once  insulin glargine Injectable (LANTUS) 18 Unit(s) SubCutaneous at bedtime  insulin lispro (ADMELOG) corrective regimen sliding scale   SubCutaneous three times a day before meals  insulin lispro (ADMELOG) corrective regimen sliding scale   SubCutaneous at bedtime  insulin lispro Injectable (ADMELOG) 5 Unit(s) SubCutaneous before breakfast  insulin lispro Injectable (ADMELOG) 5 Unit(s) SubCutaneous before lunch  insulin lispro Injectable (ADMELOG) 5 Unit(s) SubCutaneous before dinner  metoprolol tartrate 12.5 milliGRAM(s) Oral every 12 hours  piperacillin/tazobactam IVPB.. 3.375 Gram(s) IV Intermittent every 8 hours

## 2021-09-16 NOTE — PHYSICAL THERAPY INITIAL EVALUATION ADULT - GAIT DEVIATIONS NOTED, PT EVAL
decreased peña/decreased velocity of limb motion/decreased step length/decreased weight-shifting ability

## 2021-09-16 NOTE — PROGRESS NOTE ADULT - SUBJECTIVE AND OBJECTIVE BOX
Patient is a 65y old  Female who presents with a chief complaint of AMS, DKA, Necrotic foot infection (16 Sep 2021 12:48)       INTERVAL HPI/OVERNIGHT EVENTS:  Patient seen and evaluated at bedside.  Pt is resting comfortable in NAD. Denies N/V/F/C.     Allergies    avocado (Pruritus)  Carrots (Pruritus)  No Known Drug Allergies  Plums (Pruritus)    Intolerances        Vital Signs Last 24 Hrs  T(C): 37.4 (16 Sep 2021 12:13), Max: 37.4 (16 Sep 2021 12:13)  T(F): 99.4 (16 Sep 2021 12:13), Max: 99.4 (16 Sep 2021 12:13)  HR: 94 (16 Sep 2021 12:13) (76 - 94)  BP: 126/73 (16 Sep 2021 12:13) (126/73 - 139/66)  BP(mean): --  RR: 18 (16 Sep 2021 12:13) (17 - 18)  SpO2: 99% (16 Sep 2021 12:13) (96% - 99%)    LABS:                        8.4    7.94  )-----------( 310      ( 16 Sep 2021 07:16 )             25.2               CAPILLARY BLOOD GLUCOSE      POCT Blood Glucose.: 354 mg/dL (16 Sep 2021 11:15)  POCT Blood Glucose.: 347 mg/dL (16 Sep 2021 08:21)  POCT Blood Glucose.: 254 mg/dL (15 Sep 2021 21:19)  POCT Blood Glucose.: 244 mg/dL (15 Sep 2021 15:58)      Lower Extremity Physical Exam:  Vascular: RF DP NP, PT NP, LF NP pulses, B/L,Temperature gradient LF warm to touch   Neuro: Epicritic sensation absent to the level of digits B/L.  Musculoskeletal/Ortho:      s/p LF choparts amputation left open (9/10):  No purulent drainage, no malodor, no active bleeding. Soft tissue and skin edges at level of amputation do no appear viable but increased dusky changes to the dorsal and plantar flaps.     RADIOLOGY & ADDITIONAL TESTS:

## 2021-09-16 NOTE — PHYSICAL THERAPY INITIAL EVALUATION ADULT - TRANSFER TRAINING, PT EVAL
Pt will independently perform sit to stand to sit transfers, c appropriate weight bearing to LLE,  without LOB using rolling walker by 4 weeks.

## 2021-09-16 NOTE — PHYSICAL THERAPY INITIAL EVALUATION ADULT - GAIT TRAINING, PT EVAL
Pt will independently ambulate 20 feet , c appropriate weight bearing to LLE, with rolling walker without loss of balance, by 4 weeks.

## 2021-09-16 NOTE — PROGRESS NOTE ADULT - ASSESSMENT
64yo F PMH of IDDM, HTN, HLD, diabetic retinopathy initially admitted to MICU for sepsis due to R foot necrosis, gas gangrene and necrotizing fascitis.  S/p R foot TMA, seen by vascular, podiatry and ID.  Started on IV Abx.  Noted to have troponinemia 0.468, lateral TWIs on EKG.     # R foot necrosis / Necrotizing fasciitis / Gas Gangrene - s/p TMA 9/10.  Remains on IV Zosyn.  Observe for source control.  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   # Metabolic Encephalopathy - improving.  Mental status closer to baseline per family.    # NSTEMI - trop now stable.  Cardiology following.  Ischemic workup as outpatient discussed with cardiology.  Continue ASA, Statin, BBlocker.  # Diabetes Type II - monitor fingersticks.  Insulin coverage for hyperglycemia.  FS remain elevated.  Increase Lantus.  Continue premeal insulin.  # Essential HTN - Monitor BP.  Continue oral antihypertensives.  # Hyperlipidemia - diet modification.  Continue Statin.  # Inpatient DVT Prophylaxis - Lovenox subcut    Plan of care d/w son, daughter Monica [phone] / [phone] on 9/15.

## 2021-09-16 NOTE — PHYSICAL THERAPY INITIAL EVALUATION ADULT - IMPAIRMENTS CONTRIBUTING TO GAIT DEVIATIONS, PT EVAL
impaired balance/impaired coordination/decreased flexibility/pain/impaired postural control/decreased strength

## 2021-09-16 NOTE — PHYSICAL THERAPY INITIAL EVALUATION ADULT - BALANCE TRAINING, PT EVAL
Pt will increase static/dynamic sitting balance to good and static/dynamic standing balance to good, c appropriate weight bearing to LLE, to perform all functional mobility without LOB, by 2weeks.

## 2021-09-17 LAB
ANION GAP SERPL CALC-SCNC: 5 MMOL/L — SIGNIFICANT CHANGE UP (ref 5–17)
BUN SERPL-MCNC: 13 MG/DL — SIGNIFICANT CHANGE UP (ref 7–23)
CALCIUM SERPL-MCNC: 8.4 MG/DL — LOW (ref 8.5–10.1)
CHLORIDE SERPL-SCNC: 103 MMOL/L — SIGNIFICANT CHANGE UP (ref 96–108)
CO2 SERPL-SCNC: 31 MMOL/L — SIGNIFICANT CHANGE UP (ref 22–31)
CREAT SERPL-MCNC: 0.76 MG/DL — SIGNIFICANT CHANGE UP (ref 0.5–1.3)
FLUAV AG NPH QL: SIGNIFICANT CHANGE UP
FLUBV AG NPH QL: SIGNIFICANT CHANGE UP
GLUCOSE BLDC GLUCOMTR-MCNC: 226 MG/DL — HIGH (ref 70–99)
GLUCOSE BLDC GLUCOMTR-MCNC: 228 MG/DL — HIGH (ref 70–99)
GLUCOSE BLDC GLUCOMTR-MCNC: 276 MG/DL — HIGH (ref 70–99)
GLUCOSE BLDC GLUCOMTR-MCNC: 334 MG/DL — HIGH (ref 70–99)
GLUCOSE SERPL-MCNC: 311 MG/DL — HIGH (ref 70–99)
HCT VFR BLD CALC: 23.7 % — LOW (ref 34.5–45)
HGB BLD-MCNC: 7.7 G/DL — LOW (ref 11.5–15.5)
MCHC RBC-ENTMCNC: 27.9 PG — SIGNIFICANT CHANGE UP (ref 27–34)
MCHC RBC-ENTMCNC: 32.5 GM/DL — SIGNIFICANT CHANGE UP (ref 32–36)
MCV RBC AUTO: 85.9 FL — SIGNIFICANT CHANGE UP (ref 80–100)
NRBC # BLD: 0 /100 WBCS — SIGNIFICANT CHANGE UP (ref 0–0)
PLATELET # BLD AUTO: 352 K/UL — SIGNIFICANT CHANGE UP (ref 150–400)
POTASSIUM SERPL-MCNC: 3.7 MMOL/L — SIGNIFICANT CHANGE UP (ref 3.5–5.3)
POTASSIUM SERPL-SCNC: 3.7 MMOL/L — SIGNIFICANT CHANGE UP (ref 3.5–5.3)
RBC # BLD: 2.76 M/UL — LOW (ref 3.8–5.2)
RBC # FLD: 14.5 % — SIGNIFICANT CHANGE UP (ref 10.3–14.5)
SARS-COV-2 RNA SPEC QL NAA+PROBE: SIGNIFICANT CHANGE UP
SODIUM SERPL-SCNC: 139 MMOL/L — SIGNIFICANT CHANGE UP (ref 135–145)
WBC # BLD: 7.61 K/UL — SIGNIFICANT CHANGE UP (ref 3.8–10.5)
WBC # FLD AUTO: 7.61 K/UL — SIGNIFICANT CHANGE UP (ref 3.8–10.5)

## 2021-09-17 PROCEDURE — 99231 SBSQ HOSP IP/OBS SF/LOW 25: CPT

## 2021-09-17 PROCEDURE — 99232 SBSQ HOSP IP/OBS MODERATE 35: CPT

## 2021-09-17 RX ORDER — METFORMIN HYDROCHLORIDE 850 MG/1
500 TABLET ORAL DAILY
Refills: 0 | Status: DISCONTINUED | OUTPATIENT
Start: 2021-09-17 | End: 2021-09-19

## 2021-09-17 RX ADMIN — Medication 6: at 12:03

## 2021-09-17 RX ADMIN — Medication 5 UNIT(S): at 16:52

## 2021-09-17 RX ADMIN — INSULIN GLARGINE 18 UNIT(S): 100 INJECTION, SOLUTION SUBCUTANEOUS at 22:21

## 2021-09-17 RX ADMIN — Medication 12.5 MILLIGRAM(S): at 05:46

## 2021-09-17 RX ADMIN — Medication 650 MILLIGRAM(S): at 06:51

## 2021-09-17 RX ADMIN — ATORVASTATIN CALCIUM 40 MILLIGRAM(S): 80 TABLET, FILM COATED ORAL at 22:21

## 2021-09-17 RX ADMIN — Medication 650 MILLIGRAM(S): at 13:04

## 2021-09-17 RX ADMIN — Medication 5 UNIT(S): at 08:55

## 2021-09-17 RX ADMIN — Medication 4: at 16:52

## 2021-09-17 RX ADMIN — CHLORHEXIDINE GLUCONATE 1 APPLICATION(S): 213 SOLUTION TOPICAL at 05:46

## 2021-09-17 RX ADMIN — ENOXAPARIN SODIUM 40 MILLIGRAM(S): 100 INJECTION SUBCUTANEOUS at 18:28

## 2021-09-17 RX ADMIN — Medication 8: at 08:56

## 2021-09-17 RX ADMIN — Medication 5 UNIT(S): at 12:02

## 2021-09-17 RX ADMIN — Medication 650 MILLIGRAM(S): at 11:58

## 2021-09-17 RX ADMIN — Medication 650 MILLIGRAM(S): at 05:51

## 2021-09-17 RX ADMIN — Medication 81 MILLIGRAM(S): at 11:35

## 2021-09-17 RX ADMIN — Medication 12.5 MILLIGRAM(S): at 18:28

## 2021-09-17 NOTE — PROGRESS NOTE ADULT - ASSESSMENT
Gas gangrene/?necrotizing fasciitis  Sepsis  DKA  Rhabdomyolysis  GEOVANNI  Hypernatremia- higher than values reported given pseudohyponatremia from hyperglycemia?  Elevated troponin << CK, suspect MI not cause of CK  Encephalopathy, multifactorial  Crucial aspect of management here is source control. Antibiotics indicated but of secondary importance to surgical Rx in the setting of gas gangrene.    9/11: Postoperative day #1, adequate progress given clinical scenario.  Kidney function is better.  White blood cell count is about the same.  Source control remains the central aspect of management, with antibiotics adjunctive.  She is intubated, but FiO2 is only 30%, likely for airway management/metabolic control/likely need for further surgery imminently.  Anion gap has closed and glycemic control appears improved.  Culture data is pending.  No repeat CK level thus far today.  Sodium remains elevated.  Mental status not able to be assessed in the setting of intubation and sedation.    9/14: no fevers, on RA, WBC normalized, Cr ok, Vancomycin stopped as there is no evidence of MRSA, abscess culture with proteus, enterococcus, Klebsiella, all sensitive to Zosyn. IV Zosyn continued. Pt denies any pruritus, sob, cough, swelling, will monitor rash on pt's forehead. Pt has new loose bowel movements, abd exam benign, no leukocytosis - C. diff doubtful, will monitor.     9/15: Doing reasonably well.  No concern of uncontrolled infection on examination.  She is afebrile.  Her white blood cell count is normal.  Whether she will be able to escape a below-knee amputation is to be determined.  Her abdominal exam remains benign.  She denies any diarrhea at this juncture.    9/17: Off antibiotics, overall stable, pathology without concern of residual proximal bony disease. At this juncture the hope is to salvage her heel. Time will tell. Will remain at risk for superimposed infection indefinitely with uncontrolled DM. open wound, etc.

## 2021-09-17 NOTE — PROGRESS NOTE ADULT - SUBJECTIVE AND OBJECTIVE BOX
Patient: NELSON ZEPEDA [id number] 65y Female                            Hospitalist Attending Note    More alert.  No complaints.  No pain.     ____________________PHYSICAL EXAM:  GENERAL:  NAD Alert and Oriented  to person, place.   HEENT: NCAT  CARDIOVASCULAR:  S1, S2  LUNGS: CTAB  ABDOMEN:  soft, (-) tenderness, (-) distension, (+) bowel sounds, (-) guarding, (-) rebound (-) rigidity  EXTREMITIES:  no cyanosis / clubbing / edema.   R stump dressing in place.   NEURO: strength symmetric.   ____________________    VITALS:  Vital Signs Last 24 Hrs  T(C): 36.7 (17 Sep 2021 11:36), Max: 37.4 (16 Sep 2021 17:12)  T(F): 98 (17 Sep 2021 11:36), Max: 99.3 (16 Sep 2021 17:12)  HR: 93 (17 Sep 2021 12:08) (84 - 97)  BP: 130/62 (17 Sep 2021 12:08) (114/66 - 153/77)  BP(mean): --  RR: 18 (17 Sep 2021 12:08) (16 - 18)  SpO2: 97% (17 Sep 2021 12:08) (96% - 97%) Daily     Daily   CAPILLARY BLOOD GLUCOSE      POCT Blood Glucose.: 276 mg/dL (17 Sep 2021 12:01)  POCT Blood Glucose.: 334 mg/dL (17 Sep 2021 08:17)  POCT Blood Glucose.: 253 mg/dL (16 Sep 2021 21:47)  POCT Blood Glucose.: 275 mg/dL (16 Sep 2021 16:37)    I&O's Summary    16 Sep 2021 07:01  -  17 Sep 2021 07:00  --------------------------------------------------------  IN: 250 mL / OUT: 3 mL / NET: 247 mL        LABS:                        7.7    7.61  )-----------( 352      ( 17 Sep 2021 06:18 )             23.7     09-17    139  |  103  |  13  ----------------------------<  311<H>  3.7   |  31  |  0.76    Ca    8.4<L>      17 Sep 2021 06:18                    MEDICATIONS:  acetaminophen   Tablet .. 650 milliGRAM(s) Oral every 6 hours PRN  aspirin enteric coated 81 milliGRAM(s) Oral daily  atorvastatin 40 milliGRAM(s) Oral at bedtime  chlorhexidine 2% Cloths 1 Application(s) Topical <User Schedule>  dextrose 40% Gel 15 Gram(s) Oral once  dextrose 40% Gel 15 Gram(s) Oral once  dextrose 5%. 1000 milliLiter(s) IV Continuous <Continuous>  dextrose 5%. 1000 milliLiter(s) IV Continuous <Continuous>  dextrose 5%. 1000 milliLiter(s) IV Continuous <Continuous>  dextrose 5%. 1000 milliLiter(s) IV Continuous <Continuous>  dextrose 5%. 1000 milliLiter(s) IV Continuous <Continuous>  dextrose 50% Injectable 25 Gram(s) IV Push once  dextrose 50% Injectable 12.5 Gram(s) IV Push once  dextrose 50% Injectable 25 Gram(s) IV Push once  enoxaparin Injectable 40 milliGRAM(s) SubCutaneous <User Schedule>  fentaNYL    Injectable 25 MICROGram(s) IV Push every 6 hours PRN  glucagon  Injectable 1 milliGRAM(s) IntraMuscular once  glucagon  Injectable 1 milliGRAM(s) IntraMuscular once  insulin glargine Injectable (LANTUS) 18 Unit(s) SubCutaneous at bedtime  insulin lispro (ADMELOG) corrective regimen sliding scale   SubCutaneous three times a day before meals  insulin lispro (ADMELOG) corrective regimen sliding scale   SubCutaneous at bedtime  insulin lispro Injectable (ADMELOG) 5 Unit(s) SubCutaneous before breakfast  insulin lispro Injectable (ADMELOG) 5 Unit(s) SubCutaneous before lunch  insulin lispro Injectable (ADMELOG) 5 Unit(s) SubCutaneous before dinner  metoprolol tartrate 12.5 milliGRAM(s) Oral every 12 hours

## 2021-09-17 NOTE — PROGRESS NOTE ADULT - ASSESSMENT
Pt s/p LF Choparts amp 9/10/21  -pt was seen and examined  -s/p LF choparts amputation left open: No purulent drainage, no malodor, no active bleeding. Soft tissue and skin edges at level of amputation do no appear viable but increased dusky changes to the dorsal and plantar flaps.   -Podiatry will continue to monitor and assess for salvageability of the L foot  -L foot soft tissue appears viable today  -Plan for wound VAC to be applied today  -Discussed w/ attending

## 2021-09-17 NOTE — PHYSICAL THERAPY INITIAL EVALUATION ADULT - DIAGNOSIS, PT EVAL
M62.8 muscle weakness ; R26.2 Difficulty with ambulation ; Z74.0 Reduced mobility
M62.8 muscle weakness ; R26.2 Difficulty with ambulation ; Z74.0 Reduced mobility

## 2021-09-17 NOTE — PROGRESS NOTE ADULT - PROBLEM SELECTOR PLAN 3
IV fluids and pressors as needed  Follow-up cultures  Continue antibiotics as above
Appears resolved
Follow-up cultures  Continue antibiotics as above
Follow-up cultures  Continue antibiotics as above
IV fluids and pressors as needed  Follow-up cultures  Continue antibiotics as above

## 2021-09-17 NOTE — PROGRESS NOTE ADULT - SUBJECTIVE AND OBJECTIVE BOX
Patient is a 65y old  Female who presents with a chief complaint of AMS, DKA, Necrotic foot infection (17 Sep 2021 16:07)      Interval History: finger sticks are in 250 -300 range   on Lantus and prandial lispro   normal Creatinine     MEDICATIONS  (STANDING):  aspirin enteric coated 81 milliGRAM(s) Oral daily  atorvastatin 40 milliGRAM(s) Oral at bedtime  chlorhexidine 2% Cloths 1 Application(s) Topical <User Schedule>  dextrose 40% Gel 15 Gram(s) Oral once  dextrose 40% Gel 15 Gram(s) Oral once  dextrose 5%. 1000 milliLiter(s) (50 mL/Hr) IV Continuous <Continuous>  dextrose 5%. 1000 milliLiter(s) (50 mL/Hr) IV Continuous <Continuous>  dextrose 5%. 1000 milliLiter(s) (100 mL/Hr) IV Continuous <Continuous>  dextrose 5%. 1000 milliLiter(s) (100 mL/Hr) IV Continuous <Continuous>  dextrose 5%. 1000 milliLiter(s) (50 mL/Hr) IV Continuous <Continuous>  dextrose 50% Injectable 25 Gram(s) IV Push once  dextrose 50% Injectable 12.5 Gram(s) IV Push once  dextrose 50% Injectable 25 Gram(s) IV Push once  enoxaparin Injectable 40 milliGRAM(s) SubCutaneous <User Schedule>  glucagon  Injectable 1 milliGRAM(s) IntraMuscular once  glucagon  Injectable 1 milliGRAM(s) IntraMuscular once  insulin glargine Injectable (LANTUS) 18 Unit(s) SubCutaneous at bedtime  insulin lispro (ADMELOG) corrective regimen sliding scale   SubCutaneous three times a day before meals  insulin lispro (ADMELOG) corrective regimen sliding scale   SubCutaneous at bedtime  insulin lispro Injectable (ADMELOG) 5 Unit(s) SubCutaneous before breakfast  insulin lispro Injectable (ADMELOG) 5 Unit(s) SubCutaneous before lunch  insulin lispro Injectable (ADMELOG) 5 Unit(s) SubCutaneous before dinner  metFORMIN 500 milliGRAM(s) Oral daily  metoprolol tartrate 12.5 milliGRAM(s) Oral every 12 hours    MEDICATIONS  (PRN):  acetaminophen   Tablet .. 650 milliGRAM(s) Oral every 6 hours PRN Mild Pain (1 - 3)  fentaNYL    Injectable 25 MICROGram(s) IV Push every 6 hours PRN Severe Pain (7 - 10)      Allergies    avocado (Pruritus)  Carrots (Pruritus)  No Known Drug Allergies  Plums (Pruritus)    Intolerances        REVIEW OF SYSTEMS:  CONSTITUTIONAL: no changes  EYES: No eye pain, visual disturbances, or discharge  ENMT:  No difficulty hearing, No sinus or throat pain  NECK: No pain or stiffness  RESPIRATORY: No cough, wheezing, chills or hemoptysis; No shortness of breath  CARDIOVASCULAR: No chest pain, palpitations or leg swelling  GASTROINTESTINAL: No abdominal or epigastric pain. No nausea, vomiting, or hematemesis; No diarrhea or constipation. No melena or hematochezia.  GENITOURINARY: No dysuria, frequency, hematuria, or incontinence  NEUROLOGICAL: No headaches, memory loss, loss of strength, numbness, or tremors  SKIN: No itching, burning, rashes, or lesions   ENDOCRINE: No heat or cold intolerance; No hair loss  MUSCULOSKELETAL: No joint pain or swelling; No muscle, back, or extremity pain  PSYCHIATRIC: No depression, anxiety, mood swings, or difficulty sleeping  HEME/LYMPH: No easy bruising, or bleeding gums  ALLERY AND IMMUNOLOGIC: No hives or eczema    Vital Signs Last 24 Hrs  T(C): 36.8 (17 Sep 2021 17:05), Max: 37.3 (16 Sep 2021 23:39)  T(F): 98.3 (17 Sep 2021 17:05), Max: 99.1 (16 Sep 2021 23:39)  HR: 95 (17 Sep 2021 17:05) (85 - 97)  BP: 113/65 (17 Sep 2021 17:05) (113/65 - 153/77)  BP(mean): --  RR: 17 (17 Sep 2021 17:05) (16 - 18)  SpO2: 96% (17 Sep 2021 17:05) (96% - 97%)    PHYSICAL EXAM:  GENERAL:   HEAD: Atraumatic, Normocephalic  EYES: PERRLA, conjunctiva and sclera clear  ENMT: No  exudates,; Moist mucous membranes,, No lesions  NECK: Supple, No JVD, Normal thyroid  NERVOUS SYSTEM:  Alert & Oriented,   CHEST/LUNG: Clear to auscultation bilaterally; No rales, rhonchi, wheezing, or rubs  HEART: Regular rate and rhythm; No murmurs, rubs, or gallops  ABDOMEN: Soft, Nontender, Nondistended; Bowel sounds present  EXTREMITIES:  2+ Peripheral Pulses, no edema  SKIN: No rashes or lesions    LABS:        CAPILLARY BLOOD GLUCOSE      POCT Blood Glucose.: 228 mg/dL (17 Sep 2021 21:47)  POCT Blood Glucose.: 226 mg/dL (17 Sep 2021 16:32)  POCT Blood Glucose.: 276 mg/dL (17 Sep 2021 12:01)  POCT Blood Glucose.: 334 mg/dL (17 Sep 2021 08:17)    Lipid panel:           Thyroid:  Diabetes Tests:  Parathyroid Panel:  Adrenals:  RADIOLOGY & ADDITIONAL TESTS:    Imaging Personally Reviewed:  [ ] YES  [ ] NO    Consultant(s) Notes Reviewed:  [ ] YES  [ ] NO    Care Discussed with Consultants/Other Providers [ ] YES  [ ] NO

## 2021-09-17 NOTE — PROGRESS NOTE ADULT - PROBLEM SELECTOR PLAN 5
Management as per medicine
Management as per medicine
No recent CK levels   Management as per medicine
Management as per critical care  latest CK: 973 U/L  IV fluid/bicarbonate as per critical care
Management as per critical care  Follow-up CK level  IV fluid/bicarbonate as per critical care

## 2021-09-17 NOTE — PROGRESS NOTE ADULT - SUBJECTIVE AND OBJECTIVE BOX
[facility]  Division of Infectious Diseases  [phone]    Name: NELSON ZEPEDA  Age: 65y  Gender: Female  MRN: [id number]    Interval History--  Notes reviewed. Seen earlier today, lying in bed with lunch plate. No complaints. Pain not an issue. Podiatry follow up appreciated. VAC placed.     Past Medical History--  Hypertension    Family history of diabetes mellitus    No significant past surgical history        For details regarding the patient's social history, family history, and other miscellaneous elements, please refer the initial infectious diseases consultation and/or the admitting history and physical examination for this admission.    Allergies    avocado (Pruritus)  Carrots (Pruritus)  No Known Drug Allergies  Plums (Pruritus)    Intolerances        Medications--  Antibiotics:    Immunologic:    Other:  acetaminophen   Tablet .. PRN  aspirin enteric coated  atorvastatin  chlorhexidine 2% Cloths  dextrose 40% Gel  dextrose 40% Gel  dextrose 5%.  dextrose 5%.  dextrose 5%.  dextrose 5%.  dextrose 5%.  dextrose 50% Injectable  dextrose 50% Injectable  dextrose 50% Injectable  enoxaparin Injectable  fentaNYL    Injectable PRN  glucagon  Injectable  glucagon  Injectable  insulin glargine Injectable (LANTUS)  insulin lispro (ADMELOG) corrective regimen sliding scale  insulin lispro (ADMELOG) corrective regimen sliding scale  insulin lispro Injectable (ADMELOG)  insulin lispro Injectable (ADMELOG)  insulin lispro Injectable (ADMELOG)  metoprolol tartrate      Review of Systems--  A 10-point review of systems was obtained.   Review of systems otherwise negative except as previously noted.    Physical Examination--  Vital Signs: T(F): 98 (09-17-21 @ 11:36), Max: 99.3 (09-16-21 @ 17:12)  HR: 93 (09-17-21 @ 12:08)  BP: 130/62 (09-17-21 @ 12:08)  RR: 18 (09-17-21 @ 12:08)  SpO2: 97% (09-17-21 @ 12:08)  Wt(kg): --  General: Nontoxic-appearing Female in no acute distress.  HEENT: AT/NC. Anicteric.  Conjunctiva pink and moist.  Oropharynx grossly clear.  Neck: Not rigid. No sense of mass.  Nodes: None palpable.  Lungs: Diminished breath sounds bilaterally without rales wheezes or rhonchi  Heart: Regular rate and rhythm  Abdomen: Bowel sounds present and normoactive. Soft. Nondistended. Nontender. No sense of mass. No organomegaly.  Back: No spinal tenderness. No costovertebral angle tenderness.   Extremities: L foot wrapped w/ ACE over VAC. No cyanosis or clubbing. No edema.   Skin: Warm. Dry. Good turgor. No rash. No vasculitic stigmata.  Psychiatric: Grossly appropriate mood and affect        Laboratory Studies--  CBC                        7.7    7.61  )-----------( 352      ( 17 Sep 2021 06:18 )             23.7       Chemistries  09-17    139  |  103  |  13  ----------------------------<  311<H>  3.7   |  31  |  0.76    Ca    8.4<L>      17 Sep 2021 06:18        Culture Data  Culture - Other (collected 10 Sep 2021 18:46)  Source: .Other left foot  Final Report (12 Sep 2021 08:53):    No growth    Surgical Pathology Report (09.10.21 @ 13:10)    Surgical Pathology Report:   ACCESSION No:  [id number]  Patient:   NELSON ZEPEDA      Accession:                             [id number]    Collected Date/Time:                   9/10/2021 13:10 EDT  Received Date/Time:                    9/10/2021 14:28 EDT    Surgical Pathology Report - Auth (Verified)    Specimen(s) Submitted  1  Left foot  2  Talus bone left foot    Final Diagnosis  1. Foot, left, amputation:  - Gangrene  - Bone and soft tissue resection margin : viable      2. Talus bone, left foot, resection:  - Bone and cartilage. Negative for osteomyelitis  Verified by: Shawn Wick MD  (Electronic Signature)  Reported on: 09/14/21 13:38 EDT, [facility], [address] [place] [address]  Phone: [phone]   [phone]  _________________________________________________________________

## 2021-09-17 NOTE — PHYSICAL THERAPY INITIAL EVALUATION ADULT - DISCHARGE DISPOSITION, PT EVAL
Pt may benefit from subacute rehab. to improve functional ability, balance, and prevent injury from fall./rehabilitation facility
subacute rehab./rehabilitation facility

## 2021-09-17 NOTE — PROGRESS NOTE ADULT - PROBLEM SELECTOR PLAN 4
Creatinine has improved  Monitor urine output  Dose medications accordingly  Avoid nephrotoxins as able
Appears resolved

## 2021-09-17 NOTE — PROGRESS NOTE ADULT - PROBLEM SELECTOR PROBLEM 3
Sepsis, unspecified organism

## 2021-09-17 NOTE — PROGRESS NOTE ADULT - PROBLEM SELECTOR PLAN 2
Patient's hemoglobin A1c is 14.6, indicating essentially absent control for a prolonged period of time  good but not overly tight control to avoid iatrogenic hypoglycemia, recognizing that uncontrolled hyperglycemia complicates treatment of infection
Diabetes care per ICU  Patient's hemoglobin A1c is 14.6, indicating essentially absent control for a prolonged period of time  In hospital, good but not overly tight control to avoid iatrogenic hypoglycemia, recognizing that uncontrolled hyperglycemia complicates treatment of infection
Diabetes care per ICU  Patient's hemoglobin A1c is 14.6, indicating essentially absent control for a prolonged period of time  In hospital, good but not overly tight control to avoid iatrogenic hypoglycemia, recognizing that uncontrolled hyperglycemia complicates treatment of infection

## 2021-09-17 NOTE — PROGRESS NOTE ADULT - NSPROGADDITIONALINFOA_GEN_ALL_CORE
D/W Dr. Archana Gomez, DO  Infectious Disease Attending  Pager [phone]  After 5pm/weekends please call [phone] for all inquiries and new consults
Cornel Johansen MD  Attending Physician  [facility]  Division of Infectious Diseases  [phone]
Discussed with Dr. Gomez
I'll sign off at this time.   Thank you for the courtesy of this referral.    Cornel Johansen MD  Attending Physician  [facility]  Division of Infectous Diseases  [phone]
I'm available for issues over the remainder of the weekend, please call [phone]) if ID input needed.    Cornel Johansen MD  Attending Physician  [facility]  Division of Infectious Diseases  [phone]     >1h total time

## 2021-09-17 NOTE — PROGRESS NOTE ADULT - PROBLEM SELECTOR PROBLEM 4
GEOVANNI (acute kidney injury)

## 2021-09-17 NOTE — PHYSICAL THERAPY INITIAL EVALUATION ADULT - GENERAL OBSERVATIONS, REHAB EVAL
Pt was seen in supine c L foot ace wrap C/D/I , alert and Ox4. Swelling noted in R hand but no c/o pain. Pt was medicated by RN, Cecily  prior to PT session. P.T. wound care initial evaluation performed with L foot wound, cleaned, NPWT applied, setting as prescribed documented in flowsheet 2 3.4 A&I. Pt tolerated vac application well. Pt was left in supine position with NPWT to L foot and LLE in elevation and call bell within reach.  Pt was in NAD.
Pt was seen in supine c L foot ace wrap C/D/I , alert and Ox4. Swelling noted in R hand but no c/o pain. P.T. wound care initial evaluation performed with L upper back . L hip, L buttock and sacrum, coccyx  wounds, cleaned, dressing changed and  documented in flowsheet 2 3.4 A&I. Pt was educated NWB to Left due to surgery precaution and pt needed constant verbal cues to follow through.

## 2021-09-17 NOTE — PHYSICAL THERAPY INITIAL EVALUATION ADULT - PERTINENT HX OF CURRENT PROBLEM, REHAB EVAL
sepsis diabetic ulcer of foot. Pt was found unresponsive on the floor at home as per daughter, Monica.
sepsis diabetic ulcer of foot. Pt was found unresponsive on the floor at home as per daughter, Monica.

## 2021-09-17 NOTE — PHYSICAL THERAPY INITIAL EVALUATION ADULT - ADDITIONAL COMMENTS
64 yo F bibems after being found down in home unresponsive.  Pt. was last seen 1.5 days ago.  Pt. reportedly had cut L foot days ago, apparently infection has developed as L foot is now necrotic.  Pt. cannot provide history due to AMS. In ED, pt with open wound of Lt dorsal foot with necrotic 2nd toe. Pt remained with altered mental status on arrival. Labs show hyperglycemia to 1466, serum bicarb 6, AG 39. Also noted to have troponinemia 0.468, lateral TWIs on EKG. Per vascular surgery consult by ED, no immediate OR intervention indicated; recommended pt to be seen by in-house vascular in the morning. Pt s/p LF Choparts amp 9/10/21, and extubated 9/13.
64 yo F bibems after being found down in home unresponsive.  Pt. was last seen 1.5 days ago.  Pt. reportedly had cut L foot days ago, apparently infection has developed as L foot is now necrotic.  Pt. cannot provide history due to AMS. In ED, pt with open wound of Lt dorsal foot with necrotic 2nd toe. Pt remained with altered mental status on arrival. Labs show hyperglycemia to 1466, serum bicarb 6, AG 39. Also noted to have troponinemia 0.468, lateral TWIs on EKG. Per vascular surgery consult by ED, no immediate OR intervention indicated; recommended pt to be seen by in-house vascular in the morning. Pt s/p LF Choparts amp 9/10/21, and extubated 9/13.

## 2021-09-17 NOTE — PROGRESS NOTE ADULT - PROBLEM SELECTOR PROBLEM 2
Diabetes mellitus with ketoacidosis

## 2021-09-17 NOTE — PHYSICAL THERAPY INITIAL EVALUATION ADULT - PATIENT/FAMILY/SIGNIFICANT OTHER GOALS STATEMENT, PT EVAL
To improve functional mobility and to be pain free.
To improve functional mobility and to be pain free.

## 2021-09-17 NOTE — PROGRESS NOTE ADULT - SUBJECTIVE AND OBJECTIVE BOX
Podiatry pager #: [phone] ([facility])/ [phone] ([facility])    Patient is a 65y old  Female who presents with a chief complaint of AMS, DKA, Necrotic foot infection (16 Sep 2021 20:39)       INTERVAL HPI/OVERNIGHT EVENTS:  Patient seen and evaluated at bedside.  Pt is resting comfortable in NAD. Denies N/V/F/C.     Allergies    avocado (Pruritus)  Carrots (Pruritus)  No Known Drug Allergies  Plums (Pruritus)    Intolerances        Vital Signs Last 24 Hrs  T(C): 37.1 (17 Sep 2021 05:08), Max: 37.4 (16 Sep 2021 12:13)  T(F): 98.7 (17 Sep 2021 05:08), Max: 99.4 (16 Sep 2021 12:13)  HR: 97 (17 Sep 2021 05:08) (84 - 97)  BP: 153/77 (17 Sep 2021 05:08) (114/66 - 153/77)  BP(mean): --  RR: 18 (17 Sep 2021 05:08) (18 - 18)  SpO2: 97% (17 Sep 2021 05:08) (96% - 99%)    LABS:                        7.7    7.61  )-----------( 352      ( 17 Sep 2021 06:18 )             23.7     09-17    139  |  103  |  13  ----------------------------<  311<H>  3.7   |  31  |  0.76    Ca    8.4<L>      17 Sep 2021 06:18          CAPILLARY BLOOD GLUCOSE      POCT Blood Glucose.: 334 mg/dL (17 Sep 2021 08:17)  POCT Blood Glucose.: 253 mg/dL (16 Sep 2021 21:47)  POCT Blood Glucose.: 275 mg/dL (16 Sep 2021 16:37)  POCT Blood Glucose.: 354 mg/dL (16 Sep 2021 11:15)      Lower Extremity Physical Exam:  Vascular: RF DP NP, PT NP, LF NP pulses, B/L,Temperature gradient LF warm to touch   Neuro: Epicritic sensation absent to the level of digits B/L.  Musculoskeletal/Ortho:      s/p LF choparts amputation left open (9/10):  No purulent drainage, no malodor, no active bleeding. Soft tissue and skin edges at level of amputation do no appear viable but increased dusky changes to the dorsal and plantar flaps.     RADIOLOGY & ADDITIONAL TESTS:

## 2021-09-17 NOTE — PROGRESS NOTE ADULT - ASSESSMENT
64yo F PMH of IDDM, HTN, HLD, diabetic retinopathy initially admitted to MICU for sepsis due to R foot necrosis, gas gangrene and necrotizing fascitis.  S/p R foot TMA, seen by vascular, podiatry and ID.  Started on IV Abx.  Noted to have troponinemia 0.468, lateral TWIs on EKG.     # R foot necrosis / Necrotizing fasciitis / Gas Gangrene - s/p TMA 9/10.  Off antibiotics.  Observe for source control.  Wound vac placed, podiatry still concerned for possible need for BKA.    # Anemia - likely due to chronic disease.  H/H has been stable, fluctuating 7.7-9.4.  Check anemia profile, stool OB.  No overt s/s of bleeding.   # Metabolic Encephalopathy - improving.  Mental status closer to baseline per family.    # NSTEMI - trop now stable.  Cardiology following.  Ischemic workup as outpatient discussed with cardiology.  Continue ASA, Statin, BBlocker.  # Diabetes Type II - monitor fingersticks.  Insulin coverage for hyperglycemia.  FS remain elevated.  Increase Lantus.  Continue premeal insulin.  # Essential HTN - Monitor BP.  Continue oral antihypertensives.  # Hyperlipidemia - diet modification.  Continue Statin.  # Inpatient DVT Prophylaxis - Lovenox subcut    Plan of care d/w son, daughter Monica [phone] / [phone] on 9/15.   Tentatively plan for ARETHA placement 9/20.

## 2021-09-18 LAB
ANION GAP SERPL CALC-SCNC: 5 MMOL/L — SIGNIFICANT CHANGE UP (ref 5–17)
BLD GP AB SCN SERPL QL: SIGNIFICANT CHANGE UP
BUN SERPL-MCNC: 16 MG/DL — SIGNIFICANT CHANGE UP (ref 7–23)
CALCIUM SERPL-MCNC: 7.9 MG/DL — LOW (ref 8.5–10.1)
CHLORIDE SERPL-SCNC: 105 MMOL/L — SIGNIFICANT CHANGE UP (ref 96–108)
CO2 SERPL-SCNC: 28 MMOL/L — SIGNIFICANT CHANGE UP (ref 22–31)
CREAT SERPL-MCNC: 0.79 MG/DL — SIGNIFICANT CHANGE UP (ref 0.5–1.3)
FERRITIN SERPL-MCNC: 1967 NG/ML — HIGH (ref 15–150)
FOLATE SERPL-MCNC: 9.9 NG/ML — SIGNIFICANT CHANGE UP
GLUCOSE BLDC GLUCOMTR-MCNC: 336 MG/DL — HIGH (ref 70–99)
GLUCOSE BLDC GLUCOMTR-MCNC: 353 MG/DL — HIGH (ref 70–99)
GLUCOSE BLDC GLUCOMTR-MCNC: 374 MG/DL — HIGH (ref 70–99)
GLUCOSE BLDC GLUCOMTR-MCNC: 462 MG/DL — CRITICAL HIGH (ref 70–99)
GLUCOSE BLDC GLUCOMTR-MCNC: 481 MG/DL — CRITICAL HIGH (ref 70–99)
GLUCOSE SERPL-MCNC: 365 MG/DL — HIGH (ref 70–99)
HCT VFR BLD CALC: 21.2 % — LOW (ref 34.5–45)
HCT VFR BLD CALC: 21.5 % — LOW (ref 34.5–45)
HGB BLD-MCNC: 7 G/DL — CRITICAL LOW (ref 11.5–15.5)
HGB BLD-MCNC: 7 G/DL — CRITICAL LOW (ref 11.5–15.5)
IRON SATN MFR SERPL: 15 % — SIGNIFICANT CHANGE UP (ref 14–50)
IRON SATN MFR SERPL: 26 UG/DL — LOW (ref 30–160)
MAG AB SER-ACNC: NEGATIVE — SIGNIFICANT CHANGE UP
MCHC RBC-ENTMCNC: 28.2 PG — SIGNIFICANT CHANGE UP (ref 27–34)
MCHC RBC-ENTMCNC: 28.2 PG — SIGNIFICANT CHANGE UP (ref 27–34)
MCHC RBC-ENTMCNC: 32.6 GM/DL — SIGNIFICANT CHANGE UP (ref 32–36)
MCHC RBC-ENTMCNC: 33 GM/DL — SIGNIFICANT CHANGE UP (ref 32–36)
MCV RBC AUTO: 85.5 FL — SIGNIFICANT CHANGE UP (ref 80–100)
MCV RBC AUTO: 86.7 FL — SIGNIFICANT CHANGE UP (ref 80–100)
NRBC # BLD: 0 /100 WBCS — SIGNIFICANT CHANGE UP (ref 0–0)
NRBC # BLD: 0 /100 WBCS — SIGNIFICANT CHANGE UP (ref 0–0)
OB PNL STL: NEGATIVE — SIGNIFICANT CHANGE UP
PLATELET # BLD AUTO: 424 K/UL — HIGH (ref 150–400)
PLATELET # BLD AUTO: 439 K/UL — HIGH (ref 150–400)
POTASSIUM SERPL-MCNC: 3.5 MMOL/L — SIGNIFICANT CHANGE UP (ref 3.5–5.3)
POTASSIUM SERPL-SCNC: 3.5 MMOL/L — SIGNIFICANT CHANGE UP (ref 3.5–5.3)
RBC # BLD: 2.48 M/UL — LOW (ref 3.8–5.2)
RBC # FLD: 14.5 % — SIGNIFICANT CHANGE UP (ref 10.3–14.5)
RBC # FLD: 14.6 % — HIGH (ref 10.3–14.5)
RETICS #: 61 K/UL — SIGNIFICANT CHANGE UP (ref 25–125)
RETICS/RBC NFR: 2.5 % — SIGNIFICANT CHANGE UP (ref 0.5–2.5)
SODIUM SERPL-SCNC: 138 MMOL/L — SIGNIFICANT CHANGE UP (ref 135–145)
TIBC SERPL-MCNC: 167 UG/DL — LOW (ref 220–430)
UIBC SERPL-MCNC: 142 UG/DL — SIGNIFICANT CHANGE UP (ref 110–370)
VIT B12 SERPL-MCNC: 1751 PG/ML — HIGH (ref 232–1245)
WBC # BLD: 8.61 K/UL — SIGNIFICANT CHANGE UP (ref 3.8–10.5)
WBC # BLD: 8.79 K/UL — SIGNIFICANT CHANGE UP (ref 3.8–10.5)
WBC # FLD AUTO: 8.61 K/UL — SIGNIFICANT CHANGE UP (ref 3.8–10.5)
WBC # FLD AUTO: 8.79 K/UL — SIGNIFICANT CHANGE UP (ref 3.8–10.5)

## 2021-09-18 PROCEDURE — 99232 SBSQ HOSP IP/OBS MODERATE 35: CPT

## 2021-09-18 RX ORDER — POLYETHYLENE GLYCOL 3350 17 G/17G
17 POWDER, FOR SOLUTION ORAL DAILY
Refills: 0 | Status: DISCONTINUED | OUTPATIENT
Start: 2021-09-18 | End: 2021-09-23

## 2021-09-18 RX ORDER — SENNA PLUS 8.6 MG/1
2 TABLET ORAL AT BEDTIME
Refills: 0 | Status: DISCONTINUED | OUTPATIENT
Start: 2021-09-18 | End: 2021-09-23

## 2021-09-18 RX ORDER — FERROUS SULFATE 325(65) MG
325 TABLET ORAL
Refills: 0 | Status: DISCONTINUED | OUTPATIENT
Start: 2021-09-18 | End: 2021-09-23

## 2021-09-18 RX ORDER — INSULIN GLARGINE 100 [IU]/ML
24 INJECTION, SOLUTION SUBCUTANEOUS AT BEDTIME
Refills: 0 | Status: DISCONTINUED | OUTPATIENT
Start: 2021-09-18 | End: 2021-09-19

## 2021-09-18 RX ADMIN — CHLORHEXIDINE GLUCONATE 1 APPLICATION(S): 213 SOLUTION TOPICAL at 05:08

## 2021-09-18 RX ADMIN — Medication 10: at 17:31

## 2021-09-18 RX ADMIN — Medication 8: at 07:46

## 2021-09-18 RX ADMIN — Medication 325 MILLIGRAM(S): at 17:34

## 2021-09-18 RX ADMIN — Medication 650 MILLIGRAM(S): at 13:30

## 2021-09-18 RX ADMIN — Medication 12.5 MILLIGRAM(S): at 05:08

## 2021-09-18 RX ADMIN — Medication 12.5 MILLIGRAM(S): at 17:32

## 2021-09-18 RX ADMIN — Medication 650 MILLIGRAM(S): at 14:30

## 2021-09-18 RX ADMIN — Medication 10: at 11:19

## 2021-09-18 RX ADMIN — Medication 5 UNIT(S): at 11:19

## 2021-09-18 RX ADMIN — ATORVASTATIN CALCIUM 40 MILLIGRAM(S): 80 TABLET, FILM COATED ORAL at 22:58

## 2021-09-18 RX ADMIN — Medication 5 UNIT(S): at 17:31

## 2021-09-18 RX ADMIN — Medication 5 UNIT(S): at 07:46

## 2021-09-18 RX ADMIN — INSULIN GLARGINE 24 UNIT(S): 100 INJECTION, SOLUTION SUBCUTANEOUS at 22:59

## 2021-09-18 RX ADMIN — Medication 81 MILLIGRAM(S): at 11:20

## 2021-09-18 RX ADMIN — Medication 8: at 22:58

## 2021-09-18 RX ADMIN — POLYETHYLENE GLYCOL 3350 17 GRAM(S): 17 POWDER, FOR SOLUTION ORAL at 17:31

## 2021-09-18 NOTE — PROGRESS NOTE ADULT - SUBJECTIVE AND OBJECTIVE BOX
Patient is a 65y old  Female who presents with a chief complaint of AMS, DKA, Necrotic foot infection (18 Sep 2021 14:06)      Interval History: finger sticks are in 300s   also on Lantus 24 units and prandial lispro 5 units and Metformin 500 mg QD started today     MEDICATIONS  (STANDING):  aspirin enteric coated 81 milliGRAM(s) Oral daily  atorvastatin 40 milliGRAM(s) Oral at bedtime  chlorhexidine 2% Cloths 1 Application(s) Topical <User Schedule>  dextrose 40% Gel 15 Gram(s) Oral once  dextrose 40% Gel 15 Gram(s) Oral once  dextrose 5%. 1000 milliLiter(s) (100 mL/Hr) IV Continuous <Continuous>  dextrose 5%. 1000 milliLiter(s) (50 mL/Hr) IV Continuous <Continuous>  dextrose 5%. 1000 milliLiter(s) (100 mL/Hr) IV Continuous <Continuous>  dextrose 5%. 1000 milliLiter(s) (50 mL/Hr) IV Continuous <Continuous>  dextrose 5%. 1000 milliLiter(s) (50 mL/Hr) IV Continuous <Continuous>  dextrose 50% Injectable 25 Gram(s) IV Push once  dextrose 50% Injectable 12.5 Gram(s) IV Push once  dextrose 50% Injectable 25 Gram(s) IV Push once  ferrous    sulfate 325 milliGRAM(s) Oral two times a day  glucagon  Injectable 1 milliGRAM(s) IntraMuscular once  glucagon  Injectable 1 milliGRAM(s) IntraMuscular once  insulin glargine Injectable (LANTUS) 24 Unit(s) SubCutaneous at bedtime  insulin lispro (ADMELOG) corrective regimen sliding scale   SubCutaneous three times a day before meals  insulin lispro (ADMELOG) corrective regimen sliding scale   SubCutaneous at bedtime  insulin lispro Injectable (ADMELOG) 5 Unit(s) SubCutaneous before breakfast  insulin lispro Injectable (ADMELOG) 5 Unit(s) SubCutaneous before lunch  insulin lispro Injectable (ADMELOG) 5 Unit(s) SubCutaneous before dinner  metFORMIN 500 milliGRAM(s) Oral daily  metoprolol tartrate 12.5 milliGRAM(s) Oral every 12 hours  polyethylene glycol 3350 17 Gram(s) Oral daily  senna 2 Tablet(s) Oral at bedtime    MEDICATIONS  (PRN):  acetaminophen   Tablet .. 650 milliGRAM(s) Oral every 6 hours PRN Mild Pain (1 - 3)  bisacodyl Suppository 10 milliGRAM(s) Rectal daily PRN Constipation  fentaNYL    Injectable 25 MICROGram(s) IV Push every 6 hours PRN Severe Pain (7 - 10)      Allergies    avocado (Pruritus)  Carrots (Pruritus)  No Known Drug Allergies  Plums (Pruritus)    Intolerances        REVIEW OF SYSTEMS:  CONSTITUTIONAL: no changes  EYES: No eye pain, visual disturbances, or discharge  ENMT:  No difficulty hearing, No sinus or throat pain  NECK: No pain or stiffness  RESPIRATORY: No cough, wheezing, chills or hemoptysis; No shortness of breath  CARDIOVASCULAR: No chest pain, palpitations or leg swelling  GASTROINTESTINAL: No abdominal or epigastric pain. No nausea, vomiting, or hematemesis; No diarrhea or constipation. No melena or hematochezia.  GENITOURINARY: No dysuria, frequency, hematuria, or incontinence  NEUROLOGICAL: No headaches, memory loss, loss of strength, numbness, or tremors  SKIN: No itching, burning, rashes, or lesions   ENDOCRINE: No heat or cold intolerance; No hair loss  MUSCULOSKELETAL: No joint pain or swelling; No muscle, back, or extremity pain  PSYCHIATRIC: No depression, anxiety, mood swings, or difficulty sleeping  HEME/LYMPH: No easy bruising, or bleeding gums  ALLERY AND IMMUNOLOGIC: No hives or eczema    Vital Signs Last 24 Hrs  T(C): 37.6 (18 Sep 2021 17:06), Max: 37.6 (18 Sep 2021 10:23)  T(F): 99.7 (18 Sep 2021 17:06), Max: 99.7 (18 Sep 2021 10:23)  HR: 105 (18 Sep 2021 17:06) (87 - 106)  BP: 136/66 (18 Sep 2021 17:06) (123/62 - 136/66)  BP(mean): --  RR: 17 (18 Sep 2021 17:06) (17 - 17)  SpO2: 95% (18 Sep 2021 17:06) (95% - 97%)    PHYSICAL EXAM:  GENERAL:   HEAD: Atraumatic, Normocephalic  EYES: PERRLA, conjunctiva and sclera clear  ENMT: No  exudates,; Moist mucous membranes,, No lesions  NECK: Supple, No JVD, Normal thyroid  NERVOUS SYSTEM:  Alert & Oriented,   CHEST/LUNG: Clear to auscultation bilaterally; No rales, rhonchi, wheezing, or rubs  HEART: Regular rate and rhythm; No murmurs, rubs, or gallops  ABDOMEN: Soft, Nontender, Nondistended; Bowel sounds present  EXTREMITIES:  2+ Peripheral Pulses, no edema  SKIN: No rashes or lesions    LABS:        CAPILLARY BLOOD GLUCOSE      POCT Blood Glucose.: 353 mg/dL (18 Sep 2021 17:19)  POCT Blood Glucose.: 374 mg/dL (18 Sep 2021 11:00)  POCT Blood Glucose.: 336 mg/dL (18 Sep 2021 07:30)  POCT Blood Glucose.: 228 mg/dL (17 Sep 2021 21:47)    Lipid panel:           Thyroid:  Diabetes Tests:  Parathyroid Panel:  Adrenals:  RADIOLOGY & ADDITIONAL TESTS:    Imaging Personally Reviewed:  [ ] YES  [ ] NO    Consultant(s) Notes Reviewed:  [ ] YES  [ ] NO    Care Discussed with Consultants/Other Providers [ ] YES  [ ] NO

## 2021-09-18 NOTE — PROGRESS NOTE ADULT - ASSESSMENT
66yo F PMH of IDDM, HTN, HLD, diabetic retinopathy initially admitted to MICU for sepsis due to R foot necrosis, gas gangrene and necrotizing fascitis.  S/p R foot TMA, seen by vascular, podiatry and ID.  Started on IV Abx.  Noted to have troponinemia 0.468, lateral TWIs on EKG.     # R foot necrosis / Necrotizing fasciitis / Gas Gangrene - s/p TMA 9/10.  Off antibiotics.  Observe for source control.  Wound vac placed, podiatry still concerned for possible need for BKA.    # Anemia - likely due to chronic disease.  H/H has been fluctuating, now 7.0.  Check anemia profile, stool OB.  No overt s/s of bleeding.  Stop Lovenox.  Continue ASA for now.   # Metabolic Encephalopathy - improving.  Mental status closer to baseline per family.    # NSTEMI - trop now stable.  Cardiology following.  Ischemic workup as outpatient discussed with cardiology.  Continue ASA, Statin, BBlocker.  # Diabetes Type II - monitor fingersticks.  Insulin coverage for hyperglycemia.  FS remain elevated.  Increase Lantus.  Continue premeal insulin.  # Essential HTN - Monitor BP.  Continue oral antihypertensives.  # Hyperlipidemia - diet modification.  Continue Statin.  # Inpatient DVT Prophylaxis - ICDs.     Plan of care d/w son, daughter Monica [phone] / [phone] on 9/15.   Tentatively plan for ARETHA placement 9/20.

## 2021-09-18 NOTE — PROGRESS NOTE ADULT - SUBJECTIVE AND OBJECTIVE BOX
Patient: NELSON ZEPEDA [id number] 65y Female                            Hospitalist Attending Note    FS elevated.   Denies bleeding.  No fatigue.  No pain    No additional complaints.     ____________________PHYSICAL EXAM:  GENERAL:  NAD Alert and Oriented  to person, place.   HEENT: NCAT  CARDIOVASCULAR:  S1, S2  LUNGS: CTAB  ABDOMEN:  soft, (-) tenderness, (-) distension, (+) bowel sounds, (-) guarding, (-) rebound (-) rigidity  EXTREMITIES:  no cyanosis / clubbing / edema.   R stump dressing in place.   NEURO: strength symmetric.   ____________________    VITALS:  Vital Signs Last 24 Hrs  T(C): 37.6 (18 Sep 2021 10:23), Max: 37.6 (18 Sep 2021 10:23)  T(F): 99.7 (18 Sep 2021 10:23), Max: 99.7 (18 Sep 2021 10:23)  HR: 106 (18 Sep 2021 10:23) (87 - 106)  BP: 125/54 (18 Sep 2021 10:23) (113/65 - 125/62)  BP(mean): --  RR: 17 (18 Sep 2021 10:23) (17 - 17)  SpO2: 97% (18 Sep 2021 10:23) (96% - 97%) Daily     Daily   CAPILLARY BLOOD GLUCOSE      POCT Blood Glucose.: 374 mg/dL (18 Sep 2021 11:00)  POCT Blood Glucose.: 336 mg/dL (18 Sep 2021 07:30)  POCT Blood Glucose.: 228 mg/dL (17 Sep 2021 21:47)  POCT Blood Glucose.: 226 mg/dL (17 Sep 2021 16:32)    I&O's Summary    17 Sep 2021 07:01  -  18 Sep 2021 07:00  --------------------------------------------------------  IN: 350 mL / OUT: 300 mL / NET: 50 mL    18 Sep 2021 07:01  -  18 Sep 2021 14:07  --------------------------------------------------------  IN: 400 mL / OUT: 0 mL / NET: 400 mL        LABS:                        7.0    8.61  )-----------( 424      ( 18 Sep 2021 07:41 )             21.5     09-18    138  |  105  |  16  ----------------------------<  365<H>  3.5   |  28  |  0.79    Ca    7.9<L>      18 Sep 2021 07:41                    MEDICATIONS:  acetaminophen   Tablet .. 650 milliGRAM(s) Oral every 6 hours PRN  aspirin enteric coated 81 milliGRAM(s) Oral daily  atorvastatin 40 milliGRAM(s) Oral at bedtime  chlorhexidine 2% Cloths 1 Application(s) Topical <User Schedule>  dextrose 40% Gel 15 Gram(s) Oral once  dextrose 40% Gel 15 Gram(s) Oral once  dextrose 5%. 1000 milliLiter(s) IV Continuous <Continuous>  dextrose 5%. 1000 milliLiter(s) IV Continuous <Continuous>  dextrose 5%. 1000 milliLiter(s) IV Continuous <Continuous>  dextrose 5%. 1000 milliLiter(s) IV Continuous <Continuous>  dextrose 5%. 1000 milliLiter(s) IV Continuous <Continuous>  dextrose 50% Injectable 25 Gram(s) IV Push once  dextrose 50% Injectable 12.5 Gram(s) IV Push once  dextrose 50% Injectable 25 Gram(s) IV Push once  fentaNYL    Injectable 25 MICROGram(s) IV Push every 6 hours PRN  glucagon  Injectable 1 milliGRAM(s) IntraMuscular once  glucagon  Injectable 1 milliGRAM(s) IntraMuscular once  insulin glargine Injectable (LANTUS) 24 Unit(s) SubCutaneous at bedtime  insulin lispro (ADMELOG) corrective regimen sliding scale   SubCutaneous three times a day before meals  insulin lispro (ADMELOG) corrective regimen sliding scale   SubCutaneous at bedtime  insulin lispro Injectable (ADMELOG) 5 Unit(s) SubCutaneous before breakfast  insulin lispro Injectable (ADMELOG) 5 Unit(s) SubCutaneous before lunch  insulin lispro Injectable (ADMELOG) 5 Unit(s) SubCutaneous before dinner  metFORMIN 500 milliGRAM(s) Oral daily  metoprolol tartrate 12.5 milliGRAM(s) Oral every 12 hours

## 2021-09-19 LAB
ANION GAP SERPL CALC-SCNC: 2 MMOL/L — LOW (ref 5–17)
BUN SERPL-MCNC: 13 MG/DL — SIGNIFICANT CHANGE UP (ref 7–23)
CALCIUM SERPL-MCNC: 8 MG/DL — LOW (ref 8.5–10.1)
CHLORIDE SERPL-SCNC: 104 MMOL/L — SIGNIFICANT CHANGE UP (ref 96–108)
CO2 SERPL-SCNC: 30 MMOL/L — SIGNIFICANT CHANGE UP (ref 22–31)
CREAT SERPL-MCNC: 0.79 MG/DL — SIGNIFICANT CHANGE UP (ref 0.5–1.3)
FLUAV AG NPH QL: SIGNIFICANT CHANGE UP
FLUBV AG NPH QL: SIGNIFICANT CHANGE UP
GLUCOSE BLDC GLUCOMTR-MCNC: 172 MG/DL — HIGH (ref 70–99)
GLUCOSE BLDC GLUCOMTR-MCNC: 193 MG/DL — HIGH (ref 70–99)
GLUCOSE BLDC GLUCOMTR-MCNC: 296 MG/DL — HIGH (ref 70–99)
GLUCOSE BLDC GLUCOMTR-MCNC: 309 MG/DL — HIGH (ref 70–99)
GLUCOSE BLDC GLUCOMTR-MCNC: 366 MG/DL — HIGH (ref 70–99)
GLUCOSE BLDC GLUCOMTR-MCNC: 451 MG/DL — CRITICAL HIGH (ref 70–99)
GLUCOSE SERPL-MCNC: 324 MG/DL — HIGH (ref 70–99)
HCT VFR BLD CALC: 20.8 % — CRITICAL LOW (ref 34.5–45)
HGB BLD-MCNC: 6.8 G/DL — CRITICAL LOW (ref 11.5–15.5)
MCHC RBC-ENTMCNC: 28.1 PG — SIGNIFICANT CHANGE UP (ref 27–34)
MCHC RBC-ENTMCNC: 32.7 GM/DL — SIGNIFICANT CHANGE UP (ref 32–36)
MCV RBC AUTO: 86 FL — SIGNIFICANT CHANGE UP (ref 80–100)
NRBC # BLD: 0 /100 WBCS — SIGNIFICANT CHANGE UP (ref 0–0)
PLATELET # BLD AUTO: 465 K/UL — HIGH (ref 150–400)
POTASSIUM SERPL-MCNC: 4.1 MMOL/L — SIGNIFICANT CHANGE UP (ref 3.5–5.3)
POTASSIUM SERPL-SCNC: 4.1 MMOL/L — SIGNIFICANT CHANGE UP (ref 3.5–5.3)
RBC # BLD: 2.42 M/UL — LOW (ref 3.8–5.2)
RBC # FLD: 14.6 % — HIGH (ref 10.3–14.5)
SARS-COV-2 RNA SPEC QL NAA+PROBE: SIGNIFICANT CHANGE UP
SODIUM SERPL-SCNC: 136 MMOL/L — SIGNIFICANT CHANGE UP (ref 135–145)
WBC # BLD: 9.25 K/UL — SIGNIFICANT CHANGE UP (ref 3.8–10.5)
WBC # FLD AUTO: 9.25 K/UL — SIGNIFICANT CHANGE UP (ref 3.8–10.5)

## 2021-09-19 PROCEDURE — 99233 SBSQ HOSP IP/OBS HIGH 50: CPT

## 2021-09-19 RX ORDER — METFORMIN HYDROCHLORIDE 850 MG/1
500 TABLET ORAL
Refills: 0 | Status: DISCONTINUED | OUTPATIENT
Start: 2021-09-19 | End: 2021-09-23

## 2021-09-19 RX ORDER — INSULIN LISPRO 100/ML
8 VIAL (ML) SUBCUTANEOUS
Refills: 0 | Status: DISCONTINUED | OUTPATIENT
Start: 2021-09-19 | End: 2021-09-23

## 2021-09-19 RX ORDER — INSULIN HUMAN 100 [IU]/ML
5 INJECTION, SOLUTION SUBCUTANEOUS ONCE
Refills: 0 | Status: COMPLETED | OUTPATIENT
Start: 2021-09-19 | End: 2021-09-19

## 2021-09-19 RX ORDER — INSULIN GLARGINE 100 [IU]/ML
32 INJECTION, SOLUTION SUBCUTANEOUS AT BEDTIME
Refills: 0 | Status: DISCONTINUED | OUTPATIENT
Start: 2021-09-19 | End: 2021-09-20

## 2021-09-19 RX ADMIN — Medication 12.5 MILLIGRAM(S): at 18:48

## 2021-09-19 RX ADMIN — Medication 2: at 16:46

## 2021-09-19 RX ADMIN — INSULIN HUMAN 5 UNIT(S): 100 INJECTION, SOLUTION SUBCUTANEOUS at 01:46

## 2021-09-19 RX ADMIN — ATORVASTATIN CALCIUM 40 MILLIGRAM(S): 80 TABLET, FILM COATED ORAL at 21:56

## 2021-09-19 RX ADMIN — Medication 12.5 MILLIGRAM(S): at 05:39

## 2021-09-19 RX ADMIN — Medication 650 MILLIGRAM(S): at 12:05

## 2021-09-19 RX ADMIN — INSULIN GLARGINE 32 UNIT(S): 100 INJECTION, SOLUTION SUBCUTANEOUS at 22:01

## 2021-09-19 RX ADMIN — CHLORHEXIDINE GLUCONATE 1 APPLICATION(S): 213 SOLUTION TOPICAL at 05:38

## 2021-09-19 RX ADMIN — Medication 81 MILLIGRAM(S): at 12:08

## 2021-09-19 RX ADMIN — Medication 325 MILLIGRAM(S): at 18:49

## 2021-09-19 RX ADMIN — Medication 650 MILLIGRAM(S): at 13:05

## 2021-09-19 RX ADMIN — Medication 325 MILLIGRAM(S): at 05:43

## 2021-09-19 RX ADMIN — Medication 8 UNIT(S): at 11:19

## 2021-09-19 RX ADMIN — POLYETHYLENE GLYCOL 3350 17 GRAM(S): 17 POWDER, FOR SOLUTION ORAL at 12:08

## 2021-09-19 RX ADMIN — Medication 8 UNIT(S): at 16:46

## 2021-09-19 RX ADMIN — Medication 10: at 11:19

## 2021-09-19 RX ADMIN — Medication 6: at 09:17

## 2021-09-19 RX ADMIN — Medication 8 UNIT(S): at 09:19

## 2021-09-19 RX ADMIN — METFORMIN HYDROCHLORIDE 500 MILLIGRAM(S): 850 TABLET ORAL at 12:10

## 2021-09-19 NOTE — PROGRESS NOTE ADULT - ASSESSMENT
64yo F PMH of IDDM, HTN, HLD, diabetic retinopathy initially admitted to MICU for sepsis due to R foot necrosis, gas gangrene and necrotizing fascitis.  S/p R foot TMA, seen by vascular, podiatry and ID.  Started on IV Abx.  Noted to have troponinemia 0.468, lateral TWIs on EKG.     # R foot necrosis / Necrotizing fasciitis / Gas Gangrene - s/p TMA 9/10.  Off antibiotics.  Observe for source control.  Wound vac placed, podiatry still concerned for possible need for BKA.    # Fe deficiency Anemia - likely due to chronic disease.  H/H has been fluctuating, now 7.0.  Stool OB negative.  Lovenox stopped.  Continue ASA.  Per daughter Monica, pt had been awaiting outpatient colonoscopy and anemia workup.  In setting of NSTEMI, will transfuse to goal Hgb > 9.  # Metabolic Encephalopathy - improving.  Mental status closer to baseline per family.    # NSTEMI - trop now stable.  Cardiology following.  Ischemic workup as outpatient discussed with cardiology.  Continue ASA, Statin, BBlocker.  # Diabetes Type II - monitor fingersticks.  Insulin coverage for hyperglycemia.  FS remain elevated.  Increase Lantus.  Continue premeal insulin.  Discussed with Dr. Urbina.   # Essential HTN - Monitor BP.  Continue oral antihypertensives.  # Hyperlipidemia - diet modification.  Continue Statin.  # Inpatient DVT Prophylaxis - ICDs.     Plan of care d/w son, daughter Monica [phone] / [phone] on 9/15.   Tentatively plan for ARETHA placement 9/20.  66yo F PMH of IDDM, HTN, HLD, diabetic retinopathy initially admitted to MICU for sepsis due to R foot necrosis, gas gangrene and necrotizing fascitis.  S/p R foot TMA, seen by vascular, podiatry and ID.  Started on IV Abx.  Noted to have troponinemia 0.468, lateral TWIs on EKG.     # R foot necrosis / Necrotizing fasciitis / Gas Gangrene - s/p TMA 9/10.  Off antibiotics.  Observe for source control.  Wound vac placed, podiatry still concerned for possible need for BKA.    # Fe deficiency Anemia - likely due to chronic disease.  H/H has been fluctuating, now 7.0.  Stool OB negative.  Lovenox stopped.  Continue ASA.  Per daughter Monica, pt had been awaiting outpatient colonoscopy and anemia workup.  In setting of NSTEMI, will transfuse to goal Hgb > 9.  # Metabolic Encephalopathy - improving.  Mental status closer to baseline per family.    # NSTEMI - trop now stable.  Cardiology following.  Ischemic workup as outpatient discussed with cardiology.  Continue ASA, Statin, BBlocker.  # Diabetes Type II - monitor fingersticks.  Insulin coverage for hyperglycemia.  FS remain elevated.  Increase Lantus.  Continue premeal insulin.  Discussed with Dr. Urbina.   # Essential HTN - Monitor BP.  Continue oral antihypertensives.  # Hyperlipidemia - diet modification.  Continue Statin.  # Inpatient DVT Prophylaxis - ICDs.     Plan of care d/w son, daughter Monica [phone] / [phone] on 9/15, 9/19  Tentatively plan for ARETHA placement 9/20.

## 2021-09-19 NOTE — PROGRESS NOTE ADULT - SUBJECTIVE AND OBJECTIVE BOX
Patient is a 65y old  Female who presents with a chief complaint of AMS, DKA, Necrotic foot infection (19 Sep 2021 13:43)      Interval History: finger sticks are increased and in 300s   on Lantus 32 units and prandial lispro 8 units and Metformin  500 mg QD   last finger stick is 163      MEDICATIONS  (STANDING):  aspirin enteric coated 81 milliGRAM(s) Oral daily  atorvastatin 40 milliGRAM(s) Oral at bedtime  chlorhexidine 2% Cloths 1 Application(s) Topical <User Schedule>  dextrose 40% Gel 15 Gram(s) Oral once  dextrose 40% Gel 15 Gram(s) Oral once  dextrose 5%. 1000 milliLiter(s) (50 mL/Hr) IV Continuous <Continuous>  dextrose 5%. 1000 milliLiter(s) (100 mL/Hr) IV Continuous <Continuous>  dextrose 5%. 1000 milliLiter(s) (50 mL/Hr) IV Continuous <Continuous>  dextrose 5%. 1000 milliLiter(s) (100 mL/Hr) IV Continuous <Continuous>  dextrose 5%. 1000 milliLiter(s) (50 mL/Hr) IV Continuous <Continuous>  dextrose 50% Injectable 25 Gram(s) IV Push once  dextrose 50% Injectable 12.5 Gram(s) IV Push once  dextrose 50% Injectable 25 Gram(s) IV Push once  ferrous    sulfate 325 milliGRAM(s) Oral two times a day  glucagon  Injectable 1 milliGRAM(s) IntraMuscular once  glucagon  Injectable 1 milliGRAM(s) IntraMuscular once  insulin glargine Injectable (LANTUS) 32 Unit(s) SubCutaneous at bedtime  insulin lispro (ADMELOG) corrective regimen sliding scale   SubCutaneous three times a day before meals  insulin lispro (ADMELOG) corrective regimen sliding scale   SubCutaneous at bedtime  insulin lispro Injectable (ADMELOG) 8 Unit(s) SubCutaneous before breakfast  insulin lispro Injectable (ADMELOG) 8 Unit(s) SubCutaneous before lunch  insulin lispro Injectable (ADMELOG) 8 Unit(s) SubCutaneous before dinner  metFORMIN 500 milliGRAM(s) Oral two times a day  metoprolol tartrate 12.5 milliGRAM(s) Oral every 12 hours  polyethylene glycol 3350 17 Gram(s) Oral daily  senna 2 Tablet(s) Oral at bedtime    MEDICATIONS  (PRN):  acetaminophen   Tablet .. 650 milliGRAM(s) Oral every 6 hours PRN Mild Pain (1 - 3)  bisacodyl Suppository 10 milliGRAM(s) Rectal daily PRN Constipation      Allergies    avocado (Pruritus)  Carrots (Pruritus)  No Known Drug Allergies  Plums (Pruritus)    Intolerances        REVIEW OF SYSTEMS:  CONSTITUTIONAL: no changes  EYES: No eye pain, visual disturbances, or discharge  ENMT:  No difficulty hearing, No sinus or throat pain  NECK: No pain or stiffness  RESPIRATORY: No cough, wheezing, chills or hemoptysis; No shortness of breath  CARDIOVASCULAR: No chest pain, palpitations or leg swelling  GASTROINTESTINAL: No abdominal or epigastric pain. No nausea, vomiting, or hematemesis; No diarrhea or constipation. No melena or hematochezia.  GENITOURINARY: No dysuria, frequency, hematuria, or incontinence  NEUROLOGICAL: No headaches, memory loss, loss of strength, numbness, or tremors  SKIN: No itching, burning, rashes, or lesions   ENDOCRINE: No heat or cold intolerance; No hair loss  MUSCULOSKELETAL: No joint pain or swelling; No muscle, back, or extremity pain  PSYCHIATRIC: No depression, anxiety, mood swings, or difficulty sleeping  HEME/LYMPH: No easy bruising, or bleeding gums  ALLERY AND IMMUNOLOGIC: No hives or eczema    Vital Signs Last 24 Hrs  T(C): 36.8 (19 Sep 2021 17:09), Max: 37.9 (19 Sep 2021 11:24)  T(F): 98.2 (19 Sep 2021 17:09), Max: 100.2 (19 Sep 2021 11:24)  HR: 84 (19 Sep 2021 17:09) (84 - 96)  BP: 130/77 (19 Sep 2021 17:09) (107/67 - 138/74)  BP(mean): --  RR: 17 (19 Sep 2021 17:09) (17 - 18)  SpO2: 94% (19 Sep 2021 17:09) (94% - 95%)    PHYSICAL EXAM:  GENERAL:   HEAD: Atraumatic, Normocephalic  EYES: PERRLA, conjunctiva and sclera clear  ENMT: No  exudates,; Moist mucous membranes,, No lesions  NECK: Supple, No JVD, Normal thyroid  NERVOUS SYSTEM:  Alert & Oriented,   CHEST/LUNG: Clear to auscultation bilaterally; No rales, rhonchi, wheezing, or rubs  HEART: Regular rate and rhythm; No murmurs, rubs, or gallops  ABDOMEN: Soft, Nontender, Nondistended; Bowel sounds present  EXTREMITIES:  2+ Peripheral Pulses, no edema  SKIN: No rashes or lesions    LABS:        CAPILLARY BLOOD GLUCOSE      POCT Blood Glucose.: 193 mg/dL (19 Sep 2021 16:39)  POCT Blood Glucose.: 366 mg/dL (19 Sep 2021 11:09)  POCT Blood Glucose.: 296 mg/dL (19 Sep 2021 07:46)  POCT Blood Glucose.: 309 mg/dL (19 Sep 2021 06:32)  POCT Blood Glucose.: 451 mg/dL (19 Sep 2021 00:48)  POCT Blood Glucose.: 481 mg/dL (18 Sep 2021 22:56)  POCT Blood Glucose.: 462 mg/dL (18 Sep 2021 22:54)    Lipid panel:           Thyroid:  Diabetes Tests:  Parathyroid Panel:  Adrenals:  RADIOLOGY & ADDITIONAL TESTS:    Imaging Personally Reviewed:  [ ] YES  [ ] NO    Consultant(s) Notes Reviewed:  [ ] YES  [ ] NO    Care Discussed with Consultants/Other Providers [ ] YES  [ ] NO

## 2021-09-19 NOTE — PROGRESS NOTE ADULT - SUBJECTIVE AND OBJECTIVE BOX
Patient: NELSON ZEPEDA [id number] 65y Female                            Hospitalist Attending Note    FS remains elevated.   Denies bleeding.  No fatigue.  No pain  No chest pain.    No additional complaints.     ____________________PHYSICAL EXAM:  GENERAL:  NAD Alert and Oriented  to person, place.   HEENT: NCAT  CARDIOVASCULAR:  S1, S2  LUNGS: CTAB  ABDOMEN:  soft, (-) tenderness, (-) distension, (+) bowel sounds, (-) guarding, (-) rebound (-) rigidity  EXTREMITIES:  no cyanosis / clubbing / edema.   R stump dressing in place.   NEURO: strength symmetric.   ____________________    VITALS:  Vital Signs Last 24 Hrs  T(C): 37.9 (19 Sep 2021 11:24), Max: 37.9 (19 Sep 2021 11:24)  T(F): 100.2 (19 Sep 2021 11:24), Max: 100.2 (19 Sep 2021 11:24)  HR: 96 (19 Sep 2021 11:24) (94 - 105)  BP: 107/67 (19 Sep 2021 11:24) (107/67 - 138/74)  BP(mean): --  RR: 18 (19 Sep 2021 11:24) (17 - 18)  SpO2: 95% (19 Sep 2021 11:24) (94% - 95%) Daily     Daily   CAPILLARY BLOOD GLUCOSE      POCT Blood Glucose.: 366 mg/dL (19 Sep 2021 11:09)  POCT Blood Glucose.: 296 mg/dL (19 Sep 2021 07:46)  POCT Blood Glucose.: 309 mg/dL (19 Sep 2021 06:32)  POCT Blood Glucose.: 451 mg/dL (19 Sep 2021 00:48)  POCT Blood Glucose.: 481 mg/dL (18 Sep 2021 22:56)  POCT Blood Glucose.: 462 mg/dL (18 Sep 2021 22:54)  POCT Blood Glucose.: 353 mg/dL (18 Sep 2021 17:19)    I&O's Summary    18 Sep 2021 07:01  -  19 Sep 2021 07:00  --------------------------------------------------------  IN: 400 mL / OUT: 900 mL / NET: -500 mL        LABS:                        6.8    9.25  )-----------( 465      ( 19 Sep 2021 12:25 )             20.8     09-19    136  |  104  |  13  ----------------------------<  324<H>  4.1   |  30  |  0.79    Ca    8.0<L>      19 Sep 2021 12:25                    MEDICATIONS:  acetaminophen   Tablet .. 650 milliGRAM(s) Oral every 6 hours PRN  aspirin enteric coated 81 milliGRAM(s) Oral daily  atorvastatin 40 milliGRAM(s) Oral at bedtime  bisacodyl Suppository 10 milliGRAM(s) Rectal daily PRN  chlorhexidine 2% Cloths 1 Application(s) Topical <User Schedule>  dextrose 40% Gel 15 Gram(s) Oral once  dextrose 40% Gel 15 Gram(s) Oral once  dextrose 5%. 1000 milliLiter(s) IV Continuous <Continuous>  dextrose 5%. 1000 milliLiter(s) IV Continuous <Continuous>  dextrose 5%. 1000 milliLiter(s) IV Continuous <Continuous>  dextrose 5%. 1000 milliLiter(s) IV Continuous <Continuous>  dextrose 5%. 1000 milliLiter(s) IV Continuous <Continuous>  dextrose 50% Injectable 25 Gram(s) IV Push once  dextrose 50% Injectable 12.5 Gram(s) IV Push once  dextrose 50% Injectable 25 Gram(s) IV Push once  ferrous    sulfate 325 milliGRAM(s) Oral two times a day  glucagon  Injectable 1 milliGRAM(s) IntraMuscular once  glucagon  Injectable 1 milliGRAM(s) IntraMuscular once  insulin glargine Injectable (LANTUS) 32 Unit(s) SubCutaneous at bedtime  insulin lispro (ADMELOG) corrective regimen sliding scale   SubCutaneous three times a day before meals  insulin lispro (ADMELOG) corrective regimen sliding scale   SubCutaneous at bedtime  insulin lispro Injectable (ADMELOG) 8 Unit(s) SubCutaneous before breakfast  insulin lispro Injectable (ADMELOG) 8 Unit(s) SubCutaneous before lunch  insulin lispro Injectable (ADMELOG) 8 Unit(s) SubCutaneous before dinner  metFORMIN 500 milliGRAM(s) Oral daily  metoprolol tartrate 12.5 milliGRAM(s) Oral every 12 hours  polyethylene glycol 3350 17 Gram(s) Oral daily  senna 2 Tablet(s) Oral at bedtime

## 2021-09-20 LAB
ANION GAP SERPL CALC-SCNC: 3 MMOL/L — LOW (ref 5–17)
BUN SERPL-MCNC: 12 MG/DL — SIGNIFICANT CHANGE UP (ref 7–23)
CALCIUM SERPL-MCNC: 8 MG/DL — LOW (ref 8.5–10.1)
CHLORIDE SERPL-SCNC: 106 MMOL/L — SIGNIFICANT CHANGE UP (ref 96–108)
CO2 SERPL-SCNC: 30 MMOL/L — SIGNIFICANT CHANGE UP (ref 22–31)
CREAT SERPL-MCNC: 0.68 MG/DL — SIGNIFICANT CHANGE UP (ref 0.5–1.3)
GLUCOSE BLDC GLUCOMTR-MCNC: 132 MG/DL — HIGH (ref 70–99)
GLUCOSE BLDC GLUCOMTR-MCNC: 133 MG/DL — HIGH (ref 70–99)
GLUCOSE BLDC GLUCOMTR-MCNC: 141 MG/DL — HIGH (ref 70–99)
GLUCOSE BLDC GLUCOMTR-MCNC: 207 MG/DL — HIGH (ref 70–99)
GLUCOSE SERPL-MCNC: 203 MG/DL — HIGH (ref 70–99)
HCT VFR BLD CALC: 21.5 % — LOW (ref 34.5–45)
HGB BLD-MCNC: 6.9 G/DL — CRITICAL LOW (ref 11.5–15.5)
MCHC RBC-ENTMCNC: 28.2 PG — SIGNIFICANT CHANGE UP (ref 27–34)
MCHC RBC-ENTMCNC: 32.1 GM/DL — SIGNIFICANT CHANGE UP (ref 32–36)
MCV RBC AUTO: 87.8 FL — SIGNIFICANT CHANGE UP (ref 80–100)
NRBC # BLD: 0 /100 WBCS — SIGNIFICANT CHANGE UP (ref 0–0)
PLATELET # BLD AUTO: 497 K/UL — HIGH (ref 150–400)
POTASSIUM SERPL-MCNC: 3.5 MMOL/L — SIGNIFICANT CHANGE UP (ref 3.5–5.3)
POTASSIUM SERPL-SCNC: 3.5 MMOL/L — SIGNIFICANT CHANGE UP (ref 3.5–5.3)
RBC # BLD: 2.45 M/UL — LOW (ref 3.8–5.2)
RBC # FLD: 14.7 % — HIGH (ref 10.3–14.5)
SODIUM SERPL-SCNC: 139 MMOL/L — SIGNIFICANT CHANGE UP (ref 135–145)
WBC # BLD: 7.58 K/UL — SIGNIFICANT CHANGE UP (ref 3.8–10.5)
WBC # FLD AUTO: 7.58 K/UL — SIGNIFICANT CHANGE UP (ref 3.8–10.5)

## 2021-09-20 PROCEDURE — 99233 SBSQ HOSP IP/OBS HIGH 50: CPT

## 2021-09-20 RX ORDER — ACETAMINOPHEN 500 MG
1000 TABLET ORAL ONCE
Refills: 0 | Status: COMPLETED | OUTPATIENT
Start: 2021-09-20 | End: 2021-09-20

## 2021-09-20 RX ORDER — MORPHINE SULFATE 50 MG/1
2 CAPSULE, EXTENDED RELEASE ORAL ONCE
Refills: 0 | Status: DISCONTINUED | OUTPATIENT
Start: 2021-09-20 | End: 2021-09-20

## 2021-09-20 RX ORDER — INSULIN GLARGINE 100 [IU]/ML
16 INJECTION, SOLUTION SUBCUTANEOUS AT BEDTIME
Refills: 0 | Status: DISCONTINUED | OUTPATIENT
Start: 2021-09-20 | End: 2021-09-23

## 2021-09-20 RX ADMIN — Medication 8 UNIT(S): at 08:40

## 2021-09-20 RX ADMIN — Medication 4: at 08:40

## 2021-09-20 RX ADMIN — METFORMIN HYDROCHLORIDE 500 MILLIGRAM(S): 850 TABLET ORAL at 18:52

## 2021-09-20 RX ADMIN — MORPHINE SULFATE 2 MILLIGRAM(S): 50 CAPSULE, EXTENDED RELEASE ORAL at 23:41

## 2021-09-20 RX ADMIN — SENNA PLUS 2 TABLET(S): 8.6 TABLET ORAL at 21:38

## 2021-09-20 RX ADMIN — Medication 12.5 MILLIGRAM(S): at 19:18

## 2021-09-20 RX ADMIN — CHLORHEXIDINE GLUCONATE 1 APPLICATION(S): 213 SOLUTION TOPICAL at 05:43

## 2021-09-20 RX ADMIN — ATORVASTATIN CALCIUM 40 MILLIGRAM(S): 80 TABLET, FILM COATED ORAL at 21:35

## 2021-09-20 RX ADMIN — INSULIN GLARGINE 16 UNIT(S): 100 INJECTION, SOLUTION SUBCUTANEOUS at 22:45

## 2021-09-20 RX ADMIN — Medication 8 UNIT(S): at 11:46

## 2021-09-20 RX ADMIN — Medication 325 MILLIGRAM(S): at 05:43

## 2021-09-20 RX ADMIN — Medication 325 MILLIGRAM(S): at 18:51

## 2021-09-20 RX ADMIN — Medication 12.5 MILLIGRAM(S): at 05:42

## 2021-09-20 RX ADMIN — MORPHINE SULFATE 2 MILLIGRAM(S): 50 CAPSULE, EXTENDED RELEASE ORAL at 23:21

## 2021-09-20 RX ADMIN — Medication 650 MILLIGRAM(S): at 13:25

## 2021-09-20 RX ADMIN — Medication 8 UNIT(S): at 17:04

## 2021-09-20 RX ADMIN — Medication 400 MILLIGRAM(S): at 18:48

## 2021-09-20 RX ADMIN — Medication 81 MILLIGRAM(S): at 11:46

## 2021-09-20 NOTE — PROGRESS NOTE ADULT - SUBJECTIVE AND OBJECTIVE BOX
Patient: NELSON ZEPEDA [id number] 65y Female                            Hospitalist Attending Note    Denies bleeding.  No chest pain / palpitations.   She identifies herself as an RN.  States she had reported to be a [employer]ism yesterday to avoid transfusion, but admits that she is not truly a [employer]ism.     ____________________PHYSICAL EXAM:  GENERAL:  NAD.  Alert and Oriented x 3   HEENT: NCAT  CARDIOVASCULAR:  S1, S2  LUNGS: CTAB  ABDOMEN:  soft, (-) tenderness, (-) distension, (+) bowel sounds, (-) guarding, (-) rebound (-) rigidity  EXTREMITIES:  no cyanosis / clubbing / edema.   R stump dressing in place.   NEURO: strength symmetric.   ____________________    VITALS:  Vital Signs Last 24 Hrs  T(C): 36.4 (20 Sep 2021 12:04), Max: 37.1 (20 Sep 2021 05:07)  T(F): 97.5 (20 Sep 2021 12:04), Max: 98.8 (20 Sep 2021 05:07)  HR: 93 (20 Sep 2021 12:04) (69 - 93)  BP: 125/60 (20 Sep 2021 12:04) (125/60 - 155/72)  BP(mean): --  RR: 18 (20 Sep 2021 12:04) (17 - 18)  SpO2: 97% (20 Sep 2021 12:04) (96% - 97%) Daily     Daily   CAPILLARY BLOOD GLUCOSE      POCT Blood Glucose.: 132 mg/dL (20 Sep 2021 16:08)  POCT Blood Glucose.: 141 mg/dL (20 Sep 2021 11:42)  POCT Blood Glucose.: 207 mg/dL (20 Sep 2021 08:31)  POCT Blood Glucose.: 172 mg/dL (19 Sep 2021 21:57)    I&O's Summary    19 Sep 2021 07:01  -  20 Sep 2021 07:00  --------------------------------------------------------  IN: 0 mL / OUT: 200 mL / NET: -200 mL    20 Sep 2021 07:01  -  20 Sep 2021 17:39  --------------------------------------------------------  IN: 340 mL / OUT: 0 mL / NET: 340 mL        LABS:                        6.9    7.58  )-----------( 497      ( 20 Sep 2021 07:58 )             21.5     09-20    139  |  106  |  12  ----------------------------<  203<H>  3.5   |  30  |  0.68    Ca    8.0<L>      20 Sep 2021 07:58                    MEDICATIONS:  acetaminophen   Tablet .. 650 milliGRAM(s) Oral every 6 hours PRN  acetaminophen  IVPB .. 1000 milliGRAM(s) IV Intermittent once  aspirin enteric coated 81 milliGRAM(s) Oral daily  atorvastatin 40 milliGRAM(s) Oral at bedtime  bisacodyl Suppository 10 milliGRAM(s) Rectal daily PRN  chlorhexidine 2% Cloths 1 Application(s) Topical <User Schedule>  dextrose 40% Gel 15 Gram(s) Oral once  dextrose 40% Gel 15 Gram(s) Oral once  dextrose 5%. 1000 milliLiter(s) IV Continuous <Continuous>  dextrose 5%. 1000 milliLiter(s) IV Continuous <Continuous>  dextrose 5%. 1000 milliLiter(s) IV Continuous <Continuous>  dextrose 5%. 1000 milliLiter(s) IV Continuous <Continuous>  dextrose 5%. 1000 milliLiter(s) IV Continuous <Continuous>  dextrose 50% Injectable 25 Gram(s) IV Push once  dextrose 50% Injectable 12.5 Gram(s) IV Push once  dextrose 50% Injectable 25 Gram(s) IV Push once  ferrous    sulfate 325 milliGRAM(s) Oral two times a day  glucagon  Injectable 1 milliGRAM(s) IntraMuscular once  glucagon  Injectable 1 milliGRAM(s) IntraMuscular once  insulin glargine Injectable (LANTUS) 32 Unit(s) SubCutaneous at bedtime  insulin lispro (ADMELOG) corrective regimen sliding scale   SubCutaneous three times a day before meals  insulin lispro (ADMELOG) corrective regimen sliding scale   SubCutaneous at bedtime  insulin lispro Injectable (ADMELOG) 8 Unit(s) SubCutaneous before breakfast  insulin lispro Injectable (ADMELOG) 8 Unit(s) SubCutaneous before lunch  insulin lispro Injectable (ADMELOG) 8 Unit(s) SubCutaneous before dinner  metFORMIN 500 milliGRAM(s) Oral two times a day  metoprolol tartrate 12.5 milliGRAM(s) Oral every 12 hours  polyethylene glycol 3350 17 Gram(s) Oral daily  senna 2 Tablet(s) Oral at bedtime

## 2021-09-20 NOTE — PROVIDER CONTACT NOTE (CRITICAL VALUE NOTIFICATION) - BACKGROUND
Pt s/p Left foot chopart amputation on 9/10
Pt  was admitted for spesis and diabetic foot ulcer on 9/10. Pt had multiple pressure ulcers and wound vac applied
History of diabetes, MI, hypeglycemia, rhabdomyolosis, GEOVANNI, necrotizing fasciitis.

## 2021-09-20 NOTE — PROGRESS NOTE ADULT - ASSESSMENT
Pt s/p LF Choparts amp 9/10/21  - pt was seen and examined  - s/p LF choparts amputation left open (9/10):  Open surgical wound with fibroglanular base, skin edges at level of amputation appear viable with mild maceration, wound edges warm, no purulent drainage, no malodor, no active bleeding.   - Podiatry will continue to monitor and assess for salvageability of the L foot  - L foot soft tissue appears viable today  - Continue wound vac, no acute signs of infection  -Discussed w/ attending

## 2021-09-20 NOTE — PROGRESS NOTE ADULT - ASSESSMENT
64yo F PMH of IDDM, HTN, HLD, diabetic retinopathy initially admitted to MICU for sepsis due to R foot necrosis, gas gangrene and necrotizing fascitis.  S/p R foot TMA, seen by vascular, podiatry and ID.  Started on IV Abx.  Noted to have troponinemia 0.468, lateral TWIs on EKG.     # Fe deficiency Anemia - likely due to chronic disease.  H/H has been fluctuating, now 7.0.  Stool OB negative.  Lovenox stopped.  Continue ASA.  Per daughter Monica, pt had been awaiting outpatient colonoscopy and anemia workup.  In setting of NSTEMI, will transfuse to goal Hgb > 9.  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    # R foot necrosis / Necrotizing fasciitis / Gas Gangrene - s/p TMA 9/10.  Off antibiotics.  Observe for source control.  Wound vac placed, podiatry still concerned for possible need for BKA.    # Metabolic Encephalopathy - improving.  Mental status closer to baseline per family.    # NSTEMI - trop now stable.  Cardiology following.  Ischemic workup as outpatient discussed with cardiology.  Continue ASA, Statin, BBlocker.  # Diabetes Type II - monitor fingersticks.  Insulin coverage for hyperglycemia.  FS remain elevated.  Increase Lantus.  Continue premeal insulin.  Discussed with Dr. Urbina.   # Essential HTN - Monitor BP.  Continue oral antihypertensives.  # Hyperlipidemia - diet modification.  Continue Statin.  # Inpatient DVT Prophylaxis - ICDs.     Plan of care d/w son, daughter Monica [phone] / [phone] on 9/15, 9/19  Tentatively plan for ARETHA placement 9/21

## 2021-09-20 NOTE — PROVIDER CONTACT NOTE (CRITICAL VALUE NOTIFICATION) - NAME OF MD/NP/PA/DO NOTIFIED:
Romeo Kowalski
Dr. Sauer
HENRY Cesar
HENRY Smith
MARISOL Conklin
MD Velazco
Norma Kerr Np
HENRY Rick

## 2021-09-20 NOTE — PROGRESS NOTE ADULT - SUBJECTIVE AND OBJECTIVE BOX
Patient is a 65y old  Female who presents with a chief complaint of AMS, DKA, Necrotic foot infection (20 Sep 2021 17:36)      Interval History: finger sticks are in 100s   on Lantus 32 units and prandial lispro 8 units and Metformin 500 mg BID   decreased Insulin Resistance and glucose toxicity     MEDICATIONS  (STANDING):  aspirin enteric coated 81 milliGRAM(s) Oral daily  atorvastatin 40 milliGRAM(s) Oral at bedtime  chlorhexidine 2% Cloths 1 Application(s) Topical <User Schedule>  dextrose 40% Gel 15 Gram(s) Oral once  dextrose 40% Gel 15 Gram(s) Oral once  dextrose 5%. 1000 milliLiter(s) (50 mL/Hr) IV Continuous <Continuous>  dextrose 5%. 1000 milliLiter(s) (100 mL/Hr) IV Continuous <Continuous>  dextrose 5%. 1000 milliLiter(s) (50 mL/Hr) IV Continuous <Continuous>  dextrose 5%. 1000 milliLiter(s) (100 mL/Hr) IV Continuous <Continuous>  dextrose 5%. 1000 milliLiter(s) (50 mL/Hr) IV Continuous <Continuous>  dextrose 50% Injectable 25 Gram(s) IV Push once  dextrose 50% Injectable 12.5 Gram(s) IV Push once  dextrose 50% Injectable 25 Gram(s) IV Push once  ferrous    sulfate 325 milliGRAM(s) Oral two times a day  glucagon  Injectable 1 milliGRAM(s) IntraMuscular once  glucagon  Injectable 1 milliGRAM(s) IntraMuscular once  insulin glargine Injectable (LANTUS) 16 Unit(s) SubCutaneous at bedtime  insulin lispro (ADMELOG) corrective regimen sliding scale   SubCutaneous three times a day before meals  insulin lispro (ADMELOG) corrective regimen sliding scale   SubCutaneous at bedtime  insulin lispro Injectable (ADMELOG) 8 Unit(s) SubCutaneous before breakfast  insulin lispro Injectable (ADMELOG) 8 Unit(s) SubCutaneous before lunch  insulin lispro Injectable (ADMELOG) 8 Unit(s) SubCutaneous before dinner  metFORMIN 500 milliGRAM(s) Oral two times a day  metoprolol tartrate 12.5 milliGRAM(s) Oral every 12 hours  polyethylene glycol 3350 17 Gram(s) Oral daily  senna 2 Tablet(s) Oral at bedtime    MEDICATIONS  (PRN):  acetaminophen   Tablet .. 650 milliGRAM(s) Oral every 6 hours PRN Mild Pain (1 - 3)  bisacodyl Suppository 10 milliGRAM(s) Rectal daily PRN Constipation      Allergies    avocado (Pruritus)  Carrots (Pruritus)  No Known Drug Allergies  Plums (Pruritus)    Intolerances        REVIEW OF SYSTEMS:  CONSTITUTIONAL: no changes  EYES: No eye pain, visual disturbances, or discharge  ENMT:  No difficulty hearing, No sinus or throat pain  NECK: No pain or stiffness  RESPIRATORY: No cough, wheezing, chills or hemoptysis; No shortness of breath  CARDIOVASCULAR: No chest pain, palpitations or leg swelling  GASTROINTESTINAL: No abdominal or epigastric pain. No nausea, vomiting, or hematemesis; No diarrhea or constipation. No melena or hematochezia.  GENITOURINARY: No dysuria, frequency, hematuria, or incontinence  NEUROLOGICAL: No headaches, memory loss, loss of strength, numbness, or tremors  SKIN: No itching, burning, rashes, or lesions   ENDOCRINE: No heat or cold intolerance; No hair loss  MUSCULOSKELETAL: No joint pain or swelling; No muscle, back, or extremity pain  PSYCHIATRIC: No depression, anxiety, mood swings, or difficulty sleeping  HEME/LYMPH: No easy bruising, or bleeding gums  ALLERY AND IMMUNOLOGIC: No hives or eczema    Vital Signs Last 24 Hrs  T(C): 36.6 (20 Sep 2021 21:25), Max: 37.9 (20 Sep 2021 15:46)  T(F): 97.8 (20 Sep 2021 21:25), Max: 100.2 (20 Sep 2021 15:46)  HR: 78 (20 Sep 2021 21:25) (69 - 113)  BP: 133/81 (20 Sep 2021 21:25) (124/62 - 167/84)  BP(mean): --  RR: 18 (20 Sep 2021 21:25) (17 - 19)  SpO2: 95% (20 Sep 2021 21:25) (95% - 98%)    PHYSICAL EXAM:  GENERAL:   HEAD: Atraumatic, Normocephalic  EYES: PERRLA, conjunctiva and sclera clear  ENMT: No  exudates,; Moist mucous membranes,, No lesions  NECK: Supple, No JVD, Normal thyroid  NERVOUS SYSTEM:  Alert & Oriented,   CHEST/LUNG: Clear to auscultation bilaterally; No rales, rhonchi, wheezing, or rubs  HEART: Regular rate and rhythm; No murmurs, rubs, or gallops  ABDOMEN: Soft, Nontender, Nondistended; Bowel sounds present  EXTREMITIES:  2+ Peripheral Pulses, no edema  SKIN: No rashes or lesions    LABS:        CAPILLARY BLOOD GLUCOSE      POCT Blood Glucose.: 133 mg/dL (20 Sep 2021 21:20)  POCT Blood Glucose.: 132 mg/dL (20 Sep 2021 16:08)  POCT Blood Glucose.: 141 mg/dL (20 Sep 2021 11:42)  POCT Blood Glucose.: 207 mg/dL (20 Sep 2021 08:31)    Lipid panel:           Thyroid:  Diabetes Tests:  Parathyroid Panel:  Adrenals:  RADIOLOGY & ADDITIONAL TESTS:    Imaging Personally Reviewed:  [ ] YES  [ ] NO    Consultant(s) Notes Reviewed:  [ ] YES  [ ] NO    Care Discussed with Consultants/Other Providers [ ] YES  [ ] NO

## 2021-09-20 NOTE — PROGRESS NOTE ADULT - SUBJECTIVE AND OBJECTIVE BOX
Patient is a 65y old  Female who presents with a chief complaint of AMS, DKA, Necrotic foot infection (19 Sep 2021 21:20)       INTERVAL HPI/OVERNIGHT EVENTS:  Patient seen and evaluated at bedside.  Pt is resting comfortable in NAD. Denies N/V/F/C.      Allergies    avocado (Pruritus)  Carrots (Pruritus)  No Known Drug Allergies  Plums (Pruritus)    Intolerances        Vital Signs Last 24 Hrs  T(C): 37.1 (20 Sep 2021 05:07), Max: 37.9 (19 Sep 2021 11:24)  T(F): 98.8 (20 Sep 2021 05:07), Max: 100.2 (19 Sep 2021 11:24)  HR: 93 (20 Sep 2021 05:07) (69 - 96)  BP: 145/71 (20 Sep 2021 05:07) (107/67 - 155/72)  BP(mean): --  RR: 17 (20 Sep 2021 05:07) (17 - 18)  SpO2: 96% (20 Sep 2021 05:07) (94% - 97%)    LABS:                        6.8    9.25  )-----------( 465      ( 19 Sep 2021 12:25 )             20.8     09-19    136  |  104  |  13  ----------------------------<  324<H>  4.1   |  30  |  0.79    Ca    8.0<L>      19 Sep 2021 12:25          CAPILLARY BLOOD GLUCOSE      POCT Blood Glucose.: 172 mg/dL (19 Sep 2021 21:57)  POCT Blood Glucose.: 193 mg/dL (19 Sep 2021 16:39)  POCT Blood Glucose.: 366 mg/dL (19 Sep 2021 11:09)      Lower Extremity Physical Exam:  Vascular: RF DP NP, PT NP, LF NP pulses, B/L,Temperature gradient LF warm to touch   Neuro: Epicritic sensation absent to the level of digits B/L.  Musculoskeletal/Ortho:  s/p LF choparts amputation left open (9/10):  Open surgical wound with fibroglanular base, skin edges at level of amputation appear viable with mild maceration, wound edges warm, no purulent drainage, no malodor, no active bleeding.     RADIOLOGY & ADDITIONAL TESTS:

## 2021-09-20 NOTE — PROVIDER CONTACT NOTE (CRITICAL VALUE NOTIFICATION) - SITUATION
Pt received Alert, stable and asymptomatic
Admitted for sepsis, diabetic foot ulcer of necrosis of muscle.
Pt has critical h/h of 6.9/21.5 and it was previously 6.8/20.8

## 2021-09-20 NOTE — PROVIDER CONTACT NOTE (CRITICAL VALUE NOTIFICATION) - ASSESSMENT
Pt is asymptomatic for any visible signs of bleeding .
VSS, patient is alert but confused.
Dressing noted on left foot intact non odorous and pt denies any presence of tingling or numbness

## 2021-09-20 NOTE — PROVIDER CONTACT NOTE (CRITICAL VALUE NOTIFICATION) - PERSON GIVING RESULT:
Ariel montano
Lab dept Alejandro Kimble
Lakeshia/ Clinical labs
lab
deanna mace
Chanel Simpson
Archana Lara
Lab: Kassi Rodriguez

## 2021-09-20 NOTE — PROVIDER CONTACT NOTE (CRITICAL VALUE NOTIFICATION) - ACTION/TREATMENT ORDERED:
Echo ordered.
provider aware
Potassium replacement
none given
NP will follow up with ortho regarding administering blood. F/u on occult blood test.
Pt is to receive 2 units of PRBCs
continue to monitor

## 2021-09-20 NOTE — PROGRESS NOTE ADULT - ASSESSMENT
Patient is a 65y old  Female who presents with a chief complaint of AMS, DKA, Necrotic foot infection (20 Sep 2021 17:36)      Interval History:    MEDICATIONS  (STANDING):  aspirin enteric coated 81 milliGRAM(s) Oral daily  atorvastatin 40 milliGRAM(s) Oral at bedtime  chlorhexidine 2% Cloths 1 Application(s) Topical <User Schedule>  dextrose 40% Gel 15 Gram(s) Oral once  dextrose 40% Gel 15 Gram(s) Oral once  dextrose 5%. 1000 milliLiter(s) (100 mL/Hr) IV Continuous <Continuous>  dextrose 5%. 1000 milliLiter(s) (50 mL/Hr) IV Continuous <Continuous>  dextrose 5%. 1000 milliLiter(s) (50 mL/Hr) IV Continuous <Continuous>  dextrose 5%. 1000 milliLiter(s) (50 mL/Hr) IV Continuous <Continuous>  dextrose 5%. 1000 milliLiter(s) (100 mL/Hr) IV Continuous <Continuous>  dextrose 50% Injectable 25 Gram(s) IV Push once  dextrose 50% Injectable 12.5 Gram(s) IV Push once  dextrose 50% Injectable 25 Gram(s) IV Push once  ferrous    sulfate 325 milliGRAM(s) Oral two times a day  glucagon  Injectable 1 milliGRAM(s) IntraMuscular once  glucagon  Injectable 1 milliGRAM(s) IntraMuscular once  insulin glargine Injectable (LANTUS) 16 Unit(s) SubCutaneous at bedtime  insulin lispro (ADMELOG) corrective regimen sliding scale   SubCutaneous three times a day before meals  insulin lispro (ADMELOG) corrective regimen sliding scale   SubCutaneous at bedtime  insulin lispro Injectable (ADMELOG) 8 Unit(s) SubCutaneous before breakfast  insulin lispro Injectable (ADMELOG) 8 Unit(s) SubCutaneous before lunch  insulin lispro Injectable (ADMELOG) 8 Unit(s) SubCutaneous before dinner  metFORMIN 500 milliGRAM(s) Oral two times a day  metoprolol tartrate 12.5 milliGRAM(s) Oral every 12 hours  polyethylene glycol 3350 17 Gram(s) Oral daily  senna 2 Tablet(s) Oral at bedtime    MEDICATIONS  (PRN):  acetaminophen   Tablet .. 650 milliGRAM(s) Oral every 6 hours PRN Mild Pain (1 - 3)  bisacodyl Suppository 10 milliGRAM(s) Rectal daily PRN Constipation      Allergies    avocado (Pruritus)  Carrots (Pruritus)  No Known Drug Allergies  Plums (Pruritus)    Intolerances        REVIEW OF SYSTEMS:  CONSTITUTIONAL: no changes  EYES: No eye pain, visual disturbances, or discharge  ENMT:  No difficulty hearing, No sinus or throat pain  NECK: No pain or stiffness  RESPIRATORY: No cough, wheezing, chills or hemoptysis; No shortness of breath  CARDIOVASCULAR: No chest pain, palpitations or leg swelling  GASTROINTESTINAL: No abdominal or epigastric pain. No nausea, vomiting, or hematemesis; No diarrhea or constipation. No melena or hematochezia.  GENITOURINARY: No dysuria, frequency, hematuria, or incontinence  NEUROLOGICAL: No headaches, memory loss, loss of strength, numbness, or tremors  SKIN: No itching, burning, rashes, or lesions   ENDOCRINE: No heat or cold intolerance; No hair loss  MUSCULOSKELETAL: No joint pain or swelling; No muscle, back, or extremity pain  PSYCHIATRIC: No depression, anxiety, mood swings, or difficulty sleeping  HEME/LYMPH: No easy bruising, or bleeding gums  ALLERY AND IMMUNOLOGIC: No hives or eczema    Vital Signs Last 24 Hrs  T(C): 36.6 (20 Sep 2021 21:25), Max: 37.9 (20 Sep 2021 15:46)  T(F): 97.8 (20 Sep 2021 21:25), Max: 100.2 (20 Sep 2021 15:46)  HR: 78 (20 Sep 2021 21:25) (69 - 113)  BP: 133/81 (20 Sep 2021 21:25) (124/62 - 167/84)  BP(mean): --  RR: 18 (20 Sep 2021 21:25) (17 - 19)  SpO2: 95% (20 Sep 2021 21:25) (95% - 98%)    PHYSICAL EXAM:  GENERAL:   HEAD: Atraumatic, Normocephalic  EYES: PERRLA, conjunctiva and sclera clear  ENMT: No  exudates,; Moist mucous membranes,, No lesions  NECK: Supple, No JVD, Normal thyroid  NERVOUS SYSTEM:  Alert & Oriented,   CHEST/LUNG: Clear to auscultation bilaterally; No rales, rhonchi, wheezing, or rubs  HEART: Regular rate and rhythm; No murmurs, rubs, or gallops  ABDOMEN: Soft, Nontender, Nondistended; Bowel sounds present  EXTREMITIES:  2+ Peripheral Pulses, no edema  SKIN: No rashes or lesions    LABS:        CAPILLARY BLOOD GLUCOSE      POCT Blood Glucose.: 133 mg/dL (20 Sep 2021 21:20)  POCT Blood Glucose.: 132 mg/dL (20 Sep 2021 16:08)  POCT Blood Glucose.: 141 mg/dL (20 Sep 2021 11:42)  POCT Blood Glucose.: 207 mg/dL (20 Sep 2021 08:31)    Lipid panel:           Thyroid:  Diabetes Tests:  Parathyroid Panel:  Adrenals:  RADIOLOGY & ADDITIONAL TESTS:    Imaging Personally Reviewed:  [ ] YES  [ ] NO    Consultant(s) Notes Reviewed:  [ ] YES  [ ] NO    Care Discussed with Consultants/Other Providers [ ] YES  [ ] NO

## 2021-09-21 LAB
ANION GAP SERPL CALC-SCNC: 6 MMOL/L — SIGNIFICANT CHANGE UP (ref 5–17)
BUN SERPL-MCNC: 12 MG/DL — SIGNIFICANT CHANGE UP (ref 7–23)
CALCIUM SERPL-MCNC: 9.3 MG/DL — SIGNIFICANT CHANGE UP (ref 8.5–10.1)
CHLORIDE SERPL-SCNC: 103 MMOL/L — SIGNIFICANT CHANGE UP (ref 96–108)
CO2 SERPL-SCNC: 26 MMOL/L — SIGNIFICANT CHANGE UP (ref 22–31)
CREAT SERPL-MCNC: 0.56 MG/DL — SIGNIFICANT CHANGE UP (ref 0.5–1.3)
GLUCOSE BLDC GLUCOMTR-MCNC: 158 MG/DL — HIGH (ref 70–99)
GLUCOSE BLDC GLUCOMTR-MCNC: 204 MG/DL — HIGH (ref 70–99)
GLUCOSE BLDC GLUCOMTR-MCNC: 238 MG/DL — HIGH (ref 70–99)
GLUCOSE BLDC GLUCOMTR-MCNC: 277 MG/DL — HIGH (ref 70–99)
GLUCOSE SERPL-MCNC: 267 MG/DL — HIGH (ref 70–99)
HCT VFR BLD CALC: 29.8 % — LOW (ref 34.5–45)
HGB BLD-MCNC: 10 G/DL — LOW (ref 11.5–15.5)
MCHC RBC-ENTMCNC: 29 PG — SIGNIFICANT CHANGE UP (ref 27–34)
MCHC RBC-ENTMCNC: 33.6 GM/DL — SIGNIFICANT CHANGE UP (ref 32–36)
MCV RBC AUTO: 86.4 FL — SIGNIFICANT CHANGE UP (ref 80–100)
NRBC # BLD: 0 /100 WBCS — SIGNIFICANT CHANGE UP (ref 0–0)
PLATELET # BLD AUTO: 461 K/UL — HIGH (ref 150–400)
POTASSIUM SERPL-MCNC: 3.7 MMOL/L — SIGNIFICANT CHANGE UP (ref 3.5–5.3)
POTASSIUM SERPL-SCNC: 3.7 MMOL/L — SIGNIFICANT CHANGE UP (ref 3.5–5.3)
RBC # BLD: 3.45 M/UL — LOW (ref 3.8–5.2)
RBC # FLD: 14.4 % — SIGNIFICANT CHANGE UP (ref 10.3–14.5)
SODIUM SERPL-SCNC: 135 MMOL/L — SIGNIFICANT CHANGE UP (ref 135–145)
WBC # BLD: 7.16 K/UL — SIGNIFICANT CHANGE UP (ref 3.8–10.5)
WBC # FLD AUTO: 7.16 K/UL — SIGNIFICANT CHANGE UP (ref 3.8–10.5)

## 2021-09-21 PROCEDURE — 99232 SBSQ HOSP IP/OBS MODERATE 35: CPT

## 2021-09-21 RX ORDER — OXYCODONE AND ACETAMINOPHEN 5; 325 MG/1; MG/1
1 TABLET ORAL ONCE
Refills: 0 | Status: DISCONTINUED | OUTPATIENT
Start: 2021-09-21 | End: 2021-09-21

## 2021-09-21 RX ADMIN — Medication 8 UNIT(S): at 19:04

## 2021-09-21 RX ADMIN — Medication 4: at 19:04

## 2021-09-21 RX ADMIN — Medication 4: at 08:14

## 2021-09-21 RX ADMIN — Medication 8 UNIT(S): at 11:16

## 2021-09-21 RX ADMIN — Medication 12.5 MILLIGRAM(S): at 19:09

## 2021-09-21 RX ADMIN — Medication 650 MILLIGRAM(S): at 23:26

## 2021-09-21 RX ADMIN — OXYCODONE AND ACETAMINOPHEN 1 TABLET(S): 5; 325 TABLET ORAL at 11:11

## 2021-09-21 RX ADMIN — Medication 325 MILLIGRAM(S): at 05:43

## 2021-09-21 RX ADMIN — OXYCODONE AND ACETAMINOPHEN 1 TABLET(S): 5; 325 TABLET ORAL at 11:47

## 2021-09-21 RX ADMIN — INSULIN GLARGINE 16 UNIT(S): 100 INJECTION, SOLUTION SUBCUTANEOUS at 22:20

## 2021-09-21 RX ADMIN — Medication 325 MILLIGRAM(S): at 19:09

## 2021-09-21 RX ADMIN — Medication 650 MILLIGRAM(S): at 22:26

## 2021-09-21 RX ADMIN — CHLORHEXIDINE GLUCONATE 1 APPLICATION(S): 213 SOLUTION TOPICAL at 05:43

## 2021-09-21 RX ADMIN — Medication 6: at 11:16

## 2021-09-21 RX ADMIN — Medication 81 MILLIGRAM(S): at 11:11

## 2021-09-21 RX ADMIN — Medication 12.5 MILLIGRAM(S): at 05:43

## 2021-09-21 RX ADMIN — METFORMIN HYDROCHLORIDE 500 MILLIGRAM(S): 850 TABLET ORAL at 19:09

## 2021-09-21 RX ADMIN — Medication 8 UNIT(S): at 08:14

## 2021-09-21 NOTE — PROGRESS NOTE ADULT - SUBJECTIVE AND OBJECTIVE BOX
Patient is a 65y old  Female who presents with a chief complaint of AMS, DKA, Necrotic foot infection (21 Sep 2021 16:34)      Interval History: finger sticks are < 200   on Metformin BID and reduced Lantus 16 units and prandial lispro 8 units     MEDICATIONS  (STANDING):  aspirin enteric coated 81 milliGRAM(s) Oral daily  atorvastatin 40 milliGRAM(s) Oral at bedtime  chlorhexidine 2% Cloths 1 Application(s) Topical <User Schedule>  dextrose 40% Gel 15 Gram(s) Oral once  dextrose 40% Gel 15 Gram(s) Oral once  dextrose 5%. 1000 milliLiter(s) (100 mL/Hr) IV Continuous <Continuous>  dextrose 5%. 1000 milliLiter(s) (50 mL/Hr) IV Continuous <Continuous>  dextrose 5%. 1000 milliLiter(s) (100 mL/Hr) IV Continuous <Continuous>  dextrose 5%. 1000 milliLiter(s) (50 mL/Hr) IV Continuous <Continuous>  dextrose 5%. 1000 milliLiter(s) (50 mL/Hr) IV Continuous <Continuous>  dextrose 50% Injectable 25 Gram(s) IV Push once  dextrose 50% Injectable 12.5 Gram(s) IV Push once  dextrose 50% Injectable 25 Gram(s) IV Push once  ferrous    sulfate 325 milliGRAM(s) Oral two times a day  glucagon  Injectable 1 milliGRAM(s) IntraMuscular once  glucagon  Injectable 1 milliGRAM(s) IntraMuscular once  insulin glargine Injectable (LANTUS) 16 Unit(s) SubCutaneous at bedtime  insulin lispro (ADMELOG) corrective regimen sliding scale   SubCutaneous three times a day before meals  insulin lispro (ADMELOG) corrective regimen sliding scale   SubCutaneous at bedtime  insulin lispro Injectable (ADMELOG) 8 Unit(s) SubCutaneous before breakfast  insulin lispro Injectable (ADMELOG) 8 Unit(s) SubCutaneous before lunch  insulin lispro Injectable (ADMELOG) 8 Unit(s) SubCutaneous before dinner  metFORMIN 500 milliGRAM(s) Oral two times a day  metoprolol tartrate 12.5 milliGRAM(s) Oral every 12 hours  polyethylene glycol 3350 17 Gram(s) Oral daily  senna 2 Tablet(s) Oral at bedtime    MEDICATIONS  (PRN):  acetaminophen   Tablet .. 650 milliGRAM(s) Oral every 6 hours PRN Mild Pain (1 - 3)  bisacodyl Suppository 10 milliGRAM(s) Rectal daily PRN Constipation      Allergies    avocado (Pruritus)  Carrots (Pruritus)  No Known Drug Allergies  Plums (Pruritus)    Intolerances        REVIEW OF SYSTEMS:  CONSTITUTIONAL: no changes  EYES: No eye pain, visual disturbances, or discharge  ENMT:  No difficulty hearing, No sinus or throat pain  NECK: No pain or stiffness  RESPIRATORY: No cough, wheezing, chills or hemoptysis; No shortness of breath  CARDIOVASCULAR: No chest pain, palpitations or leg swelling  GASTROINTESTINAL: No abdominal or epigastric pain. No nausea, vomiting, or hematemesis; No diarrhea or constipation. No melena or hematochezia.  GENITOURINARY: No dysuria, frequency, hematuria, or incontinence  NEUROLOGICAL: No headaches, memory loss, loss of strength, numbness, or tremors  SKIN: No itching, burning, rashes, or lesions   ENDOCRINE: No heat or cold intolerance; No hair loss  MUSCULOSKELETAL: No joint pain or swelling; No muscle, back, or extremity pain  PSYCHIATRIC: No depression, anxiety, mood swings, or difficulty sleeping  HEME/LYMPH: No easy bruising, or bleeding gums  ALLERY AND IMMUNOLOGIC: No hives or eczema    Vital Signs Last 24 Hrs  T(C): 37.3 (21 Sep 2021 16:41), Max: 37.6 (21 Sep 2021 05:57)  T(F): 99.1 (21 Sep 2021 16:41), Max: 99.6 (21 Sep 2021 05:57)  HR: 95 (21 Sep 2021 16:41) (80 - 98)  BP: 176/100 (21 Sep 2021 16:41) (160/82 - 181/79)  BP(mean): --  RR: 18 (21 Sep 2021 16:41) (17 - 18)  SpO2: 95% (21 Sep 2021 16:41) (95% - 99%)    PHYSICAL EXAM:  GENERAL:   HEAD: Atraumatic, Normocephalic  EYES: PERRLA, conjunctiva and sclera clear  ENMT: No  exudates,; Moist mucous membranes,, No lesions  NECK: Supple, No JVD, Normal thyroid  NERVOUS SYSTEM:  Alert & Oriented,   CHEST/LUNG: Clear to auscultation bilaterally; No rales, rhonchi, wheezing, or rubs  HEART: Regular rate and rhythm; No murmurs, rubs, or gallops  ABDOMEN: Soft, Nontender, Nondistended; Bowel sounds present  EXTREMITIES:  2+ Peripheral Pulses, no edema  SKIN: No rashes or lesions    LABS:        CAPILLARY BLOOD GLUCOSE      POCT Blood Glucose.: 158 mg/dL (21 Sep 2021 22:15)  POCT Blood Glucose.: 204 mg/dL (21 Sep 2021 18:57)  POCT Blood Glucose.: 277 mg/dL (21 Sep 2021 11:10)  POCT Blood Glucose.: 238 mg/dL (21 Sep 2021 07:59)    Lipid panel:           Thyroid:  Diabetes Tests:  Parathyroid Panel:  Adrenals:  RADIOLOGY & ADDITIONAL TESTS:    Imaging Personally Reviewed:  [ ] YES  [ ] NO    Consultant(s) Notes Reviewed:  [ ] YES  [ ] NO    Care Discussed with Consultants/Other Providers [ ] YES  [ ] NO

## 2021-09-21 NOTE — PROGRESS NOTE ADULT - SUBJECTIVE AND OBJECTIVE BOX
Patient: NELSON ZEPEDA [id number] 65y Female                            Hospitalist Attending Note    Denies bleeding.  No chest pain / palpitations.   S/p transfusion.    ____________________PHYSICAL EXAM:  GENERAL:  NAD.  Alert and Oriented x 3   HEENT: NCAT  CARDIOVASCULAR:  S1, S2  LUNGS: CTAB  ABDOMEN:  soft, (-) tenderness, (-) distension, (+) bowel sounds, (-) guarding, (-) rebound (-) rigidity  EXTREMITIES:  no cyanosis / clubbing / edema.   R stump dressing in place.   NEURO: strength symmetric.   ____________________    VITALS:  Vital Signs Last 24 Hrs  T(C): 37.5 (21 Sep 2021 10:57), Max: 37.6 (21 Sep 2021 05:57)  T(F): 99.5 (21 Sep 2021 10:57), Max: 99.6 (21 Sep 2021 05:57)  HR: 94 (21 Sep 2021 10:57) (78 - 98)  BP: 181/79 (21 Sep 2021 10:57) (130/58 - 181/79)  BP(mean): --  RR: 17 (21 Sep 2021 10:57) (17 - 18)  SpO2: 95% (21 Sep 2021 10:57) (95% - 99%) Daily     Daily   CAPILLARY BLOOD GLUCOSE      POCT Blood Glucose.: 277 mg/dL (21 Sep 2021 11:10)  POCT Blood Glucose.: 238 mg/dL (21 Sep 2021 07:59)  POCT Blood Glucose.: 133 mg/dL (20 Sep 2021 21:20)    I&O's Summary    20 Sep 2021 07:01  -  21 Sep 2021 07:00  --------------------------------------------------------  IN: 640 mL / OUT: 900 mL / NET: -260 mL        LABS:                        10.0   7.16  )-----------( 461      ( 21 Sep 2021 07:11 )             29.8     09-21    135  |  103  |  12  ----------------------------<  267<H>  3.7   |  26  |  0.56    Ca    9.3      21 Sep 2021 07:11                    MEDICATIONS:  acetaminophen   Tablet .. 650 milliGRAM(s) Oral every 6 hours PRN  aspirin enteric coated 81 milliGRAM(s) Oral daily  atorvastatin 40 milliGRAM(s) Oral at bedtime  bisacodyl Suppository 10 milliGRAM(s) Rectal daily PRN  chlorhexidine 2% Cloths 1 Application(s) Topical <User Schedule>  dextrose 40% Gel 15 Gram(s) Oral once  dextrose 40% Gel 15 Gram(s) Oral once  dextrose 5%. 1000 milliLiter(s) IV Continuous <Continuous>  dextrose 5%. 1000 milliLiter(s) IV Continuous <Continuous>  dextrose 5%. 1000 milliLiter(s) IV Continuous <Continuous>  dextrose 5%. 1000 milliLiter(s) IV Continuous <Continuous>  dextrose 5%. 1000 milliLiter(s) IV Continuous <Continuous>  dextrose 50% Injectable 25 Gram(s) IV Push once  dextrose 50% Injectable 12.5 Gram(s) IV Push once  dextrose 50% Injectable 25 Gram(s) IV Push once  ferrous    sulfate 325 milliGRAM(s) Oral two times a day  glucagon  Injectable 1 milliGRAM(s) IntraMuscular once  glucagon  Injectable 1 milliGRAM(s) IntraMuscular once  insulin glargine Injectable (LANTUS) 16 Unit(s) SubCutaneous at bedtime  insulin lispro (ADMELOG) corrective regimen sliding scale   SubCutaneous three times a day before meals  insulin lispro (ADMELOG) corrective regimen sliding scale   SubCutaneous at bedtime  insulin lispro Injectable (ADMELOG) 8 Unit(s) SubCutaneous before breakfast  insulin lispro Injectable (ADMELOG) 8 Unit(s) SubCutaneous before lunch  insulin lispro Injectable (ADMELOG) 8 Unit(s) SubCutaneous before dinner  metFORMIN 500 milliGRAM(s) Oral two times a day  metoprolol tartrate 12.5 milliGRAM(s) Oral every 12 hours  polyethylene glycol 3350 17 Gram(s) Oral daily  senna 2 Tablet(s) Oral at bedtime

## 2021-09-21 NOTE — PROGRESS NOTE ADULT - ASSESSMENT
64yo F PMH of IDDM, HTN, HLD, diabetic retinopathy initially admitted to MICU for sepsis due to R foot necrosis, gas gangrene and necrotizing fascitis.  S/p R foot TMA, seen by vascular, podiatry and ID.  Started on IV Abx.  Noted to have troponinemia 0.468, lateral TWIs on EKG.     # Fe deficiency Anemia - likely due to chronic disease.  H/H has been fluctuating, dropped to 6.8.  Stool OB negative.  Lovenox stopped.  Continue ASA.  Pt was agreeable to transfusion, transfused 2 units pRBCs on 9/20.  Hgb now 10.0.    # R foot necrosis / Necrotizing fasciitis / Gas Gangrene - s/p TMA 9/10.  Off antibiotics.  Observe for source control.  Wound vac placed, podiatry still concerned for possible need for BKA.    # Metabolic Encephalopathy - improving.  Mental status closer to baseline per family.    # NSTEMI - trop now stable.  Cardiology following.  Ischemic workup as outpatient discussed with cardiology.  Continue ASA, Statin, BBlocker.  # Diabetes Type II - monitor fingersticks.  Insulin coverage for hyperglycemia.  FS remain elevated.  Increase Lantus.  Continue premeal insulin.  Discussed with Dr. Urbina.   # Essential HTN - Monitor BP.  Continue oral antihypertensives.  # Hyperlipidemia - diet modification.  Continue Statin.  # Inpatient DVT Prophylaxis - ICDs.     Plan of care d/w son, daughter Monica [phone] / [phone] on 9/15, 9/19  Stable for ARETHA placement.

## 2021-09-22 LAB
ANION GAP SERPL CALC-SCNC: 6 MMOL/L — SIGNIFICANT CHANGE UP (ref 5–17)
BUN SERPL-MCNC: 14 MG/DL — SIGNIFICANT CHANGE UP (ref 7–23)
CALCIUM SERPL-MCNC: 8.9 MG/DL — SIGNIFICANT CHANGE UP (ref 8.5–10.1)
CHLORIDE SERPL-SCNC: 104 MMOL/L — SIGNIFICANT CHANGE UP (ref 96–108)
CO2 SERPL-SCNC: 27 MMOL/L — SIGNIFICANT CHANGE UP (ref 22–31)
CREAT SERPL-MCNC: 0.58 MG/DL — SIGNIFICANT CHANGE UP (ref 0.5–1.3)
GLUCOSE BLDC GLUCOMTR-MCNC: 164 MG/DL — HIGH (ref 70–99)
GLUCOSE BLDC GLUCOMTR-MCNC: 176 MG/DL — HIGH (ref 70–99)
GLUCOSE BLDC GLUCOMTR-MCNC: 200 MG/DL — HIGH (ref 70–99)
GLUCOSE BLDC GLUCOMTR-MCNC: 221 MG/DL — HIGH (ref 70–99)
GLUCOSE SERPL-MCNC: 211 MG/DL — HIGH (ref 70–99)
HCT VFR BLD CALC: 29.4 % — LOW (ref 34.5–45)
HGB BLD-MCNC: 9.9 G/DL — LOW (ref 11.5–15.5)
MCHC RBC-ENTMCNC: 29.4 PG — SIGNIFICANT CHANGE UP (ref 27–34)
MCHC RBC-ENTMCNC: 33.7 GM/DL — SIGNIFICANT CHANGE UP (ref 32–36)
MCV RBC AUTO: 87.2 FL — SIGNIFICANT CHANGE UP (ref 80–100)
NRBC # BLD: 0 /100 WBCS — SIGNIFICANT CHANGE UP (ref 0–0)
PLATELET # BLD AUTO: 469 K/UL — HIGH (ref 150–400)
POTASSIUM SERPL-MCNC: 4 MMOL/L — SIGNIFICANT CHANGE UP (ref 3.5–5.3)
POTASSIUM SERPL-SCNC: 4 MMOL/L — SIGNIFICANT CHANGE UP (ref 3.5–5.3)
RBC # BLD: 3.37 M/UL — LOW (ref 3.8–5.2)
RBC # FLD: 14.9 % — HIGH (ref 10.3–14.5)
SODIUM SERPL-SCNC: 137 MMOL/L — SIGNIFICANT CHANGE UP (ref 135–145)
WBC # BLD: 7.48 K/UL — SIGNIFICANT CHANGE UP (ref 3.8–10.5)
WBC # FLD AUTO: 7.48 K/UL — SIGNIFICANT CHANGE UP (ref 3.8–10.5)

## 2021-09-22 PROCEDURE — 99232 SBSQ HOSP IP/OBS MODERATE 35: CPT

## 2021-09-22 RX ORDER — COLLAGENASE CLOSTRIDIUM HIST. 250 UNIT/G
1 OINTMENT (GRAM) TOPICAL DAILY
Refills: 0 | Status: DISCONTINUED | OUTPATIENT
Start: 2021-09-22 | End: 2021-09-23

## 2021-09-22 RX ADMIN — METFORMIN HYDROCHLORIDE 500 MILLIGRAM(S): 850 TABLET ORAL at 05:18

## 2021-09-22 RX ADMIN — Medication 650 MILLIGRAM(S): at 15:40

## 2021-09-22 RX ADMIN — Medication 8 UNIT(S): at 16:36

## 2021-09-22 RX ADMIN — Medication 1 APPLICATION(S): at 15:38

## 2021-09-22 RX ADMIN — Medication 8 UNIT(S): at 08:12

## 2021-09-22 RX ADMIN — Medication 81 MILLIGRAM(S): at 11:31

## 2021-09-22 RX ADMIN — METFORMIN HYDROCHLORIDE 500 MILLIGRAM(S): 850 TABLET ORAL at 17:11

## 2021-09-22 RX ADMIN — Medication 12.5 MILLIGRAM(S): at 05:18

## 2021-09-22 RX ADMIN — Medication 325 MILLIGRAM(S): at 17:10

## 2021-09-22 RX ADMIN — Medication 325 MILLIGRAM(S): at 05:18

## 2021-09-22 RX ADMIN — Medication 2: at 11:32

## 2021-09-22 RX ADMIN — Medication 8 UNIT(S): at 11:32

## 2021-09-22 RX ADMIN — Medication 2: at 16:36

## 2021-09-22 RX ADMIN — INSULIN GLARGINE 16 UNIT(S): 100 INJECTION, SOLUTION SUBCUTANEOUS at 23:22

## 2021-09-22 RX ADMIN — Medication 650 MILLIGRAM(S): at 16:40

## 2021-09-22 RX ADMIN — CHLORHEXIDINE GLUCONATE 1 APPLICATION(S): 213 SOLUTION TOPICAL at 05:19

## 2021-09-22 RX ADMIN — Medication 12.5 MILLIGRAM(S): at 17:10

## 2021-09-22 RX ADMIN — Medication 2: at 08:12

## 2021-09-22 NOTE — PROGRESS NOTE ADULT - ASSESSMENT
Pt s/p LF Choparts amp 9/10/21  - pt was seen and examined  - s/p LF choparts amputation left open (9/10):  Open surgical wound with fibroglanular base, skin edges at level of amputation appear viable with mild maceration, wound edges warm, no purulent drainage, no malodor, no active bleeding.   - 9/22: wound bed is now 50% granular and 50% fibrotic with minimal drainage, wound edges appear viable, no acute signs of infection   - Podiatry will continue to monitor and assess for salvageability of the L foot  - Continue wound vac, no acute signs of infection  -Discussed w/ attending    Pt s/p LF Choparts amp 9/10/21  - pt was seen and examined  - s/p LF choparts amputation left open (9/10):  Open surgical wound with fibroglanular base, skin edges at level of amputation appear viable with mild maceration, wound edges warm, no purulent drainage, no malodor, no active bleeding.   - 9/22: wound bed is now 50% granular and 50% fibrotic with minimal drainage, wound edges appear viable, no acute signs of infection   - Podiatry will continue to monitor and assess for salvageability of the L foot  - Continue wound vac with santyl, no acute signs of infection  -Discussed w/ attending

## 2021-09-22 NOTE — PROGRESS NOTE ADULT - ASSESSMENT
66yo F PMH of IDDM, HTN, HLD, diabetic retinopathy initially admitted to MICU for sepsis due to R foot necrosis, gas gangrene and necrotizing fascitis.  S/p R foot TMA, seen by vascular, podiatry and ID.  Started on IV Abx.  Noted to have troponinemia 0.468, lateral TWIs on EKG.     # Fe deficiency Anemia - likely due to chronic disease.  H/H has been fluctuating, dropped to 6.8 with Stool OB negative.  Lovenox stopped.  Continue ASA.  Pt was agreeable to transfusion, transfused 2 units pRBCs on 9/20.  Hgb now stable ~ 10.    # R foot necrosis / Necrotizing fasciitis / Gas Gangrene - s/p TMA 9/10.  Off antibiotics.  Observe for source control.  Wound vac placed, podiatry still concerned for possible need for BKA.    # Metabolic Encephalopathy - improving.  Mental status closer to baseline per family.    # NSTEMI - trop now stable.  Cardiology following.  Ischemic workup as outpatient discussed with cardiology.  Continue ASA, Statin, BBlocker.  # Diabetes Type II - monitor fingersticks.  Insulin coverage for hyperglycemia.  FS better controlled.  Continue Lantus, premeal insulin.  Endocrine following Dr. Urbina.   # Essential HTN - Monitor BP.  Continue oral antihypertensives.  # Hyperlipidemia - diet modification.  Continue Statin.  # Inpatient DVT Prophylaxis - ICDs.     Plan of care d/w son, daughter Monica [phone] / [phone] on 9/15, 9/19  Stable for ARETHA placement.

## 2021-09-22 NOTE — PROGRESS NOTE ADULT - SUBJECTIVE AND OBJECTIVE BOX
Patient is a 65y old  Female who presents with a chief complaint of AMS, DKA, Necrotic foot infection (21 Sep 2021 22:55)       INTERVAL HPI/OVERNIGHT EVENTS:  Patient seen and evaluated at bedside.  Pt is resting comfortable in NAD. Denies N/V/F/C.  Pain rated at X/10    Allergies    avocado (Pruritus)  Carrots (Pruritus)  No Known Drug Allergies  Plums (Pruritus)    Intolerances        Vital Signs Last 24 Hrs  T(C): 37.1 (22 Sep 2021 05:18), Max: 37.5 (21 Sep 2021 10:57)  T(F): 98.7 (22 Sep 2021 05:18), Max: 99.5 (21 Sep 2021 10:57)  HR: 92 (22 Sep 2021 05:18) (88 - 95)  BP: 145/83 (22 Sep 2021 05:18) (139/78 - 181/79)  BP(mean): --  RR: 18 (22 Sep 2021 05:18) (17 - 18)  SpO2: 95% (22 Sep 2021 05:18) (95% - 95%)    LABS:                        10.0   7.16  )-----------( 461      ( 21 Sep 2021 07:11 )             29.8     09-21    135  |  103  |  12  ----------------------------<  267<H>  3.7   |  26  |  0.56    Ca    9.3      21 Sep 2021 07:11          CAPILLARY BLOOD GLUCOSE      POCT Blood Glucose.: 158 mg/dL (21 Sep 2021 22:15)  POCT Blood Glucose.: 204 mg/dL (21 Sep 2021 18:57)  POCT Blood Glucose.: 277 mg/dL (21 Sep 2021 11:10)  POCT Blood Glucose.: 238 mg/dL (21 Sep 2021 07:59)      Lower Extremity Physical Exam:  Lower Extremity Physical Exam:  Vascular: RF DP NP, PT NP, LF NP pulses, B/L,Temperature gradient LF warm to touch   Neuro: Epicritic sensation absent to the level of digits B/L.  Musculoskeletal/Ortho:  s/p LF choparts amputation left open (9/10):  Open surgical wound with fibroglanular base, skin edges at level of amputation appear viable with mild maceration, wound edges warm, no purulent drainage, no malodor, no active bleeding.     9/22: wound bed is now 50% granular and 50% fibrotic with minimal drainage, wound edges appear viable, no acute signs of infection       RADIOLOGY & ADDITIONAL TESTS:

## 2021-09-22 NOTE — PROGRESS NOTE ADULT - SUBJECTIVE AND OBJECTIVE BOX
Patient: NELSON ZEPEDA [id number] 65y Female                            Hospitalist Attending Note    Denies bleeding.  No chest pain / palpitations.       ____________________PHYSICAL EXAM:  GENERAL:  NAD.  Alert and Oriented x 3   HEENT: NCAT  CARDIOVASCULAR:  S1, S2  LUNGS: CTAB  ABDOMEN:  soft, (-) tenderness, (-) distension, (+) bowel sounds, (-) guarding, (-) rebound (-) rigidity  EXTREMITIES:  no cyanosis / clubbing / edema.   R stump dressing in place.   NEURO: strength symmetric.   ____________________      VITALS:  Vital Signs Last 24 Hrs  T(C): 37.1 (22 Sep 2021 13:48), Max: 37.3 (21 Sep 2021 16:41)  T(F): 98.7 (22 Sep 2021 13:48), Max: 99.1 (21 Sep 2021 16:41)  HR: 100 (22 Sep 2021 13:48) (88 - 100)  BP: 138/64 (22 Sep 2021 13:48) (138/64 - 176/100)  BP(mean): --  RR: 18 (22 Sep 2021 13:48) (18 - 18)  SpO2: 95% (22 Sep 2021 13:48) (95% - 95%) Daily     Daily   CAPILLARY BLOOD GLUCOSE      POCT Blood Glucose.: 176 mg/dL (22 Sep 2021 11:25)  POCT Blood Glucose.: 200 mg/dL (22 Sep 2021 08:01)  POCT Blood Glucose.: 158 mg/dL (21 Sep 2021 22:15)  POCT Blood Glucose.: 204 mg/dL (21 Sep 2021 18:57)    I&O's Summary    21 Sep 2021 07:01  -  22 Sep 2021 07:00  --------------------------------------------------------  IN: 590 mL / OUT: 2025 mL / NET: -1435 mL        LABS:                        9.9    7.48  )-----------( 469      ( 22 Sep 2021 07:59 )             29.4     09-22    137  |  104  |  14  ----------------------------<  211<H>  4.0   |  27  |  0.58    Ca    8.9      22 Sep 2021 07:59                    MEDICATIONS:  acetaminophen   Tablet .. 650 milliGRAM(s) Oral every 6 hours PRN  aspirin enteric coated 81 milliGRAM(s) Oral daily  atorvastatin 40 milliGRAM(s) Oral at bedtime  bisacodyl Suppository 10 milliGRAM(s) Rectal daily PRN  chlorhexidine 2% Cloths 1 Application(s) Topical <User Schedule>  collagenase Ointment 1 Application(s) Topical daily  dextrose 40% Gel 15 Gram(s) Oral once  dextrose 40% Gel 15 Gram(s) Oral once  dextrose 5%. 1000 milliLiter(s) IV Continuous <Continuous>  dextrose 5%. 1000 milliLiter(s) IV Continuous <Continuous>  dextrose 5%. 1000 milliLiter(s) IV Continuous <Continuous>  dextrose 5%. 1000 milliLiter(s) IV Continuous <Continuous>  dextrose 5%. 1000 milliLiter(s) IV Continuous <Continuous>  dextrose 50% Injectable 25 Gram(s) IV Push once  dextrose 50% Injectable 12.5 Gram(s) IV Push once  dextrose 50% Injectable 25 Gram(s) IV Push once  ferrous    sulfate 325 milliGRAM(s) Oral two times a day  glucagon  Injectable 1 milliGRAM(s) IntraMuscular once  glucagon  Injectable 1 milliGRAM(s) IntraMuscular once  insulin glargine Injectable (LANTUS) 16 Unit(s) SubCutaneous at bedtime  insulin lispro (ADMELOG) corrective regimen sliding scale   SubCutaneous three times a day before meals  insulin lispro (ADMELOG) corrective regimen sliding scale   SubCutaneous at bedtime  insulin lispro Injectable (ADMELOG) 8 Unit(s) SubCutaneous before breakfast  insulin lispro Injectable (ADMELOG) 8 Unit(s) SubCutaneous before lunch  insulin lispro Injectable (ADMELOG) 8 Unit(s) SubCutaneous before dinner  metFORMIN 500 milliGRAM(s) Oral two times a day  metoprolol tartrate 12.5 milliGRAM(s) Oral every 12 hours  polyethylene glycol 3350 17 Gram(s) Oral daily  senna 2 Tablet(s) Oral at bedtime

## 2021-09-22 NOTE — PROGRESS NOTE ADULT - SUBJECTIVE AND OBJECTIVE BOX
Patient is a 65y old  Female who presents with a chief complaint of AMS, DKA, Necrotic foot infection (22 Sep 2021 14:41)      Interval History: Finger-sticks are in high 100's and low 200's mainly high 100s   Nutrition fair, patient is on Lantus 16 units and prandial lispro 8 units and Metformin 500 mg BID     MEDICATIONS  (STANDING):  aspirin enteric coated 81 milliGRAM(s) Oral daily  atorvastatin 40 milliGRAM(s) Oral at bedtime  chlorhexidine 2% Cloths 1 Application(s) Topical <User Schedule>  collagenase Ointment 1 Application(s) Topical daily  dextrose 40% Gel 15 Gram(s) Oral once  dextrose 40% Gel 15 Gram(s) Oral once  dextrose 5%. 1000 milliLiter(s) (100 mL/Hr) IV Continuous <Continuous>  dextrose 5%. 1000 milliLiter(s) (50 mL/Hr) IV Continuous <Continuous>  dextrose 5%. 1000 milliLiter(s) (100 mL/Hr) IV Continuous <Continuous>  dextrose 5%. 1000 milliLiter(s) (50 mL/Hr) IV Continuous <Continuous>  dextrose 5%. 1000 milliLiter(s) (50 mL/Hr) IV Continuous <Continuous>  dextrose 50% Injectable 25 Gram(s) IV Push once  dextrose 50% Injectable 12.5 Gram(s) IV Push once  dextrose 50% Injectable 25 Gram(s) IV Push once  ferrous    sulfate 325 milliGRAM(s) Oral two times a day  glucagon  Injectable 1 milliGRAM(s) IntraMuscular once  glucagon  Injectable 1 milliGRAM(s) IntraMuscular once  insulin glargine Injectable (LANTUS) 16 Unit(s) SubCutaneous at bedtime  insulin lispro (ADMELOG) corrective regimen sliding scale   SubCutaneous three times a day before meals  insulin lispro (ADMELOG) corrective regimen sliding scale   SubCutaneous at bedtime  insulin lispro Injectable (ADMELOG) 8 Unit(s) SubCutaneous before breakfast  insulin lispro Injectable (ADMELOG) 8 Unit(s) SubCutaneous before lunch  insulin lispro Injectable (ADMELOG) 8 Unit(s) SubCutaneous before dinner  metFORMIN 500 milliGRAM(s) Oral two times a day  metoprolol tartrate 12.5 milliGRAM(s) Oral every 12 hours  polyethylene glycol 3350 17 Gram(s) Oral daily  senna 2 Tablet(s) Oral at bedtime    MEDICATIONS  (PRN):  acetaminophen   Tablet .. 650 milliGRAM(s) Oral every 6 hours PRN Mild Pain (1 - 3)  bisacodyl Suppository 10 milliGRAM(s) Rectal daily PRN Constipation      Allergies    avocado (Pruritus)  Carrots (Pruritus)  No Known Drug Allergies  Plums (Pruritus)    Intolerances        REVIEW OF SYSTEMS:  CONSTITUTIONAL: no changes  EYES: No eye pain, visual disturbances, or discharge  ENMT:  No difficulty hearing, No sinus or throat pain  NECK: No pain or stiffness  RESPIRATORY: No cough, wheezing, chills or hemoptysis; No shortness of breath  CARDIOVASCULAR: No chest pain, palpitations or leg swelling  GASTROINTESTINAL: No abdominal or epigastric pain. No nausea, vomiting, or hematemesis; No diarrhea or constipation. No melena or hematochezia.  GENITOURINARY: No dysuria, frequency, hematuria, or incontinence  NEUROLOGICAL: No headaches, memory loss, loss of strength, numbness, or tremors  SKIN: No itching, burning, rashes, or lesions   ENDOCRINE: No heat or cold intolerance; No hair loss  MUSCULOSKELETAL: No joint pain or swelling; No muscle, back, or extremity pain  PSYCHIATRIC: No depression, anxiety, mood swings, or difficulty sleeping  HEME/LYMPH: No easy bruising, or bleeding gums  ALLERY AND IMMUNOLOGIC: No hives or eczema    Vital Signs Last 24 Hrs  T(C): 37.1 (22 Sep 2021 13:48), Max: 37.3 (21 Sep 2021 16:41)  T(F): 98.7 (22 Sep 2021 13:48), Max: 99.1 (21 Sep 2021 16:41)  HR: 100 (22 Sep 2021 13:48) (88 - 100)  BP: 138/64 (22 Sep 2021 13:48) (138/64 - 176/100)  BP(mean): --  RR: 18 (22 Sep 2021 13:48) (18 - 18)  SpO2: 95% (22 Sep 2021 13:48) (95% - 95%)    PHYSICAL EXAM:  GENERAL:   HEAD: Atraumatic, Normocephalic  EYES: PERRLA, conjunctiva and sclera clear  ENMT: No  exudates,; Moist mucous membranes,, No lesions  NECK: Supple, No JVD, Normal thyroid  NERVOUS SYSTEM:  Alert & Oriented,   CHEST/LUNG: Clear to auscultation bilaterally; No rales, rhonchi, wheezing, or rubs  HEART: Regular rate and rhythm; No murmurs, rubs, or gallops  ABDOMEN: Soft, Nontender, Nondistended; Bowel sounds present  EXTREMITIES:  2+ Peripheral Pulses, no edema  SKIN: No rashes or lesions    LABS:        CAPILLARY BLOOD GLUCOSE      POCT Blood Glucose.: 176 mg/dL (22 Sep 2021 11:25)  POCT Blood Glucose.: 200 mg/dL (22 Sep 2021 08:01)  POCT Blood Glucose.: 158 mg/dL (21 Sep 2021 22:15)  POCT Blood Glucose.: 204 mg/dL (21 Sep 2021 18:57)    Lipid panel:           Thyroid:  Diabetes Tests:  Parathyroid Panel:  Adrenals:  RADIOLOGY & ADDITIONAL TESTS:    Imaging Personally Reviewed:  [ ] YES  [ ] NO    Consultant(s) Notes Reviewed:  [ ] YES  [ ] NO    Care Discussed with Consultants/Other Providers [ ] YES  [ ] NO

## 2021-09-23 VITALS
HEART RATE: 100 BPM | SYSTOLIC BLOOD PRESSURE: 166 MMHG | TEMPERATURE: 99 F | OXYGEN SATURATION: 100 % | RESPIRATION RATE: 18 BRPM | DIASTOLIC BLOOD PRESSURE: 84 MMHG

## 2021-09-23 LAB
ANION GAP SERPL CALC-SCNC: 5 MMOL/L — SIGNIFICANT CHANGE UP (ref 5–17)
BUN SERPL-MCNC: 17 MG/DL — SIGNIFICANT CHANGE UP (ref 7–23)
CALCIUM SERPL-MCNC: 8.5 MG/DL — SIGNIFICANT CHANGE UP (ref 8.5–10.1)
CHLORIDE SERPL-SCNC: 104 MMOL/L — SIGNIFICANT CHANGE UP (ref 96–108)
CO2 SERPL-SCNC: 30 MMOL/L — SIGNIFICANT CHANGE UP (ref 22–31)
CREAT SERPL-MCNC: 0.85 MG/DL — SIGNIFICANT CHANGE UP (ref 0.5–1.3)
GLUCOSE BLDC GLUCOMTR-MCNC: 267 MG/DL — HIGH (ref 70–99)
GLUCOSE BLDC GLUCOMTR-MCNC: 317 MG/DL — HIGH (ref 70–99)
GLUCOSE BLDC GLUCOMTR-MCNC: 325 MG/DL — HIGH (ref 70–99)
GLUCOSE SERPL-MCNC: 294 MG/DL — HIGH (ref 70–99)
HCT VFR BLD CALC: 29.6 % — LOW (ref 34.5–45)
HGB BLD-MCNC: 9.7 G/DL — LOW (ref 11.5–15.5)
MCHC RBC-ENTMCNC: 29 PG — SIGNIFICANT CHANGE UP (ref 27–34)
MCHC RBC-ENTMCNC: 32.8 GM/DL — SIGNIFICANT CHANGE UP (ref 32–36)
MCV RBC AUTO: 88.4 FL — SIGNIFICANT CHANGE UP (ref 80–100)
NRBC # BLD: 0 /100 WBCS — SIGNIFICANT CHANGE UP (ref 0–0)
PLATELET # BLD AUTO: 496 K/UL — HIGH (ref 150–400)
POTASSIUM SERPL-MCNC: 4 MMOL/L — SIGNIFICANT CHANGE UP (ref 3.5–5.3)
POTASSIUM SERPL-SCNC: 4 MMOL/L — SIGNIFICANT CHANGE UP (ref 3.5–5.3)
RAPID RVP RESULT: SIGNIFICANT CHANGE UP
RBC # BLD: 3.35 M/UL — LOW (ref 3.8–5.2)
RBC # FLD: 14.9 % — HIGH (ref 10.3–14.5)
SARS-COV-2 RNA SPEC QL NAA+PROBE: SIGNIFICANT CHANGE UP
SODIUM SERPL-SCNC: 139 MMOL/L — SIGNIFICANT CHANGE UP (ref 135–145)
WBC # BLD: 7.16 K/UL — SIGNIFICANT CHANGE UP (ref 3.8–10.5)
WBC # FLD AUTO: 7.16 K/UL — SIGNIFICANT CHANGE UP (ref 3.8–10.5)

## 2021-09-23 PROCEDURE — 99238 HOSP IP/OBS DSCHRG MGMT 30/<: CPT

## 2021-09-23 RX ORDER — POLYETHYLENE GLYCOL 3350 17 G/17G
17 POWDER, FOR SOLUTION ORAL
Qty: 0 | Refills: 0 | DISCHARGE
Start: 2021-09-23

## 2021-09-23 RX ORDER — COLLAGENASE CLOSTRIDIUM HIST. 250 UNIT/G
1 OINTMENT (GRAM) TOPICAL
Qty: 0 | Refills: 0 | DISCHARGE
Start: 2021-09-23

## 2021-09-23 RX ORDER — METOPROLOL TARTRATE 50 MG
12.5 TABLET ORAL
Qty: 0 | Refills: 0 | DISCHARGE
Start: 2021-09-23

## 2021-09-23 RX ORDER — ASPIRIN/CALCIUM CARB/MAGNESIUM 324 MG
1 TABLET ORAL
Qty: 0 | Refills: 0 | DISCHARGE
Start: 2021-09-23

## 2021-09-23 RX ORDER — METFORMIN HYDROCHLORIDE 850 MG/1
1 TABLET ORAL
Qty: 0 | Refills: 0 | DISCHARGE
Start: 2021-09-23

## 2021-09-23 RX ORDER — INSULIN GLARGINE 100 [IU]/ML
16 INJECTION, SOLUTION SUBCUTANEOUS
Qty: 0 | Refills: 0 | DISCHARGE
Start: 2021-09-23

## 2021-09-23 RX ORDER — SENNA PLUS 8.6 MG/1
2 TABLET ORAL
Qty: 0 | Refills: 0 | DISCHARGE
Start: 2021-09-23

## 2021-09-23 RX ORDER — GABAPENTIN 400 MG/1
100 CAPSULE ORAL THREE TIMES A DAY
Refills: 0 | Status: DISCONTINUED | OUTPATIENT
Start: 2021-09-23 | End: 2021-09-23

## 2021-09-23 RX ORDER — GABAPENTIN 400 MG/1
1 CAPSULE ORAL
Qty: 0 | Refills: 0 | DISCHARGE
Start: 2021-09-23

## 2021-09-23 RX ORDER — ATORVASTATIN CALCIUM 80 MG/1
1 TABLET, FILM COATED ORAL
Qty: 0 | Refills: 0 | DISCHARGE
Start: 2021-09-23

## 2021-09-23 RX ADMIN — Medication 6: at 11:08

## 2021-09-23 RX ADMIN — Medication 8 UNIT(S): at 11:08

## 2021-09-23 RX ADMIN — Medication 325 MILLIGRAM(S): at 06:01

## 2021-09-23 RX ADMIN — Medication 12.5 MILLIGRAM(S): at 06:01

## 2021-09-23 RX ADMIN — GABAPENTIN 100 MILLIGRAM(S): 400 CAPSULE ORAL at 11:14

## 2021-09-23 RX ADMIN — Medication 81 MILLIGRAM(S): at 11:07

## 2021-09-23 RX ADMIN — CHLORHEXIDINE GLUCONATE 1 APPLICATION(S): 213 SOLUTION TOPICAL at 06:02

## 2021-09-23 RX ADMIN — METFORMIN HYDROCHLORIDE 500 MILLIGRAM(S): 850 TABLET ORAL at 07:32

## 2021-09-23 RX ADMIN — Medication 8 UNIT(S): at 07:33

## 2021-09-23 RX ADMIN — Medication 8: at 07:33

## 2021-09-23 NOTE — DISCHARGE NOTE NURSING/CASE MANAGEMENT/SOCIAL WORK - PATIENT PORTAL LINK FT
You can access the FollowMyHealth Patient Portal offered by [facility] by registering at the following website: [email or web address]. By joining [facility]’s FollowMyHealth portal, you will also be able to view your health information using other applications (apps) compatible with our system.

## 2021-09-23 NOTE — PROGRESS NOTE ADULT - PROBLEM SELECTOR PLAN 1
Continue with the current  regimen while inpatient   can be discharged on current dose/ regimen   decreasing glucose toxicity
add Metformin 500 mg QD to decrease Insulin Resistance   Continue with the current  regimen while inpatient / insulin   while inpatient Finger Sticks should be in 140-180 range
decrease Lantus to 16 units and later wean prandial lispro as much possible   can be discharged on Lantus and Metformin
increase Metformin to 500 mg BID   if finger sticks are better , wean insulin as much as possible
no MRSA or other need for vanco-> off vancomycin   Continue Zosyn  Clindamycin stopped   Follow-up culture data until final   Local care per podiatry  Eventual BKA if patient agreeable though hopefully at this point in time we have achieved source control
Continue with the current  regimen while inpatient   stable finger sticks   can be discharged on current dose/ regimen   or Patient can resume  home regimen upon discharge   Patient should have strict diet control and do exercise as tolerated upon discharge
addition of Insulin sensitizers , Metformin will decrease Insulin Resistance and help control blood glucose better   Continue with the current  regimen while inpatient
addition of low dose Metformin will help   Continue with the current  regimen while inpatient   while inpatient Finger Sticks should be in 140-180 range
better control blood glucose   Continue with the current  regimen while inpatient   can be discharged on current dose/ regimen   Patient should have strict diet control and do exercise as tolerated upon discharge
with increased glucose toxicity and Insulin Resistance   Continue with the current  regimen while inpatient   expect finger sticks to improve on Metformin
Observe off abxc  Local care per podiatry  CHRISTOPHER COSTA
Continue Zosyn  Follow-up culture data until final   Local care per podiatry  CHRISTOPHER COSTA
Continue vancomycin pending cx data  Recheck trough before fourth dose, target trough 15-20 pending culture data  Continue Zosyn  Continue clindamycin, hope to limit to another 24 hours  Follow-up culture data  Local care per podiatry  Eventual BKA per vascular, though hopefully at this point in time we have achieved source control
no MRSA or other need for vanco->please stop vancomycin   Continue Zosyn  can stop Clinda   Follow-up culture data until final   Local care per podiatry  Eventual BKA if patient agreeable though hopefully at this point in time we have achieved source control

## 2021-09-23 NOTE — PROGRESS NOTE ADULT - PROVIDER SPECIALTY LIST ADULT
Hospitalist
Hospitalist
Nephrology
Podiatry
Cardiology
Critical Care
Critical Care
Electrophysiology
Infectious Disease
Podiatry
Cardiology
Critical Care
Endocrinology
Hospitalist
Infectious Disease
Nephrology
Podiatry
Podiatry
Cardiology
Hospitalist
Infectious Disease
Podiatry
Endocrinology
Infectious Disease
Endocrinology
Infectious Disease

## 2021-09-23 NOTE — PROGRESS NOTE ADULT - SUBJECTIVE AND OBJECTIVE BOX
Patient is a 65y old  Female who presents with a chief complaint of AMS, DKA, Necrotic foot infection (22 Sep 2021 16:00)      Interval History: finger sticks are in 200-250 range   on Lantus 16 units and 8 units and Metformin 500 mg BID     MEDICATIONS  (STANDING):  aspirin enteric coated 81 milliGRAM(s) Oral daily  atorvastatin 40 milliGRAM(s) Oral at bedtime  chlorhexidine 2% Cloths 1 Application(s) Topical <User Schedule>  collagenase Ointment 1 Application(s) Topical daily  dextrose 40% Gel 15 Gram(s) Oral once  dextrose 40% Gel 15 Gram(s) Oral once  dextrose 5%. 1000 milliLiter(s) (50 mL/Hr) IV Continuous <Continuous>  dextrose 5%. 1000 milliLiter(s) (50 mL/Hr) IV Continuous <Continuous>  dextrose 5%. 1000 milliLiter(s) (100 mL/Hr) IV Continuous <Continuous>  dextrose 5%. 1000 milliLiter(s) (50 mL/Hr) IV Continuous <Continuous>  dextrose 5%. 1000 milliLiter(s) (100 mL/Hr) IV Continuous <Continuous>  dextrose 50% Injectable 25 Gram(s) IV Push once  dextrose 50% Injectable 25 Gram(s) IV Push once  dextrose 50% Injectable 12.5 Gram(s) IV Push once  ferrous    sulfate 325 milliGRAM(s) Oral two times a day  gabapentin 100 milliGRAM(s) Oral three times a day  glucagon  Injectable 1 milliGRAM(s) IntraMuscular once  glucagon  Injectable 1 milliGRAM(s) IntraMuscular once  insulin glargine Injectable (LANTUS) 16 Unit(s) SubCutaneous at bedtime  insulin lispro (ADMELOG) corrective regimen sliding scale   SubCutaneous three times a day before meals  insulin lispro (ADMELOG) corrective regimen sliding scale   SubCutaneous at bedtime  insulin lispro Injectable (ADMELOG) 8 Unit(s) SubCutaneous before breakfast  insulin lispro Injectable (ADMELOG) 8 Unit(s) SubCutaneous before lunch  insulin lispro Injectable (ADMELOG) 8 Unit(s) SubCutaneous before dinner  metFORMIN 500 milliGRAM(s) Oral two times a day  metoprolol tartrate 12.5 milliGRAM(s) Oral every 12 hours  polyethylene glycol 3350 17 Gram(s) Oral daily  senna 2 Tablet(s) Oral at bedtime    MEDICATIONS  (PRN):  acetaminophen   Tablet .. 650 milliGRAM(s) Oral every 6 hours PRN Mild Pain (1 - 3)  bisacodyl Suppository 10 milliGRAM(s) Rectal daily PRN Constipation      Allergies    avocado (Pruritus)  Carrots (Pruritus)  No Known Drug Allergies  Plums (Pruritus)    Intolerances        REVIEW OF SYSTEMS:  CONSTITUTIONAL: no changes  EYES: No eye pain, visual disturbances, or discharge  ENMT:  No difficulty hearing, No sinus or throat pain  NECK: No pain or stiffness  RESPIRATORY: No cough, wheezing, chills or hemoptysis; No shortness of breath  CARDIOVASCULAR: No chest pain, palpitations or leg swelling  GASTROINTESTINAL: No abdominal or epigastric pain. No nausea, vomiting, or hematemesis; No diarrhea or constipation. No melena or hematochezia.  GENITOURINARY: No dysuria, frequency, hematuria, or incontinence  NEUROLOGICAL: No headaches, memory loss, loss of strength, numbness, or tremors  SKIN: No itching, burning, rashes, or lesions   ENDOCRINE: No heat or cold intolerance; No hair loss  MUSCULOSKELETAL: No joint pain or swelling; No muscle, back, or extremity pain  PSYCHIATRIC: No depression, anxiety, mood swings, or difficulty sleeping  HEME/LYMPH: No easy bruising, or bleeding gums  ALLERY AND IMMUNOLOGIC: No hives or eczema    Vital Signs Last 24 Hrs  T(C): 37.2 (23 Sep 2021 16:40), Max: 37.4 (23 Sep 2021 11:27)  T(F): 99 (23 Sep 2021 16:40), Max: 99.3 (23 Sep 2021 11:27)  HR: 100 (23 Sep 2021 16:40) (88 - 100)  BP: 166/84 (23 Sep 2021 16:40) (132/64 - 166/84)  BP(mean): --  RR: 18 (23 Sep 2021 16:40) (17 - 18)  SpO2: 100% (23 Sep 2021 16:40) (95% - 100%)    PHYSICAL EXAM:  GENERAL:   HEAD: Atraumatic, Normocephalic  EYES: PERRLA, conjunctiva and sclera clear  ENMT: No  exudates,; Moist mucous membranes,, No lesions  NECK: Supple, No JVD, Normal thyroid  NERVOUS SYSTEM:  Alert & Oriented,   CHEST/LUNG: Clear to auscultation bilaterally; No rales, rhonchi, wheezing, or rubs  HEART: Regular rate and rhythm; No murmurs, rubs, or gallops  ABDOMEN: Soft, Nontender, Nondistended; Bowel sounds present  EXTREMITIES:  2+ Peripheral Pulses, no edema  SKIN: No rashes or lesions    LABS:        CAPILLARY BLOOD GLUCOSE      POCT Blood Glucose.: 267 mg/dL (23 Sep 2021 10:44)  POCT Blood Glucose.: 317 mg/dL (23 Sep 2021 07:29)  POCT Blood Glucose.: 325 mg/dL (23 Sep 2021 06:07)  POCT Blood Glucose.: 221 mg/dL (22 Sep 2021 23:20)    Lipid panel:           Thyroid:  Diabetes Tests:  Parathyroid Panel:  Adrenals:  RADIOLOGY & ADDITIONAL TESTS:    Imaging Personally Reviewed:  [ ] YES  [ ] NO    Consultant(s) Notes Reviewed:  [ ] YES  [ ] NO    Care Discussed with Consultants/Other Providers [ ] YES  [ ] NO

## 2021-09-23 NOTE — PROGRESS NOTE ADULT - REASON FOR ADMISSION
AMS, DKA, Necrotic foot infection

## 2021-09-25 ENCOUNTER — EMERGENCY (EMERGENCY)
Facility: HOSPITAL | Age: 66
LOS: 0 days | Discharge: HOME | End: 2021-09-26
Attending: STUDENT IN AN ORGANIZED HEALTH CARE EDUCATION/TRAINING PROGRAM
Payer: COMMERCIAL

## 2021-09-25 VITALS
TEMPERATURE: 99 F | HEART RATE: 97 BPM | DIASTOLIC BLOOD PRESSURE: 61 MMHG | OXYGEN SATURATION: 99 % | SYSTOLIC BLOOD PRESSURE: 153 MMHG | RESPIRATION RATE: 17 BRPM

## 2021-09-25 VITALS
RESPIRATION RATE: 17 BRPM | WEIGHT: 154.98 LBS | DIASTOLIC BLOOD PRESSURE: 76 MMHG | OXYGEN SATURATION: 97 % | SYSTOLIC BLOOD PRESSURE: 147 MMHG | TEMPERATURE: 100 F | HEIGHT: 64 IN | HEART RATE: 102 BPM

## 2021-09-25 DIAGNOSIS — Z20.822 CONTACT WITH AND (SUSPECTED) EXPOSURE TO COVID-19: ICD-10-CM

## 2021-09-25 DIAGNOSIS — Z89.439 ACQUIRED ABSENCE OF UNSPECIFIED FOOT: ICD-10-CM

## 2021-09-25 DIAGNOSIS — J90 PLEURAL EFFUSION, NOT ELSEWHERE CLASSIFIED: ICD-10-CM

## 2021-09-25 DIAGNOSIS — Z79.4 LONG TERM (CURRENT) USE OF INSULIN: ICD-10-CM

## 2021-09-25 DIAGNOSIS — E11.319 TYPE 2 DIABETES MELLITUS WITH UNSPECIFIED DIABETIC RETINOPATHY WITHOUT MACULAR EDEMA: ICD-10-CM

## 2021-09-25 DIAGNOSIS — R50.9 FEVER, UNSPECIFIED: ICD-10-CM

## 2021-09-25 DIAGNOSIS — I10 ESSENTIAL (PRIMARY) HYPERTENSION: ICD-10-CM

## 2021-09-25 DIAGNOSIS — E78.5 HYPERLIPIDEMIA, UNSPECIFIED: ICD-10-CM

## 2021-09-25 DIAGNOSIS — Z87.2 PERSONAL HISTORY OF DISEASES OF THE SKIN AND SUBCUTANEOUS TISSUE: ICD-10-CM

## 2021-09-25 DIAGNOSIS — Z91.018 ALLERGY TO OTHER FOODS: ICD-10-CM

## 2021-09-25 DIAGNOSIS — N39.0 URINARY TRACT INFECTION, SITE NOT SPECIFIED: ICD-10-CM

## 2021-09-25 DIAGNOSIS — Z79.82 LONG TERM (CURRENT) USE OF ASPIRIN: ICD-10-CM

## 2021-09-25 LAB
ALBUMIN SERPL ELPH-MCNC: 1.8 G/DL — LOW (ref 3.3–5)
ALP SERPL-CCNC: 118 U/L — SIGNIFICANT CHANGE UP (ref 40–120)
ALT FLD-CCNC: 23 U/L — SIGNIFICANT CHANGE UP (ref 12–78)
AMMONIA BLD-MCNC: 16 UMOL/L — SIGNIFICANT CHANGE UP (ref 11–32)
ANION GAP SERPL CALC-SCNC: 7 MMOL/L — SIGNIFICANT CHANGE UP (ref 5–17)
APPEARANCE UR: CLEAR — SIGNIFICANT CHANGE UP
APTT BLD: 28.7 SEC — SIGNIFICANT CHANGE UP (ref 27.5–35.5)
AST SERPL-CCNC: 16 U/L — SIGNIFICANT CHANGE UP (ref 15–37)
BACTERIA # UR AUTO: ABNORMAL
BASOPHILS # BLD AUTO: 0.06 K/UL — SIGNIFICANT CHANGE UP (ref 0–0.2)
BASOPHILS NFR BLD AUTO: 0.7 % — SIGNIFICANT CHANGE UP (ref 0–2)
BILIRUB SERPL-MCNC: 0.3 MG/DL — SIGNIFICANT CHANGE UP (ref 0.2–1.2)
BILIRUB UR-MCNC: NEGATIVE — SIGNIFICANT CHANGE UP
BUN SERPL-MCNC: 17 MG/DL — SIGNIFICANT CHANGE UP (ref 7–23)
CALCIUM SERPL-MCNC: 9.2 MG/DL — SIGNIFICANT CHANGE UP (ref 8.5–10.1)
CHLORIDE SERPL-SCNC: 99 MMOL/L — SIGNIFICANT CHANGE UP (ref 96–108)
CO2 SERPL-SCNC: 28 MMOL/L — SIGNIFICANT CHANGE UP (ref 22–31)
COLOR SPEC: YELLOW — SIGNIFICANT CHANGE UP
COMMENT - URINE: SIGNIFICANT CHANGE UP
CREAT SERPL-MCNC: 0.77 MG/DL — SIGNIFICANT CHANGE UP (ref 0.5–1.3)
D DIMER BLD IA.RAPID-MCNC: 799 NG/ML DDU — HIGH
DIFF PNL FLD: ABNORMAL
EOSINOPHIL # BLD AUTO: 0.06 K/UL — SIGNIFICANT CHANGE UP (ref 0–0.5)
EOSINOPHIL NFR BLD AUTO: 0.7 % — SIGNIFICANT CHANGE UP (ref 0–6)
EPI CELLS # UR: SIGNIFICANT CHANGE UP
ERYTHROCYTE [SEDIMENTATION RATE] IN BLOOD: 107 MM/HR — HIGH (ref 0–20)
GLUCOSE SERPL-MCNC: 254 MG/DL — HIGH (ref 70–99)
GLUCOSE UR QL: 250 MG/DL
HCT VFR BLD CALC: 28.1 % — LOW (ref 34.5–45)
HGB BLD-MCNC: 9.5 G/DL — LOW (ref 11.5–15.5)
IMM GRANULOCYTES NFR BLD AUTO: 0.5 % — SIGNIFICANT CHANGE UP (ref 0–1.5)
INR BLD: 1.05 RATIO — SIGNIFICANT CHANGE UP (ref 0.88–1.16)
KETONES UR-MCNC: NEGATIVE — SIGNIFICANT CHANGE UP
LACTATE SERPL-SCNC: 1.6 MMOL/L — SIGNIFICANT CHANGE UP (ref 0.7–2)
LEUKOCYTE ESTERASE UR-ACNC: ABNORMAL
LYMPHOCYTES # BLD AUTO: 2.43 K/UL — SIGNIFICANT CHANGE UP (ref 1–3.3)
LYMPHOCYTES # BLD AUTO: 27.5 % — SIGNIFICANT CHANGE UP (ref 13–44)
MCHC RBC-ENTMCNC: 28.9 PG — SIGNIFICANT CHANGE UP (ref 27–34)
MCHC RBC-ENTMCNC: 33.8 GM/DL — SIGNIFICANT CHANGE UP (ref 32–36)
MCV RBC AUTO: 85.4 FL — SIGNIFICANT CHANGE UP (ref 80–100)
MONOCYTES # BLD AUTO: 0.52 K/UL — SIGNIFICANT CHANGE UP (ref 0–0.9)
MONOCYTES NFR BLD AUTO: 5.9 % — SIGNIFICANT CHANGE UP (ref 2–14)
NEUTROPHILS # BLD AUTO: 5.73 K/UL — SIGNIFICANT CHANGE UP (ref 1.8–7.4)
NEUTROPHILS NFR BLD AUTO: 64.7 % — SIGNIFICANT CHANGE UP (ref 43–77)
NITRITE UR-MCNC: NEGATIVE — SIGNIFICANT CHANGE UP
NRBC # BLD: 0 /100 WBCS — SIGNIFICANT CHANGE UP (ref 0–0)
PH UR: 5 — SIGNIFICANT CHANGE UP (ref 5–8)
PLATELET # BLD AUTO: 452 K/UL — HIGH (ref 150–400)
POTASSIUM SERPL-MCNC: 3.9 MMOL/L — SIGNIFICANT CHANGE UP (ref 3.5–5.3)
POTASSIUM SERPL-SCNC: 3.9 MMOL/L — SIGNIFICANT CHANGE UP (ref 3.5–5.3)
PROT SERPL-MCNC: 6.3 GM/DL — SIGNIFICANT CHANGE UP (ref 6–8.3)
PROT UR-MCNC: 30 MG/DL
PROTHROM AB SERPL-ACNC: 12.2 SEC — SIGNIFICANT CHANGE UP (ref 10.6–13.6)
RAPID RVP RESULT: SIGNIFICANT CHANGE UP
RBC # BLD: 3.29 M/UL — LOW (ref 3.8–5.2)
RBC # FLD: 14.2 % — SIGNIFICANT CHANGE UP (ref 10.3–14.5)
RBC CASTS # UR COMP ASSIST: SIGNIFICANT CHANGE UP /HPF (ref 0–4)
SARS-COV-2 RNA SPEC QL NAA+PROBE: SIGNIFICANT CHANGE UP
SODIUM SERPL-SCNC: 134 MMOL/L — LOW (ref 135–145)
SP GR SPEC: 1.01 — SIGNIFICANT CHANGE UP (ref 1.01–1.02)
UROBILINOGEN FLD QL: NEGATIVE MG/DL — SIGNIFICANT CHANGE UP
WBC # BLD: 8.84 K/UL — SIGNIFICANT CHANGE UP (ref 3.8–10.5)
WBC # FLD AUTO: 8.84 K/UL — SIGNIFICANT CHANGE UP (ref 3.8–10.5)
WBC UR QL: SIGNIFICANT CHANGE UP

## 2021-09-25 PROCEDURE — 99285 EMERGENCY DEPT VISIT HI MDM: CPT

## 2021-09-25 PROCEDURE — 93010 ELECTROCARDIOGRAM REPORT: CPT

## 2021-09-25 PROCEDURE — 71275 CT ANGIOGRAPHY CHEST: CPT | Mod: 26,MA

## 2021-09-25 PROCEDURE — 73590 X-RAY EXAM OF LOWER LEG: CPT | Mod: 26,LT

## 2021-09-25 PROCEDURE — 71045 X-RAY EXAM CHEST 1 VIEW: CPT | Mod: 26

## 2021-09-25 RX ORDER — CEFDINIR 250 MG/5ML
1 POWDER, FOR SUSPENSION ORAL
Qty: 14 | Refills: 0
Start: 2021-09-25 | End: 2021-10-01

## 2021-09-25 RX ORDER — ACETAMINOPHEN 500 MG
650 TABLET ORAL ONCE
Refills: 0 | Status: COMPLETED | OUTPATIENT
Start: 2021-09-25 | End: 2021-09-25

## 2021-09-25 RX ORDER — CEFTRIAXONE 500 MG/1
1000 INJECTION, POWDER, FOR SOLUTION INTRAMUSCULAR; INTRAVENOUS ONCE
Refills: 0 | Status: COMPLETED | OUTPATIENT
Start: 2021-09-25 | End: 2021-09-25

## 2021-09-25 RX ORDER — SODIUM CHLORIDE 9 MG/ML
2000 INJECTION INTRAMUSCULAR; INTRAVENOUS; SUBCUTANEOUS ONCE
Refills: 0 | Status: COMPLETED | OUTPATIENT
Start: 2021-09-25 | End: 2021-09-25

## 2021-09-25 RX ADMIN — Medication 650 MILLIGRAM(S): at 21:47

## 2021-09-25 RX ADMIN — CEFTRIAXONE 1000 MILLIGRAM(S): 500 INJECTION, POWDER, FOR SOLUTION INTRAMUSCULAR; INTRAVENOUS at 23:15

## 2021-09-25 RX ADMIN — SODIUM CHLORIDE 2000 MILLILITER(S): 9 INJECTION INTRAMUSCULAR; INTRAVENOUS; SUBCUTANEOUS at 20:39

## 2021-09-25 RX ADMIN — Medication 650 MILLIGRAM(S): at 20:39

## 2021-09-25 RX ADMIN — SODIUM CHLORIDE 2000 MILLILITER(S): 9 INJECTION INTRAMUSCULAR; INTRAVENOUS; SUBCUTANEOUS at 22:13

## 2021-09-25 RX ADMIN — CEFTRIAXONE 100 MILLIGRAM(S): 500 INJECTION, POWDER, FOR SOLUTION INTRAMUSCULAR; INTRAVENOUS at 22:14

## 2021-09-25 NOTE — ED PROVIDER NOTE - PATIENT PORTAL LINK FT
You can access the FollowMyHealth Patient Portal offered by [facility] by registering at the following website: [email or web address]. By joining [employer]’s FollowMyHealth portal, you will also be able to view your health information using other applications (apps) compatible with our system.

## 2021-09-25 NOTE — ED PROVIDER NOTE - NSFOLLOWUPINSTRUCTIONS_ED_ALL_ED_FT
Urinary Tract Infection    A urinary tract infection (UTI) is an infection of any part of the urinary tract, which includes the kidneys, ureters, bladder, and urethra. Risk factors include ignoring your need to urinate, wiping back to front if female, being an uncircumcised male, and having diabetes or a weak immune system. Symptoms include frequent urination, pain or burning with urination, foul smelling urine, cloudy urine, pain in the lower abdomen, blood in the urine, and fever. If you were prescribed an antibiotic medicine, take it as told by your health care provider. Do not stop taking the antibiotic even if you start to feel better.    SEEK IMMEDIATE MEDICAL CARE IF YOU HAVE ANY OF THE FOLLOWING SYMPTOMS: severe back or abdominal pain, fever, inability to keep fluids or medicine down, dizziness/lightheadedness, or a change in mental status.     Take acetaminophen 650 mg orally every 6-8 hours for pain control as needed. Please do not exceed 4,000 mg of acetaminophen during a 24 hours period. Acetaminophen can be found in many over-the-counter cold medications as well as opioid medications that may be given for pain.    Take ibuprofen (also known as MOTRIN or ADVIL) 400 mg orally every 6-8 hours for pain control as needed with food to avoid an upset stomach. Ibuprofen can be found in many over-the-counter medications. Please do not take ibuprofen if you have a bleeding disorder, stomach or gastrointestinal ulcer, or liver disease.    If needed, you can alternate these medications so that you can take one medication every 3 hours. For example, at noon take ibuprofen, then at 3PM take acetaminophen, then at 6PM take ibuprofen.     Please fill the prescription of the antibiotics and take as directed. Please finish the entire course of the medication as prescribed. Do not use any alcohol or grapefruit juice with any antibiotics.

## 2021-09-25 NOTE — ED ADULT NURSE NOTE - NSIMPLEMENTINTERV_GEN_ALL_ED
Implemented All Fall Risk Interventions:  [place] to call system. Call bell, personal items and telephone within reach. Instruct patient to call for assistance. Room bathroom lighting operational. Non-slip footwear when patient is off stretcher. Physically safe environment: no spills, clutter or unnecessary equipment. Stretcher in lowest position, wheels locked, appropriate side rails in place. Provide visual cue, wrist band, yellow gown, etc. Monitor gait and stability. Monitor for mental status changes and reorient to person, place, and time. Review medications for side effects contributing to fall risk. Reinforce activity limits and safety measures with patient and family.

## 2021-09-25 NOTE — ED PROVIDER NOTE - CLINICAL SUMMARY MEDICAL DECISION MAKING FREE TEXT BOX
64 yo F presenting with fever, with wound vac to LLE- area clean and dry without erythema, warmth or ttp. No crepitus. Abdomen benign, lungs ctab. will obtain UA, cxr, xr LLE, r/o infection. dispo per findings

## 2021-09-25 NOTE — ED PROVIDER NOTE - OBJECTIVE STATEMENT
64 yo F IDDM, HTN, HLD, diabetic retinopathy, necrotic foot infection, sent over from [facility] for fever. S/p Chopart's amputation with wound vac. Denies cough, cp, abdominal pain, urinary sx, diarrhea.

## 2021-09-25 NOTE — ED ADULT NURSE NOTE - CHIEF COMPLAINT QUOTE
from [facility], here for fever, RN stated had chopart's amputation, tylenol 650mg was given in [facility] around 1700. patient is alert and oriented x 2 at baseline, wound vac noted on left leg/foot

## 2021-09-25 NOTE — ED PROVIDER NOTE - PROGRESS NOTE DETAILS
DICKSON LEONARDO: signout received; (+) UTI, new left small pleural effusion; however, in no respiratory distress, not hypoxic. to be discharged back to [facility]. I have discussed with the patient about the ED workup, lab results, diagnostics results, plan for discharge home, need for follow-up with primary care physician/specialists, and return precautions. At this time, the patient does not require further workup in the ED. The patient is subjectively feeling better and would like to be discharged home. The patient had the opportunity to ask questions and I have answered all inquiries. The patient verbalizes understanding and agreement with the plan. The patient is hemodynamically stable, clinically well-appearing, ambulatory, mentating well and ready for discharge home.

## 2021-09-25 NOTE — ED PROVIDER NOTE - PHYSICAL EXAMINATION
VITALS: reviewed  GEN: NAD, A & O x 4  HEAD/EYES: NCAT, EOMI, anicteric sclerae  ENT: mucus membranes moist, oropharynx WNL, trachea midline  RESP: lungs CTA with equal breath sounds bilaterally, chest wall nontender and atraumatic  CV: heart with reg rhythm S1, S2, distal pulses intact and symmetric bilaterally  ABDOMEN:soft, nondistended, nontender, no palpable masses  : no CVAT  MSK: chopart's amputation LLE with wound vac, extremities atraumatic and nontender, no edema, no asymmetry. the back is without midline or lateral tenderness, there is no spinal deformity or stepoff and the back is ranged painlessly.  NEURO: alert, mentating appropriately, no facial asymmetry. gross sensation, motor, coordination are intact  PSYCH: Affect appropriate

## 2021-09-25 NOTE — ED ADULT TRIAGE NOTE - CHIEF COMPLAINT QUOTE
from [facility], here for fever, RN stated had chopart's amputation, tylenol 650mg was given in [facility] around 1700. patient is alert and oriented x 2 at baseline, wound vac noted on left leg/foot

## 2021-09-25 NOTE — ED PROVIDER NOTE - WR ORDER NAME 1
Patient girlfriend/Sylvia/EILEEN contacted to set up hospice meeting. Sathish did not want to meet until financial varication that hospice services would be covered under insurance.  Patient's Medicaid reviewed by [facility] and apparently pat Xray Tibia + Fibula 2 Views, Left

## 2021-09-25 NOTE — ED ADULT NURSE NOTE - NSFALLRSKOUTCOME_ED_ALL_ED
Subjective:      History was provided by the father    Vin Myles is a 10 year old male who is brought in for this well-child visit.    Immunization History   Administered Date(s) Administered   • DTaP, 5 Pertussis Antigens (DAPTACEL) 2009, 2009, 03/16/2010, 03/31/2011, 04/14/2014   • HIB (HbOC) 2009, 2009, 03/16/2010, 03/31/2011   • Hep A, Unspecified formulation 03/31/2011, 02/16/2012   • Hep B, Unspecified Formulation 2009, 2009, 06/10/2010   • MMR 09/21/2010, 04/14/2014   • Pneumococcal Conjugate 7 Valent 2009, 2009, 03/16/2010, 03/31/2011   • Polio, INACTIVATED 2009, 2009, 03/16/2010, 03/31/2011, 04/14/2014   • Rotavirus - pentavalent 2009, 2009, 03/16/2010   • Varicella 09/21/2010, 04/14/2014     Patient's medications, allergies, past medical, surgical, social and family histories were reviewed and updated as appropriate.    Current Issues:  Current concerns include None.  Does patient snore? no     Review of Nutrition:  Current diet: Good  Balanced diet? no    Social Screening:  Sibling relations: ok  Discipline concerns? no  Concerns regarding behavior with peers? no  School performance: doing well; no concerns  Secondhand smoke exposure? no    Screening Questions:  Risk factors for anemia: no  Risk factors for tuberculosis: no  Risk factors for dyslipidemia: no     Objective:     Vitals:    09/28/19 1102   BP: 92/62   Pulse: 75   Temp: 97.8 °F (36.6 °C)   TempSrc: Temporal   SpO2: 100%   Weight: 37.6 kg (83 lb)   Height: 4' 10.03\" (1.474 m)     Growth parameters are noted and are appropriate for age.    General:   alert   Gait:   normal   Skin:   normal, warm and dry, with normal turgor   Oral cavity:   lips, mucosa, and tongue normal; teeth and gums normal   Eyes:   sclerae white, pupils equal and reactive, red reflex normal bilaterally   Ears:   air/fluid interface bilaterally   Neck:   no adenopathy, no carotid bruit, no JVD,  supple, symmetrical, trachea midline and thyroid not enlarged, symmetric, no tenderness/mass/nodules   Lungs:  clear to auscultation bilaterally   Heart:   regular rate and rhythm, S1, S2 normal, no murmur, click, rub or gallop   Abdomen:  Soft, non-tender; bowel sounds normal; no masses, no hepatosplenomegaly   :  exam deferred   Isaac stage:   2   Extremities:  extremities normal, atraumatic, no cyanosis or edema   Neuro:  normal without focal findings, mental status, speech normal, alert and oriented x3, ZAIRA and reflexes normal and symmetric     Assessment:     Healthy 10 year old male child.     Plan:     1. Anticipatory guidance discussed.  Specific topics reviewed: importance of regular dental care, importance of regular exercise, importance of varied diet and minimize junk food.    2.  Weight management:  The patient was counseled regarding nutrition and physical activity.    3. Development: appropriate for age    4. Immunizations today: per orders.  History of previous adverse reactions to immunizations? no    5. Follow-up visit in 1 year for next well child visit, or sooner as needed  Epi pen scrip given and forms completed.    Shaka Motta MD  .   Fall Risk

## 2021-09-26 LAB
CRP SERPL-MCNC: 106 MG/L — HIGH
CULTURE RESULTS: SIGNIFICANT CHANGE UP
GLUCOSE BLDC GLUCOMTR-MCNC: 291 MG/DL — HIGH (ref 70–99)
SPECIMEN SOURCE: SIGNIFICANT CHANGE UP

## 2021-09-26 RX ORDER — GABAPENTIN 400 MG/1
100 CAPSULE ORAL ONCE
Refills: 0 | Status: COMPLETED | OUTPATIENT
Start: 2021-09-26 | End: 2021-09-26

## 2021-09-26 RX ADMIN — GABAPENTIN 100 MILLIGRAM(S): 400 CAPSULE ORAL at 00:59

## 2021-09-27 ENCOUNTER — INPATIENT (INPATIENT)
Facility: HOSPITAL | Age: 66
LOS: 10 days | Discharge: NURSING, REHAB OR LONG-TERM CARE | End: 2021-10-08
Attending: INTERNAL MEDICINE | Admitting: INTERNAL MEDICINE
Payer: COMMERCIAL

## 2021-09-27 VITALS
HEART RATE: 96 BPM | TEMPERATURE: 99 F | DIASTOLIC BLOOD PRESSURE: 76 MMHG | RESPIRATION RATE: 20 BRPM | HEIGHT: 64 IN | WEIGHT: 160.06 LBS | OXYGEN SATURATION: 100 % | SYSTOLIC BLOOD PRESSURE: 133 MMHG

## 2021-09-27 DIAGNOSIS — E11.319 TYPE 2 DIABETES MELLITUS WITH UNSPECIFIED DIABETIC RETINOPATHY WITHOUT MACULAR EDEMA: ICD-10-CM

## 2021-09-27 DIAGNOSIS — I10 ESSENTIAL (PRIMARY) HYPERTENSION: ICD-10-CM

## 2021-09-27 DIAGNOSIS — I73.9 PERIPHERAL VASCULAR DISEASE, UNSPECIFIED: ICD-10-CM

## 2021-09-27 DIAGNOSIS — G93.41 METABOLIC ENCEPHALOPATHY: ICD-10-CM

## 2021-09-27 DIAGNOSIS — E44.0 MODERATE PROTEIN-CALORIE MALNUTRITION: ICD-10-CM

## 2021-09-27 DIAGNOSIS — I08.1 RHEUMATIC DISORDERS OF BOTH MITRAL AND TRICUSPID VALVES: ICD-10-CM

## 2021-09-27 DIAGNOSIS — I27.20 PULMONARY HYPERTENSION, UNSPECIFIED: ICD-10-CM

## 2021-09-27 DIAGNOSIS — A41.01 SEPSIS DUE TO METHICILLIN SUSCEPTIBLE STAPHYLOCOCCUS AUREUS: ICD-10-CM

## 2021-09-27 DIAGNOSIS — Z91.018 ALLERGY TO OTHER FOODS: ICD-10-CM

## 2021-09-27 DIAGNOSIS — E78.5 HYPERLIPIDEMIA, UNSPECIFIED: ICD-10-CM

## 2021-09-27 DIAGNOSIS — E11.52 TYPE 2 DIABETES MELLITUS WITH DIABETIC PERIPHERAL ANGIOPATHY WITH GANGRENE: ICD-10-CM

## 2021-09-27 DIAGNOSIS — Z79.4 LONG TERM (CURRENT) USE OF INSULIN: ICD-10-CM

## 2021-09-27 DIAGNOSIS — M86.172 OTHER ACUTE OSTEOMYELITIS, LEFT ANKLE AND FOOT: ICD-10-CM

## 2021-09-27 DIAGNOSIS — I25.2 OLD MYOCARDIAL INFARCTION: ICD-10-CM

## 2021-09-27 DIAGNOSIS — Z83.3 FAMILY HISTORY OF DIABETES MELLITUS: ICD-10-CM

## 2021-09-27 DIAGNOSIS — A41.9 SEPSIS, UNSPECIFIED ORGANISM: ICD-10-CM

## 2021-09-27 DIAGNOSIS — E11.51 TYPE 2 DIABETES MELLITUS WITH DIABETIC PERIPHERAL ANGIOPATHY WITHOUT GANGRENE: ICD-10-CM

## 2021-09-27 DIAGNOSIS — G92 TOXIC ENCEPHALOPATHY: ICD-10-CM

## 2021-09-27 DIAGNOSIS — E11.65 TYPE 2 DIABETES MELLITUS WITH HYPERGLYCEMIA: ICD-10-CM

## 2021-09-27 DIAGNOSIS — L97.529 NON-PRESSURE CHRONIC ULCER OF OTHER PART OF LEFT FOOT WITH UNSPECIFIED SEVERITY: ICD-10-CM

## 2021-09-27 DIAGNOSIS — E11.69 TYPE 2 DIABETES MELLITUS WITH OTHER SPECIFIED COMPLICATION: ICD-10-CM

## 2021-09-27 DIAGNOSIS — I21.A1 MYOCARDIAL INFARCTION TYPE 2: ICD-10-CM

## 2021-09-27 DIAGNOSIS — M62.82 RHABDOMYOLYSIS: ICD-10-CM

## 2021-09-27 DIAGNOSIS — E86.0 DEHYDRATION: ICD-10-CM

## 2021-09-27 DIAGNOSIS — Z29.9 ENCOUNTER FOR PROPHYLACTIC MEASURES, UNSPECIFIED: ICD-10-CM

## 2021-09-27 DIAGNOSIS — E11.621 TYPE 2 DIABETES MELLITUS WITH FOOT ULCER: ICD-10-CM

## 2021-09-27 DIAGNOSIS — E87.0 HYPEROSMOLALITY AND HYPERNATREMIA: ICD-10-CM

## 2021-09-27 DIAGNOSIS — E11.9 TYPE 2 DIABETES MELLITUS WITHOUT COMPLICATIONS: ICD-10-CM

## 2021-09-27 DIAGNOSIS — Z79.82 LONG TERM (CURRENT) USE OF ASPIRIN: ICD-10-CM

## 2021-09-27 DIAGNOSIS — M19.09 PRIMARY OSTEOARTHRITIS, OTHER SPECIFIED SITE: ICD-10-CM

## 2021-09-27 DIAGNOSIS — D63.8 ANEMIA IN OTHER CHRONIC DISEASES CLASSIFIED ELSEWHERE: ICD-10-CM

## 2021-09-27 DIAGNOSIS — E11.10 TYPE 2 DIABETES MELLITUS WITH KETOACIDOSIS WITHOUT COMA: ICD-10-CM

## 2021-09-27 DIAGNOSIS — Z89.432 ACQUIRED ABSENCE OF LEFT FOOT: ICD-10-CM

## 2021-09-27 DIAGNOSIS — K59.00 CONSTIPATION, UNSPECIFIED: ICD-10-CM

## 2021-09-27 DIAGNOSIS — E87.2 ACIDOSIS: ICD-10-CM

## 2021-09-27 DIAGNOSIS — R65.20 SEVERE SEPSIS WITHOUT SEPTIC SHOCK: ICD-10-CM

## 2021-09-27 DIAGNOSIS — N17.9 ACUTE KIDNEY FAILURE, UNSPECIFIED: ICD-10-CM

## 2021-09-27 DIAGNOSIS — Z79.84 LONG TERM (CURRENT) USE OF ORAL HYPOGLYCEMIC DRUGS: ICD-10-CM

## 2021-09-27 LAB
ALBUMIN SERPL ELPH-MCNC: 1.6 G/DL — LOW (ref 3.3–5)
ALP SERPL-CCNC: 112 U/L — SIGNIFICANT CHANGE UP (ref 40–120)
ALT FLD-CCNC: 20 U/L — SIGNIFICANT CHANGE UP (ref 12–78)
ANION GAP SERPL CALC-SCNC: 4 MMOL/L — LOW (ref 5–17)
APPEARANCE UR: ABNORMAL
APTT BLD: 27.6 SEC — SIGNIFICANT CHANGE UP (ref 27.5–35.5)
AST SERPL-CCNC: 8 U/L — LOW (ref 15–37)
BACTERIA # UR AUTO: ABNORMAL
BASOPHILS # BLD AUTO: 0.02 K/UL — SIGNIFICANT CHANGE UP (ref 0–0.2)
BASOPHILS NFR BLD AUTO: 0.3 % — SIGNIFICANT CHANGE UP (ref 0–2)
BILIRUB SERPL-MCNC: 0.3 MG/DL — SIGNIFICANT CHANGE UP (ref 0.2–1.2)
BILIRUB UR-MCNC: NEGATIVE — SIGNIFICANT CHANGE UP
BUN SERPL-MCNC: 14 MG/DL — SIGNIFICANT CHANGE UP (ref 7–23)
CALCIUM SERPL-MCNC: 8.5 MG/DL — SIGNIFICANT CHANGE UP (ref 8.5–10.1)
CHLORIDE SERPL-SCNC: 102 MMOL/L — SIGNIFICANT CHANGE UP (ref 96–108)
CO2 SERPL-SCNC: 30 MMOL/L — SIGNIFICANT CHANGE UP (ref 22–31)
COLOR SPEC: YELLOW — SIGNIFICANT CHANGE UP
CREAT SERPL-MCNC: 0.77 MG/DL — SIGNIFICANT CHANGE UP (ref 0.5–1.3)
DIFF PNL FLD: ABNORMAL
EOSINOPHIL # BLD AUTO: 0.08 K/UL — SIGNIFICANT CHANGE UP (ref 0–0.5)
EOSINOPHIL NFR BLD AUTO: 1.1 % — SIGNIFICANT CHANGE UP (ref 0–6)
EPI CELLS # UR: SIGNIFICANT CHANGE UP
FLUAV AG NPH QL: SIGNIFICANT CHANGE UP
FLUBV AG NPH QL: SIGNIFICANT CHANGE UP
GLUCOSE BLDC GLUCOMTR-MCNC: 368 MG/DL — HIGH (ref 70–99)
GLUCOSE BLDC GLUCOMTR-MCNC: 389 MG/DL — HIGH (ref 70–99)
GLUCOSE SERPL-MCNC: 342 MG/DL — HIGH (ref 70–99)
GLUCOSE UR QL: 1000 MG/DL
HCT VFR BLD CALC: 24.8 % — LOW (ref 34.5–45)
HGB BLD-MCNC: 8.2 G/DL — LOW (ref 11.5–15.5)
IMM GRANULOCYTES NFR BLD AUTO: 0.3 % — SIGNIFICANT CHANGE UP (ref 0–1.5)
INR BLD: 1.16 RATIO — SIGNIFICANT CHANGE UP (ref 0.88–1.16)
KETONES UR-MCNC: ABNORMAL
LACTATE SERPL-SCNC: 1 MMOL/L — SIGNIFICANT CHANGE UP (ref 0.7–2)
LEUKOCYTE ESTERASE UR-ACNC: ABNORMAL
LYMPHOCYTES # BLD AUTO: 2.12 K/UL — SIGNIFICANT CHANGE UP (ref 1–3.3)
LYMPHOCYTES # BLD AUTO: 30.3 % — SIGNIFICANT CHANGE UP (ref 13–44)
MCHC RBC-ENTMCNC: 28.9 PG — SIGNIFICANT CHANGE UP (ref 27–34)
MCHC RBC-ENTMCNC: 33.1 GM/DL — SIGNIFICANT CHANGE UP (ref 32–36)
MCV RBC AUTO: 87.3 FL — SIGNIFICANT CHANGE UP (ref 80–100)
METHOD TYPE: SIGNIFICANT CHANGE UP
MONOCYTES # BLD AUTO: 0.39 K/UL — SIGNIFICANT CHANGE UP (ref 0–0.9)
MONOCYTES NFR BLD AUTO: 5.6 % — SIGNIFICANT CHANGE UP (ref 2–14)
MSSA DNA SPEC QL NAA+PROBE: SIGNIFICANT CHANGE UP
NEUTROPHILS # BLD AUTO: 4.37 K/UL — SIGNIFICANT CHANGE UP (ref 1.8–7.4)
NEUTROPHILS NFR BLD AUTO: 62.4 % — SIGNIFICANT CHANGE UP (ref 43–77)
NITRITE UR-MCNC: NEGATIVE — SIGNIFICANT CHANGE UP
NRBC # BLD: 0 /100 WBCS — SIGNIFICANT CHANGE UP (ref 0–0)
PH UR: 5 — SIGNIFICANT CHANGE UP (ref 5–8)
PLATELET # BLD AUTO: 422 K/UL — HIGH (ref 150–400)
POTASSIUM SERPL-MCNC: 3.7 MMOL/L — SIGNIFICANT CHANGE UP (ref 3.5–5.3)
POTASSIUM SERPL-SCNC: 3.7 MMOL/L — SIGNIFICANT CHANGE UP (ref 3.5–5.3)
PROT SERPL-MCNC: 6.3 GM/DL — SIGNIFICANT CHANGE UP (ref 6–8.3)
PROT UR-MCNC: NEGATIVE MG/DL — SIGNIFICANT CHANGE UP
PROTHROM AB SERPL-ACNC: 13.4 SEC — SIGNIFICANT CHANGE UP (ref 10.6–13.6)
RBC # BLD: 2.84 M/UL — LOW (ref 3.8–5.2)
RBC # FLD: 14.4 % — SIGNIFICANT CHANGE UP (ref 10.3–14.5)
RBC CASTS # UR COMP ASSIST: SIGNIFICANT CHANGE UP /HPF (ref 0–4)
SARS-COV-2 RNA SPEC QL NAA+PROBE: SIGNIFICANT CHANGE UP
SODIUM SERPL-SCNC: 136 MMOL/L — SIGNIFICANT CHANGE UP (ref 135–145)
SP GR SPEC: 1.01 — SIGNIFICANT CHANGE UP (ref 1.01–1.02)
T PALLIDUM AB TITR SER: NEGATIVE — SIGNIFICANT CHANGE UP
UROBILINOGEN FLD QL: NEGATIVE MG/DL — SIGNIFICANT CHANGE UP
WBC # BLD: 7 K/UL — SIGNIFICANT CHANGE UP (ref 3.8–10.5)
WBC # FLD AUTO: 7 K/UL — SIGNIFICANT CHANGE UP (ref 3.8–10.5)
WBC UR QL: SIGNIFICANT CHANGE UP

## 2021-09-27 PROCEDURE — 99285 EMERGENCY DEPT VISIT HI MDM: CPT

## 2021-09-27 PROCEDURE — 73590 X-RAY EXAM OF LOWER LEG: CPT | Mod: 26,LT

## 2021-09-27 PROCEDURE — 71045 X-RAY EXAM CHEST 1 VIEW: CPT | Mod: 26

## 2021-09-27 RX ORDER — GLUCAGON INJECTION, SOLUTION 0.5 MG/.1ML
1 INJECTION, SOLUTION SUBCUTANEOUS ONCE
Refills: 0 | Status: DISCONTINUED | OUTPATIENT
Start: 2021-09-27 | End: 2021-10-08

## 2021-09-27 RX ORDER — HEPARIN SODIUM 5000 [USP'U]/ML
5000 INJECTION INTRAVENOUS; SUBCUTANEOUS EVERY 12 HOURS
Refills: 0 | Status: DISCONTINUED | OUTPATIENT
Start: 2021-09-27 | End: 2021-10-08

## 2021-09-27 RX ORDER — SODIUM CHLORIDE 9 MG/ML
1000 INJECTION, SOLUTION INTRAVENOUS
Refills: 0 | Status: DISCONTINUED | OUTPATIENT
Start: 2021-09-27 | End: 2021-10-08

## 2021-09-27 RX ORDER — ACETAMINOPHEN 500 MG
650 TABLET ORAL EVERY 6 HOURS
Refills: 0 | Status: DISCONTINUED | OUTPATIENT
Start: 2021-09-27 | End: 2021-10-08

## 2021-09-27 RX ORDER — INSULIN LISPRO 100/ML
VIAL (ML) SUBCUTANEOUS AT BEDTIME
Refills: 0 | Status: DISCONTINUED | OUTPATIENT
Start: 2021-09-27 | End: 2021-10-08

## 2021-09-27 RX ORDER — INSULIN LISPRO 100/ML
7 VIAL (ML) SUBCUTANEOUS
Refills: 0 | Status: DISCONTINUED | OUTPATIENT
Start: 2021-09-27 | End: 2021-10-08

## 2021-09-27 RX ORDER — CEFAZOLIN SODIUM 1 G
2000 VIAL (EA) INJECTION EVERY 8 HOURS
Refills: 0 | Status: DISCONTINUED | OUTPATIENT
Start: 2021-09-27 | End: 2021-09-29

## 2021-09-27 RX ORDER — INSULIN LISPRO 100/ML
VIAL (ML) SUBCUTANEOUS
Refills: 0 | Status: DISCONTINUED | OUTPATIENT
Start: 2021-09-27 | End: 2021-10-08

## 2021-09-27 RX ORDER — POLYETHYLENE GLYCOL 3350 17 G/17G
17 POWDER, FOR SOLUTION ORAL DAILY
Refills: 0 | Status: DISCONTINUED | OUTPATIENT
Start: 2021-09-27 | End: 2021-10-08

## 2021-09-27 RX ORDER — SODIUM CHLORIDE 9 MG/ML
2300 INJECTION INTRAMUSCULAR; INTRAVENOUS; SUBCUTANEOUS ONCE
Refills: 0 | Status: COMPLETED | OUTPATIENT
Start: 2021-09-27 | End: 2021-09-27

## 2021-09-27 RX ORDER — DEXTROSE 50 % IN WATER 50 %
25 SYRINGE (ML) INTRAVENOUS ONCE
Refills: 0 | Status: DISCONTINUED | OUTPATIENT
Start: 2021-09-27 | End: 2021-10-08

## 2021-09-27 RX ORDER — PIPERACILLIN AND TAZOBACTAM 4; .5 G/20ML; G/20ML
3.38 INJECTION, POWDER, LYOPHILIZED, FOR SOLUTION INTRAVENOUS ONCE
Refills: 0 | Status: DISCONTINUED | OUTPATIENT
Start: 2021-09-27 | End: 2021-09-27

## 2021-09-27 RX ORDER — SENNA PLUS 8.6 MG/1
2 TABLET ORAL AT BEDTIME
Refills: 0 | Status: DISCONTINUED | OUTPATIENT
Start: 2021-09-27 | End: 2021-10-08

## 2021-09-27 RX ORDER — ATORVASTATIN CALCIUM 80 MG/1
40 TABLET, FILM COATED ORAL AT BEDTIME
Refills: 0 | Status: DISCONTINUED | OUTPATIENT
Start: 2021-09-27 | End: 2021-10-08

## 2021-09-27 RX ORDER — METOPROLOL TARTRATE 50 MG
12.5 TABLET ORAL
Refills: 0 | Status: DISCONTINUED | OUTPATIENT
Start: 2021-09-27 | End: 2021-10-08

## 2021-09-27 RX ORDER — VANCOMYCIN HCL 1 G
1000 VIAL (EA) INTRAVENOUS ONCE
Refills: 0 | Status: DISCONTINUED | OUTPATIENT
Start: 2021-09-27 | End: 2021-09-27

## 2021-09-27 RX ORDER — GABAPENTIN 400 MG/1
100 CAPSULE ORAL THREE TIMES A DAY
Refills: 0 | Status: DISCONTINUED | OUTPATIENT
Start: 2021-09-27 | End: 2021-10-08

## 2021-09-27 RX ORDER — DEXTROSE 50 % IN WATER 50 %
12.5 SYRINGE (ML) INTRAVENOUS ONCE
Refills: 0 | Status: DISCONTINUED | OUTPATIENT
Start: 2021-09-27 | End: 2021-10-08

## 2021-09-27 RX ORDER — DEXTROSE 50 % IN WATER 50 %
15 SYRINGE (ML) INTRAVENOUS ONCE
Refills: 0 | Status: DISCONTINUED | OUTPATIENT
Start: 2021-09-27 | End: 2021-10-08

## 2021-09-27 RX ADMIN — Medication 10: at 18:02

## 2021-09-27 RX ADMIN — Medication 6: at 22:13

## 2021-09-27 RX ADMIN — SODIUM CHLORIDE 2300 MILLILITER(S): 9 INJECTION INTRAMUSCULAR; INTRAVENOUS; SUBCUTANEOUS at 15:20

## 2021-09-27 RX ADMIN — Medication 100 MILLIGRAM(S): at 22:13

## 2021-09-27 RX ADMIN — Medication 650 MILLIGRAM(S): at 18:02

## 2021-09-27 RX ADMIN — GABAPENTIN 100 MILLIGRAM(S): 400 CAPSULE ORAL at 22:13

## 2021-09-27 NOTE — ED PROVIDER NOTE - MUSCULOSKELETAL, MLM
Spine appears normal, range of motion is not limited, left leg has well dressed post surgical wound.

## 2021-09-27 NOTE — CHART NOTE - NSCHARTNOTEFT_GEN_A_CORE
Seen earlier today at [facility]  +BC for MSSA from ER visit in the setting of fever, delirium.   These complaints resolved  Only complaint at present is paresthesias in B UE and RUE weakness involving thumb and 2nd finger  Drainage from VAC ?purulent    Suggestions--  MRI C/S wwo gadolinium now  Remove VAC/assess amp site  Podiatry assessment  +/- vascular consult depending on above  Blood Cx x2  Cefazolin 2g IVPB q8h  ECHO  CT A/P ideally with iv and PO contrast    Thank you for the courtesy of this referral.  Cornel Johansen MD  Attending Physician  [facility]  Division of Infectious Diseases  [phone] Seen earlier today at [facility]  +BC for MSSA from ER visit in the setting of fever, delirium.   These complaints resolved  Only complaint at present is paresthesias in B UE and RUE weakness involving thumb and 2nd finger  Drainage from VAC ?purulent    Suggestions--  MRI C/S wwo gadolinium now  Remove VAC/assess amp site  Podiatry assessment  +/- vascular consult depending on above  Blood Cx x2  Cefazolin 2g IVPB q8h  ECHO  CT A/P ideally with iv and PO contrast    Thank you for the courtesy of this referral.  Cornel Johansen MD  Attending Physician  [facility]  Division of Infectious Diseases  [phone]    --------  Copy of [facility] Note  Infectious Disease Consultation    [facility]  Division of Infectious Diseases  [phone]    DOS: 2021    Benedicto ZEPEDA    MRN: [id number] ([facility])  : 1955    CC: +Blood Cx    HPI: This is a 65-year-old woman previously known to our group for necrotizing fasciitis/gas gangrene involving the left foot.  She also had diabetic ketoacidosis, possible myocardial infarction, rhabdomyolysis, acute kidney injury, dehydration/hypernatremia, and underwent Chopart amputation onSept 10.  The wound was left open.  She was seen by vascular surgery and no vascular intervention was performed.  VAC was placed and the patient was sent to rehab.    Margins of resection on pathology were negative.  Blood cultures were negative. Abscess cultures were polymicrobial.  The patient presented to the emergency room late in the evening on .  She had fever, and delirium.  At that point in time Covid was negative.  She was diagnosed with a urinary tract infection, the patient however denies any urinary symptoms at this point in time.  Urinalysis was also without pyuria and the urine culture was polymicrobial.  Work-up also included a CT scan angiogram of the chest which did not show a pulmonary embolism, but there was a small groundglass infiltrate and small pleural effusion.  Covid PCR was negative. She was given a dose of ceftriaxone and sent back to [facility].  She had been started on oral cefdinir, but apparently the patient had refused it.  The patient is now noted to have methicillin sensitive Staphylococcus aureus growing in 1 out of 4 bottles.  The patient's only complaint at this point in time is paresthesias of the hands and forearms which began after coming to rehab.  The patient also has weakness involving the thumb and forefinger of her right hand.  She denies any neck pain.  She denies any chills or rigors.  There is no shortness of breath or chest pain.  She denies any abdominal pain.  There is no cough.  She denies any nausea or vomiting.  There is no diarrhea or constipation.  She denies any urinary symptoms.      PMH/PSH:  •	Diabetes mellitus  •	Diabetic retinopathy  •	Dyslipidemia  •	Acute renal failure  •	Rhabdomyolysis  •	High blood pressure    Medications: [facility] MAR reviewed.     Allergies: The patient denies any antibiotic or medication allergies    Social Hx: Denies any active alcohol, smoking or drug use.    Family/Marital History- Not relevant to the clinical concern.    ROS: otherwise negative    Physical Exam:  General: Nontoxic-appearing woman in NAD  Vitals: 153/77.  Heart rate 84.  Respiratory rate 18.  Presently temperature 99 Fahrenheit.  HEENT: AT/NC. Sclera not icteric. Conjunctiva pink and moist. Maureen grossly clear.  Neck: Supple no LAD  Lungs: Clear bilaterally without rales, wheezes or rhonchi  Cor: RRR 2/6 systolic murmur.  No rub or gallop.  No thrill.  Abd: +BS. Soft. ND. NT. No sense of mass.  Back: No spinal or CVA tenderness.   Ext: No C/C/E.  VAC in place left lower extremity.  Drainage in the tubing has a purulent appearance, though difficult to adequately assess.  Skin: Warm/Dry. No vasculitic stigmata  Psych: Appropriate mood & affect    Lab Studies: Lab data reviewed. White blood cell count at the ER visit was 8.8.  Hemoglobin 9.5.  Platelet count 452.  Differential without left shift.  Creatinine 0.77.  Chemistries not significantly deranged.  Liver function test not elevated.  Urinalysis without significant pyuria.  There are also yeast forms present.  Covid test negative.  Blood culture 1 out of 4 methicillin sensitive Staph aureus.  CT angiogram of the chest as previously described.  Plain x-ray of the left distal leg is unchanged compared with the prior study.    Assessment—  Methicillin sensitive Staph aureus septicemia.  This cannot be ignored as a contaminant.  There is no IV catheter related phlebitis.  Her chest exam is fairly clear.  There are no embolic or vasculitic stigmata at this point in time concerning for endocarditis.  Her abdominal exam is benign.  Urine was polymicrobial, and Staph aureus would be an atypical pathogen for urosepsis.  She did have fever and delirium in this context, so I would want to look for potential sources of infection.  Most importantly given the patient's complaint, I would image her C-spine with MRI to look for epidural abscess or similar infectious process that would be compromising her neurologic function.  This is not a process that I would be comfortable handling at the rehab facility.    Suggestions--  Discussed with Dr. Pratt at the time of assessment.  Transfer to hospital  MRI of cervical spine with and without IV gadolinium contrast  Remove VAC closure device\podiatry evaluation of amputation site as a potential source of infection  +/-Vascular consult depending on above  Blood cultures x2  Cefazolin 2 g IV piggyback every 8 hours  Echocardiogram  CT scan of the abdomen pelvis ideally with p.o. and IV contrast  Reviewed with the ER attending later in the day (Dr. Meyer)  Reviewed with the patient in layman's terms in detail    We will follow at the hospital    Thank you for the courtesy of this referral.      Cornel Johansen MD  Chief, Division of Infectious Diseases [facility]  Attending Physician, [facility]  [phone]

## 2021-09-27 NOTE — ED ADULT NURSE NOTE - OBJECTIVE STATEMENT
pt presented to the ED for abnormal labs pt presented to the ED for abnormal labs/ noted with left foot amputation and stage 3 decubitus ulcer on the right trochanter

## 2021-09-27 NOTE — H&P ADULT - HISTORY OF PRESENT ILLNESS
· HPI Objective Statement: 65 year old female w/PMH of HTN, IDDM, HLD, diabetic retinopathy, necrotic foot infection presents to the ED from [facility] for positive blood cultures. Pt denies new cough, pain or discharge. 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 NKDA. evaluated  by  id  startled  on  ancef  admitted  for  furhter  w/u  and  Rx  asked by pt's  [facility]  attnding   to  take  over  pt's  care in hospital.   presently  comfortable  no  chest pain  no sob  no  palpitations  no  chills

## 2021-09-27 NOTE — ED ADULT TRIAGE NOTE - CHIEF COMPLAINT QUOTE
Sent by [facility] via Hospital bed for bacteremia, Positive blood cultures noted, findings in transfer forms, Dr Pratt gave report to Dr Kimble in ED

## 2021-09-27 NOTE — ED PROVIDER NOTE - PROGRESS NOTE DETAILS
Pt pain free and stable in ED. Difficulty in getting IV access initially. Pt started on fluids, and Dr. Alonso consulted. Pt admitted for further care.

## 2021-09-27 NOTE — POST DISCHARGE NOTE - NOTIFICATION:
Preliminary positive blood culture Gram Positive Cocci in clusters. Called [facility] rehab where he is at currently, and made Zohra Guevara RN aware.

## 2021-09-27 NOTE — ED ADULT TRIAGE NOTE - AS TEMP SITE
Be sure to complete all of the antibiotics take with food   Take the Diflucan pills  Claritin D twice a day as for the next 7 days  Phenergan DM as needed for cough at night   Continue nasal spray will help with post nasal drip    oral

## 2021-09-27 NOTE — ED ADULT NURSE NOTE - NSIMPLEMENTINTERV_GEN_ALL_ED
Implemented All Fall with Harm Risk Interventions:  [place] to call system. Call bell, personal items and telephone within reach. Instruct patient to call for assistance. Room bathroom lighting operational. Non-slip footwear when patient is off stretcher. Physically safe environment: no spills, clutter or unnecessary equipment. Stretcher in lowest position, wheels locked, appropriate side rails in place. Provide visual cue, wrist band, yellow gown, etc. Monitor gait and stability. Monitor for mental status changes and reorient to person, place, and time. Review medications for side effects contributing to fall risk. Reinforce activity limits and safety measures with patient and family. Provide visual clues: red socks.

## 2021-09-27 NOTE — H&P ADULT - NSHPPHYSICALEXAM_GEN_ALL_CORE
PHYSICAL EXAM:    GENERAL: NAD, well-groomed, well-developed  HEAD:  Atraumatic, Normocephalic  EYES: EOMI, PERRLA, conjunctiva and sclera clear  ENMT: No tonsillar erythema, exudates, or enlargement; Moist mucous membranes, , No lesions  NECK: Supple, No JVD, Normal thyroid  NERVOUS SYSTEM:  Alert & Oriented X3,  NO  FOCAL  DEFICITS  CHEST/LUNG: Clear  bilaterally; No rales, rhonchi, wheezing, or rubs  HEART: Regular rate and rhythm; No murmurs, rubs, or gallops  ABDOMEN: Soft, Nontender, Nondistended; no  masses Bowel sounds present  EXTREMITIES:  L DISTAL  L FOOT  AMPUTATION SURGICALLY  WRAPPED  NO  TENDERNESS  LYMPH: No lymphadenopathy noted   RECTAL: deferred    SKIN: No rashes or lesions

## 2021-09-27 NOTE — H&P ADULT - ASSESSMENT
IMPROVE VTE Individual Risk Assessment    RISK                                                                Points    [  ] Previous VTE                                                  3    [  ] Thrombophilia                                               2    [  ] Lower limb paralysis                                      2        (unable to hold up >15 seconds)      [  ] Current Cancer                                              2         (within 6 months)    [ X ] Immobilization > 24 hrs                                1    [  ] ICU/CCU stay > 24 hours                              1    [ X ] Age > 60                                                      1    IMPROVE VTE Score ______2___    IMPROVE Score 0-1: Low Risk, No VTE prophylaxis required for most patients, encourage ambulation.   IMPROVE Score 2-3: At risk, pharmacologic VTE prophylaxis is indicated for most patients (in the absence of a contraindication)  IMPROVE Score > or = 4: High Risk, pharmacologic VTE prophylaxis is indicated for most patients (in the absence of a contraindication)

## 2021-09-27 NOTE — ED PROVIDER NOTE - OBJECTIVE STATEMENT
65 year old female w/PMH of HTN, IDDM, HLD, diabetic retinopathy, necrotic foot infection presents to the ED from [facility] for positive blood cultures. Pt denies new cough, pain or discharge. 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 NKDA.

## 2021-09-27 NOTE — ED PROVIDER NOTE - CLINICAL SUMMARY MEDICAL DECISION MAKING FREE TEXT BOX
DDx: Sepsis/wound infection/pneumonia/UTI  Plan: CBC, CMP, septic workup, antibiotics and admission.

## 2021-09-27 NOTE — ED ADULT NURSE NOTE - CHIEF COMPLAINT QUOTE
Sent by [facility] via Hospital bed for bacteremia, Positive blood cultures noted, findings in transfer forms, Dr Pratt gave report to Dr Kimble in ED

## 2021-09-27 NOTE — H&P ADULT - NSHPLABSRESULTS_GEN_ALL_CORE
LABS:                        8.2    7.00  )-----------( 422      ( 27 Sep 2021 14:14 )             24.8         136  |  102  |  14  ----------------------------<  342<H>  3.7   |  30  |  0.77    Ca    8.5      27 Sep 2021 14:14    TPro  6.3  /  Alb  1.6<L>  /  TBili  0.3  /  DBili  x   /  AST  8<L>  /  ALT  20  /  AlkPhos  112      PT/INR - ( 27 Sep 2021 14:14 )   PT: 13.4 sec;   INR: 1.16 ratio         PTT - ( 27 Sep 2021 14:14 )  PTT:27.6 sec  Urinalysis Basic - ( 25 Sep 2021 20:35 )    Color: Yellow / Appearance: Clear / S.010 / pH: x  Gluc: x / Ketone: Negative  / Bili: Negative / Urobili: Negative mg/dL   Blood: x / Protein: 30 mg/dL / Nitrite: Negative   Leuk Esterase: Small / RBC: 0-2 /HPF / WBC 3-5   Sq Epi: x / Non Sq Epi: Few / Bacteria: Few

## 2021-09-27 NOTE — ED ADULT NURSE NOTE - CAS DISCH BELONGINGS RETURNED
March 30, 2018    Markel Padilla  [address]  [place] [address]    Dear Markel,       We recently received a refill request for lisinopril (PRINIVIL/ZESTRIL) 20 MG tablet.  We have refilled this for a one time 30 day supply only because you are due for a:    Blood Pressure office visit      Please call at your earliest convenience so that there will not be a delay with your future refills.          Thank you,   Your [facility] Team/ks  [phone]                  
with patient/Not applicable

## 2021-09-28 DIAGNOSIS — A41.01 SEPSIS DUE TO METHICILLIN SUSCEPTIBLE STAPHYLOCOCCUS AUREUS: ICD-10-CM

## 2021-09-28 LAB
ALBUMIN SERPL ELPH-MCNC: 1.5 G/DL — LOW (ref 3.3–5)
ALP SERPL-CCNC: 128 U/L — HIGH (ref 40–120)
ALT FLD-CCNC: 14 U/L — SIGNIFICANT CHANGE UP (ref 12–78)
ANION GAP SERPL CALC-SCNC: 7 MMOL/L — SIGNIFICANT CHANGE UP (ref 5–17)
AST SERPL-CCNC: 12 U/L — LOW (ref 15–37)
BILIRUB SERPL-MCNC: 0.2 MG/DL — SIGNIFICANT CHANGE UP (ref 0.2–1.2)
BUN SERPL-MCNC: 18 MG/DL — SIGNIFICANT CHANGE UP (ref 7–23)
CALCIUM SERPL-MCNC: 8.9 MG/DL — SIGNIFICANT CHANGE UP (ref 8.5–10.1)
CHLORIDE SERPL-SCNC: 101 MMOL/L — SIGNIFICANT CHANGE UP (ref 96–108)
CO2 SERPL-SCNC: 26 MMOL/L — SIGNIFICANT CHANGE UP (ref 22–31)
COVID-19 SPIKE DOMAIN AB INTERP: POSITIVE
COVID-19 SPIKE DOMAIN ANTIBODY RESULT: >250 U/ML — HIGH
CREAT SERPL-MCNC: 0.68 MG/DL — SIGNIFICANT CHANGE UP (ref 0.5–1.3)
E CLOAC COMP DNA BLD POS QL NAA+PROBE: SIGNIFICANT CHANGE UP
GLUCOSE BLDC GLUCOMTR-MCNC: 200 MG/DL — HIGH (ref 70–99)
GLUCOSE BLDC GLUCOMTR-MCNC: 298 MG/DL — HIGH (ref 70–99)
GLUCOSE BLDC GLUCOMTR-MCNC: 316 MG/DL — HIGH (ref 70–99)
GLUCOSE BLDC GLUCOMTR-MCNC: 354 MG/DL — HIGH (ref 70–99)
GLUCOSE SERPL-MCNC: 347 MG/DL — HIGH (ref 70–99)
GRAM STN FLD: SIGNIFICANT CHANGE UP
HCT VFR BLD CALC: 23.3 % — LOW (ref 34.5–45)
HGB BLD-MCNC: 7.8 G/DL — LOW (ref 11.5–15.5)
MCHC RBC-ENTMCNC: 29.1 PG — SIGNIFICANT CHANGE UP (ref 27–34)
MCHC RBC-ENTMCNC: 33.5 GM/DL — SIGNIFICANT CHANGE UP (ref 32–36)
MCV RBC AUTO: 86.9 FL — SIGNIFICANT CHANGE UP (ref 80–100)
METHOD TYPE: SIGNIFICANT CHANGE UP
NRBC # BLD: 0 /100 WBCS — SIGNIFICANT CHANGE UP (ref 0–0)
PLATELET # BLD AUTO: 420 K/UL — HIGH (ref 150–400)
POTASSIUM SERPL-MCNC: 3.7 MMOL/L — SIGNIFICANT CHANGE UP (ref 3.5–5.3)
POTASSIUM SERPL-SCNC: 3.7 MMOL/L — SIGNIFICANT CHANGE UP (ref 3.5–5.3)
PROT SERPL-MCNC: 5.8 GM/DL — LOW (ref 6–8.3)
RBC # BLD: 2.68 M/UL — LOW (ref 3.8–5.2)
RBC # FLD: 14.5 % — SIGNIFICANT CHANGE UP (ref 10.3–14.5)
SARS-COV-2 IGG+IGM SERPL QL IA: >250 U/ML — HIGH
SARS-COV-2 IGG+IGM SERPL QL IA: POSITIVE
SODIUM SERPL-SCNC: 134 MMOL/L — LOW (ref 135–145)
SPECIMEN SOURCE: SIGNIFICANT CHANGE UP
WBC # BLD: 5.8 K/UL — SIGNIFICANT CHANGE UP (ref 3.8–10.5)
WBC # FLD AUTO: 5.8 K/UL — SIGNIFICANT CHANGE UP (ref 3.8–10.5)

## 2021-09-28 PROCEDURE — 74176 CT ABD & PELVIS W/O CONTRAST: CPT | Mod: 26

## 2021-09-28 PROCEDURE — 72156 MRI NECK SPINE W/O & W/DYE: CPT | Mod: 26

## 2021-09-28 PROCEDURE — 93306 TTE W/DOPPLER COMPLETE: CPT | Mod: 26

## 2021-09-28 PROCEDURE — 99233 SBSQ HOSP IP/OBS HIGH 50: CPT

## 2021-09-28 PROCEDURE — 93010 ELECTROCARDIOGRAM REPORT: CPT

## 2021-09-28 RX ORDER — OXYCODONE AND ACETAMINOPHEN 5; 325 MG/1; MG/1
1 TABLET ORAL ONCE
Refills: 0 | Status: DISCONTINUED | OUTPATIENT
Start: 2021-09-28 | End: 2021-09-28

## 2021-09-28 RX ORDER — ACETAMINOPHEN 500 MG
650 TABLET ORAL EVERY 6 HOURS
Refills: 0 | Status: DISCONTINUED | OUTPATIENT
Start: 2021-09-28 | End: 2021-10-08

## 2021-09-28 RX ORDER — IOHEXOL 300 MG/ML
30 INJECTION, SOLUTION INTRAVENOUS ONCE
Refills: 0 | Status: COMPLETED | OUTPATIENT
Start: 2021-09-28 | End: 2021-09-28

## 2021-09-28 RX ADMIN — OXYCODONE AND ACETAMINOPHEN 1 TABLET(S): 5; 325 TABLET ORAL at 22:04

## 2021-09-28 RX ADMIN — Medication 7 UNIT(S): at 12:10

## 2021-09-28 RX ADMIN — Medication 100 MILLIGRAM(S): at 21:05

## 2021-09-28 RX ADMIN — Medication 12.5 MILLIGRAM(S): at 17:00

## 2021-09-28 RX ADMIN — HEPARIN SODIUM 5000 UNIT(S): 5000 INJECTION INTRAVENOUS; SUBCUTANEOUS at 05:40

## 2021-09-28 RX ADMIN — GABAPENTIN 100 MILLIGRAM(S): 400 CAPSULE ORAL at 05:40

## 2021-09-28 RX ADMIN — IOHEXOL 30 MILLILITER(S): 300 INJECTION, SOLUTION INTRAVENOUS at 10:00

## 2021-09-28 RX ADMIN — Medication 8: at 12:09

## 2021-09-28 RX ADMIN — POLYETHYLENE GLYCOL 3350 17 GRAM(S): 17 POWDER, FOR SOLUTION ORAL at 12:10

## 2021-09-28 RX ADMIN — GABAPENTIN 100 MILLIGRAM(S): 400 CAPSULE ORAL at 13:01

## 2021-09-28 RX ADMIN — Medication 10: at 08:16

## 2021-09-28 RX ADMIN — GABAPENTIN 100 MILLIGRAM(S): 400 CAPSULE ORAL at 21:05

## 2021-09-28 RX ADMIN — Medication 2: at 16:53

## 2021-09-28 RX ADMIN — ATORVASTATIN CALCIUM 40 MILLIGRAM(S): 80 TABLET, FILM COATED ORAL at 21:05

## 2021-09-28 RX ADMIN — HEPARIN SODIUM 5000 UNIT(S): 5000 INJECTION INTRAVENOUS; SUBCUTANEOUS at 17:00

## 2021-09-28 RX ADMIN — Medication 2: at 21:09

## 2021-09-28 RX ADMIN — Medication 12.5 MILLIGRAM(S): at 05:40

## 2021-09-28 RX ADMIN — Medication 7 UNIT(S): at 16:53

## 2021-09-28 RX ADMIN — Medication 100 MILLIGRAM(S): at 13:01

## 2021-09-28 RX ADMIN — Medication 7 UNIT(S): at 08:16

## 2021-09-28 RX ADMIN — Medication 650 MILLIGRAM(S): at 13:01

## 2021-09-28 RX ADMIN — Medication 100 MILLIGRAM(S): at 05:40

## 2021-09-28 RX ADMIN — OXYCODONE AND ACETAMINOPHEN 1 TABLET(S): 5; 325 TABLET ORAL at 21:04

## 2021-09-28 NOTE — PROGRESS NOTE ADULT - SUBJECTIVE AND OBJECTIVE BOX
NELSON ZEPEDA  MRN-[id number]    Interval History: The pt was seen and examined earlier, no distress, having her lunch, s/p MRI and CT. The pt is afebrile today, no leukocytosis, denies pain.    PAST MEDICAL & SURGICAL HISTORY:  Hypertension    Family history of diabetes mellitus    Left LE amputation         ROS:    [ ] Unobtainable because:  [x ] All other systems negative    Constitutional: no fever, no chills  Head: no trauma  Eyes: no vision changes, no eye pain  ENT:  no sore throat, no rhinorrhea  Cardiovascular:  no chest pain, no palpitation  Respiratory:  no SOB, occasional cough  GI:  no abd pain, no vomiting, no diarrhea  urinary: no dysuria, no hematuria, no flank pain  musculoskeletal:  no joint pain, no joint swelling  skin:  no rash  neurology:  no headache, no seizure, no change in mental status  psych: no anxiety, no depression         Allergies  avocado (Pruritus)  Carrots (Pruritus)  No Known Drug Allergies  Plums (Pruritus)        ANTIMICROBIALS:  ceFAZolin   IVPB 2000 every 8 hours      OTHER MEDS:  acetaminophen   Tablet .. 650 milliGRAM(s) Oral every 6 hours PRN  atorvastatin 40 milliGRAM(s) Oral at bedtime  dextrose 40% Gel 15 Gram(s) Oral once  dextrose 5%. 1000 milliLiter(s) IV Continuous <Continuous>  dextrose 5%. 1000 milliLiter(s) IV Continuous <Continuous>  dextrose 50% Injectable 25 Gram(s) IV Push once  dextrose 50% Injectable 12.5 Gram(s) IV Push once  dextrose 50% Injectable 25 Gram(s) IV Push once  gabapentin 100 milliGRAM(s) Oral three times a day  glucagon  Injectable 1 milliGRAM(s) IntraMuscular once  heparin   Injectable 5000 Unit(s) SubCutaneous every 12 hours  insulin lispro (ADMELOG) corrective regimen sliding scale   SubCutaneous three times a day before meals  insulin lispro (ADMELOG) corrective regimen sliding scale   SubCutaneous at bedtime  insulin lispro Injectable (ADMELOG) 7 Unit(s) SubCutaneous three times a day before meals  metoprolol tartrate 12.5 milliGRAM(s) Oral two times a day  polyethylene glycol 3350 17 Gram(s) Oral daily  senna 2 Tablet(s) Oral at bedtime      Vital Signs Last 24 Hrs  T(C): 37.3 (28 Sep 2021 05:09), Max: 37.8 (27 Sep 2021 17:45)  T(F): 99.2 (28 Sep 2021 05:09), Max: 100.1 (27 Sep 2021 17:45)  HR: 95 (28 Sep 2021 05:09) (95 - 115)  BP: 139/69 (28 Sep 2021 05:09) (138/69 - 185/78)  BP(mean): --  RR: 19 (28 Sep 2021 05:09) (19 - 20)  SpO2: 96% (28 Sep 2021 05:09) (96% - 99%)    Physical Exam:  General: Nontoxic-appearing Female in no acute distress.  HEENT: AT/NC. Anicteric, poor dentition, no visualized oral lesions.   Neck: Not rigid. No sense of mass.  Nodes: None palpable.  Lungs: Poor effort grossly clear bilaterally  Heart: Regular rate and rhythm. No Murmur. No rub. No gallop. No palpable thrill.  Abdomen: Bowel sounds present and normoactive. Soft. Nondistended. Nontender. No sense of mass. No organomegaly.  Back: No spinal tenderness. No costovertebral angle tenderness.   Extremities: L foot dressed. R foot warm, dry. No cyanosis or clubbing. No edema.   Skin: Warm. Dry. Good turgor. No rash. No vasculitic stigmata.  Psychiatric: calm behavior, appropriate     WBC Count: 5.80 K/uL ( @ 07:32)  WBC Count: 7.00 K/uL ( @ 14:14)  WBC Count: 8.84 K/uL ( @ 20:35)  WBC Count: 7.16 K/uL ( @ 07:42)  WBC Count: 7.48 K/uL ( @ 07:59)                            7.8    5.80  )-----------( 420      ( 28 Sep 2021 07:32 )             23.3           134<L>  |  101  |  18  ----------------------------<  347<H>  3.7   |  26  |  0.68    Ca    8.9      28 Sep 2021 07:32    TPro  5.8<L>  /  Alb  1.5<L>  /  TBili  0.2  /  DBili  x   /  AST  12<L>  /  ALT  14  /  AlkPhos  128<H>        Urinalysis Basic - ( 27 Sep 2021 21:02 )    Color: Yellow / Appearance: Slightly Turbid / S.010 / pH: x  Gluc: x / Ketone: Trace  / Bili: Negative / Urobili: Negative mg/dL   Blood: x / Protein: Negative mg/dL / Nitrite: Negative   Leuk Esterase: Trace / RBC: 0-2 /HPF / WBC 3-5   Sq Epi: x / Non Sq Epi: Few / Bacteria: Few        Creatinine Trend: 0.68<--, 0.77<--, 0.77<--, 0.85<--, 0.58<--, 0.56<--  Lactate, Blood: 1.0 mmol/L (21 @ 14:14)      MICROBIOLOGY:  v  .Blood Blood-Peripheral  21   Growth in aerobic bottle: Gram Negative Rods  ***Blood Panel PCR results on this specimen are available  approximately 3 hours after the Gram stain result.***  Gram stain, PCR, and/or culture results may not always  correspond due to difference in methodologies.  ************************************************************  This PCR assay was performed by multiplex PCR. This  Assay tests for 66 bacterial and resistance gene targets.  Please refer to the [facility] test directory  at [email or web address] for details.  --    Growth in aerobic bottle: Gram Negative Rods      .Blood Blood-Peripheral  21   No growth to date.  --  --      .Blood Blood-Peripheral  21   Growth in anaerobic bottle: Gram Positive Cocci in Clusters  ***Blood Panel PCR results on this specimen are available  approximately 3 hours after the Gram stain result.***  Gram stain, PCR, and/or culture results may not always  correspond due todifference in methodologies.  ************************************************************  This PCR assay was performed by multiplex PCR. This  Assay tests for 66 bacterial and resistance gene targets.  Please refer to the [facility] testdirectory  at [email or web address] for details.  --  Blood Culture PCR      Clean Catch Clean Catch (Midstream)  21   >=3 organisms. Probable collection contamination.  --  --      .Other left foot  09-10-21   No growth  --  --      .Abscess left foot wound  09-10-21   Few Proteus mirabilis  Moderate Enterococcus faecalis  Rare Klebsiella pneumoniae  Few Non Fermentering Gram Negative Rods Most closely resembling  Wohlfahrtiimonas Species  Moderate Bacteroides fragilis "Susceptibilities not performed"  Moderate Streptococcus agalactiae (Group B) isolated  Group B streptococci are susceptible to ampicillin,  penicillin and cefazolin, but may be resistant to  erythromycin and clindamycin.  Recommendations for intrapartum prophylaxis for Group B  streptococci are penicillin or ampicillin.  --  Non Fermentering Gram Negative Rods  Proteus mirabilis  Enterococcus faecalis  Klebsiella pneumoniae      Clean Catch Clean Catch (Midstream)  09-10-21   No growth  --  --      .Blood Blood-Peripheral  09-10-21   No Growth Final  --  --          Rapid RVP Result: NotDetec ( @ 20:40)  Rapid RVP Result: NotDetec ( @ 11:41)        C-Reactive Protein, Serum: 106 ()    Ferritin, Serum: 1967 ()      D-Dimer Assay, Quantitative: 799 ()      SARS-CoV-2: NotDetec (25 Sep 2021 20:40)  SARS-CoV-2: NotDetec (23 Sep 2021 11:41)  SARS-CoV-2: NotDetec (09 Sep 2021 21:45)    RADIOLOGY:    < from: MR Cervical Spine w/wo IV Cont (21 @ 11:29) >    EXAM:  MR SPINE CERVICAL WAW IC                            PROCEDURE DATE:  2021          INTERPRETATION:  STUDY: MR CERVICAL SPINE WITHOUT AND WITH CONTRAST.    CLINICAL INDICATION: Septicemia.    TECHNIQUE: Multiplanar, multisequence MR imaging of the cervical spine was performed.    CONTRAST: 8 mL of Gadavist.    COMPARISON: CT cervical spine 9/10/2021.    FINDINGS:    There is straightening of usual cervical lordosis. There is no significant subluxation. Vertebral body height is maintained. There is no focal STIR hyperintense or enhancing marrow replacing lesion.    There is no epidural or paraspinal abnormal enhancement or discrete fluid collection. There is no definite signal abnormality or enhancement within the cervical spinalcord.    C 2/3: Diffuse disc osteophyte complex indents the ventral thecal sac and contributes to mild central canal stenosis. No significant neural foraminal narrowing.    C 3/4: Diffuse disc osteophyte complex and ligamentum flavum thickening contributes to moderate to severe central canal stenosis. There is no underlying cord signal abnormality. Facet hypertrophy and uncovertebral spurring contribute to mild bilateral neural foraminal narrowing.    C 4/5: Moderate loss of intervertebral disc height. Diffuse disc osteophyte complex and ligamentum flavum thickening contribute to severe central canal stenosis and mild cord compression. There is no underlying cord signal abnormality. Facet hypertrophy and uncovertebral spurring contribute to moderate bilateral neural foraminal stenosis.    C 5/6: Severe loss of intervertebral disc height. Diffuse disc osteophyte complex and ligamentum flavum thickening contribute to severe central canal stenosis and mild cord compression. No underlying cord signal abnormality. Facet hypertrophy and uncovertebral spurring contribute to moderate to severe right and moderate left neural foraminal stenosis.    C 6/7: Moderate loss of intervertebral disc height. Diffuse disc osteophyte complex and ligamentum flavum thickening contribute to moderate to severe central canal stenosis. No underlying cord signal abnormality. Facet hypertrophy and uncovertebral spurring contribute to moderate to severe right and severe left neural foraminal stenosis.    C 7/T1: Diffuse disc osteophyte complex and ligamentum flavum thickening contribute to moderate central canal stenosis. No underlying cord signal abnormality. Facet hypertrophy contribute to moderate right and severe left neural foraminal stenosis.    The visualized posterior fossa structures are unremarkable.    IMPRESSION:  No evidence for discitis osteomyelitis. No discrete epidural or paraspinal fluid collection. Extensive advanced multilevel degenerative changes as described.    --- End of Report ---    ALVAREZ BHATTI MD; Attending Radiologist  This document has been electronically signed. Sep 28 2021 12:41PM    < end of copied text >    < from: CT Abdomen and Pelvis w/ Oral Cont (21 @ 11:09) >    EXAM:  CT ABDOMEN AND PELVIS OC                            PROCEDURE DATE:  2021          INTERPRETATION:  CLINICAL INFORMATION: Septicemia and fever    COMPARISON: 9/10/2021 and chest CT dated 2021    CONTRAST/COMPLICATIONS:  IV Contrast: None  Oral Contrast: Positive contrast  Complications: None    PROCEDURE:  CT of the Abdomen and Pelvis was performed.  Sagittal and coronal reformats were performed.    FINDINGS:  LOWER CHEST: Bilateral linear atelectasis. Left lower lobe consolidation. Small bilateral pleural effusions, left larger than right. Coronary artery calcifications.    Please note that evaluation of the abdominal organs and vascular structures is limited by lack of intravenous contrast.    LIVER: Hepatic hypodensities, possibly cysts.  BILE DUCTS: Normal caliber.  GALLBLADDER: Within normal limits.  SPLEEN: Within normal limits.  PANCREAS: Within normal limits.  ADRENALS: Within normal limits.  KIDNEYS/URETERS: Within normal limits.    BLADDER: Within normal limits.  REPRODUCTIVE ORGANS: Uterus and adnexa within normal limits.    BOWEL: No bowel obstruction. Appendix is normal.  PERITONEUM: No ascites.  VESSELS: Atherosclerotic calcifications.  RETROPERITONEUM/LYMPH NODES: No lymphadenopathy.  ABDOMINAL WALL:Subcutaneous soft tissue edema.  BONES: Degenerative changes.    IMPRESSION:    1. Small bilateral pleural effusions, left greater than right.  2. Left lower lobe consolidation, may represent atelectasis and/or pneumonia.  3. No acute intra-abdominalabnormality.        --- End of Report ---            THANIA NGUYEN MD; Attending Radiologist  This document has been electronically signed. Sep 28 2021 11:53AM    < end of copied text >

## 2021-09-28 NOTE — PROGRESS NOTE ADULT - ATTENDING COMMENTS
Attending Addendum--  Case reviewed with MARISOL Graves. Her note reviewed and modified as appropriate.   Patient personally assessed and examined.  MRI personally reviewed, and reviewed again with radiology.  No evidence of discitis osteomyelitis.  Upper extremity symptoms appear to be related to DJD.  CT scan abdomen pelvis unrevealing for clear convincing source of MSSA septicemia.  Echocardiogram is pending.  Overall the patient is feeling better.  Podiatry and or vascular surgery assessment of the patient's foot does not appear to have been done as of yet.  Work-up ongoing, as outlined previously.    Reviewed with Dr. Garcia earlier today.    Cornel Johansen MD  Attending Physician  [facility]  Division of Infectious Diseases  [phone]  MSSA (methicillin susceptible Staphylococcus aureus) septicemia [A41.01]    PAD (peripheral artery disease) [I73.9]    Status post transmetatarsal amputation of left foot [Z89.432]    DM (diabetes mellitus) [E11.9]

## 2021-09-28 NOTE — PROGRESS NOTE ADULT - SUBJECTIVE AND OBJECTIVE BOX
INTERVAL HPI/OVERNIGHT EVENTS:        REVIEW OF SYSTEMS:  CONSTITUTIONAL :feels  well  c/o "shocking  sensation  down both arms"    NECK: No pain or stiffnes  RESPIRATORY: No SOB   CARDIOVASCULAR: No chest pain, palpitations, dizziness,   GASTROINTESTINAL: No abdominal pain. No nausea, vomiting,   NEUROLOGICAL: No headaches, no  blurry  vision no  dizziness  SKIN: No itching,   MUSCULOSKELETAL: No pain    MEDICATION:  acetaminophen   Tablet .. 650 milliGRAM(s) Oral every 6 hours PRN  atorvastatin 40 milliGRAM(s) Oral at bedtime  ceFAZolin   IVPB 2000 milliGRAM(s) IV Intermittent every 8 hours  dextrose 40% Gel 15 Gram(s) Oral once  dextrose 5%. 1000 milliLiter(s) IV Continuous <Continuous>  dextrose 5%. 1000 milliLiter(s) IV Continuous <Continuous>  dextrose 50% Injectable 25 Gram(s) IV Push once  dextrose 50% Injectable 12.5 Gram(s) IV Push once  dextrose 50% Injectable 25 Gram(s) IV Push once  gabapentin 100 milliGRAM(s) Oral three times a day  glucagon  Injectable 1 milliGRAM(s) IntraMuscular once  heparin   Injectable 5000 Unit(s) SubCutaneous every 12 hours  insulin lispro (ADMELOG) corrective regimen sliding scale   SubCutaneous three times a day before meals  insulin lispro (ADMELOG) corrective regimen sliding scale   SubCutaneous at bedtime  insulin lispro Injectable (ADMELOG) 7 Unit(s) SubCutaneous three times a day before meals  metoprolol tartrate 12.5 milliGRAM(s) Oral two times a day  polyethylene glycol 3350 17 Gram(s) Oral daily  senna 2 Tablet(s) Oral at bedtime    Vital Signs Last 24 Hrs  T(C): 37.3 (28 Sep 2021 05:09), Max: 37.8 (27 Sep 2021 17:45)  T(F): 99.2 (28 Sep 2021 05:09), Max: 100.1 (27 Sep 2021 17:45)  HR: 95 (28 Sep 2021 05:09) (95 - 115)  BP: 139/69 (28 Sep 2021 05:09) (138/69 - 185/78)  BP(mean): --  RR: 19 (28 Sep 2021 05:09) (19 - 20)  SpO2: 96% (28 Sep 2021 05:09) (96% - 99%)    PHYSICAL EXAM:  GENERAL: NAD, well-groomed, well-developed  HEENT : Conjuntivae  clear sclerae anicteric  NECK: Supple, No JVD, Normal thyroid  NERVOUS SYSTEM:  Alert oriented   no  focal  deficits;   CHEST/LUNG: Clear    HEART: Regular rate and rhythm; No murmurs, rubs, or gallops  ABDOMEN: Soft, Nontender, Nondistended; Bowel sounds present  EXTREMITIES:  no  edema no  tenderness  SKIN: No rashes   LABS:                        7.8    5.80  )-----------( 420      ( 28 Sep 2021 07:32 )             23.3         134<L>  |  101  |  18  ----------------------------<  347<H>  3.7   |  26  |  0.68    Ca    8.9      28 Sep 2021 07:32    TPro  5.8<L>  /  Alb  1.5<L>  /  TBili  0.2  /  DBili  x   /  AST  12<L>  /  ALT  14  /  AlkPhos  128<H>      PT/INR - ( 27 Sep 2021 14:14 )   PT: 13.4 sec;   INR: 1.16 ratio         PTT - ( 27 Sep 2021 14:14 )  PTT:27.6 sec  Urinalysis Basic - ( 27 Sep 2021 21:02 )    Color: Yellow / Appearance: Slightly Turbid / S.010 / pH: x  Gluc: x / Ketone: Trace  / Bili: Negative / Urobili: Negative mg/dL   Blood: x / Protein: Negative mg/dL / Nitrite: Negative   Leuk Esterase: Trace / RBC: 0-2 /HPF / WBC 3-5   Sq Epi: x / Non Sq Epi: Few / Bacteria: Few      CAPILLARY BLOOD GLUCOSE      POCT Blood Glucose.: 316 mg/dL (28 Sep 2021 11:58)  POCT Blood Glucose.: 354 mg/dL (28 Sep 2021 08:06)  POCT Blood Glucose.: 389 mg/dL (27 Sep 2021 22:08)  POCT Blood Glucose.: 368 mg/dL (27 Sep 2021 16:42)      RADIOLOGY & ADDITIONAL TESTS:    Imaging reports  Personally Reviewed:  [ ] YES  [ ] NO    Consultant(s) Notes Reviewed:  [ x] YES  [ ] NO    Care Discussed with Consultants/Other Providers [x ] YES  [ ] NO  Problem/Plan - 1:  ·  Problem: Septicemia.   ·  Plan: ancef  TTE  ID  eval with  dr Alonso.    Problem/Plan - 2:  ·  Problem: PAD (peripheral artery disease).   ·  Plan: vascular  surgery  eval dr Olivo called.    Problem/Plan - 3:  ·  Problem: Status post transmetatarsal amputation of left foot.   ·  Plan: local  Rx  as per  podiatry  dr Patel  called.    Problem/Plan - 4:  ·  Problem: DM (diabetes mellitus).   ·  Plan: nutritional  insulin  and  scale  coverage hold  metformin  post  ct  angio.    Problem/Plan - 5:  ·  Problem: Constipation.   ·  Plan: senna  miralx.    Problem/Plan - 6:  ·  Problem: Preventive measure.   ·  Plan: dvt  prophylaxis  with  heparin.

## 2021-09-28 NOTE — PROGRESS NOTE ADULT - ASSESSMENT
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  Pt had a low grade temp yesterday 100.1, no fevers today, UA not impressive, UC pending, denies any symptoms of UTI.   The pt is s/p MRI of cervical spine - negative for discitis or osteomyelitis.  CT abd/pelvis - no acute abdominal pathology, + LLL consolidation   TTE and cultures are pending.   Vascular and podiatry evaluations pending.     Suggestions  - continue Cefazolin 2 grams IV q 8 hrs  - awaiting for culture data  - follow cultures from [facility] and cultures collected on the admission  - repeat blood cultures tomorrow morning   - monitor pt's temperatures  - monitor WBC  - good but not too tight glycemic control  - podiatry evaluation  - vascular evaluation

## 2021-09-29 DIAGNOSIS — E11.65 TYPE 2 DIABETES MELLITUS WITH HYPERGLYCEMIA: ICD-10-CM

## 2021-09-29 DIAGNOSIS — A41.4 SEPSIS DUE TO ANAEROBES: ICD-10-CM

## 2021-09-29 PROBLEM — Z00.00 ENCOUNTER FOR PREVENTIVE HEALTH EXAMINATION: Status: ACTIVE | Noted: 2021-09-29

## 2021-09-29 LAB
-  AMPICILLIN/SULBACTAM: SIGNIFICANT CHANGE UP
-  CEFAZOLIN: SIGNIFICANT CHANGE UP
-  CLINDAMYCIN: SIGNIFICANT CHANGE UP
-  ERYTHROMYCIN: SIGNIFICANT CHANGE UP
-  GENTAMICIN: SIGNIFICANT CHANGE UP
-  OXACILLIN: SIGNIFICANT CHANGE UP
-  RIFAMPIN: SIGNIFICANT CHANGE UP
-  TETRACYCLINE: SIGNIFICANT CHANGE UP
-  TRIMETHOPRIM/SULFAMETHOXAZOLE: SIGNIFICANT CHANGE UP
-  VANCOMYCIN: SIGNIFICANT CHANGE UP
ANION GAP SERPL CALC-SCNC: 9 MMOL/L — SIGNIFICANT CHANGE UP (ref 5–17)
BUN SERPL-MCNC: 21 MG/DL — SIGNIFICANT CHANGE UP (ref 7–23)
CALCIUM SERPL-MCNC: 9.2 MG/DL — SIGNIFICANT CHANGE UP (ref 8.5–10.1)
CHLORIDE SERPL-SCNC: 101 MMOL/L — SIGNIFICANT CHANGE UP (ref 96–108)
CO2 SERPL-SCNC: 24 MMOL/L — SIGNIFICANT CHANGE UP (ref 22–31)
CREAT SERPL-MCNC: 0.62 MG/DL — SIGNIFICANT CHANGE UP (ref 0.5–1.3)
CRP SERPL-MCNC: 142 MG/L — HIGH
CULTURE RESULTS: SIGNIFICANT CHANGE UP
GLUCOSE BLDC GLUCOMTR-MCNC: 285 MG/DL — HIGH (ref 70–99)
GLUCOSE BLDC GLUCOMTR-MCNC: 366 MG/DL — HIGH (ref 70–99)
GLUCOSE BLDC GLUCOMTR-MCNC: 366 MG/DL — HIGH (ref 70–99)
GLUCOSE BLDC GLUCOMTR-MCNC: 399 MG/DL — HIGH (ref 70–99)
GLUCOSE SERPL-MCNC: 385 MG/DL — HIGH (ref 70–99)
GRAM STN FLD: SIGNIFICANT CHANGE UP
HCT VFR BLD CALC: 24.6 % — LOW (ref 34.5–45)
HGB BLD-MCNC: 8.2 G/DL — LOW (ref 11.5–15.5)
MCHC RBC-ENTMCNC: 28.8 PG — SIGNIFICANT CHANGE UP (ref 27–34)
MCHC RBC-ENTMCNC: 33.3 GM/DL — SIGNIFICANT CHANGE UP (ref 32–36)
MCV RBC AUTO: 86.3 FL — SIGNIFICANT CHANGE UP (ref 80–100)
METHOD TYPE: SIGNIFICANT CHANGE UP
NRBC # BLD: 0 /100 WBCS — SIGNIFICANT CHANGE UP (ref 0–0)
ORGANISM # SPEC MICROSCOPIC CNT: SIGNIFICANT CHANGE UP
PLATELET # BLD AUTO: 441 K/UL — HIGH (ref 150–400)
POTASSIUM SERPL-MCNC: 3.8 MMOL/L — SIGNIFICANT CHANGE UP (ref 3.5–5.3)
POTASSIUM SERPL-SCNC: 3.8 MMOL/L — SIGNIFICANT CHANGE UP (ref 3.5–5.3)
RBC # BLD: 2.85 M/UL — LOW (ref 3.8–5.2)
RBC # FLD: 14.4 % — SIGNIFICANT CHANGE UP (ref 10.3–14.5)
SODIUM SERPL-SCNC: 134 MMOL/L — LOW (ref 135–145)
SPECIMEN SOURCE: SIGNIFICANT CHANGE UP
WBC # BLD: 5.27 K/UL — SIGNIFICANT CHANGE UP (ref 3.8–10.5)
WBC # FLD AUTO: 5.27 K/UL — SIGNIFICANT CHANGE UP (ref 3.8–10.5)

## 2021-09-29 PROCEDURE — 99233 SBSQ HOSP IP/OBS HIGH 50: CPT

## 2021-09-29 RX ORDER — CEFEPIME 1 G/1
2000 INJECTION, POWDER, FOR SOLUTION INTRAMUSCULAR; INTRAVENOUS ONCE
Refills: 0 | Status: COMPLETED | OUTPATIENT
Start: 2021-09-29 | End: 2021-09-29

## 2021-09-29 RX ORDER — CEFEPIME 1 G/1
INJECTION, POWDER, FOR SOLUTION INTRAMUSCULAR; INTRAVENOUS
Refills: 0 | Status: DISCONTINUED | OUTPATIENT
Start: 2021-09-29 | End: 2021-10-08

## 2021-09-29 RX ORDER — CEFEPIME 1 G/1
2000 INJECTION, POWDER, FOR SOLUTION INTRAMUSCULAR; INTRAVENOUS EVERY 8 HOURS
Refills: 0 | Status: DISCONTINUED | OUTPATIENT
Start: 2021-09-29 | End: 2021-10-08

## 2021-09-29 RX ORDER — INSULIN GLARGINE 100 [IU]/ML
20 INJECTION, SOLUTION SUBCUTANEOUS AT BEDTIME
Refills: 0 | Status: DISCONTINUED | OUTPATIENT
Start: 2021-09-29 | End: 2021-10-08

## 2021-09-29 RX ADMIN — GABAPENTIN 100 MILLIGRAM(S): 400 CAPSULE ORAL at 05:35

## 2021-09-29 RX ADMIN — CEFEPIME 100 MILLIGRAM(S): 1 INJECTION, POWDER, FOR SOLUTION INTRAMUSCULAR; INTRAVENOUS at 22:17

## 2021-09-29 RX ADMIN — SENNA PLUS 2 TABLET(S): 8.6 TABLET ORAL at 22:14

## 2021-09-29 RX ADMIN — Medication 7 UNIT(S): at 12:04

## 2021-09-29 RX ADMIN — Medication 7 UNIT(S): at 16:30

## 2021-09-29 RX ADMIN — GABAPENTIN 100 MILLIGRAM(S): 400 CAPSULE ORAL at 22:14

## 2021-09-29 RX ADMIN — GABAPENTIN 100 MILLIGRAM(S): 400 CAPSULE ORAL at 14:15

## 2021-09-29 RX ADMIN — Medication 650 MILLIGRAM(S): at 22:48

## 2021-09-29 RX ADMIN — CEFEPIME 100 MILLIGRAM(S): 1 INJECTION, POWDER, FOR SOLUTION INTRAMUSCULAR; INTRAVENOUS at 12:05

## 2021-09-29 RX ADMIN — Medication 12.5 MILLIGRAM(S): at 05:36

## 2021-09-29 RX ADMIN — HEPARIN SODIUM 5000 UNIT(S): 5000 INJECTION INTRAVENOUS; SUBCUTANEOUS at 05:35

## 2021-09-29 RX ADMIN — Medication 650 MILLIGRAM(S): at 17:30

## 2021-09-29 RX ADMIN — Medication 10: at 12:04

## 2021-09-29 RX ADMIN — Medication 650 MILLIGRAM(S): at 22:17

## 2021-09-29 RX ADMIN — HEPARIN SODIUM 5000 UNIT(S): 5000 INJECTION INTRAVENOUS; SUBCUTANEOUS at 18:48

## 2021-09-29 RX ADMIN — Medication 7 UNIT(S): at 08:25

## 2021-09-29 RX ADMIN — ATORVASTATIN CALCIUM 40 MILLIGRAM(S): 80 TABLET, FILM COATED ORAL at 22:14

## 2021-09-29 RX ADMIN — Medication 650 MILLIGRAM(S): at 16:30

## 2021-09-29 RX ADMIN — Medication 12.5 MILLIGRAM(S): at 18:48

## 2021-09-29 RX ADMIN — Medication 10: at 08:25

## 2021-09-29 RX ADMIN — INSULIN GLARGINE 20 UNIT(S): 100 INJECTION, SOLUTION SUBCUTANEOUS at 22:32

## 2021-09-29 RX ADMIN — Medication 6: at 16:29

## 2021-09-29 RX ADMIN — Medication 6: at 22:15

## 2021-09-29 RX ADMIN — Medication 100 MILLIGRAM(S): at 05:36

## 2021-09-29 NOTE — PROGRESS NOTE ADULT - ASSESSMENT
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  Pt had a low grade temp yesterday 100.1, no fevers today, UA not impressive, UC pending, denies any symptoms of UTI.   The pt is s/p MRI of cervical spine - negative for discitis or osteomyelitis.  CT abd/pelvis - no acute abdominal pathology, + LLL consolidation   TTE and cultures are pending.   Vascular and podiatry evaluations pending.   Attending Addendum--  Case reviewed with NP Soraya Graves. Her note reviewed and modified as appropriate.   Patient personally assessed and examined.  MRI personally reviewed, and reviewed again with radiology.  No evidence of discitis osteomyelitis.  Upper extremity symptoms appear to be related to DJD.  CT scan abdomen pelvis unrevealing for clear convincing source of MSSA septicemia.  Echocardiogram is pending.  Overall the patient is feeling better.  Podiatry and or vascular surgery assessment of the patient's foot does not appear to have been done as of yet.  Work-up ongoing, as outlined previously.    9/29: no fevers, no leukocytosis, one set of blood cultures from [facility] rehab with staphylococcus aureus, cultures collected on the admission one set is growing enterococcus cloacae complex, wound culture from 9/10 grew Proteus mirabilis, Enterococcus faecalis, Klebsiella pneumoniae. Cefazolin IV was stopped, started on cefepime IV for MSSA septicemia and enterococcus. New two sets of blood cultures were collected yesterday, UC pending. Unclear what is the cause of pt's septicemia, will consider MRI of left foot.     Suggestions  - stop Cefazolin 2 grams IV q 8 hrs  - start cefepime 2 grams q 8 hrs   - awaiting for repeat blood cultures  - follow all culture data, awaiting for sensitivity of cultures collected on admission   - monitor pt's temperatures  - monitor WBC  - good but not too tight glycemic control  - podiatry evaluation pending  - vascular evaluation pending  - please obtain MRI of left foot  - TTE performed and pending reading

## 2021-09-29 NOTE — DIETITIAN INITIAL EVALUATION ADULT. - OTHER CALCULATIONS
Ht (cm): 162.6cm    Wt (kg): 72.6kg (09/27 dosing weight)   BMI: 27.5     IBW: 54.4kg +/- 10% %IBW: 133%

## 2021-09-29 NOTE — DIETITIAN INITIAL EVALUATION ADULT. - OTHER INFO
Pt reports good appetite and PO intake during LOS. States she consumed 100% of breakfast this morning (09/29). Denies difficulty chewing/swallowing; though noted swallow evaluation on 09/13/21 with recommendations for mechanical soft, thin liquids. Pt denies nausea, vomiting, diarrhea, or constipation. Per previous RD note weight history: (09/13/21) 60.6kg; weight at [facility] (09/23) 68.6kg. Unable to assess weight loss due to ?accuracy of bed weights.   Pt with T2DM. Per H&P pt takes Metformin and Novolog PTA. HbA1c 14.1% indicates poor blood glucose control.  Nutrition education inappropriate at this time as pt sleepy throughout visit. Will follow up with education as per protocol.  Preferences taken and relayed to diet office. Pt made aware RD remains available.

## 2021-09-29 NOTE — DIETITIAN INITIAL EVALUATION ADULT. - PERTINENT MEDS FT
MEDICATIONS  (STANDING):  atorvastatin 40 milliGRAM(s) Oral at bedtime  cefepime   IVPB 2000 milliGRAM(s) IV Intermittent once  cefepime   IVPB 2000 milliGRAM(s) IV Intermittent every 8 hours  cefepime   IVPB      dextrose 40% Gel 15 Gram(s) Oral once  dextrose 5%. 1000 milliLiter(s) (50 mL/Hr) IV Continuous <Continuous>  dextrose 5%. 1000 milliLiter(s) (100 mL/Hr) IV Continuous <Continuous>  dextrose 50% Injectable 25 Gram(s) IV Push once  dextrose 50% Injectable 12.5 Gram(s) IV Push once  dextrose 50% Injectable 25 Gram(s) IV Push once  gabapentin 100 milliGRAM(s) Oral three times a day  glucagon  Injectable 1 milliGRAM(s) IntraMuscular once  heparin   Injectable 5000 Unit(s) SubCutaneous every 12 hours  insulin lispro (ADMELOG) corrective regimen sliding scale   SubCutaneous three times a day before meals  insulin lispro (ADMELOG) corrective regimen sliding scale   SubCutaneous at bedtime  insulin lispro Injectable (ADMELOG) 7 Unit(s) SubCutaneous three times a day before meals  metoprolol tartrate 12.5 milliGRAM(s) Oral two times a day  polyethylene glycol 3350 17 Gram(s) Oral daily  senna 2 Tablet(s) Oral at bedtime    MEDICATIONS  (PRN):  acetaminophen   Tablet .. 650 milliGRAM(s) Oral every 6 hours PRN Moderate Pain (4 - 6)  acetaminophen   Tablet .. 650 milliGRAM(s) Oral every 6 hours PRN Temp greater or equal to 38C (100.4F)

## 2021-09-29 NOTE — CONSULT NOTE ADULT - PROBLEM SELECTOR RECOMMENDATION 9
add Lantus 20 units   while inpatient Finger Sticks should be in 140-180 range   once sepsis is better , add low dose Metformin   patient has Infection driven Hyperglycemia and Insulin Resistance   Thank You for the courtesy of this consultation !!!

## 2021-09-29 NOTE — PROGRESS NOTE ADULT - SUBJECTIVE AND OBJECTIVE BOX
NELSON ZEPEDA  MRN-[id number]      Interval History: The pt was seen and examined, no distress, denies any pain. The pt remains afebrile, no leukocytosis, denies any specific complains.     PAST MEDICAL & SURGICAL HISTORY:  Hypertension    Family history of diabetes mellitus    No significant past surgical history        ROS:    [ ] Unobtainable because:  [x ] All other systems negative    Constitutional: no fever, no chills  Head: no trauma  Eyes: no vision changes, no eye pain  ENT:  no sore throat, no rhinorrhea  Cardiovascular:  no chest pain, no palpitation  Respiratory:  no SOB, no cough  GI:  no abd pain, no vomiting, no diarrhea  urinary: no dysuria, no hematuria, no flank pain  musculoskeletal:  left foot partial amputation, denies pain   skin:  no rash  neurology:  no headache, no seizure, no change in mental status  psych: no anxiety, no depression         Allergies  avocado (Pruritus)  Carrots (Pruritus)  No Known Drug Allergies  Plums (Pruritus)        ANTIMICROBIALS:  cefepime   IVPB 2000 once  cefepime   IVPB 2000 every 8 hours  cefepime   IVPB        OTHER MEDS:  acetaminophen   Tablet .. 650 milliGRAM(s) Oral every 6 hours PRN  acetaminophen   Tablet .. 650 milliGRAM(s) Oral every 6 hours PRN  atorvastatin 40 milliGRAM(s) Oral at bedtime  dextrose 40% Gel 15 Gram(s) Oral once  dextrose 5%. 1000 milliLiter(s) IV Continuous <Continuous>  dextrose 5%. 1000 milliLiter(s) IV Continuous <Continuous>  dextrose 50% Injectable 25 Gram(s) IV Push once  dextrose 50% Injectable 12.5 Gram(s) IV Push once  dextrose 50% Injectable 25 Gram(s) IV Push once  gabapentin 100 milliGRAM(s) Oral three times a day  glucagon  Injectable 1 milliGRAM(s) IntraMuscular once  heparin   Injectable 5000 Unit(s) SubCutaneous every 12 hours  insulin lispro (ADMELOG) corrective regimen sliding scale   SubCutaneous three times a day before meals  insulin lispro (ADMELOG) corrective regimen sliding scale   SubCutaneous at bedtime  insulin lispro Injectable (ADMELOG) 7 Unit(s) SubCutaneous three times a day before meals  metoprolol tartrate 12.5 milliGRAM(s) Oral two times a day  polyethylene glycol 3350 17 Gram(s) Oral daily  senna 2 Tablet(s) Oral at bedtime      Vital Signs Last 24 Hrs  T(C): 37.2 (29 Sep 2021 05:14), Max: 37.6 (28 Sep 2021 23:41)  T(F): 99 (29 Sep 2021 05:14), Max: 99.7 (28 Sep 2021 23:41)  HR: 95 (29 Sep 2021 05:14) (95 - 96)  BP: 145/70 (29 Sep 2021 05:14) (141/72 - 146/70)  BP(mean): --  RR: 17 (29 Sep 2021 05:14) (17 - 18)  SpO2: 96% (29 Sep 2021 05:14) (96% - 98%)    Physical Exam:  General: Nontoxic-appearing Female in no acute distress.  HEENT: AT/NC. Anicteric, poor dentition, no visualized oral lesions, no thrush.   Neck: Not rigid. No sense of mass.  Nodes: None palpable.  Lungs: Poor effort grossly clear bilaterally  Heart: Regular rate and rhythm. No Murmur. No rub. No gallop. No palpable thrill.  Abdomen: Bowel sounds present and normoactive. Soft. Nondistended. Nontender. No sense of mass. No organomegaly.  Back: No spinal tenderness. No costovertebral angle tenderness. Small scrape covered with dressing right upper back, no swelling or tenderness of surrounding tissues, no erythema  Extremities: L foot wound do not seem to be infected, fibrous changes, no pus, no malodor, no significant erythema of surrounding tissues, increased warmth in comparison to Right foot, not tender. R foot warm, dry. No cyanosis or clubbing. No edema.   Skin: Warm. Dry. Good turgor. No rash. No vasculitic stigmata.  Psychiatric: calm behavior, appropriate     WBC Count: 5.27 K/uL ( @ 08:41)  WBC Count: 5.80 K/uL ( @ 07:32)  WBC Count: 7.00 K/uL ( @ 14:14)  WBC Count: 8.84 K/uL ( @ 20:35)  WBC Count: 7.16 K/uL ( @ 07:42)                            8.2    5.27  )-----------( 441      ( 29 Sep 2021 08:41 )             24.6           134<L>  |  101  |  21  ----------------------------<  385<H>  3.8   |  24  |  0.62    Ca    9.2      29 Sep 2021 08:41    TPro  5.8<L>  /  Alb  1.5<L>  /  TBili  0.2  /  DBili  x   /  AST  12<L>  /  ALT  14  /  AlkPhos  128<H>        Urinalysis Basic - ( 27 Sep 2021 21:02 )    Color: Yellow / Appearance: Slightly Turbid / S.010 / pH: x  Gluc: x / Ketone: Trace  / Bili: Negative / Urobili: Negative mg/dL   Blood: x / Protein: Negative mg/dL / Nitrite: Negative   Leuk Esterase: Trace / RBC: 0-2 /HPF / WBC 3-5   Sq Epi: x / Non Sq Epi: Few / Bacteria: Few        Creatinine Trend: 0.62<--, 0.68<--, 0.77<--, 0.77<--, 0.85<--, 0.58<--  Lactate, Blood: 1.0 mmol/L (21 @ 14:14)      MICROBIOLOGY:  v  .Blood Blood-Peripheral  21   Growth in aerobic bottle: Gram Negative Rods  ***Blood Panel PCR results on this specimen are available  approximately 3 hours after the Gram stain result.***  Gram stain, PCR, and/or culture results may not always  correspond due to difference in methodologies.  ************************************************************  This PCR assay was performed by multiplex PCR. This  Assay tests for 66 bacterial and resistance gene targets.  Please refer to the [facility] test directory  at [email or web address] for details.  --  Blood Culture PCR      .Blood Blood-Peripheral  21   No growth to date.  --  --      .Blood Blood-Peripheral  21   Growth in anaerobic bottle: Staphylococcus aureus  ***Blood Panel PCR results on this specimen are available  approximately 3 hours after the Gram stain result.***  Gram stain, PCR, and/or culture results may not always  correspond due to difference in methodologies.  ************************************************************  This PCR assay was performed by multiplex PCR. This  Assay tests for 66 bacterial and resistance gene targets.  Please refer to the [facility] test directory  at [email or web address] for details.  --  Blood Culture PCR  Staphylococcus aureus      Clean Catch Clean Catch (Midstream)  21   >=3 organisms. Probable collection contamination.  --  --      .Other left foot  09-10-21   No growth  --  --      .Abscess left foot wound  09-10-21   Few Proteus mirabilis  Moderate Enterococcus faecalis  Rare Klebsiella pneumoniae  Few Non Fermentering Gram Negative Rods Most closely resembling  Wohlfahrtiimonas Species  Moderate Bacteroides fragilis "Susceptibilities not performed"  Moderate Streptococcus agalactiae (Group B) isolated  Group B streptococci are susceptible to ampicillin,  penicillin and cefazolin, but may be resistant to  erythromycin and clindamycin.  Recommendations for intrapartum prophylaxis for Group B  streptococci are penicillin or ampicillin.  --  Non Fermentering Gram Negative Rods  Proteus mirabilis  Enterococcus faecalis  Klebsiella pneumoniae      Clean Catch Clean Catch (Midstream)  09-10-21   No growth  --  --      .Blood Blood-Peripheral  09-10-21   No Growth Final  --  --          Rapid RVP Result: NotDetec ( @ 20:40)  Rapid RVP Result: NotDetec ( @ 11:41)        C-Reactive Protein, Serum: 142 ()  C-Reactive Protein, Serum: 106 ()    Ferritin, Serum: 1967 ()      D-Dimer Assay, Quantitative: 799 ()      SARS-CoV-2: NotDetec (25 Sep 2021 20:40)  SARS-CoV-2: NotDetec (23 Sep 2021 11:41)  SARS-CoV-2: NotDetec (09 Sep 2021 21:45)    RADIOLOGY:

## 2021-09-29 NOTE — DIETITIAN INITIAL EVALUATION ADULT. - ETIOLOGY
Inadequate protein-energy intake in setting of increased protein-energy needs in pt with stage II or greater pressure injuries and uncontrolled T2DM

## 2021-09-29 NOTE — SWALLOW BEDSIDE ASSESSMENT ADULT - H & P REVIEW
65 year old female w/PMH of HTN, IDDM, HLD, diabetic retinopathy, necrotic foot infection presents to the ED from [facility] for positive blood cultures. Pt denies new cough, pain or discharge. 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 NKDA./yes

## 2021-09-29 NOTE — SWALLOW BEDSIDE ASSESSMENT ADULT - COMMENTS
CXR9/27/2021INTERPRETATION:  AP semierect chest on September 27, 2021 at 10:35 AM. Patient has sepsis.Heart is normal for projection.  There are scattered small infiltrates in the mid lower lung fields somewhat increased from September 25. Covid Pneumonia is is possible.  IMPRESSION: Bilateral infiltrates small in degree but increased from prior.

## 2021-09-29 NOTE — DIETITIAN INITIAL EVALUATION ADULT. - PERTINENT LABORATORY DATA
09-29 Na134 mmol/L<L> Glu 385 mg/dL<H> K+ 3.8 mmol/L Cr  0.62 mg/dL BUN 21 mg/dL 09-28 Alb 1.5 g/dL<L>  09-29  mg/dL  09-10 HbA1c 14.1%

## 2021-09-29 NOTE — SWALLOW BEDSIDE ASSESSMENT ADULT - SLP GENERAL OBSERVATIONS
pt seen bedside alert and oriented x3-4. pt engaged with SLP for assessment, she verbalized wants and was able to follow one step directions. noted good speech intelligibility

## 2021-09-29 NOTE — PROGRESS NOTE ADULT - ATTENDING COMMENTS
Attending Addendum--  Case reviewed with MARISOL Graves. Her note reviewed and modified as appropriate.   Patient personally assessed and examined.   With now a GNR identified in blood cx, and no other clear source for polymicrobial bacteremia identified, have to be concerned about potential occult disease in L ankle. It is warm to touch. However wound bed, while with some mild malodor, is about 1/3 granular, 1/3 fatty tissue, and 1/3 fibrinous exudate. No pus or necrosis. Skin is darkened closer to the wound but clearly viable tissue. No crepitus or fluctuance. No proximal lymphangitis.   Recommendations as above.  Left message for Dr. Angelo.  Thank you for the courtesy of this referral.    Cornel Johansen MD  Attending Physician  [facility]  Division of Infectious Diseases  [phone]

## 2021-09-29 NOTE — DIETITIAN INITIAL EVALUATION ADULT. - ORAL INTAKE PTA/DIET HISTORY
[facility] Per [facility] records pt was receiving dysphagia 2 mechanical soft, thin liquids + DASH/TLC + Consistent carbohydrate diet and was consuming % of documented meals.

## 2021-09-29 NOTE — DIETITIAN NUTRITION RISK NOTIFICATION - TREATMENT: THE FOLLOWING DIET HAS BEEN RECOMMENDED
Diet, Dysphagia 2 Mechanical Soft-Thin Liquids:   Consistent Carbohydrate {Evening Snack}  DASH/TLC {Sodium & Cholesterol Restricted}  Supplement Feeding Modality:  Oral  Glucerna Shake Cans or Servings Per Day:  1       Frequency:  Two Times a day (09-29-21 @ 11:44) [Pending Verification By Attending]  Diet, DASH/TLC:   Sodium & Cholesterol Restricted  Consistent Carbohydrate {Evening Snack} (09-27-21 @ 18:25) [Active]

## 2021-09-30 LAB
-  AMIKACIN: SIGNIFICANT CHANGE UP
-  AMPICILLIN/SULBACTAM: SIGNIFICANT CHANGE UP
-  AMPICILLIN: SIGNIFICANT CHANGE UP
-  AZTREONAM: SIGNIFICANT CHANGE UP
-  CEFAZOLIN: SIGNIFICANT CHANGE UP
-  CEFEPIME: SIGNIFICANT CHANGE UP
-  CEFOXITIN: SIGNIFICANT CHANGE UP
-  CEFTRIAXONE: SIGNIFICANT CHANGE UP
-  CIPROFLOXACIN: SIGNIFICANT CHANGE UP
-  ERTAPENEM: SIGNIFICANT CHANGE UP
-  GENTAMICIN: SIGNIFICANT CHANGE UP
-  IMIPENEM: SIGNIFICANT CHANGE UP
-  LEVOFLOXACIN: SIGNIFICANT CHANGE UP
-  MEROPENEM: SIGNIFICANT CHANGE UP
-  PIPERACILLIN/TAZOBACTAM: SIGNIFICANT CHANGE UP
-  TOBRAMYCIN: SIGNIFICANT CHANGE UP
-  TRIMETHOPRIM/SULFAMETHOXAZOLE: SIGNIFICANT CHANGE UP
CULTURE RESULTS: SIGNIFICANT CHANGE UP
GLUCOSE BLDC GLUCOMTR-MCNC: 226 MG/DL — HIGH (ref 70–99)
GLUCOSE BLDC GLUCOMTR-MCNC: 298 MG/DL — HIGH (ref 70–99)
GLUCOSE BLDC GLUCOMTR-MCNC: 331 MG/DL — HIGH (ref 70–99)
GLUCOSE BLDC GLUCOMTR-MCNC: 336 MG/DL — HIGH (ref 70–99)
GRAM STN FLD: SIGNIFICANT CHANGE UP
METHOD TYPE: SIGNIFICANT CHANGE UP
ORGANISM # SPEC MICROSCOPIC CNT: SIGNIFICANT CHANGE UP
SPECIMEN SOURCE: SIGNIFICANT CHANGE UP

## 2021-09-30 PROCEDURE — 99232 SBSQ HOSP IP/OBS MODERATE 35: CPT

## 2021-09-30 PROCEDURE — 73721 MRI JNT OF LWR EXTRE W/O DYE: CPT | Mod: 26,LT

## 2021-09-30 PROCEDURE — 99222 1ST HOSP IP/OBS MODERATE 55: CPT

## 2021-09-30 RX ORDER — METFORMIN HYDROCHLORIDE 850 MG/1
500 TABLET ORAL DAILY
Refills: 0 | Status: DISCONTINUED | OUTPATIENT
Start: 2021-09-30 | End: 2021-10-08

## 2021-09-30 RX ORDER — FLUCONAZOLE 150 MG/1
100 TABLET ORAL DAILY
Refills: 0 | Status: DISCONTINUED | OUTPATIENT
Start: 2021-09-30 | End: 2021-09-30

## 2021-09-30 RX ADMIN — INSULIN GLARGINE 20 UNIT(S): 100 INJECTION, SOLUTION SUBCUTANEOUS at 21:44

## 2021-09-30 RX ADMIN — Medication 12.5 MILLIGRAM(S): at 17:37

## 2021-09-30 RX ADMIN — ATORVASTATIN CALCIUM 40 MILLIGRAM(S): 80 TABLET, FILM COATED ORAL at 21:44

## 2021-09-30 RX ADMIN — HEPARIN SODIUM 5000 UNIT(S): 5000 INJECTION INTRAVENOUS; SUBCUTANEOUS at 17:36

## 2021-09-30 RX ADMIN — Medication 8: at 07:57

## 2021-09-30 RX ADMIN — Medication 8: at 11:29

## 2021-09-30 RX ADMIN — Medication 7 UNIT(S): at 07:57

## 2021-09-30 RX ADMIN — CEFEPIME 100 MILLIGRAM(S): 1 INJECTION, POWDER, FOR SOLUTION INTRAMUSCULAR; INTRAVENOUS at 11:28

## 2021-09-30 RX ADMIN — POLYETHYLENE GLYCOL 3350 17 GRAM(S): 17 POWDER, FOR SOLUTION ORAL at 11:28

## 2021-09-30 RX ADMIN — GABAPENTIN 100 MILLIGRAM(S): 400 CAPSULE ORAL at 21:43

## 2021-09-30 RX ADMIN — METFORMIN HYDROCHLORIDE 500 MILLIGRAM(S): 850 TABLET ORAL at 11:28

## 2021-09-30 RX ADMIN — Medication 7 UNIT(S): at 17:37

## 2021-09-30 RX ADMIN — GABAPENTIN 100 MILLIGRAM(S): 400 CAPSULE ORAL at 06:12

## 2021-09-30 RX ADMIN — Medication 12.5 MILLIGRAM(S): at 06:12

## 2021-09-30 RX ADMIN — CEFEPIME 100 MILLIGRAM(S): 1 INJECTION, POWDER, FOR SOLUTION INTRAMUSCULAR; INTRAVENOUS at 21:43

## 2021-09-30 RX ADMIN — HEPARIN SODIUM 5000 UNIT(S): 5000 INJECTION INTRAVENOUS; SUBCUTANEOUS at 06:12

## 2021-09-30 RX ADMIN — Medication 4: at 17:37

## 2021-09-30 RX ADMIN — Medication 2: at 21:44

## 2021-09-30 RX ADMIN — SENNA PLUS 2 TABLET(S): 8.6 TABLET ORAL at 21:44

## 2021-09-30 RX ADMIN — GABAPENTIN 100 MILLIGRAM(S): 400 CAPSULE ORAL at 14:04

## 2021-09-30 RX ADMIN — CEFEPIME 100 MILLIGRAM(S): 1 INJECTION, POWDER, FOR SOLUTION INTRAMUSCULAR; INTRAVENOUS at 06:11

## 2021-09-30 RX ADMIN — Medication 7 UNIT(S): at 11:29

## 2021-09-30 NOTE — PROGRESS NOTE ADULT - ASSESSMENT
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  Pt had a low grade temp yesterday 100.1, no fevers today, UA not impressive, UC pending, denies any symptoms of UTI.   The pt is s/p MRI of cervical spine - negative for discitis or osteomyelitis.  CT abd/pelvis - no acute abdominal pathology, + LLL consolidation   TTE and cultures are pending.   Vascular and podiatry evaluations pending.   Attending Addendum--  Case reviewed with MARISOL Graves. Her note reviewed and modified as appropriate.   Patient personally assessed and examined.  MRI personally reviewed, and reviewed again with radiology.  No evidence of discitis osteomyelitis.  Upper extremity symptoms appear to be related to DJD.  CT scan abdomen pelvis unrevealing for clear convincing source of MSSA septicemia.  Echocardiogram is pending.  Overall the patient is feeling better.  Podiatry and or vascular surgery assessment of the patient's foot does not appear to have been done as of yet.  Work-up ongoing, as outlined previously.    9/29: no fevers, no leukocytosis, one set of blood cultures from [facility] rehab with staphylococcus aureus, cultures collected on the admission one set is growing enterococcus cloacae complex, wound culture from 9/10 grew Proteus mirabilis, Enterococcus faecalis, Klebsiella pneumoniae. Cefazolin IV was stopped, started on cefepime IV for MSSA septicemia and enterococcus. New two sets of blood cultures were collected yesterday, UC pending. Unclear what is the cause of pt's septicemia, will consider MRI of left foot.   Attending addendum:  Case reviewed with MARISOL Graves. Her note reviewed and modified as appropriate.   Patient personally assessed and examined.   With now a GNR identified in blood cx, and no other clear source for polymicrobial bacteremia identified, have to be concerned about potential occult disease in L ankle. It is warm to touch. However wound bed, while with some mild malodor, is about 1/3 granular, 1/3 fatty tissue, and 1/3 fibrinous exudate. No pus or necrosis. Skin is darkened closer to the wound but clearly viable tissue. No crepitus or fluctuance. No proximal lymphangitis.     9/30: no fevers, no new lab work, MR left ankle - concern for early OM, Cefepime IV continued. Pt was seen by Vascular service - BKA was offered, pt refusing for now, podiatry evaluation is pending. Repeat blood cultures with no growth to date, UC with yeast like cells - no need for diflucan.     Suggestions  - continue cefepime 2 grams q 8 hrs   - follow repeat blood cultures NGTD  - follow all culture data  - monitor pt's temperatures  - monitor WBC  - good but not too tight glycemic control  - podiatry evaluation pending  - vascular evaluation appreciated   - MRI of left foot - early OM  - TTE performed - no vegetation      Discussed with Dr. Johansen  Discussed with Dr. Garcia

## 2021-09-30 NOTE — CONSULT NOTE ADULT - ATTENDING COMMENTS
I have seen and examined the patient, s/p chopart amputation of the left foot, viable stump with some slough, ?bacteremia,   may need let BKA if the stump does not heal and sepsis cannot be resolved.  However I have discusses with the patient and she AGAIN REFUSES FOR THE LEFT BKA

## 2021-09-30 NOTE — PROGRESS NOTE ADULT - SUBJECTIVE AND OBJECTIVE BOX
INTERVAL HPI/OVERNIGHT EVENTS:        REVIEW OF SYSTEMS:  CONSTITUTIONAL:  no  complaints    NECK: No pain or stiffnes  RESPIRATORY: No SOB   CARDIOVASCULAR: No chest pain, palpitations, dizziness,   GASTROINTESTINAL: No abdominal pain. No nausea, vomiting,   NEUROLOGICAL: No headaches, no  blurry  vision no  dizziness  SKIN: No itching,   MUSCULOSKELETAL: No pain    MEDICATION:  acetaminophen   Tablet .. 650 milliGRAM(s) Oral every 6 hours PRN  acetaminophen   Tablet .. 650 milliGRAM(s) Oral every 6 hours PRN  atorvastatin 40 milliGRAM(s) Oral at bedtime  cefepime   IVPB 2000 milliGRAM(s) IV Intermittent every 8 hours  cefepime   IVPB      dextrose 40% Gel 15 Gram(s) Oral once  dextrose 5%. 1000 milliLiter(s) IV Continuous <Continuous>  dextrose 5%. 1000 milliLiter(s) IV Continuous <Continuous>  dextrose 50% Injectable 25 Gram(s) IV Push once  dextrose 50% Injectable 12.5 Gram(s) IV Push once  dextrose 50% Injectable 25 Gram(s) IV Push once  gabapentin 100 milliGRAM(s) Oral three times a day  glucagon  Injectable 1 milliGRAM(s) IntraMuscular once  heparin   Injectable 5000 Unit(s) SubCutaneous every 12 hours  insulin glargine Injectable (LANTUS) 20 Unit(s) SubCutaneous at bedtime  insulin lispro (ADMELOG) corrective regimen sliding scale   SubCutaneous three times a day before meals  insulin lispro (ADMELOG) corrective regimen sliding scale   SubCutaneous at bedtime  insulin lispro Injectable (ADMELOG) 7 Unit(s) SubCutaneous three times a day before meals  metoprolol tartrate 12.5 milliGRAM(s) Oral two times a day  polyethylene glycol 3350 17 Gram(s) Oral daily  senna 2 Tablet(s) Oral at bedtime    Vital Signs Last 24 Hrs  T(C): 37.1 (30 Sep 2021 05:51), Max: 38.1 (29 Sep 2021 17:46)  T(F): 98.7 (30 Sep 2021 05:51), Max: 100.5 (29 Sep 2021 17:46)  HR: 97 (30 Sep 2021 05:51) (85 - 105)  BP: 163/80 (30 Sep 2021 05:51) (140/71 - 184/80)  BP(mean): --  RR: 19 (30 Sep 2021 05:51) (19 - 20)  SpO2: 99% (30 Sep 2021 05:51) (94% - 99%)    PHYSICAL EXAM:  GENERAL: NAD, well-groomed, well-developed  HEENT : Conjuntivae  clear sclerae anicteric  NECK: Supple, No JVD, Normal thyroid  NERVOUS SYSTEM:  Alert oriented   no  focal  deficits;   CHEST/LUNG: Clear    HEART: Regular rate and rhythm; No murmurs, rubs, or gallops  ABDOMEN: Soft, Nontender, Nondistended; Bowel sounds present  EXTREMITIES:  no  edema L  foot  surgically  bandged  no  bleeding  no  tenderness  SKIN: No rashes   LABS:                        8.2    5.27  )-----------( 441      ( 29 Sep 2021 08:41 )             24.6     09-29    134<L>  |  101  |  21  ----------------------------<  385<H>  3.8   |  24  |  0.62    Ca    9.2      29 Sep 2021 08:41          CAPILLARY BLOOD GLUCOSE      POCT Blood Glucose.: 331 mg/dL (30 Sep 2021 07:33)  POCT Blood Glucose.: 366 mg/dL (29 Sep 2021 22:05)  POCT Blood Glucose.: 285 mg/dL (29 Sep 2021 15:34)  POCT Blood Glucose.: 366 mg/dL (29 Sep 2021 11:36)  POCT Blood Glucose.: 399 mg/dL (29 Sep 2021 08:13)      RADIOLOGY & ADDITIONAL TESTS:    Imaging reports  Personally Reviewed:  [ ] YES  [ ] NO    Consultant(s) Notes Reviewed:  [x ] YES  [ ] NO    Care Discussed with Consultants/Other Providers [ x] YES  [ ] NO  Problem/Plan - 1:  ·  Problem: Septicemia.   ·  Plan: sefepime  as per  ID      Problem/Plan - 2:  ·  Problem: PAD (peripheral artery disease).   ·  Plan: vascular  surgery  eval dr Crawley called. again    Problem/Plan - 3:  ·  Problem: Status post transmetatarsal amputation of left foot.   ·  Plan: local  Rx  as per  podiatry  dr Patel  called. again    Problem/Plan - 4:  ·  Problem: DM (diabetes mellitus).   ·  Plan: nutritional  insulin  and  scale  coverage hold  metformin  post  ct  angio.    Problem/Plan - 5:  ·  Problem: Constipation.   ·  Plan: senna  miralx.    Problem/Plan - 6:  ·  Problem: Preventive measure.   ·  Plan: dvt  prophylaxis  with  heparin.    FUNGURIA  add  Diflucan         INTERVAL HPI/OVERNIGHT EVENTS:        REVIEW OF SYSTEMS:  CONSTITUTIONAL:  no  complaints    NECK: No pain or stiffnes  RESPIRATORY: No SOB   CARDIOVASCULAR: No chest pain, palpitations, dizziness,   GASTROINTESTINAL: No abdominal pain. No nausea, vomiting,   NEUROLOGICAL: No headaches, no  blurry  vision no  dizziness  SKIN: No itching,   MUSCULOSKELETAL: No pain    MEDICATION:  acetaminophen   Tablet .. 650 milliGRAM(s) Oral every 6 hours PRN  acetaminophen   Tablet .. 650 milliGRAM(s) Oral every 6 hours PRN  atorvastatin 40 milliGRAM(s) Oral at bedtime  cefepime   IVPB 2000 milliGRAM(s) IV Intermittent every 8 hours  cefepime   IVPB      dextrose 40% Gel 15 Gram(s) Oral once  dextrose 5%. 1000 milliLiter(s) IV Continuous <Continuous>  dextrose 5%. 1000 milliLiter(s) IV Continuous <Continuous>  dextrose 50% Injectable 25 Gram(s) IV Push once  dextrose 50% Injectable 12.5 Gram(s) IV Push once  dextrose 50% Injectable 25 Gram(s) IV Push once  gabapentin 100 milliGRAM(s) Oral three times a day  glucagon  Injectable 1 milliGRAM(s) IntraMuscular once  heparin   Injectable 5000 Unit(s) SubCutaneous every 12 hours  insulin glargine Injectable (LANTUS) 20 Unit(s) SubCutaneous at bedtime  insulin lispro (ADMELOG) corrective regimen sliding scale   SubCutaneous three times a day before meals  insulin lispro (ADMELOG) corrective regimen sliding scale   SubCutaneous at bedtime  insulin lispro Injectable (ADMELOG) 7 Unit(s) SubCutaneous three times a day before meals  metoprolol tartrate 12.5 milliGRAM(s) Oral two times a day  polyethylene glycol 3350 17 Gram(s) Oral daily  senna 2 Tablet(s) Oral at bedtime    Vital Signs Last 24 Hrs  T(C): 37.1 (30 Sep 2021 05:51), Max: 38.1 (29 Sep 2021 17:46)  T(F): 98.7 (30 Sep 2021 05:51), Max: 100.5 (29 Sep 2021 17:46)  HR: 97 (30 Sep 2021 05:51) (85 - 105)  BP: 163/80 (30 Sep 2021 05:51) (140/71 - 184/80)  BP(mean): --  RR: 19 (30 Sep 2021 05:51) (19 - 20)  SpO2: 99% (30 Sep 2021 05:51) (94% - 99%)    PHYSICAL EXAM:  GENERAL: NAD, well-groomed, well-developed  HEENT : Conjuntivae  clear sclerae anicteric  NECK: Supple, No JVD, Normal thyroid  NERVOUS SYSTEM:  Alert oriented   no  focal  deficits;   CHEST/LUNG: Clear    HEART: Regular rate and rhythm; No murmurs, rubs, or gallops  ABDOMEN: Soft, Nontender, Nondistended; Bowel sounds present  EXTREMITIES:  no  edema L  foot  surgically  bandged  no  bleeding  no  tenderness  SKIN: No rashes   LABS:                        8.2    5.27  )-----------( 441      ( 29 Sep 2021 08:41 )             24.6     09-29    134<L>  |  101  |  21  ----------------------------<  385<H>  3.8   |  24  |  0.62    Ca    9.2      29 Sep 2021 08:41          CAPILLARY BLOOD GLUCOSE      POCT Blood Glucose.: 331 mg/dL (30 Sep 2021 07:33)  POCT Blood Glucose.: 366 mg/dL (29 Sep 2021 22:05)  POCT Blood Glucose.: 285 mg/dL (29 Sep 2021 15:34)  POCT Blood Glucose.: 366 mg/dL (29 Sep 2021 11:36)  POCT Blood Glucose.: 399 mg/dL (29 Sep 2021 08:13)      RADIOLOGY & ADDITIONAL TESTS:    Imaging reports  Personally Reviewed:  [ ] YES  [ ] NO    Consultant(s) Notes Reviewed:  [x ] YES  [ ] NO    Care Discussed with Consultants/Other Providers [ x] YES  [ ] NO  Problem/Plan - 1:  ·  Problem: Septicemia.   ·  Plan: sefepime  as per  ID      Problem/Plan - 2:  ·  Problem: PAD (peripheral artery disease).   ·  Plan: vascular  surgery  eval dr Crawley called. again    Problem/Plan - 3:  ·  Problem: Status post transmetatarsal amputation of left foot.   ·  Plan: local  Rx  as per  podiatry  dr Patel  called. again    Problem/Plan - 4:  ·  Problem: DM (diabetes mellitus).   ·  Plan: nutritional  insulin  and  scale  coverage hold  metformin  post  ct  angio.    Problem/Plan - 5:  ·  Problem: Constipation.   ·  Plan: senna  miralx.    Problem/Plan - 6:  ·  Problem: Preventive measure.   ·  Plan: dvt  prophylaxis  with  heparin.    FUNGURIA  add  Diflucan    ADD  PT  SEEN  EXAMINED  LABS  ORDERED  CASE  DISCUSSED  WITH RN   9/29/21    NOTE  DOES  NOT  APPEAR  ON  CHART

## 2021-09-30 NOTE — PROGRESS NOTE ADULT - SUBJECTIVE AND OBJECTIVE BOX
NELSON ZEPEDA  MRN-[id number]    Follow Up:      Interval History:    PAST MEDICAL & SURGICAL HISTORY:  Hypertension    Family history of diabetes mellitus    No significant past surgical history        ROS:    [ ] Unobtainable because:  [ ] All other systems negative    Constitutional: no fever, no chills  Head: no trauma  Eyes: no vision changes, no eye pain  ENT:  no sore throat, no rhinorrhea  Cardiovascular:  no chest pain, no palpitation  Respiratory:  no SOB, no cough  GI:  no abd pain, no vomiting, no diarrhea  urinary: no dysuria, no hematuria, no flank pain  musculoskeletal:  no joint pain, no joint swelling  skin:  no rash  neurology:  no headache, no seizure, no change in mental status  psych: no anxiety, no depression         Allergies  avocado (Pruritus)  Carrots (Pruritus)  No Known Drug Allergies  Plums (Pruritus)        ANTIMICROBIALS:  cefepime   IVPB 2000 every 8 hours  cefepime   IVPB        OTHER MEDS:  acetaminophen   Tablet .. 650 milliGRAM(s) Oral every 6 hours PRN  acetaminophen   Tablet .. 650 milliGRAM(s) Oral every 6 hours PRN  atorvastatin 40 milliGRAM(s) Oral at bedtime  dextrose 40% Gel 15 Gram(s) Oral once  dextrose 5%. 1000 milliLiter(s) IV Continuous <Continuous>  dextrose 5%. 1000 milliLiter(s) IV Continuous <Continuous>  dextrose 50% Injectable 25 Gram(s) IV Push once  dextrose 50% Injectable 12.5 Gram(s) IV Push once  dextrose 50% Injectable 25 Gram(s) IV Push once  gabapentin 100 milliGRAM(s) Oral three times a day  glucagon  Injectable 1 milliGRAM(s) IntraMuscular once  heparin   Injectable 5000 Unit(s) SubCutaneous every 12 hours  insulin glargine Injectable (LANTUS) 20 Unit(s) SubCutaneous at bedtime  insulin lispro (ADMELOG) corrective regimen sliding scale   SubCutaneous three times a day before meals  insulin lispro (ADMELOG) corrective regimen sliding scale   SubCutaneous at bedtime  insulin lispro Injectable (ADMELOG) 7 Unit(s) SubCutaneous three times a day before meals  metFORMIN 500 milliGRAM(s) Oral daily  metoprolol tartrate 12.5 milliGRAM(s) Oral two times a day  polyethylene glycol 3350 17 Gram(s) Oral daily  senna 2 Tablet(s) Oral at bedtime      Vital Signs Last 24 Hrs  T(C): 37.2 (30 Sep 2021 12:25), Max: 38.1 (29 Sep 2021 17:46)  T(F): 99 (30 Sep 2021 12:25), Max: 100.5 (29 Sep 2021 17:46)  HR: 50 (30 Sep 2021 12:25) (50 - 105)  BP: 113/77 (30 Sep 2021 12:25) (113/77 - 184/80)  BP(mean): --  RR: 17 (30 Sep 2021 12:25) (17 - 20)  SpO2: 98% (30 Sep 2021 12:25) (94% - 99%)    Physical Exam:  General:    NAD,  non toxic  Head: atraumatic, normocephalic  Eye: normal sclera and conjunctiva  ENT:    no oral lesions, neck supple  Cardio:     regular S1, S2,  no murmur  Respiratory:    clear b/l,    no wheezing  abd:     soft,   BS +,   no tenderness  :   no CVAT,  no suprapubic tenderness,   no  mortensen  Musculoskeletal:   no joint swelling,   no edema  vascular: no central lines, +PIV   Skin:    no rash  Neurologic:     no focal deficit  psych: normal affect    WBC Count: 5.27 K/uL (09-29 @ 08:41)  WBC Count: 5.80 K/uL (09-28 @ 07:32)  WBC Count: 7.00 K/uL (09-27 @ 14:14)  WBC Count: 8.84 K/uL (09-25 @ 20:35)                            8.2    5.27  )-----------( 441      ( 29 Sep 2021 08:41 )             24.6       09-29    134<L>  |  101  |  21  ----------------------------<  385<H>  3.8   |  24  |  0.62    Ca    9.2      29 Sep 2021 08:41            Creatinine Trend: 0.62<--, 0.68<--, 0.77<--, 0.77<--, 0.85<--, 0.58<--      MICROBIOLOGY:  v  .Blood Blood  09-29-21   No growth to date.  --  --      Clean Catch Clean Catch (Midstream)  09-28-21   50,000 - 99,000 CFU/mL Yeast like cells  --  --      .Blood Blood-Peripheral  09-28-21   Growth in aerobic bottle: Enterobacter cloacae complex  ***Blood Panel PCR results on this specimen are available  approximately 3 hours after the Gram stain result.***  Gram stain, PCR, and/or culture results may not always  correspond due to difference in methodologies.  ************************************************************  This PCR assay was performed by multiplex PCR. This  Assay tests for 66 bacterial and resistance gene targets.  Please refer to the [facility] Integrated biometrics test directory  at [email or web address] for details.  --  Blood Culture PCR  Enterobacter cloacae complex      .Blood Blood-Peripheral  09-26-21   No growth to date.  --  --      .Blood Blood-Peripheral  09-26-21   Growth in anaerobic bottle: Staphylococcus aureus  ***Blood Panel PCR results on this specimen are available  approximately 3 hours after the Gram stain result.***  Gram stain, PCR, and/or culture results may not always  correspond due to difference in methodologies.  ************************************************************  This PCR assay was performed by multiplex PCR. This  Assay tests for 66 bacterial and resistance gene targets.  Please refer to the [facility] Integrated biometrics test directory  at [email or web address] for details.  --  Blood Culture PCR  Staphylococcus aureus      Clean Catch Clean Catch (Midstream)  09-26-21   >=3 organisms. Probable collection contamination.  --  --      .Other left foot  09-10-21   No growth  --  --      .Abscess left foot wound  09-10-21   Few Proteus mirabilis  Moderate Enterococcus faecalis  Rare Klebsiella pneumoniae  Few Non Fermentering Gram Negative Rods Most closely resembling  Wohlfahrtiimonas Species  Moderate Bacteroides fragilis "Susceptibilities not performed"  Moderate Streptococcus agalactiae (Group B) isolated  Group B streptococci are susceptible to ampicillin,  penicillin and cefazolin, but may be resistant to  erythromycin and clindamycin.  Recommendations for intrapartum prophylaxis for Group B  streptococci are penicillin or ampicillin.  --  Non Fermentering Gram Negative Rods  Proteus mirabilis  Enterococcus faecalis  Klebsiella pneumoniae      Clean Catch Clean Catch (Midstream)  09-10-21   No growth  --  --      .Blood Blood-Peripheral  09-10-21   No Growth Final  --  --          Rapid RVP Result: NotDetec (09-25 @ 20:40)        C-Reactive Protein, Serum: 142 (09-29)  C-Reactive Protein, Serum: 106 (09-26)    Ferritin, Serum: 1967 (09-18)      D-Dimer Assay, Quantitative: 799 (09-25)      SARS-CoV-2: NotDetec (25 Sep 2021 20:40)  SARS-CoV-2: NotDetec (23 Sep 2021 11:41)  SARS-CoV-2: NotDetec (09 Sep 2021 21:45)    RADIOLOGY:     NELSON ZEPEDA  MRN-[id number]    Interval History: The pt was seen and examined earlier, no distress, was seen by vascular earlier - was offered BKA. The pt had a low grade temp yesterday evening, 100.5, no fevers since then, no new cbc.     PAST MEDICAL & SURGICAL HISTORY:  Hypertension    Family history of diabetes mellitus    No significant past surgical history        ROS:    [ ] Unobtainable because:  [ x] All other systems negative    Constitutional: no fever, no chills  Head: no trauma  Eyes: no vision changes, no eye pain  ENT:  no sore throat, no rhinorrhea  Cardiovascular:  no chest pain, no palpitation  Respiratory:  no SOB, no cough  GI:  no abd pain, no vomiting, no diarrhea  urinary: no dysuria, no hematuria, no flank pain  musculoskeletal:  no joint pain, no joint swelling  skin:  no rash  neurology:  no headache, no seizure, no change in mental status  psych: no anxiety, no depression         Allergies  avocado (Pruritus)  Carrots (Pruritus)  No Known Drug Allergies  Plums (Pruritus)        ANTIMICROBIALS:  cefepime   IVPB 2000 every 8 hours  cefepime   IVPB        OTHER MEDS:  acetaminophen   Tablet .. 650 milliGRAM(s) Oral every 6 hours PRN  acetaminophen   Tablet .. 650 milliGRAM(s) Oral every 6 hours PRN  atorvastatin 40 milliGRAM(s) Oral at bedtime  dextrose 40% Gel 15 Gram(s) Oral once  dextrose 5%. 1000 milliLiter(s) IV Continuous <Continuous>  dextrose 5%. 1000 milliLiter(s) IV Continuous <Continuous>  dextrose 50% Injectable 25 Gram(s) IV Push once  dextrose 50% Injectable 12.5 Gram(s) IV Push once  dextrose 50% Injectable 25 Gram(s) IV Push once  gabapentin 100 milliGRAM(s) Oral three times a day  glucagon  Injectable 1 milliGRAM(s) IntraMuscular once  heparin   Injectable 5000 Unit(s) SubCutaneous every 12 hours  insulin glargine Injectable (LANTUS) 20 Unit(s) SubCutaneous at bedtime  insulin lispro (ADMELOG) corrective regimen sliding scale   SubCutaneous three times a day before meals  insulin lispro (ADMELOG) corrective regimen sliding scale   SubCutaneous at bedtime  insulin lispro Injectable (ADMELOG) 7 Unit(s) SubCutaneous three times a day before meals  metFORMIN 500 milliGRAM(s) Oral daily  metoprolol tartrate 12.5 milliGRAM(s) Oral two times a day  polyethylene glycol 3350 17 Gram(s) Oral daily  senna 2 Tablet(s) Oral at bedtime      Vital Signs Last 24 Hrs  T(C): 37.2 (30 Sep 2021 12:25), Max: 38.1 (29 Sep 2021 17:46)  T(F): 99 (30 Sep 2021 12:25), Max: 100.5 (29 Sep 2021 17:46)  HR: 50 (30 Sep 2021 12:25) (50 - 105)  BP: 113/77 (30 Sep 2021 12:25) (113/77 - 184/80)  BP(mean): --  RR: 17 (30 Sep 2021 12:25) (17 - 20)  SpO2: 98% (30 Sep 2021 12:25) (94% - 99%)    Physical Exam:  General: Nontoxic-appearing Female in no acute distress.  HEENT: AT/NC. Anicteric, poor dentition, no visualized oral lesions, no thrush.   Neck: Not rigid. No sense of mass.  Nodes: None palpable.  Lungs: Poor effort grossly clear bilaterally  Heart: Regular rate and rhythm. No Murmur. No rub. No gallop. No palpable thrill.  Abdomen: Bowel sounds present and normoactive. Soft. Nondistended. Nontender. No sense of mass. No organomegaly.  Back: No spinal tenderness. No costovertebral angle tenderness. Small scrape covered with dressing right upper back, no swelling or tenderness of surrounding tissues, no erythema  Extremities: L foot dressed, R foot warm, dry. No cyanosis or clubbing. No edema.   Skin: Warm. Dry. Good turgor. No rash. No vasculitic stigmata.  Psychiatric: calm behavior, appropriate     WBC Count: 5.27 K/uL (09-29 @ 08:41)  WBC Count: 5.80 K/uL (09-28 @ 07:32)  WBC Count: 7.00 K/uL (09-27 @ 14:14)  WBC Count: 8.84 K/uL (09-25 @ 20:35)                            8.2    5.27  )-----------( 441      ( 29 Sep 2021 08:41 )             24.6       09-29    134<L>  |  101  |  21  ----------------------------<  385<H>  3.8   |  24  |  0.62    Ca    9.2      29 Sep 2021 08:41            Creatinine Trend: 0.62<--, 0.68<--, 0.77<--, 0.77<--, 0.85<--, 0.58<--      MICROBIOLOGY:  v  .Blood Blood  09-29-21   No growth to date.  --  --      Clean Catch Clean Catch (Midstream)  09-28-21   50,000 - 99,000 CFU/mL Yeast like cells  --  --      .Blood Blood-Peripheral  09-28-21   Growth in aerobic bottle: Enterobacter cloacae complex  ***Blood Panel PCR results on this specimen are available  approximately 3 hours after the Gram stain result.***  Gram stain, PCR, and/or culture results may not always  correspond due to difference in methodologies.  ************************************************************  This PCR assay was performed by multiplex PCR. This  Assay tests for 66 bacterial and resistance gene targets.  Please refer to the [facility] [employer] test directory  at [email or web address] for details.  --  Blood Culture PCR  Enterobacter cloacae complex      .Blood Blood-Peripheral  09-26-21   No growth to date.  --  --      .Blood Blood-Peripheral  09-26-21   Growth in anaerobic bottle: Staphylococcus aureus  ***Blood Panel PCR results on this specimen are available  approximately 3 hours after the Gram stain result.***  Gram stain, PCR, and/or culture results may not always  correspond due to difference in methodologies.  ************************************************************  This PCR assay was performed by multiplex PCR. This  Assay tests for 66 bacterial and resistance gene targets.  Please refer to the [facility] [employer] test directory  at [email or web address] for details.  --  Blood Culture PCR  Staphylococcus aureus      Clean Catch Clean Catch (Midstream)  09-26-21   >=3 organisms. Probable collection contamination.  --  --      .Other left foot  09-10-21   No growth  --  --      .Abscess left foot wound  09-10-21   Few Proteus mirabilis  Moderate Enterococcus faecalis  Rare Klebsiella pneumoniae  Few Non Fermentering Gram Negative Rods Most closely resembling  Wohlfahrtiimonas Species  Moderate Bacteroides fragilis "Susceptibilities not performed"  Moderate Streptococcus agalactiae (Group B) isolated  Group B streptococci are susceptible to ampicillin,  penicillin and cefazolin, but may be resistant to  erythromycin and clindamycin.  Recommendations for intrapartum prophylaxis for Group B  streptococci are penicillin or ampicillin.  --  Non Fermentering Gram Negative Rods  Proteus mirabilis  Enterococcus faecalis  Klebsiella pneumoniae      Clean Catch Clean Catch (Midstream)  09-10-21   No growth  --  --      .Blood Blood-Peripheral  09-10-21   No Growth Final  --  --          Rapid RVP Result: NotDetec (09-25 @ 20:40)        C-Reactive Protein, Serum: 142 (09-29)  C-Reactive Protein, Serum: 106 (09-26)    Ferritin, Serum: 1967 (09-18)      D-Dimer Assay, Quantitative: 799 (09-25)      SARS-CoV-2: NotDetec (25 Sep 2021 20:40)  SARS-CoV-2: NotDetec (23 Sep 2021 11:41)  SARS-CoV-2: NotDetec (09 Sep 2021 21:45)    RADIOLOGY:  < from: MR Ankle No Cont, Left (09.30.21 @ 16:52) >    EXAM:  MR ANKLE LT                            PROCEDURE DATE:  09/30/2021          INTERPRETATION:  EXAMINATION: MR ANKLE LEFT    CLINICAL INDICATION:Evaluate for osteomyelitis    COMPARISON: Correlation with lower leg radiographs 9/27/2021    TECHNIQUE: Multiplanar, multi-sequence MRI of the left ankle was performed without intravenous contrast.    INTERPRETATION:    Localizer: No additional findings.    Bones: There has been amputation of the foot at the level of the talonavicular and calcaneocuboid joints. There is patchy T1 hypointense/STIR hyperintense signal abnormality within the calcaneus distally which could represent reactive osteitis although early osteomyelitis is of concern given the overlying wound and T1 signal abnormality. Similar but similar findings are seen at the anterior aspect of the talus.    There are trace tibiotalar and subtalar joint effusions.    Soft tissues: There is no localized abscess. There is soft tissue phlegmon anterior to the talus. Muscles of the hindfoot demonstrate diffuse T2 hyperintensity.      IMPRESSION:  1.  Prior left foot partial amputation at the level of Chopart's joint. Patchy signal abnormality as detailed with the anterior aspect of the calcaneus and to a milder degree at the anterior aspect of the talus are present, concerning for early osteomyelitis.    2.  Overlying soft tissue phlegmon and ulceration is present without localized abscess.    --- End of Report ---            SHLOMIT GOLDBERG-STEIN MD; Attending Radiologist  This document has been electronically signed. Sep 30 2021  5:15PM    < end of copied text >    < from: TTE Limited Echo w/o Cont (09.28.21 @ 14:04) >  Summary:   1. Left ventricular ejection fraction, by visual estimation, is 65 to 70%.   2. Technically good study.   3. Normal global left ventricular systolic function.   4. Normal left ventricular internal cavity size.   5. The left ventricular diastolic function could not be assessed in this study.   6. Normal right ventricular size and function.   7. Normal left atrial size.   8. Normal right atrial size.   9. Trivial pericardial effusion.  10. Structurally normal mitral valve, with normal leaflet excursion.  11. Trace mitral valve regurgitation.  12. Mild tricuspid regurgitation.  13. Normal trileaflet aortic valve with normal opening.  14. No valvular vegetations seen.    Giovanni Barlow MD FACC, FASE, FACP  Electronically signed on 9/29/2021 at 12:05:50 PM    < end of copied text >

## 2021-09-30 NOTE — PROGRESS NOTE ADULT - SUBJECTIVE AND OBJECTIVE BOX
Patient is a 65y old  Female who presents with a chief complaint of sepsis (30 Sep 2021 14:23)      Interval History: finger sticks are > 300s  increased Insulin Resistance   and Infection driven Hyperglycemia   on Lantus and prandial lispro     MEDICATIONS  (STANDING):  atorvastatin 40 milliGRAM(s) Oral at bedtime  cefepime   IVPB 2000 milliGRAM(s) IV Intermittent every 8 hours  cefepime   IVPB      dextrose 40% Gel 15 Gram(s) Oral once  dextrose 5%. 1000 milliLiter(s) (50 mL/Hr) IV Continuous <Continuous>  dextrose 5%. 1000 milliLiter(s) (100 mL/Hr) IV Continuous <Continuous>  dextrose 50% Injectable 25 Gram(s) IV Push once  dextrose 50% Injectable 12.5 Gram(s) IV Push once  dextrose 50% Injectable 25 Gram(s) IV Push once  gabapentin 100 milliGRAM(s) Oral three times a day  glucagon  Injectable 1 milliGRAM(s) IntraMuscular once  heparin   Injectable 5000 Unit(s) SubCutaneous every 12 hours  insulin glargine Injectable (LANTUS) 20 Unit(s) SubCutaneous at bedtime  insulin lispro (ADMELOG) corrective regimen sliding scale   SubCutaneous three times a day before meals  insulin lispro (ADMELOG) corrective regimen sliding scale   SubCutaneous at bedtime  insulin lispro Injectable (ADMELOG) 7 Unit(s) SubCutaneous three times a day before meals  metFORMIN 500 milliGRAM(s) Oral daily  metoprolol tartrate 12.5 milliGRAM(s) Oral two times a day  polyethylene glycol 3350 17 Gram(s) Oral daily  senna 2 Tablet(s) Oral at bedtime    MEDICATIONS  (PRN):  acetaminophen   Tablet .. 650 milliGRAM(s) Oral every 6 hours PRN Moderate Pain (4 - 6)  acetaminophen   Tablet .. 650 milliGRAM(s) Oral every 6 hours PRN Temp greater or equal to 38C (100.4F)      Allergies    avocado (Pruritus)  Carrots (Pruritus)  No Known Drug Allergies  Plums (Pruritus)    Intolerances        REVIEW OF SYSTEMS:  CONSTITUTIONAL: no changes      Vital Signs Last 24 Hrs  T(C): 37.7 (30 Sep 2021 17:36), Max: 37.7 (30 Sep 2021 17:36)  T(F): 99.8 (30 Sep 2021 17:36), Max: 99.8 (30 Sep 2021 17:36)  HR: 101 (30 Sep 2021 17:36) (50 - 101)  BP: 139/62 (30 Sep 2021 17:36) (113/77 - 163/80)  BP(mean): --  RR: 18 (30 Sep 2021 17:36) (17 - 19)  SpO2: 99% (30 Sep 2021 17:36) (96% - 99%)    PHYSICAL EXAM:  GENERAL:   HEAD: Atraumatic, Normocephalic  EYES: PERRLA, conjunctiva and sclera clear  ENMT: No  exudates,; Moist mucous membranes,, No lesions  NECK: Supple, No JVD, Normal thyroid  NERVOUS SYSTEM:  Alert & Oriented,   CHEST/LUNG: Clear to auscultation bilaterally; No rales, rhonchi, wheezing, or rubs  HEART: Regular rate and rhythm; No murmurs, rubs, or gallops  ABDOMEN: Soft, Nontender, Nondistended; Bowel sounds present  EXTREMITIES:  2+ Peripheral Pulses, no edema  SKIN: No rashes or lesions    LABS:        CAPILLARY BLOOD GLUCOSE      POCT Blood Glucose.: 298 mg/dL (30 Sep 2021 21:32)  POCT Blood Glucose.: 226 mg/dL (30 Sep 2021 17:30)  POCT Blood Glucose.: 336 mg/dL (30 Sep 2021 11:01)  POCT Blood Glucose.: 331 mg/dL (30 Sep 2021 07:33)  POCT Blood Glucose.: 366 mg/dL (29 Sep 2021 22:05)    Lipid panel:           Thyroid:  Diabetes Tests:  Parathyroid Panel:  Adrenals:  RADIOLOGY & ADDITIONAL TESTS:    Imaging Personally Reviewed:  [ ] YES  [ ] NO    Consultant(s) Notes Reviewed:  [ ] YES  [ ] NO    Care Discussed with Consultants/Other Providers [ ] YES  [ ] NO

## 2021-10-01 DIAGNOSIS — M86.9 OSTEOMYELITIS, UNSPECIFIED: ICD-10-CM

## 2021-10-01 LAB
ANION GAP SERPL CALC-SCNC: 8 MMOL/L — SIGNIFICANT CHANGE UP (ref 5–17)
BUN SERPL-MCNC: 24 MG/DL — HIGH (ref 7–23)
CALCIUM SERPL-MCNC: 9.1 MG/DL — SIGNIFICANT CHANGE UP (ref 8.5–10.1)
CHLORIDE SERPL-SCNC: 98 MMOL/L — SIGNIFICANT CHANGE UP (ref 96–108)
CO2 SERPL-SCNC: 25 MMOL/L — SIGNIFICANT CHANGE UP (ref 22–31)
CREAT SERPL-MCNC: 0.69 MG/DL — SIGNIFICANT CHANGE UP (ref 0.5–1.3)
CULTURE RESULTS: SIGNIFICANT CHANGE UP
GLUCOSE BLDC GLUCOMTR-MCNC: 183 MG/DL — HIGH (ref 70–99)
GLUCOSE BLDC GLUCOMTR-MCNC: 302 MG/DL — HIGH (ref 70–99)
GLUCOSE BLDC GLUCOMTR-MCNC: 369 MG/DL — HIGH (ref 70–99)
GLUCOSE BLDC GLUCOMTR-MCNC: 373 MG/DL — HIGH (ref 70–99)
GLUCOSE SERPL-MCNC: 324 MG/DL — HIGH (ref 70–99)
GRAM STN FLD: SIGNIFICANT CHANGE UP
HCT VFR BLD CALC: 24.1 % — LOW (ref 34.5–45)
HGB BLD-MCNC: 7.9 G/DL — LOW (ref 11.5–15.5)
MCHC RBC-ENTMCNC: 28.4 PG — SIGNIFICANT CHANGE UP (ref 27–34)
MCHC RBC-ENTMCNC: 32.8 GM/DL — SIGNIFICANT CHANGE UP (ref 32–36)
MCV RBC AUTO: 86.7 FL — SIGNIFICANT CHANGE UP (ref 80–100)
NRBC # BLD: 0 /100 WBCS — SIGNIFICANT CHANGE UP (ref 0–0)
ORGANISM # SPEC MICROSCOPIC CNT: SIGNIFICANT CHANGE UP
ORGANISM # SPEC MICROSCOPIC CNT: SIGNIFICANT CHANGE UP
PLATELET # BLD AUTO: 493 K/UL — HIGH (ref 150–400)
POTASSIUM SERPL-MCNC: 3.6 MMOL/L — SIGNIFICANT CHANGE UP (ref 3.5–5.3)
POTASSIUM SERPL-SCNC: 3.6 MMOL/L — SIGNIFICANT CHANGE UP (ref 3.5–5.3)
RBC # BLD: 2.78 M/UL — LOW (ref 3.8–5.2)
RBC # FLD: 14.4 % — SIGNIFICANT CHANGE UP (ref 10.3–14.5)
SODIUM SERPL-SCNC: 131 MMOL/L — LOW (ref 135–145)
SPECIMEN SOURCE: SIGNIFICANT CHANGE UP
SPECIMEN SOURCE: SIGNIFICANT CHANGE UP
WBC # BLD: 5.86 K/UL — SIGNIFICANT CHANGE UP (ref 3.8–10.5)
WBC # FLD AUTO: 5.86 K/UL — SIGNIFICANT CHANGE UP (ref 3.8–10.5)

## 2021-10-01 PROCEDURE — 36573 INSJ PICC RS&I 5 YR+: CPT

## 2021-10-01 PROCEDURE — 99233 SBSQ HOSP IP/OBS HIGH 50: CPT

## 2021-10-01 RX ADMIN — GABAPENTIN 100 MILLIGRAM(S): 400 CAPSULE ORAL at 05:53

## 2021-10-01 RX ADMIN — Medication 650 MILLIGRAM(S): at 17:29

## 2021-10-01 RX ADMIN — Medication 10: at 08:02

## 2021-10-01 RX ADMIN — CEFEPIME 100 MILLIGRAM(S): 1 INJECTION, POWDER, FOR SOLUTION INTRAMUSCULAR; INTRAVENOUS at 11:27

## 2021-10-01 RX ADMIN — CEFEPIME 100 MILLIGRAM(S): 1 INJECTION, POWDER, FOR SOLUTION INTRAMUSCULAR; INTRAVENOUS at 20:16

## 2021-10-01 RX ADMIN — Medication 12.5 MILLIGRAM(S): at 05:53

## 2021-10-01 RX ADMIN — Medication 2: at 16:15

## 2021-10-01 RX ADMIN — HEPARIN SODIUM 5000 UNIT(S): 5000 INJECTION INTRAVENOUS; SUBCUTANEOUS at 17:14

## 2021-10-01 RX ADMIN — Medication 7 UNIT(S): at 08:02

## 2021-10-01 RX ADMIN — GABAPENTIN 100 MILLIGRAM(S): 400 CAPSULE ORAL at 13:12

## 2021-10-01 RX ADMIN — GABAPENTIN 100 MILLIGRAM(S): 400 CAPSULE ORAL at 22:13

## 2021-10-01 RX ADMIN — CEFEPIME 100 MILLIGRAM(S): 1 INJECTION, POWDER, FOR SOLUTION INTRAMUSCULAR; INTRAVENOUS at 05:53

## 2021-10-01 RX ADMIN — Medication 650 MILLIGRAM(S): at 18:12

## 2021-10-01 RX ADMIN — Medication 7 UNIT(S): at 16:15

## 2021-10-01 RX ADMIN — Medication 4: at 22:13

## 2021-10-01 RX ADMIN — HEPARIN SODIUM 5000 UNIT(S): 5000 INJECTION INTRAVENOUS; SUBCUTANEOUS at 05:53

## 2021-10-01 RX ADMIN — Medication 7 UNIT(S): at 11:27

## 2021-10-01 RX ADMIN — Medication 10: at 11:27

## 2021-10-01 RX ADMIN — INSULIN GLARGINE 20 UNIT(S): 100 INJECTION, SOLUTION SUBCUTANEOUS at 22:14

## 2021-10-01 RX ADMIN — METFORMIN HYDROCHLORIDE 500 MILLIGRAM(S): 850 TABLET ORAL at 11:27

## 2021-10-01 RX ADMIN — Medication 12.5 MILLIGRAM(S): at 17:14

## 2021-10-01 NOTE — PROGRESS NOTE ADULT - SUBJECTIVE AND OBJECTIVE BOX
[facility]  Division of Infectious Diseases  [phone]    Name: NELSON ZEPEDA  Age: 65y  Gender: Female  MRN: [id number]    Interval History--  Notes reviewed. Seen earlier today with Dr. Garcia.  No new complaints.  Vascular assessment appreciated.  No podiatry assessment as of yet.  MRI result reviewed, no clear convincing evidence of deep infection.  Patient initially was not enthusiastic to go back to rehab, but between need for protracted IV antibiotics, VAC placement, she now is amenable [facility].  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    Allergies    avocado (Pruritus)  Carrots (Pruritus)  No Known Drug Allergies  Plums (Pruritus)    Intolerances        Medications--  Antibiotics:  cefepime   IVPB 2000 milliGRAM(s) IV Intermittent every 8 hours  cefepime   IVPB        Immunologic:    Other:  acetaminophen   Tablet .. PRN  acetaminophen   Tablet .. PRN  atorvastatin  dextrose 40% Gel  dextrose 5%.  dextrose 5%.  dextrose 50% Injectable  dextrose 50% Injectable  dextrose 50% Injectable  gabapentin  glucagon  Injectable  heparin   Injectable  insulin glargine Injectable (LANTUS)  insulin lispro (ADMELOG) corrective regimen sliding scale  insulin lispro (ADMELOG) corrective regimen sliding scale  insulin lispro Injectable (ADMELOG)  metFORMIN  metoprolol tartrate  polyethylene glycol 3350  senna      Review of Systems--  A 10-point review of systems was obtained.   Review of systems otherwise negative except as previously noted.    Physical Examination--  Vital Signs: T(F): 99.8 (10-01-21 @ 17:13), Max: 99.8 (10-01-21 @ 17:13)  HR: 90 (10-01-21 @ 18:18)  BP: 164/79 (10-01-21 @ 18:18)  RR: 16 (10-01-21 @ 17:13)  SpO2: 99% (10-01-21 @ 17:13)  Wt(kg): --  General: Nontoxic-appearing Female in no acute distress.  HEENT: AT/NC. Anicteric.  Conjunctiva pink and moist.  Oropharynx grossly clear.  Neck: Not rigid. No sense of mass.  Nodes: None palpable.  Lungs: Diminished breath sounds bilaterally without rales wheezes or rhonchi  Heart: Regular rate and rhythm  Abdomen: Bowel sounds present and normoactive. Soft. Nondistended. Nontender. No sense of mass. No organomegaly.  Extremities: L foot wrapped w/ ACE over VAC. No cyanosis or clubbing. No edema.   Skin: Warm. Dry. Good turgor. No rash. No vasculitic stigmata.  Psychiatric: Grossly appropriate mood and affect      Laboratory Studies--  CBC                        7.9    5.86  )-----------( 493      ( 01 Oct 2021 08:50 )             24.1       Chemistries  10-01    131<L>  |  98  |  24<H>  ----------------------------<  324<H>  3.6   |  25  |  0.69    Ca    9.1      01 Oct 2021 08:50        Culture Data    Culture - Blood (collected 29 Sep 2021 14:48)  Source: .Blood Blood  Preliminary Report (30 Sep 2021 15:01):    No growth to date.    Culture - Blood (collected 29 Sep 2021 11:47)  Source: .Blood Blood  Preliminary Report (30 Sep 2021 12:01):    No growth to date.    Culture - Urine (collected 28 Sep 2021 09:13)  Source: Clean Catch Clean Catch (Midstream)  Final Report (01 Oct 2021 09:39):    50,000 - 99,000 CFU/mL Candida glabrata    Culture - Blood (collected 28 Sep 2021 00:49)  Source: .Blood Blood-Peripheral  Gram Stain (prelim) (30 Sep 2021 18:22):    Growth in anaerobic bottle: Gram Negative Rods  Preliminary Report (30 Sep 2021 18:22):    Growth in anaerobic bottle: Gram Negative Rods    Culture - Blood (collected 28 Sep 2021 00:49)  Source: .Blood Blood-Peripheral  Gram Stain (01 Oct 2021 01:28):    Growth in aerobic bottle: Gram Negative Rods    Growth in anaerobic bottle: Gram Negative Rods  Final Report (01 Oct 2021 01:28):    Growth in aerobic bottle: Enterobacter cloacae complex    ***Blood Panel PCR results on this specimen are available    approximately 3 hours after the Gram stain result.***    Gram stain, PCR, and/or culture results may not always    correspond due to difference in methodologies.    ************************************************************    This PCR assay was performed by multiplex PCR. This    Assay tests for 66 bacterial and resistance gene targets.    Please refer to the [facility] test directory    at [email or web address] for details.    Growth in anaerobic bottle: Gram Negative Rods  Organism: Enterobacter cloacae complex (01 Oct 2021 01:28)  Organism: Blood Culture PCR (01 Oct 2021 01:28)  Organism: Blood Culture PCR  Enterobacter cloacae complex (30 Sep 2021 09:10)    Culture - Blood (collected 26 Sep 2021 01:23)  Source: .Blood Blood-Peripheral  Final Report (01 Oct 2021 02:00):    No Growth Final    Culture - Blood (collected 26 Sep 2021 01:19)  Source: .Blood Blood-Peripheral  Gram Stain (29 Sep 2021 08:48):    Growth in anaerobic bottle: Gram Positive Cocci in Clusters  Final Report (29 Sep 2021 08:48):    Growth in anaerobic bottle: Staphylococcus aureus    ***Blood Panel PCR results on this specimen are available    approximately 3 hours after the Gram stain result.***    Gram stain, PCR, and/or culture results may not always    correspond due to difference in methodologies.    ************************************************************    This PCR assay was performed by multiplex PCR. This    Assay tests for 66 bacterial and resistance gene targets.    Please refer to the [facility] test directory    at [email or web address] for details.  Organism: Blood Culture PCR  Staphylococcus aureus (29 Sep 2021 08:48)  Organism: Staphylococcus aureus (29 Sep 2021 08:48)  Organism: Blood Culture PCR (29 Sep 2021 08:48)    Culture - Urine (collected 26 Sep 2021 00:37)  Source: Clean Catch Clean Catch (Midstream)  Final Report (26 Sep 2021 22:26):    >=3 organisms. Probable collection contamination.

## 2021-10-01 NOTE — PROGRESS NOTE ADULT - SUBJECTIVE AND OBJECTIVE BOX
INTERVAL HPI/OVERNIGHT EVENTS:        REVIEW OF SYSTEMS:  CONSTITUTIONAL:  no  complaints    NECK: No pain or stiffnes  RESPIRATORY: No SOB   CARDIOVASCULAR: No chest pain, palpitations, dizziness,   GASTROINTESTINAL: No abdominal pain. No nausea, vomiting,   NEUROLOGICAL: No headaches, no  blurry  vision no  dizziness  SKIN: No itching,   MUSCULOSKELETAL: No pain    MEDICATION:  acetaminophen   Tablet .. 650 milliGRAM(s) Oral every 6 hours PRN  acetaminophen   Tablet .. 650 milliGRAM(s) Oral every 6 hours PRN  atorvastatin 40 milliGRAM(s) Oral at bedtime  cefepime   IVPB 2000 milliGRAM(s) IV Intermittent every 8 hours  cefepime   IVPB      dextrose 40% Gel 15 Gram(s) Oral once  dextrose 5%. 1000 milliLiter(s) IV Continuous <Continuous>  dextrose 5%. 1000 milliLiter(s) IV Continuous <Continuous>  dextrose 50% Injectable 25 Gram(s) IV Push once  dextrose 50% Injectable 12.5 Gram(s) IV Push once  dextrose 50% Injectable 25 Gram(s) IV Push once  gabapentin 100 milliGRAM(s) Oral three times a day  glucagon  Injectable 1 milliGRAM(s) IntraMuscular once  heparin   Injectable 5000 Unit(s) SubCutaneous every 12 hours  insulin glargine Injectable (LANTUS) 20 Unit(s) SubCutaneous at bedtime  insulin lispro (ADMELOG) corrective regimen sliding scale   SubCutaneous three times a day before meals  insulin lispro (ADMELOG) corrective regimen sliding scale   SubCutaneous at bedtime  insulin lispro Injectable (ADMELOG) 7 Unit(s) SubCutaneous three times a day before meals  metFORMIN 500 milliGRAM(s) Oral daily  metoprolol tartrate 12.5 milliGRAM(s) Oral two times a day  polyethylene glycol 3350 17 Gram(s) Oral daily  senna 2 Tablet(s) Oral at bedtime    Vital Signs Last 24 Hrs  T(C): 37.2 (01 Oct 2021 12:40), Max: 37.7 (30 Sep 2021 17:36)  T(F): 99 (01 Oct 2021 12:40), Max: 99.8 (30 Sep 2021 17:36)  HR: 84 (01 Oct 2021 12:40) (84 - 101)  BP: 142/73 (01 Oct 2021 12:40) (134/68 - 142/73)  BP(mean): --  RR: 18 (01 Oct 2021 12:40) (18 - 18)  SpO2: 99% (01 Oct 2021 12:40) (96% - 99%)    PHYSICAL EXAM:  GENERAL: NAD, well-groomed, well-developed  HEENT : Conjuntivae  clear sclerae anicteric  NECK: Supple, No JVD, Normal thyroid  NERVOUS SYSTEM:  Alert oriented   no  focal  deficits;   CHEST/LUNG: Clear    HEART: Regular rate and rhythm; No murmurs, rubs, or gallops  ABDOMEN: Soft, Nontender, Nondistended; Bowel sounds present  EXTREMITIES:  no  edema no  tenderness  SKIN: No rashes   LABS:                        7.9    5.86  )-----------( 493      ( 01 Oct 2021 08:50 )             24.1     10-01    131<L>  |  98  |  24<H>  ----------------------------<  324<H>  3.6   |  25  |  0.69    Ca    9.1      01 Oct 2021 08:50          CAPILLARY BLOOD GLUCOSE      POCT Blood Glucose.: 369 mg/dL (01 Oct 2021 11:05)  POCT Blood Glucose.: 373 mg/dL (01 Oct 2021 07:52)  POCT Blood Glucose.: 298 mg/dL (30 Sep 2021 21:32)  POCT Blood Glucose.: 226 mg/dL (30 Sep 2021 17:30)      RADIOLOGY & ADDITIONAL TESTS:    Imaging reports  Personally Reviewed:  [ ] YES  [ ] NO    Consultant(s) Notes Reviewed:  [ x] YES  [ ] NO    Care Discussed with Consultants/Other Providers [ x] YES  [ ] NO  Problem/Plan - 1:  ·  Problem: Septicemia.   ·  Plan: sefepime  as per  ID  for  PICC line  placement  for  long  term AB    Problem/Plan - 2:  ·  Problem: PAD (peripheral artery disease).   ·  Plan: vascular  surgery  eval dr Crawley called. again    Problem/Plan - 3:  ·  Problem: Status post transmetatarsal amputation of left foot.   ·  Plan: local  Rx  as per  podiatry  dr Patel  called. again    Problem/Plan - 4:  ·  Problem: DM (diabetes mellitus).   ·  Plan: nutritional  insulin  and  scale  coverage hold  metformin  post  ct  angio.    Problem/Plan - 5:  ·  Problem: Constipation.   ·  Plan: senna  miralx.    Problem/Plan - 6:  ·  Problem: Preventive measure.   ·  Plan: dvt  prophylaxis  with  heparin.    FUNGURIA  add  Diflucan

## 2021-10-01 NOTE — PROVIDER CONTACT NOTE (CRITICAL VALUE NOTIFICATION) - TEST AND RESULT REPORTED:
Prelim blood culture growth in anaerobic bottle gram negative rods
Blood culture from 9/27, gram stain growth in aerobic bottle, gram negative rods, prelim results
Blood culture - growth in aerobic bottleenterobactr cloacue

## 2021-10-01 NOTE — PROGRESS NOTE ADULT - SUBJECTIVE AND OBJECTIVE BOX
Patient is a 65y old  Female who presents with a chief complaint of sepsis (01 Oct 2021 19:02)      Interval History: finger sticks are stable and now < 200   on Lantus 20 units and prandial lispro 6 units and Metformin     MEDICATIONS  (STANDING):  atorvastatin 40 milliGRAM(s) Oral at bedtime  cefepime   IVPB 2000 milliGRAM(s) IV Intermittent every 8 hours  cefepime   IVPB      dextrose 40% Gel 15 Gram(s) Oral once  dextrose 5%. 1000 milliLiter(s) (50 mL/Hr) IV Continuous <Continuous>  dextrose 5%. 1000 milliLiter(s) (100 mL/Hr) IV Continuous <Continuous>  dextrose 50% Injectable 25 Gram(s) IV Push once  dextrose 50% Injectable 12.5 Gram(s) IV Push once  dextrose 50% Injectable 25 Gram(s) IV Push once  gabapentin 100 milliGRAM(s) Oral three times a day  glucagon  Injectable 1 milliGRAM(s) IntraMuscular once  heparin   Injectable 5000 Unit(s) SubCutaneous every 12 hours  insulin glargine Injectable (LANTUS) 20 Unit(s) SubCutaneous at bedtime  insulin lispro (ADMELOG) corrective regimen sliding scale   SubCutaneous three times a day before meals  insulin lispro (ADMELOG) corrective regimen sliding scale   SubCutaneous at bedtime  insulin lispro Injectable (ADMELOG) 7 Unit(s) SubCutaneous three times a day before meals  metFORMIN 500 milliGRAM(s) Oral daily  metoprolol tartrate 12.5 milliGRAM(s) Oral two times a day  polyethylene glycol 3350 17 Gram(s) Oral daily  senna 2 Tablet(s) Oral at bedtime    MEDICATIONS  (PRN):  acetaminophen   Tablet .. 650 milliGRAM(s) Oral every 6 hours PRN Moderate Pain (4 - 6)  acetaminophen   Tablet .. 650 milliGRAM(s) Oral every 6 hours PRN Temp greater or equal to 38C (100.4F)      Allergies    avocado (Pruritus)  Carrots (Pruritus)  No Known Drug Allergies  Plums (Pruritus)    Intolerances        REVIEW OF SYSTEMS:  CONSTITUTIONAL: no changes  EYES: No eye pain, visual disturbances, or discharge  ENMT:  No difficulty hearing, No sinus or throat pain  NECK: No pain or stiffness  RESPIRATORY: No cough, wheezing, chills or hemoptysis; No shortness of breath  CARDIOVASCULAR: No chest pain, palpitations or leg swelling  GASTROINTESTINAL: No abdominal or epigastric pain. No nausea, vomiting, or hematemesis; No diarrhea or constipation. No melena or hematochezia.  GENITOURINARY: No dysuria, frequency, hematuria, or incontinence  NEUROLOGICAL: No headaches, memory loss, loss of strength, numbness, or tremors  SKIN: No itching, burning, rashes, or lesions   ENDOCRINE: No heat or cold intolerance; No hair loss  MUSCULOSKELETAL: No joint pain or swelling; No muscle, back, or extremity pain  PSYCHIATRIC: No depression, anxiety, mood swings, or difficulty sleeping  HEME/LYMPH: No easy bruising, or bleeding gums  ALLERY AND IMMUNOLOGIC: No hives or eczema    Vital Signs Last 24 Hrs  T(C): 37.7 (01 Oct 2021 17:13), Max: 37.7 (01 Oct 2021 17:13)  T(F): 99.8 (01 Oct 2021 17:13), Max: 99.8 (01 Oct 2021 17:13)  HR: 90 (01 Oct 2021 18:18) (84 - 99)  BP: 164/79 (01 Oct 2021 18:18) (134/68 - 166/80)  BP(mean): --  RR: 16 (01 Oct 2021 17:13) (16 - 18)  SpO2: 99% (01 Oct 2021 17:13) (96% - 99%)    PHYSICAL EXAM:  GENERAL:   HEAD: Atraumatic, Normocephalic  EYES: PERRLA, conjunctiva and sclera clear  ENMT: No  exudates,; Moist mucous membranes,, No lesions  NECK: Supple, No JVD, Normal thyroid  NERVOUS SYSTEM:  Alert & Oriented,   CHEST/LUNG: Clear to auscultation bilaterally; No rales, rhonchi, wheezing, or rubs  HEART: Regular rate and rhythm; No murmurs, rubs, or gallops  ABDOMEN: Soft, Nontender, Nondistended; Bowel sounds present  EXTREMITIES:  2+ Peripheral Pulses, no edema  SKIN: No rashes or lesions    LABS:        CAPILLARY BLOOD GLUCOSE      POCT Blood Glucose.: 183 mg/dL (01 Oct 2021 16:14)  POCT Blood Glucose.: 369 mg/dL (01 Oct 2021 11:05)  POCT Blood Glucose.: 373 mg/dL (01 Oct 2021 07:52)  POCT Blood Glucose.: 298 mg/dL (30 Sep 2021 21:32)    Lipid panel:           Thyroid:  Diabetes Tests:  Parathyroid Panel:  Adrenals:  RADIOLOGY & ADDITIONAL TESTS:    Imaging Personally Reviewed:  [ ] YES  [ ] NO    Consultant(s) Notes Reviewed:  [ ] YES  [ ] NO    Care Discussed with Consultants/Other Providers [ ] YES  [ ] NO

## 2021-10-01 NOTE — PROGRESS NOTE ADULT - ASSESSMENT
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  Pt had a low grade temp yesterday 100.1, no fevers today, UA not impressive, UC pending, denies any symptoms of UTI.   The pt is s/p MRI of cervical spine - negative for discitis or osteomyelitis.  CT abd/pelvis - no acute abdominal pathology, + LLL consolidation   TTE and cultures are pending.   Vascular and podiatry evaluations pending.   Attending Addendum--  Case reviewed with MARISOL Graves. Her note reviewed and modified as appropriate.   Patient personally assessed and examined.  MRI personally reviewed, and reviewed again with radiology.  No evidence of discitis osteomyelitis.  Upper extremity symptoms appear to be related to DJD.  CT scan abdomen pelvis unrevealing for clear convincing source of MSSA septicemia.  Echocardiogram is pending.  Overall the patient is feeling better.  Podiatry and or vascular surgery assessment of the patient's foot does not appear to have been done as of yet.  Work-up ongoing, as outlined previously.    9/29: no fevers, no leukocytosis, one set of blood cultures from [facility] rehab with staphylococcus aureus, cultures collected on the admission one set is growing enterococcus cloacae complex, wound culture from 9/10 grew Proteus mirabilis, Enterococcus faecalis, Klebsiella pneumoniae. Cefazolin IV was stopped, started on cefepime IV for MSSA septicemia and enterococcus. New two sets of blood cultures were collected yesterday, UC pending. Unclear what is the cause of pt's septicemia, will consider MRI of left foot.   Attending addendum:  Case reviewed with MARISOL Graves. Her note reviewed and modified as appropriate.   Patient personally assessed and examined.   With now a GNR identified in blood cx, and no other clear source for polymicrobial bacteremia identified, have to be concerned about potential occult disease in L ankle. It is warm to touch. However wound bed, while with some mild malodor, is about 1/3 granular, 1/3 fatty tissue, and 1/3 fibrinous exudate. No pus or necrosis. Skin is darkened closer to the wound but clearly viable tissue. No crepitus or fluctuance. No proximal lymphangitis.     9/30: no fevers, no new lab work, MR left ankle - concern for early OM, Cefepime IV continued. Pt was seen by Vascular service - BKA was offered, pt refusing for now, podiatry evaluation is pending. Repeat blood cultures with no growth to date, UC with yeast like cells - no need for diflucan.   10/1: We will need protracted course of antibiotics, my concern remains that the foot/ankle remains a source of the bacteremia is here.  Septicemia due to acute osteomyelitis is well described.  Chronic osteomyelitis is highly unlikely to result in bacteremia.  I reviewed the need for a PICC line with the patient.  She is familiar with them.  The risk benefits and alternatives were discussed.  We also reviewed the risk benefits alternatives of the intravenous antibiotics.  I do not see a role to alter therapy at this point in time.    Suggestions  Continue cefepime 2 grams q 8 hrs   Would complete 6-week course of antibiotics as above  Weekly CBC with differential, basic metabolic profile.  PICC line placement  Local care to the wound per vascular and/or podiatry  Happy to follow the patient at [facility]    Dr. Paramjit Gomez covering the service this weekend. Please call [phone] for any ID issues that need attention.    Cornel Johansen MD  Attending Physician  [facility]  Division of Infectious Diseases  [phone]     >35min total time

## 2021-10-02 ENCOUNTER — TRANSCRIPTION ENCOUNTER (OUTPATIENT)
Age: 66
End: 2021-10-02

## 2021-10-02 LAB
ALBUMIN SERPL ELPH-MCNC: 1.6 G/DL — LOW (ref 3.3–5)
ALP SERPL-CCNC: 116 U/L — SIGNIFICANT CHANGE UP (ref 40–120)
ALT FLD-CCNC: 18 U/L — SIGNIFICANT CHANGE UP (ref 12–78)
ANION GAP SERPL CALC-SCNC: 4 MMOL/L — LOW (ref 5–17)
AST SERPL-CCNC: 15 U/L — SIGNIFICANT CHANGE UP (ref 15–37)
BILIRUB SERPL-MCNC: 0.3 MG/DL — SIGNIFICANT CHANGE UP (ref 0.2–1.2)
BUN SERPL-MCNC: 21 MG/DL — SIGNIFICANT CHANGE UP (ref 7–23)
CALCIUM SERPL-MCNC: 8.7 MG/DL — SIGNIFICANT CHANGE UP (ref 8.5–10.1)
CHLORIDE SERPL-SCNC: 105 MMOL/L — SIGNIFICANT CHANGE UP (ref 96–108)
CO2 SERPL-SCNC: 27 MMOL/L — SIGNIFICANT CHANGE UP (ref 22–31)
CREAT SERPL-MCNC: 0.72 MG/DL — SIGNIFICANT CHANGE UP (ref 0.5–1.3)
CULTURE RESULTS: SIGNIFICANT CHANGE UP
FLUAV AG NPH QL: SIGNIFICANT CHANGE UP
FLUBV AG NPH QL: SIGNIFICANT CHANGE UP
GLUCOSE BLDC GLUCOMTR-MCNC: 145 MG/DL — HIGH (ref 70–99)
GLUCOSE BLDC GLUCOMTR-MCNC: 224 MG/DL — HIGH (ref 70–99)
GLUCOSE BLDC GLUCOMTR-MCNC: 235 MG/DL — HIGH (ref 70–99)
GLUCOSE BLDC GLUCOMTR-MCNC: 316 MG/DL — HIGH (ref 70–99)
GLUCOSE SERPL-MCNC: 214 MG/DL — HIGH (ref 70–99)
GRAM STN FLD: SIGNIFICANT CHANGE UP
HCT VFR BLD CALC: 25.2 % — LOW (ref 34.5–45)
HGB BLD-MCNC: 8.3 G/DL — LOW (ref 11.5–15.5)
MCHC RBC-ENTMCNC: 28.5 PG — SIGNIFICANT CHANGE UP (ref 27–34)
MCHC RBC-ENTMCNC: 32.9 GM/DL — SIGNIFICANT CHANGE UP (ref 32–36)
MCV RBC AUTO: 86.6 FL — SIGNIFICANT CHANGE UP (ref 80–100)
NRBC # BLD: 0 /100 WBCS — SIGNIFICANT CHANGE UP (ref 0–0)
PLATELET # BLD AUTO: 443 K/UL — HIGH (ref 150–400)
POTASSIUM SERPL-MCNC: 4.2 MMOL/L — SIGNIFICANT CHANGE UP (ref 3.5–5.3)
POTASSIUM SERPL-SCNC: 4.2 MMOL/L — SIGNIFICANT CHANGE UP (ref 3.5–5.3)
PROT SERPL-MCNC: 6.2 GM/DL — SIGNIFICANT CHANGE UP (ref 6–8.3)
RBC # BLD: 2.91 M/UL — LOW (ref 3.8–5.2)
RBC # FLD: 14.6 % — HIGH (ref 10.3–14.5)
SARS-COV-2 RNA SPEC QL NAA+PROBE: SIGNIFICANT CHANGE UP
SODIUM SERPL-SCNC: 136 MMOL/L — SIGNIFICANT CHANGE UP (ref 135–145)
SPECIMEN SOURCE: SIGNIFICANT CHANGE UP
WBC # BLD: 6.29 K/UL — SIGNIFICANT CHANGE UP (ref 3.8–10.5)
WBC # FLD AUTO: 6.29 K/UL — SIGNIFICANT CHANGE UP (ref 3.8–10.5)

## 2021-10-02 RX ORDER — ACETAMINOPHEN 500 MG
2 TABLET ORAL
Qty: 0 | Refills: 0 | DISCHARGE
Start: 2021-10-02

## 2021-10-02 RX ORDER — CEFEPIME 1 G/1
2 INJECTION, POWDER, FOR SOLUTION INTRAMUSCULAR; INTRAVENOUS
Qty: 0 | Refills: 0 | DISCHARGE
Start: 2021-10-02

## 2021-10-02 RX ORDER — CHLORHEXIDINE GLUCONATE 213 G/1000ML
1 SOLUTION TOPICAL DAILY
Refills: 0 | Status: DISCONTINUED | OUTPATIENT
Start: 2021-10-02 | End: 2021-10-08

## 2021-10-02 RX ADMIN — CEFEPIME 100 MILLIGRAM(S): 1 INJECTION, POWDER, FOR SOLUTION INTRAMUSCULAR; INTRAVENOUS at 06:04

## 2021-10-02 RX ADMIN — INSULIN GLARGINE 20 UNIT(S): 100 INJECTION, SOLUTION SUBCUTANEOUS at 21:03

## 2021-10-02 RX ADMIN — Medication 7 UNIT(S): at 08:08

## 2021-10-02 RX ADMIN — Medication 7 UNIT(S): at 15:54

## 2021-10-02 RX ADMIN — Medication 650 MILLIGRAM(S): at 23:56

## 2021-10-02 RX ADMIN — Medication 12.5 MILLIGRAM(S): at 18:14

## 2021-10-02 RX ADMIN — CEFEPIME 100 MILLIGRAM(S): 1 INJECTION, POWDER, FOR SOLUTION INTRAMUSCULAR; INTRAVENOUS at 20:13

## 2021-10-02 RX ADMIN — HEPARIN SODIUM 5000 UNIT(S): 5000 INJECTION INTRAVENOUS; SUBCUTANEOUS at 06:04

## 2021-10-02 RX ADMIN — Medication 4: at 08:07

## 2021-10-02 RX ADMIN — CEFEPIME 100 MILLIGRAM(S): 1 INJECTION, POWDER, FOR SOLUTION INTRAMUSCULAR; INTRAVENOUS at 11:45

## 2021-10-02 RX ADMIN — Medication 7 UNIT(S): at 11:47

## 2021-10-02 RX ADMIN — METFORMIN HYDROCHLORIDE 500 MILLIGRAM(S): 850 TABLET ORAL at 11:46

## 2021-10-02 RX ADMIN — Medication 650 MILLIGRAM(S): at 21:09

## 2021-10-02 RX ADMIN — Medication 650 MILLIGRAM(S): at 13:19

## 2021-10-02 RX ADMIN — CHLORHEXIDINE GLUCONATE 1 APPLICATION(S): 213 SOLUTION TOPICAL at 11:46

## 2021-10-02 RX ADMIN — Medication 12.5 MILLIGRAM(S): at 06:04

## 2021-10-02 RX ADMIN — GABAPENTIN 100 MILLIGRAM(S): 400 CAPSULE ORAL at 06:03

## 2021-10-02 RX ADMIN — GABAPENTIN 100 MILLIGRAM(S): 400 CAPSULE ORAL at 21:09

## 2021-10-02 RX ADMIN — GABAPENTIN 100 MILLIGRAM(S): 400 CAPSULE ORAL at 13:28

## 2021-10-02 RX ADMIN — Medication 650 MILLIGRAM(S): at 12:36

## 2021-10-02 RX ADMIN — Medication 8: at 11:46

## 2021-10-02 RX ADMIN — HEPARIN SODIUM 5000 UNIT(S): 5000 INJECTION INTRAVENOUS; SUBCUTANEOUS at 18:14

## 2021-10-02 NOTE — PROGRESS NOTE ADULT - SUBJECTIVE AND OBJECTIVE BOX
Patient is a 65y old  Female who presents with a chief complaint of sepsis (02 Oct 2021 15:05)      Interval History: finger sticks are 200s to 300s   on Lantus 20 units and prandial lispro 7 units and Metformin 5900 mg   MEDICATIONS  (STANDING):  atorvastatin 40 milliGRAM(s) Oral at bedtime  cefepime   IVPB 2000 milliGRAM(s) IV Intermittent every 8 hours  cefepime   IVPB      chlorhexidine 2% Cloths 1 Application(s) Topical daily  dextrose 40% Gel 15 Gram(s) Oral once  dextrose 5%. 1000 milliLiter(s) (50 mL/Hr) IV Continuous <Continuous>  dextrose 5%. 1000 milliLiter(s) (100 mL/Hr) IV Continuous <Continuous>  dextrose 50% Injectable 25 Gram(s) IV Push once  dextrose 50% Injectable 12.5 Gram(s) IV Push once  dextrose 50% Injectable 25 Gram(s) IV Push once  gabapentin 100 milliGRAM(s) Oral three times a day  glucagon  Injectable 1 milliGRAM(s) IntraMuscular once  heparin   Injectable 5000 Unit(s) SubCutaneous every 12 hours  insulin glargine Injectable (LANTUS) 20 Unit(s) SubCutaneous at bedtime  insulin lispro (ADMELOG) corrective regimen sliding scale   SubCutaneous three times a day before meals  insulin lispro (ADMELOG) corrective regimen sliding scale   SubCutaneous at bedtime  insulin lispro Injectable (ADMELOG) 7 Unit(s) SubCutaneous three times a day before meals  metFORMIN 500 milliGRAM(s) Oral daily  metoprolol tartrate 12.5 milliGRAM(s) Oral two times a day  polyethylene glycol 3350 17 Gram(s) Oral daily  senna 2 Tablet(s) Oral at bedtime    MEDICATIONS  (PRN):  acetaminophen   Tablet .. 650 milliGRAM(s) Oral every 6 hours PRN Temp greater or equal to 38C (100.4F)  acetaminophen   Tablet .. 650 milliGRAM(s) Oral every 6 hours PRN Moderate Pain (4 - 6)      Allergies    avocado (Pruritus)  Carrots (Pruritus)  No Known Drug Allergies  Plums (Pruritus)    Intolerances        REVIEW OF SYSTEMS:  CONSTITUTIONAL: no changes  EYES: No eye pain, visual disturbances, or discharge  ENMT:  No difficulty hearing, No sinus or throat pain  NECK: No pain or stiffness  RESPIRATORY: No cough, wheezing, chills or hemoptysis; No shortness of breath  CARDIOVASCULAR: No chest pain, palpitations or leg swelling  GASTROINTESTINAL: No abdominal or epigastric pain. No nausea, vomiting, or hematemesis; No diarrhea or constipation. No melena or hematochezia.  GENITOURINARY: No dysuria, frequency, hematuria, or incontinence  NEUROLOGICAL: No headaches, memory loss, loss of strength, numbness, or tremors  SKIN: No itching, burning, rashes, or lesions   ENDOCRINE: No heat or cold intolerance; No hair loss  MUSCULOSKELETAL: No joint pain or swelling; No muscle, back, or extremity pain  PSYCHIATRIC: No depression, anxiety, mood swings, or difficulty sleeping  HEME/LYMPH: No easy bruising, or bleeding gums  ALLERY AND IMMUNOLOGIC: No hives or eczema    Vital Signs Last 24 Hrs  T(C): 37.1 (02 Oct 2021 17:31), Max: 37.4 (02 Oct 2021 12:13)  T(F): 98.7 (02 Oct 2021 17:31), Max: 99.3 (02 Oct 2021 12:13)  HR: 102 (02 Oct 2021 17:31) (91 - 102)  BP: 153/79 (02 Oct 2021 17:31) (118/61 - 169/83)  BP(mean): --  RR: 18 (02 Oct 2021 17:31) (18 - 18)  SpO2: 94% (02 Oct 2021 17:31) (94% - 98%)    PHYSICAL EXAM:  GENERAL:   HEAD: Atraumatic, Normocephalic  EYES: PERRLA, conjunctiva and sclera clear  ENMT: No  exudates,; Moist mucous membranes,, No lesions  NECK: Supple, No JVD, Normal thyroid  NERVOUS SYSTEM:  Alert & Oriented,   CHEST/LUNG: Clear to auscultation bilaterally; No rales, rhonchi, wheezing, or rubs  HEART: Regular rate and rhythm; No murmurs, rubs, or gallops  ABDOMEN: Soft, Nontender, Nondistended; Bowel sounds present  EXTREMITIES:  2+ Peripheral Pulses, no edema  SKIN: No rashes or lesions    LABS:        CAPILLARY BLOOD GLUCOSE      POCT Blood Glucose.: 235 mg/dL (02 Oct 2021 20:57)  POCT Blood Glucose.: 145 mg/dL (02 Oct 2021 15:22)  POCT Blood Glucose.: 316 mg/dL (02 Oct 2021 11:21)  POCT Blood Glucose.: 224 mg/dL (02 Oct 2021 07:35)    Lipid panel:           Thyroid:  Diabetes Tests:  Parathyroid Panel:  Adrenals:  RADIOLOGY & ADDITIONAL TESTS:    Imaging Personally Reviewed:  [ ] YES  [ ] NO    Consultant(s) Notes Reviewed:  [ ] YES  [ ] NO    Care Discussed with Consultants/Other Providers [ ] YES  [ ] NO

## 2021-10-02 NOTE — DISCHARGE NOTE PROVIDER - NSDCCPCAREPLAN_GEN_ALL_CORE_FT
PRINCIPAL DISCHARGE DIAGNOSIS  Diagnosis: Sepsis  Assessment and Plan of Treatment: s/p TMA of left foot, IV Antibiotics long term

## 2021-10-02 NOTE — DISCHARGE NOTE PROVIDER - NSDCMRMEDTOKEN_GEN_ALL_CORE_FT
acetaminophen 325 mg oral tablet: 2 tab(s) orally every 6 hours, As needed, Temp greater or equal to 38C (100.4F)  acetaminophen 325 mg oral tablet: 2 tab(s) orally every 6 hours, As needed, Moderate Pain (4 - 6)  aspirin 81 mg oral delayed release tablet: 1 tab(s) orally once a day  bisacodyl 10 mg rectal suppository: 1 suppository(ies) rectal once a day, As needed, Constipation  cefepime: 2 gram(s) intravenous 3 times a day for  37  more  dauys  gabapentin 100 mg oral capsule: 1 cap(s) orally 3 times a day  metFORMIN 500 mg oral tablet: 1 tab(s) orally 2 times a day  metoprolol: 12.5 milligram(s) orally 2 times a day  NovoLOG FlexPen 100 units/mL subcutaneous solution: 7 unit(s) subcutaneous 3 times a day (with meals)  polyethylene glycol 3350 oral powder for reconstitution: 17 gram(s) orally once a day  senna oral tablet: 2 tab(s) orally once a day (at bedtime)   acetaminophen 325 mg oral tablet: 2 tab(s) orally every 6 hours, As needed, Temp greater or equal to 38C (100.4F)  acetaminophen 325 mg oral tablet: 2 tab(s) orally every 6 hours, As needed, Moderate Pain (4 - 6)  aspirin 81 mg oral delayed release tablet: 1 tab(s) orally once a day  bisacodyl 10 mg rectal suppository: 1 suppository(ies) rectal once a day, As needed, Constipation  cefepime: 2 gram(s) intravenous 3 times a day for  37  more  dauys  gabapentin 100 mg oral capsule: 1 cap(s) orally 3 times a day  metFORMIN 500 mg oral tablet: 1 tab(s) orally 2 times a day  metoprolol: 12.5 milligram(s) orally 2 times a day  metroNIDAZOLE 500 mg oral tablet: 1 tab(s) orally every 8 hours  NovoLOG FlexPen 100 units/mL subcutaneous solution: 7 unit(s) subcutaneous 3 times a day (with meals)  polyethylene glycol 3350 oral powder for reconstitution: 17 gram(s) orally once a day  senna oral tablet: 2 tab(s) orally once a day (at bedtime)

## 2021-10-02 NOTE — PROGRESS NOTE ADULT - ASSESSMENT
Telephone Encounter by Sofía Odom RN at 08/23/18 04:35 PM     Author:  Sofía Odom RN Service:  (none) Author Type:  Registered Nurse     Filed:  08/23/18 04:36 PM Encounter Date:  8/16/2018 Status:  Signed     :  Sofía Odom RN (Registered Nurse)            Closing encounter.[JS1.1M]       Revision History        User Key Date/Time User Provider Type Action    > JS1.1 08/23/18 04:36 PM Sofía Odom RN Registered Nurse Sign    M - Manual             Infection driven Hyperglycemia

## 2021-10-02 NOTE — DISCHARGE NOTE PROVIDER - PROVIDER TOKENS
PROVIDER:[TOKEN:[[id number]]],PROVIDER:[TOKEN:[[id number]]],PROVIDER:[TOKEN:[[id number]]] PROVIDER:[TOKEN:[[id number]]],PROVIDER:[TOKEN:[[id number]]],PROVIDER:[TOKEN:[[id number]]],PROVIDER:[TOKEN:[[id number]]]

## 2021-10-02 NOTE — DISCHARGE NOTE PROVIDER - NSDCFUADDINST_GEN_ALL_CORE_FT
Podiatry Discharge Instructions:  Follow up: Please follow up with Dr. Robb within 1 week of discharge from the hospital, please call [phone] for appointment and discuss that you recently were seen in the hospital.  Wound Care: Please continue with CollagaVAC to left foot, apply santyl prior to VAC application at 125mmHg to be changed 3x/week.   Weight bearing: Please no weight bearing to the left foot.  Antibiotics: Please continue as instructed.

## 2021-10-02 NOTE — DISCHARGE NOTE PROVIDER - CARE PROVIDER_API CALL
Dominick Robb (DPM)  Surgery  [address] [facility], [address]  [place] [address]  Phone: [phone]  Fax: [phone]  Follow Up Time:     Aiden Dasilva)  Surgery  [address] [facility]  [place], [place] [address]  Phone: [phone]  Fax: [phone]  Follow Up Time:     Cornel Johansen)  Infectious Disease; Internal Medicine  [address] [place] [address]  Phone: [phone]  Fax: ()-  Follow Up Time:    Dominick Robb (DPM)  Surgery  [address] [place] [address], [address]  [place] [address]  Phone: [phone]  Fax: [phone]  Follow Up Time:     Aiden Dasilva)  Surgery  [address] [facility]  [place], [place] [id number]  Phone: [phone]  Fax: [phone]  Follow Up Time:     Cornel Johansen)  Infectious Disease; Internal Medicine  [address] [place] [address]  Phone: [phone]  Fax: ()-  Follow Up Time:     Johnson Garcia AND KINA QUINTANILLA [facility]  [address] [place] [address]  Phone: [phone]  Fax: [phone]  Follow Up Time:

## 2021-10-02 NOTE — PROGRESS NOTE ADULT - SUBJECTIVE AND OBJECTIVE BOX
INTERVAL HPI/OVERNIGHT EVENTS:  s/p PICC line  palcement      REVIEW OF SYSTEMS:  CONSTITUTIONAL:  no  complaints    NECK: No pain or stiffnes  RESPIRATORY: No SOB   CARDIOVASCULAR: No chest pain, palpitations, dizziness,   GASTROINTESTINAL: No abdominal pain. No nausea, vomiting,   NEUROLOGICAL: No headaches, no  blurry  vision no  dizziness  SKIN: No itching,   MUSCULOSKELETAL: No pain    MEDICATION:  acetaminophen   Tablet .. 650 milliGRAM(s) Oral every 6 hours PRN  acetaminophen   Tablet .. 650 milliGRAM(s) Oral every 6 hours PRN  atorvastatin 40 milliGRAM(s) Oral at bedtime  cefepime   IVPB 2000 milliGRAM(s) IV Intermittent every 8 hours  cefepime   IVPB      chlorhexidine 2% Cloths 1 Application(s) Topical daily  dextrose 40% Gel 15 Gram(s) Oral once  dextrose 5%. 1000 milliLiter(s) IV Continuous <Continuous>  dextrose 5%. 1000 milliLiter(s) IV Continuous <Continuous>  dextrose 50% Injectable 25 Gram(s) IV Push once  dextrose 50% Injectable 12.5 Gram(s) IV Push once  dextrose 50% Injectable 25 Gram(s) IV Push once  gabapentin 100 milliGRAM(s) Oral three times a day  glucagon  Injectable 1 milliGRAM(s) IntraMuscular once  heparin   Injectable 5000 Unit(s) SubCutaneous every 12 hours  insulin glargine Injectable (LANTUS) 20 Unit(s) SubCutaneous at bedtime  insulin lispro (ADMELOG) corrective regimen sliding scale   SubCutaneous three times a day before meals  insulin lispro (ADMELOG) corrective regimen sliding scale   SubCutaneous at bedtime  insulin lispro Injectable (ADMELOG) 7 Unit(s) SubCutaneous three times a day before meals  metFORMIN 500 milliGRAM(s) Oral daily  metoprolol tartrate 12.5 milliGRAM(s) Oral two times a day  polyethylene glycol 3350 17 Gram(s) Oral daily  senna 2 Tablet(s) Oral at bedtime    Vital Signs Last 24 Hrs  T(C): 37.4 (02 Oct 2021 12:13), Max: 37.7 (01 Oct 2021 17:13)  T(F): 99.3 (02 Oct 2021 12:13), Max: 99.8 (01 Oct 2021 17:13)  HR: 97 (02 Oct 2021 12:13) (90 - 99)  BP: 169/83 (02 Oct 2021 12:13) (118/61 - 169/83)  BP(mean): --  RR: 18 (02 Oct 2021 12:13) (16 - 18)  SpO2: 98% (02 Oct 2021 12:13) (97% - 99%)    PHYSICAL EXAM:  GENERAL: NAD, well-groomed, well-developed  HEENT : Conjuntivae  clear sclerae anicteric  NECK: Supple, No JVD, Normal thyroid  NERVOUS SYSTEM:  Alert oriented   no  focal  deficits;   CHEST/LUNG: Clear    HEART: Regular rate and rhythm; No murmurs, rubs, or gallops  ABDOMEN: Soft, Nontender, Nondistended; Bowel sounds present  EXTREMITIES:  l  distal  orlando  ampuation  SKIN: No rashes   LABS:                        8.3    6.29  )-----------( 443      ( 02 Oct 2021 08:44 )             25.2     10-02    136  |  105  |  21  ----------------------------<  214<H>  4.2   |  27  |  0.72    Ca    8.7      02 Oct 2021 08:41    TPro  6.2  /  Alb  1.6<L>  /  TBili  0.3  /  DBili  x   /  AST  15  /  ALT  18  /  AlkPhos  116  10-02        CAPILLARY BLOOD GLUCOSE      POCT Blood Glucose.: 316 mg/dL (02 Oct 2021 11:21)  POCT Blood Glucose.: 224 mg/dL (02 Oct 2021 07:35)  POCT Blood Glucose.: 302 mg/dL (01 Oct 2021 21:35)  POCT Blood Glucose.: 183 mg/dL (01 Oct 2021 16:14)      RADIOLOGY & ADDITIONAL TESTS:    Imaging reports  Personally Reviewed:  [ ] YES  [ ] NO    Consultant(s) Notes Reviewed:  [x ] YES  [ ] NO    Care Discussed with Consultants/Other Providers [ x] YES  [ ] NO  Problem/Plan - 1:  ·  Problem: Septicemia.   ·  Plan: sefepime  as per  ID  for  PICC line  placement  for  long  term AB    Problem/Plan - 2:  ·  Problem: PAD (peripheral artery disease).   ·  Plan: vascular  surgery  eval dr Crawley called. again    Problem/Plan - 3:  ·  Problem: Status post transmetatarsal amputation of left foot.   ·  Plan: local  Rx  as per  podiatry  dr Patel  called. again    Problem/Plan - 4:  ·  Problem: DM (diabetes mellitus).   ·  Plan: nutritional  insulin  and  scale  coverage hold  metformin  post  ct  angio.    Problem/Plan - 5:  ·  Problem: Constipation.   ·  Plan: senna  miralx.    Problem/Plan - 6:  ·  Problem: Preventive measure.   ·  Plan: dvt  prophylaxis  with  heparin.    FUNGURIA  add  Diflucan

## 2021-10-02 NOTE — DISCHARGE NOTE PROVIDER - CARE PROVIDERS DIRECT ADDRESSES
,DirectAddress_Unknown,DirectAddress_Unknown,DirectAddress_Unknown ,DirectAddress_Unknown,DirectAddress_Unknown,DirectAddress_Unknown,[email or web address]

## 2021-10-02 NOTE — DISCHARGE NOTE PROVIDER - HOSPITAL COURSE
patient  treated  with  ivf  Cefepime   for  MSSA and  enterococcus  CT  abd  pelvis  neck  mri  TTE  no  evidence  of  infection   was  evaluated  by  vascular  surgery was  offered  [facility]  patient  refused  Podiatry  office  contacted  several  times  left  message  and  spoke  to  office  person  for  Dr Robb  who  is  out  of  town office states  they bill inform  of  ms Shelley  admission  for  infection  patient  underwent  Picc line  placement  for  long  term  AB  no  chest pain  no  sob  appeite  good    discharged  back to  [facility]  for  rehab  wound  care  long  term  AB patient  treated  with  ivf  Cefepime   for  MSSA and  enterococcus  CT  abd  pelvis  neck  mri  TTE  no  evidence  of  infection   was  evaluated  by  vascular  surgery was  offered  [facility]  patient  refused  Podiatry  office  contacted  several  times  left  message  and  spoke  to  office  person  for  Dr Robb  who  is  out  of  town office states  they bill inform  of  ms Shelley  admission  for  infection  patient  underwent  Picc line  placement  for  long  term  AB  no  chest pain  no  sob  appeite  good    discharged  rehab  for wound  care  long  term  AB

## 2021-10-03 LAB
FLUAV AG NPH QL: SIGNIFICANT CHANGE UP
FLUBV AG NPH QL: SIGNIFICANT CHANGE UP
GLUCOSE BLDC GLUCOMTR-MCNC: 188 MG/DL — HIGH (ref 70–99)
GLUCOSE BLDC GLUCOMTR-MCNC: 199 MG/DL — HIGH (ref 70–99)
GLUCOSE BLDC GLUCOMTR-MCNC: 325 MG/DL — HIGH (ref 70–99)
GLUCOSE BLDC GLUCOMTR-MCNC: 371 MG/DL — HIGH (ref 70–99)
SARS-COV-2 RNA SPEC QL NAA+PROBE: SIGNIFICANT CHANGE UP

## 2021-10-03 RX ORDER — METRONIDAZOLE 500 MG
500 TABLET ORAL EVERY 8 HOURS
Refills: 0 | Status: DISCONTINUED | OUTPATIENT
Start: 2021-10-03 | End: 2021-10-08

## 2021-10-03 RX ADMIN — CEFEPIME 100 MILLIGRAM(S): 1 INJECTION, POWDER, FOR SOLUTION INTRAMUSCULAR; INTRAVENOUS at 21:04

## 2021-10-03 RX ADMIN — Medication 500 MILLIGRAM(S): at 22:14

## 2021-10-03 RX ADMIN — CEFEPIME 100 MILLIGRAM(S): 1 INJECTION, POWDER, FOR SOLUTION INTRAMUSCULAR; INTRAVENOUS at 04:56

## 2021-10-03 RX ADMIN — Medication 12.5 MILLIGRAM(S): at 17:23

## 2021-10-03 RX ADMIN — HEPARIN SODIUM 5000 UNIT(S): 5000 INJECTION INTRAVENOUS; SUBCUTANEOUS at 06:59

## 2021-10-03 RX ADMIN — GABAPENTIN 100 MILLIGRAM(S): 400 CAPSULE ORAL at 14:37

## 2021-10-03 RX ADMIN — Medication 2: at 16:34

## 2021-10-03 RX ADMIN — Medication 10: at 08:11

## 2021-10-03 RX ADMIN — Medication 500 MILLIGRAM(S): at 14:55

## 2021-10-03 RX ADMIN — HEPARIN SODIUM 5000 UNIT(S): 5000 INJECTION INTRAVENOUS; SUBCUTANEOUS at 17:23

## 2021-10-03 RX ADMIN — CEFEPIME 100 MILLIGRAM(S): 1 INJECTION, POWDER, FOR SOLUTION INTRAMUSCULAR; INTRAVENOUS at 11:37

## 2021-10-03 RX ADMIN — Medication 7 UNIT(S): at 08:11

## 2021-10-03 RX ADMIN — CHLORHEXIDINE GLUCONATE 1 APPLICATION(S): 213 SOLUTION TOPICAL at 10:23

## 2021-10-03 RX ADMIN — POLYETHYLENE GLYCOL 3350 17 GRAM(S): 17 POWDER, FOR SOLUTION ORAL at 11:37

## 2021-10-03 RX ADMIN — METFORMIN HYDROCHLORIDE 500 MILLIGRAM(S): 850 TABLET ORAL at 11:37

## 2021-10-03 RX ADMIN — Medication 7 UNIT(S): at 11:38

## 2021-10-03 RX ADMIN — INSULIN GLARGINE 20 UNIT(S): 100 INJECTION, SOLUTION SUBCUTANEOUS at 22:14

## 2021-10-03 RX ADMIN — Medication 12.5 MILLIGRAM(S): at 07:00

## 2021-10-03 RX ADMIN — GABAPENTIN 100 MILLIGRAM(S): 400 CAPSULE ORAL at 06:59

## 2021-10-03 RX ADMIN — Medication 8: at 11:37

## 2021-10-03 RX ADMIN — Medication 7 UNIT(S): at 16:34

## 2021-10-03 RX ADMIN — GABAPENTIN 100 MILLIGRAM(S): 400 CAPSULE ORAL at 22:15

## 2021-10-03 NOTE — PROGRESS NOTE ADULT - SUBJECTIVE AND OBJECTIVE BOX
INTERVAL HPI/OVERNIGHT EVENTS:    awaiting  transfer  to  rehab    REVIEW OF SYSTEMS:  CONSTITUTIONAL:  no  complaints    NECK: No pain or stiffnes  RESPIRATORY: No SOB   CARDIOVASCULAR: No chest pain, palpitations, dizziness,   GASTROINTESTINAL: No abdominal pain. No nausea, vomiting,   NEUROLOGICAL: No headaches, no  blurry  vision no  dizziness  SKIN: No itching,   MUSCULOSKELETAL: No pain    MEDICATION:  acetaminophen   Tablet .. 650 milliGRAM(s) Oral every 6 hours PRN  acetaminophen   Tablet .. 650 milliGRAM(s) Oral every 6 hours PRN  atorvastatin 40 milliGRAM(s) Oral at bedtime  cefepime   IVPB 2000 milliGRAM(s) IV Intermittent every 8 hours  cefepime   IVPB      chlorhexidine 2% Cloths 1 Application(s) Topical daily  dextrose 40% Gel 15 Gram(s) Oral once  dextrose 5%. 1000 milliLiter(s) IV Continuous <Continuous>  dextrose 5%. 1000 milliLiter(s) IV Continuous <Continuous>  dextrose 50% Injectable 25 Gram(s) IV Push once  dextrose 50% Injectable 12.5 Gram(s) IV Push once  dextrose 50% Injectable 25 Gram(s) IV Push once  gabapentin 100 milliGRAM(s) Oral three times a day  glucagon  Injectable 1 milliGRAM(s) IntraMuscular once  heparin   Injectable 5000 Unit(s) SubCutaneous every 12 hours  insulin glargine Injectable (LANTUS) 20 Unit(s) SubCutaneous at bedtime  insulin lispro (ADMELOG) corrective regimen sliding scale   SubCutaneous three times a day before meals  insulin lispro (ADMELOG) corrective regimen sliding scale   SubCutaneous at bedtime  insulin lispro Injectable (ADMELOG) 7 Unit(s) SubCutaneous three times a day before meals  metFORMIN 500 milliGRAM(s) Oral daily  metoprolol tartrate 12.5 milliGRAM(s) Oral two times a day  metroNIDAZOLE    Tablet 500 milliGRAM(s) Oral every 8 hours  polyethylene glycol 3350 17 Gram(s) Oral daily  senna 2 Tablet(s) Oral at bedtime    Vital Signs Last 24 Hrs  T(C): 37.1 (03 Oct 2021 12:04), Max: 37.6 (03 Oct 2021 05:31)  T(F): 98.8 (03 Oct 2021 12:04), Max: 99.7 (03 Oct 2021 05:31)  HR: 100 (03 Oct 2021 12:04) (95 - 102)  BP: 170/90 (03 Oct 2021 12:04) (142/66 - 170/90)  BP(mean): --  RR: 17 (03 Oct 2021 12:04) (17 - 18)  SpO2: 98% (03 Oct 2021 12:04) (94% - 98%)    PHYSICAL EXAM:  GENERAL: NAD, well-groomed, well-developed  HEENT : Conjuntivae  clear sclerae anicteric  NECK: Supple, No JVD, Normal thyroid  NERVOUS SYSTEM:  Alert oriented   no  focal  deficits;   CHEST/LUNG: Clear    HEART: Regular rate and rhythm; No murmurs, rubs, or gallops  ABDOMEN: Soft, Nontender, Nondistended; Bowel sounds present  EXTREMITIES:  no  edema no  tenderness  SKIN: No rashes   LABS:                        8.3    6.29  )-----------( 443      ( 02 Oct 2021 08:44 )             25.2     10-02    136  |  105  |  21  ----------------------------<  214<H>  4.2   |  27  |  0.72    Ca    8.7      02 Oct 2021 08:41    TPro  6.2  /  Alb  1.6<L>  /  TBili  0.3  /  DBili  x   /  AST  15  /  ALT  18  /  AlkPhos  116  10-02        CAPILLARY BLOOD GLUCOSE      POCT Blood Glucose.: 325 mg/dL (03 Oct 2021 10:53)  POCT Blood Glucose.: 371 mg/dL (03 Oct 2021 07:53)  POCT Blood Glucose.: 235 mg/dL (02 Oct 2021 20:57)      RADIOLOGY & ADDITIONAL TESTS:    Imaging reports  Personally Reviewed:  [ ] YES  [ ] NO    Consultant(s) Notes Reviewed:  [x ] YES  [ ] NO    Care Discussed with Consultants/Other Providers [x ] YES  [ ] NO  Problem/Plan - 1:  ·  Problem: Septicemia.   ·  Plan: sefepime  as per  ID  for  PICC line  placement  for  long  term AB    Problem/Plan - 2:  ·  Problem: PAD (peripheral artery disease).   ·  Plan: vascular  surgery  eval dr Crawley called. again    Problem/Plan - 3:  ·  Problem: Status post transmetatarsal amputation of left foot.   ·  Plan: local  Rx  as per  podiatry  dr Patel  called. again    Problem/Plan - 4:  ·  Problem: DM (diabetes mellitus).   ·  Plan: nutritional  insulin  and  scale  coverage hold  metformin  post  ct  angio.    Problem/Plan - 5:  ·  Problem: Constipation.   ·  Plan: senna  miralx.    Problem/Plan - 6:  ·  Problem: Preventive measure.   ·  Plan: dvt  prophylaxis  with  heparin.    FUNGURIA  add  Diflucan  Problem/Plan - 1:  ·  Problem: Septicemia.   ·  Plan: sefepime  as per  ID  s/p  PICC line  placement  for  long  term AB    Problem/Plan - 2:  ·  Problem: PAD (peripheral artery disease).   ·  Plan: vascular  surgery  eval dr Crawley  appreciated    Problem/Plan - 3:  ·  Problem: Status post transmetatarsal amputation of left foot.   ·  Plan: local  Rx  as per  podiatry  dr Patel  called.   multiple  times    Problem/Plan - 4:  ·  Problem: DM (diabetes mellitus).   ·  Plan: nutritional  insulin  and  scale  coverage hold  metformin  post  ct  angio.    Problem/Plan - 5:  ·  Problem: Constipation.   ·  Plan: senna  miralx.    Problem/Plan - 6:  ·  Problem: Preventive measure.   ·  Plan: dvt  prophylaxis  with  heparin.

## 2021-10-03 NOTE — PROGRESS NOTE ADULT - SUBJECTIVE AND OBJECTIVE BOX
Patient is a 65y old  Female who presents with a chief complaint of sepsis (03 Oct 2021 15:24)      Interval History: finger sticks are < 200   on same diabetic regimen   Lantus 20 units and prandial lispro 7 units and Metformin   Infection driven Hyperglycemia now with decreased glucose toxicity     MEDICATIONS  (STANDING):  atorvastatin 40 milliGRAM(s) Oral at bedtime  cefepime   IVPB 2000 milliGRAM(s) IV Intermittent every 8 hours  cefepime   IVPB      chlorhexidine 2% Cloths 1 Application(s) Topical daily  dextrose 40% Gel 15 Gram(s) Oral once  dextrose 5%. 1000 milliLiter(s) (50 mL/Hr) IV Continuous <Continuous>  dextrose 5%. 1000 milliLiter(s) (100 mL/Hr) IV Continuous <Continuous>  dextrose 50% Injectable 25 Gram(s) IV Push once  dextrose 50% Injectable 12.5 Gram(s) IV Push once  dextrose 50% Injectable 25 Gram(s) IV Push once  gabapentin 100 milliGRAM(s) Oral three times a day  glucagon  Injectable 1 milliGRAM(s) IntraMuscular once  heparin   Injectable 5000 Unit(s) SubCutaneous every 12 hours  insulin glargine Injectable (LANTUS) 20 Unit(s) SubCutaneous at bedtime  insulin lispro (ADMELOG) corrective regimen sliding scale   SubCutaneous three times a day before meals  insulin lispro (ADMELOG) corrective regimen sliding scale   SubCutaneous at bedtime  insulin lispro Injectable (ADMELOG) 7 Unit(s) SubCutaneous three times a day before meals  metFORMIN 500 milliGRAM(s) Oral daily  metoprolol tartrate 12.5 milliGRAM(s) Oral two times a day  metroNIDAZOLE    Tablet 500 milliGRAM(s) Oral every 8 hours  polyethylene glycol 3350 17 Gram(s) Oral daily  senna 2 Tablet(s) Oral at bedtime    MEDICATIONS  (PRN):  acetaminophen   Tablet .. 650 milliGRAM(s) Oral every 6 hours PRN Moderate Pain (4 - 6)  acetaminophen   Tablet .. 650 milliGRAM(s) Oral every 6 hours PRN Temp greater or equal to 38C (100.4F)      Allergies    avocado (Pruritus)  Carrots (Pruritus)  No Known Drug Allergies  Plums (Pruritus)    Intolerances        REVIEW OF SYSTEMS:  CONSTITUTIONAL: no changes  EYES: No eye pain, visual disturbances, or discharge  ENMT:  No difficulty hearing, No sinus or throat pain  NECK: No pain or stiffness  RESPIRATORY: No cough, wheezing, chills or hemoptysis; No shortness of breath  CARDIOVASCULAR: No chest pain, palpitations or leg swelling  GASTROINTESTINAL: No abdominal or epigastric pain. No nausea, vomiting, or hematemesis; No diarrhea or constipation. No melena or hematochezia.  GENITOURINARY: No dysuria, frequency, hematuria, or incontinence  NEUROLOGICAL: No headaches, memory loss, loss of strength, numbness, or tremors  SKIN: No itching, burning, rashes, or lesions   ENDOCRINE: No heat or cold intolerance; No hair loss  MUSCULOSKELETAL: No joint pain or swelling; No muscle, back, or extremity pain  PSYCHIATRIC: No depression, anxiety, mood swings, or difficulty sleeping  HEME/LYMPH: No easy bruising, or bleeding gums  ALLERY AND IMMUNOLOGIC: No hives or eczema    Vital Signs Last 24 Hrs  T(C): 37.7 (03 Oct 2021 17:41), Max: 37.7 (03 Oct 2021 17:41)  T(F): 99.9 (03 Oct 2021 17:41), Max: 99.9 (03 Oct 2021 17:41)  HR: 106 (03 Oct 2021 17:41) (95 - 106)  BP: 183/79 (03 Oct 2021 17:41) (142/66 - 183/79)  BP(mean): --  RR: 20 (03 Oct 2021 17:41) (17 - 20)  SpO2: 97% (03 Oct 2021 17:41) (96% - 98%)    PHYSICAL EXAM:  GENERAL:   HEAD: Atraumatic, Normocephalic  EYES: PERRLA, conjunctiva and sclera clear  ENMT: No  exudates,; Moist mucous membranes,, No lesions  NECK: Supple, No JVD, Normal thyroid  NERVOUS SYSTEM:  Alert & Oriented,   CHEST/LUNG: Clear to auscultation bilaterally; No rales, rhonchi, wheezing, or rubs  HEART: Regular rate and rhythm; No murmurs, rubs, or gallops  ABDOMEN: Soft, Nontender, Nondistended; Bowel sounds present  EXTREMITIES:  2+ Peripheral Pulses, no edema  SKIN: No rashes or lesions    LABS:        CAPILLARY BLOOD GLUCOSE      POCT Blood Glucose.: 199 mg/dL (03 Oct 2021 21:16)  POCT Blood Glucose.: 188 mg/dL (03 Oct 2021 16:17)  POCT Blood Glucose.: 325 mg/dL (03 Oct 2021 10:53)  POCT Blood Glucose.: 371 mg/dL (03 Oct 2021 07:53)    Lipid panel:           Thyroid:  Diabetes Tests:  Parathyroid Panel:  Adrenals:  RADIOLOGY & ADDITIONAL TESTS:    Imaging Personally Reviewed:  [ ] YES  [ ] NO    Consultant(s) Notes Reviewed:  [ ] YES  [ ] NO    Care Discussed with Consultants/Other Providers [ ] YES  [ ] NO

## 2021-10-04 LAB
CULTURE RESULTS: SIGNIFICANT CHANGE UP
CULTURE RESULTS: SIGNIFICANT CHANGE UP
GLUCOSE BLDC GLUCOMTR-MCNC: 256 MG/DL — HIGH (ref 70–99)
GLUCOSE BLDC GLUCOMTR-MCNC: 283 MG/DL — HIGH (ref 70–99)
GLUCOSE BLDC GLUCOMTR-MCNC: 325 MG/DL — HIGH (ref 70–99)
GLUCOSE BLDC GLUCOMTR-MCNC: 358 MG/DL — HIGH (ref 70–99)
SPECIMEN SOURCE: SIGNIFICANT CHANGE UP
SPECIMEN SOURCE: SIGNIFICANT CHANGE UP

## 2021-10-04 PROCEDURE — 99232 SBSQ HOSP IP/OBS MODERATE 35: CPT

## 2021-10-04 RX ORDER — OXYCODONE AND ACETAMINOPHEN 5; 325 MG/1; MG/1
1 TABLET ORAL ONCE
Refills: 0 | Status: DISCONTINUED | OUTPATIENT
Start: 2021-10-04 | End: 2021-10-04

## 2021-10-04 RX ADMIN — Medication 12.5 MILLIGRAM(S): at 06:41

## 2021-10-04 RX ADMIN — Medication 8: at 08:05

## 2021-10-04 RX ADMIN — OXYCODONE AND ACETAMINOPHEN 1 TABLET(S): 5; 325 TABLET ORAL at 18:48

## 2021-10-04 RX ADMIN — HEPARIN SODIUM 5000 UNIT(S): 5000 INJECTION INTRAVENOUS; SUBCUTANEOUS at 06:38

## 2021-10-04 RX ADMIN — Medication 650 MILLIGRAM(S): at 13:52

## 2021-10-04 RX ADMIN — GABAPENTIN 100 MILLIGRAM(S): 400 CAPSULE ORAL at 06:41

## 2021-10-04 RX ADMIN — METFORMIN HYDROCHLORIDE 500 MILLIGRAM(S): 850 TABLET ORAL at 11:35

## 2021-10-04 RX ADMIN — CEFEPIME 100 MILLIGRAM(S): 1 INJECTION, POWDER, FOR SOLUTION INTRAMUSCULAR; INTRAVENOUS at 20:16

## 2021-10-04 RX ADMIN — Medication 7 UNIT(S): at 08:06

## 2021-10-04 RX ADMIN — GABAPENTIN 100 MILLIGRAM(S): 400 CAPSULE ORAL at 13:51

## 2021-10-04 RX ADMIN — Medication 650 MILLIGRAM(S): at 14:52

## 2021-10-04 RX ADMIN — GABAPENTIN 100 MILLIGRAM(S): 400 CAPSULE ORAL at 22:17

## 2021-10-04 RX ADMIN — Medication 7 UNIT(S): at 11:34

## 2021-10-04 RX ADMIN — Medication 7 UNIT(S): at 17:19

## 2021-10-04 RX ADMIN — OXYCODONE AND ACETAMINOPHEN 1 TABLET(S): 5; 325 TABLET ORAL at 17:48

## 2021-10-04 RX ADMIN — Medication 6: at 17:19

## 2021-10-04 RX ADMIN — SENNA PLUS 2 TABLET(S): 8.6 TABLET ORAL at 22:17

## 2021-10-04 RX ADMIN — Medication 10: at 11:34

## 2021-10-04 RX ADMIN — CHLORHEXIDINE GLUCONATE 1 APPLICATION(S): 213 SOLUTION TOPICAL at 11:33

## 2021-10-04 RX ADMIN — Medication 500 MILLIGRAM(S): at 13:51

## 2021-10-04 RX ADMIN — Medication 500 MILLIGRAM(S): at 06:41

## 2021-10-04 RX ADMIN — Medication 12.5 MILLIGRAM(S): at 17:20

## 2021-10-04 RX ADMIN — INSULIN GLARGINE 20 UNIT(S): 100 INJECTION, SOLUTION SUBCUTANEOUS at 22:18

## 2021-10-04 RX ADMIN — HEPARIN SODIUM 5000 UNIT(S): 5000 INJECTION INTRAVENOUS; SUBCUTANEOUS at 17:20

## 2021-10-04 RX ADMIN — Medication 500 MILLIGRAM(S): at 22:17

## 2021-10-04 RX ADMIN — CEFEPIME 100 MILLIGRAM(S): 1 INJECTION, POWDER, FOR SOLUTION INTRAMUSCULAR; INTRAVENOUS at 06:41

## 2021-10-04 RX ADMIN — Medication 2: at 22:18

## 2021-10-04 RX ADMIN — CEFEPIME 100 MILLIGRAM(S): 1 INJECTION, POWDER, FOR SOLUTION INTRAMUSCULAR; INTRAVENOUS at 11:35

## 2021-10-04 NOTE — PHYSICAL THERAPY INITIAL EVALUATION ADULT - DISCHARGE DISPOSITION, PT EVAL
Sub-Acute Rehab to further improve strength, balance and falls prevention. Patient demonstrates higher level of functional assistance on this encounter, compared to status pre-admission. Wound Care RN. Will benefit from Saline instillation NPWT.

## 2021-10-04 NOTE — PHYSICAL THERAPY INITIAL EVALUATION ADULT - PERTINENT HX OF CURRENT PROBLEM, REHAB EVAL
Patient returns to [facility] from Short Term Rehab due to bacteremia found in blood cultures. Vascular Surgery following, whom offered BKA but patient refused. Podiatry consult pending. s/p (L) Foot Chopart Amputation on 9/10/21.

## 2021-10-04 NOTE — CONSULT NOTE ADULT - SUBJECTIVE AND OBJECTIVE BOX
HPI:  Patient is a 65 year old female with PMH HTN, HLD, DM, diabetic retinopathy, admitted to our facility in 9/2021 for necrotic foot infection s/p left chopart's amputation 9/10/21, who was sent to the ED from rehab due to positive blood cultures. Patient seen and examined at bedside with Dr. Dasilva. No complaints offered. Denies pain at surgical site.       PAST MEDICAL HISTORY:  Hypertension  Hyperlipidemia  Diabetes  Family history of diabetes mellitus    REVIEW OF SYSTEMS  Contained within HPI    MEDICATIONS  (STANDING):  atorvastatin 40 milliGRAM(s) Oral at bedtime  cefepime   IVPB 2000 milliGRAM(s) IV Intermittent every 8 hours  cefepime   IVPB      dextrose 40% Gel 15 Gram(s) Oral once  dextrose 5%. 1000 milliLiter(s) (50 mL/Hr) IV Continuous <Continuous>  dextrose 5%. 1000 milliLiter(s) (100 mL/Hr) IV Continuous <Continuous>  dextrose 50% Injectable 25 Gram(s) IV Push once  dextrose 50% Injectable 12.5 Gram(s) IV Push once  dextrose 50% Injectable 25 Gram(s) IV Push once  gabapentin 100 milliGRAM(s) Oral three times a day  glucagon  Injectable 1 milliGRAM(s) IntraMuscular once  heparin   Injectable 5000 Unit(s) SubCutaneous every 12 hours  insulin glargine Injectable (LANTUS) 20 Unit(s) SubCutaneous at bedtime  insulin lispro (ADMELOG) corrective regimen sliding scale   SubCutaneous three times a day before meals  insulin lispro (ADMELOG) corrective regimen sliding scale   SubCutaneous at bedtime  insulin lispro Injectable (ADMELOG) 7 Unit(s) SubCutaneous three times a day before meals  metFORMIN 500 milliGRAM(s) Oral daily  metoprolol tartrate 12.5 milliGRAM(s) Oral two times a day  polyethylene glycol 3350 17 Gram(s) Oral daily  senna 2 Tablet(s) Oral at bedtime    MEDICATIONS  (PRN):  acetaminophen   Tablet .. 650 milliGRAM(s) Oral every 6 hours PRN Moderate Pain (4 - 6)  acetaminophen   Tablet .. 650 milliGRAM(s) Oral every 6 hours PRN Temp greater or equal to 38C (100.4F)      Allergies    avocado (Pruritus)  Carrots (Pruritus)  No Known Drug Allergies    Vital Signs Last 24 Hrs  T(C): 37.2 (30 Sep 2021 12:25), Max: 38.1 (29 Sep 2021 17:46)  T(F): 99 (30 Sep 2021 12:25), Max: 100.5 (29 Sep 2021 17:46)  HR: 50 (30 Sep 2021 12:25) (50 - 105)  BP: 113/77 (30 Sep 2021 12:25) (113/77 - 184/80)  RR: 17 (30 Sep 2021 12:25) (17 - 20)  SpO2: 98% (30 Sep 2021 12:25) (94% - 99%)    PHYSICAL EXAM:    Constitutional: Awake, alert, NAD  Respiratory: Respirations nonlabored  Cardiovascular: S1S2 RRR  Gastrointestinal: Soft, NTND  Extremities: No upper extremity edema, no lower extremity edema. LLE dressing c/d/i, removed. Wound bed with some slough, no fluctuance/drainage noted. Nonodorous. +palpable pulse. Surrounding skin with normal color and warmth.   Redressed with xeroform, gauze, ACE    LABS:                        8.2    5.27  )-----------( 441      ( 29 Sep 2021 08:41 )             24.6     09-29    134<L>  |  101  |  21  ----------------------------<  385<H>  3.8   |  24  |  0.62    Ca    9.2      29 Sep 2021 08:41    A/P: 65 year old female with PMH HTN, HLD, DM, diabetic retinopathy, s/p left chopart's amputation 9/10/21, now a/w positive blood culture. LLE with good circulation/good pulse  - Patient refusing BKA at this time  - Continue conservative management   - Podiatry to f/u  - Discussed with Dr. Dasilva
Podiatry pager #: [phone] ([facility])/ [phone] ([facility])    Patient is a 65y old  Female who presents with a chief complaint of sepsis (04 Oct 2021 14:20)      HPI:    · HPI Objective Statement: 65 year old female w/PMH of HTN, IDDM, HLD, diabetic retinopathy, necrotic foot infection presents to the ED from [facility] for positive blood cultures. Pt denies new cough, pain or discharge. 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 NKDA. evaluated  by  id  cliff  on  ancef  admitted  for  furhter  w/u  and  Rx  asked by pt's  [facility]  attnding   to  take  over  pt's  care in hospital.   presently  comfortable  no  chest pain  no sob  no  palpitations  no  chills     (27 Sep 2021 17:50)      PAST MEDICAL & SURGICAL HISTORY:  Hypertension    Family history of diabetes mellitus    No significant past surgical history        MEDICATIONS  (STANDING):  atorvastatin 40 milliGRAM(s) Oral at bedtime  cefepime   IVPB 2000 milliGRAM(s) IV Intermittent every 8 hours  cefepime   IVPB      chlorhexidine 2% Cloths 1 Application(s) Topical daily  dextrose 40% Gel 15 Gram(s) Oral once  dextrose 5%. 1000 milliLiter(s) (50 mL/Hr) IV Continuous <Continuous>  dextrose 5%. 1000 milliLiter(s) (100 mL/Hr) IV Continuous <Continuous>  dextrose 50% Injectable 25 Gram(s) IV Push once  dextrose 50% Injectable 12.5 Gram(s) IV Push once  dextrose 50% Injectable 25 Gram(s) IV Push once  gabapentin 100 milliGRAM(s) Oral three times a day  glucagon  Injectable 1 milliGRAM(s) IntraMuscular once  heparin   Injectable 5000 Unit(s) SubCutaneous every 12 hours  insulin glargine Injectable (LANTUS) 20 Unit(s) SubCutaneous at bedtime  insulin lispro (ADMELOG) corrective regimen sliding scale   SubCutaneous three times a day before meals  insulin lispro (ADMELOG) corrective regimen sliding scale   SubCutaneous at bedtime  insulin lispro Injectable (ADMELOG) 7 Unit(s) SubCutaneous three times a day before meals  metFORMIN 500 milliGRAM(s) Oral daily  metoprolol tartrate 12.5 milliGRAM(s) Oral two times a day  metroNIDAZOLE    Tablet 500 milliGRAM(s) Oral every 8 hours  polyethylene glycol 3350 17 Gram(s) Oral daily  senna 2 Tablet(s) Oral at bedtime    MEDICATIONS  (PRN):  acetaminophen   Tablet .. 650 milliGRAM(s) Oral every 6 hours PRN Moderate Pain (4 - 6)  acetaminophen   Tablet .. 650 milliGRAM(s) Oral every 6 hours PRN Temp greater or equal to 38C (100.4F)      Allergies    avocado (Pruritus)  Carrots (Pruritus)  No Known Drug Allergies  Plums (Pruritus)    Intolerances        VITALS:    Vital Signs Last 24 Hrs  T(C): 37.2 (04 Oct 2021 17:18), Max: 37.6 (03 Oct 2021 23:41)  T(F): 99 (04 Oct 2021 17:18), Max: 99.7 (03 Oct 2021 23:41)  HR: 92 (04 Oct 2021 17:18) (91 - 99)  BP: 145/73 (04 Oct 2021 17:18) (126/61 - 145/73)  BP(mean): --  RR: 17 (04 Oct 2021 17:18) (17 - 19)  SpO2: 100% (04 Oct 2021 17:18) (96% - 100%)    LABS:                CAPILLARY BLOOD GLUCOSE      POCT Blood Glucose.: 283 mg/dL (04 Oct 2021 16:29)  POCT Blood Glucose.: 358 mg/dL (04 Oct 2021 10:36)  POCT Blood Glucose.: 325 mg/dL (04 Oct 2021 07:51)  POCT Blood Glucose.: 199 mg/dL (03 Oct 2021 21:16)          LOWER EXTREMITY PHYSICAL EXAM:    Vascular: RF DP/PT 0/4, Temperature gradient LF warm to touch   Neuro: Epicritic sensation absent to the level of digits B/L.  Musculoskeletal/Ortho:  s/p LF choparts amputation left open (9/10):  Open surgical wound with fibroglanular base, skin edges at level of amputation appear viable with mild necrosis, wound edges warm, no purulent drainage, no malodor, no active bleeding.     RADIOLOGY & ADDITIONAL STUDIES:    
  Patient is a 65y old  Female who presents with a chief complaint of sepsis (29 Sep 2021 11:01)      Reason For Consult: diabetes uncontrolled     HPI:    · HPI Objective Statement: 65 year old female w/PMH of HTN, IDDM, HLD, diabetic retinopathy, necrotic foot infection presents to the ED from [facility] for positive blood cultures. Pt denies new cough, pain or discharge. 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 NKDA. evaluated  by  id  startled  on  ancef  admitted  for  furhter  w/u  and  Rx  asked by pt's  [facility]  attnding   to  take  over  pt's  care in hospital.   presently  comfortable  no  chest pain  no sob  no  palpitations  no  chills     (27 Sep 2021 17:50)  returned from rehab for persistent infection   normal Creatinine , increased HbA1C   on prandial lispro 7 units and Lispro sliding scale coverage with meals only , finger sticks are in 300s   Infection driven Hyperglycemia     PAST MEDICAL & SURGICAL HISTORY:  Hypertension    Family history of diabetes mellitus    No significant past surgical history        FAMILY HISTORY:        Social History:    MEDICATIONS  (STANDING):  atorvastatin 40 milliGRAM(s) Oral at bedtime  cefepime   IVPB 2000 milliGRAM(s) IV Intermittent every 8 hours  cefepime   IVPB      dextrose 40% Gel 15 Gram(s) Oral once  dextrose 5%. 1000 milliLiter(s) (50 mL/Hr) IV Continuous <Continuous>  dextrose 5%. 1000 milliLiter(s) (100 mL/Hr) IV Continuous <Continuous>  dextrose 50% Injectable 25 Gram(s) IV Push once  dextrose 50% Injectable 12.5 Gram(s) IV Push once  dextrose 50% Injectable 25 Gram(s) IV Push once  gabapentin 100 milliGRAM(s) Oral three times a day  glucagon  Injectable 1 milliGRAM(s) IntraMuscular once  heparin   Injectable 5000 Unit(s) SubCutaneous every 12 hours  insulin glargine Injectable (LANTUS) 20 Unit(s) SubCutaneous at bedtime  insulin lispro (ADMELOG) corrective regimen sliding scale   SubCutaneous three times a day before meals  insulin lispro (ADMELOG) corrective regimen sliding scale   SubCutaneous at bedtime  insulin lispro Injectable (ADMELOG) 7 Unit(s) SubCutaneous three times a day before meals  metoprolol tartrate 12.5 milliGRAM(s) Oral two times a day  polyethylene glycol 3350 17 Gram(s) Oral daily  senna 2 Tablet(s) Oral at bedtime    MEDICATIONS  (PRN):  acetaminophen   Tablet .. 650 milliGRAM(s) Oral every 6 hours PRN Moderate Pain (4 - 6)  acetaminophen   Tablet .. 650 milliGRAM(s) Oral every 6 hours PRN Temp greater or equal to 38C (100.4F)      REVIEW OF SYSTEMS:  CONSTITUTIONAL:  as per HPI  HEENT:  Eyes:  No diplopia or blurred vision.   ENT:  No earache, sore throat or runny nose.  CARDIOVASCULAR:  No chest pain .  RESPIRATORY:  No cough, shortness of breath, PND or orthopnea.  GASTROINTESTINAL:  No nausea, vomiting or diarrhea.  GENITOURINARY:  No dysuria, frequency or urgency. No Blood in urine  MUSCULOSKELETAL:  no joint aches, no muscle pain, myalgia  SKIN:  No change in skin, hair or nails.  NEUROLOGIC:  No paresthesias, fasciculations, seizures or weakness.  PSYCHIATRIC:  No disorder of thought or mood.  ENDOCRINE:  No heat or cold intolerance, polyuria or polydipsia. abnormal weight gain or loss, oral thrush  HEMATOLOGICAL:  No easy bruising or bleeding.     T(C): 38.1 (09-29-21 @ 17:46), Max: 38.1 (09-29-21 @ 17:46)  HR: 105 (09-29-21 @ 17:46) (95 - 105)  BP: 184/80 (09-29-21 @ 17:46) (145/70 - 184/80)  RR: 20 (09-29-21 @ 17:46) (17 - 20)  SpO2: 94% (09-29-21 @ 17:46) (94% - 98%)  Wt(kg): --    PHYSICAL EXAM:  GENERAL: NAD, well-groomed, well-developed  HEAD:  Atraumatic, Normocephalic  EYES: PERRLA, conjunctiva and sclera clear  ENMT: No  exudates,, Moist mucous membranes,, No lesions  NECK: Supple, No JVD,   NERVOUS SYSTEM:  Alert & Oriented   CHEST/LUNG: Clear to percussion bilaterally; No rales, rhonchi, wheezing, or rubs  HEART: Regular rate and rhythm; No murmurs, rubs, or gallops  ABDOMEN: Soft, Nontender, Nondistended; Bowel sounds present  EXTREMITIES: as per the progress notes of Primary Team    LYMPH: No lymphadenopathy noted  SKIN: No rashes or lesions    CAPILLARY BLOOD GLUCOSE      POCT Blood Glucose.: 285 mg/dL (29 Sep 2021 15:34)  POCT Blood Glucose.: 366 mg/dL (29 Sep 2021 11:36)  POCT Blood Glucose.: 399 mg/dL (29 Sep 2021 08:13)  POCT Blood Glucose.: 298 mg/dL (28 Sep 2021 21:07)                            8.2    5.27  )-----------( 441      ( 29 Sep 2021 08:41 )             24.6       CMP:  09-29 @ 08:41  SGPT --  Albumin --   Alk Phos --   Anion Gap 9   SGOT --   Total Bili --   BUN 21   Calcium Total 9.2   CO2 24   Chloride 101   Creatinine 0.62   eGFR if    eGFR if non AA 95   Glucose 385   Potassium 3.8   Protein --   Sodium 134      Thyroid Function Tests:      Diabetes Tests:     Parathyroids:     Adrenals:       Radiology:

## 2021-10-04 NOTE — PROGRESS NOTE ADULT - SUBJECTIVE AND OBJECTIVE BOX
NELSON ZEPEDA  MRN-[id number]    Interval History: The pt was seen and examined earlier, no distress, no new complains. The pt is afebrile, on RA, denies having pain, continues to refuse surgical intervention.     PAST MEDICAL & SURGICAL HISTORY:  Hypertension    Family history of diabetes mellitus    No significant past surgical history        ROS:    [ ] Unobtainable because:  [x ] All other systems negative    Constitutional: no fever, no chills  Head: no trauma  Eyes: no vision changes, no eye pain  ENT:  no sore throat, no rhinorrhea  Cardiovascular:  no chest pain, no palpitation  Respiratory:  no SOB, no cough  GI:  no abd pain, no vomiting, no diarrhea  urinary: no dysuria, no hematuria, no flank pain  musculoskeletal:  no joint pain, no joint swelling  skin:  no rash  neurology:  no headache, no seizure, no change in mental status  psych: no anxiety, no depression         Allergies  avocado (Pruritus)  Carrots (Pruritus)  No Known Drug Allergies  Plums (Pruritus)        ANTIMICROBIALS:  cefepime   IVPB 2000 every 8 hours  cefepime   IVPB    metroNIDAZOLE    Tablet 500 every 8 hours      OTHER MEDS:  acetaminophen   Tablet .. 650 milliGRAM(s) Oral every 6 hours PRN  acetaminophen   Tablet .. 650 milliGRAM(s) Oral every 6 hours PRN  atorvastatin 40 milliGRAM(s) Oral at bedtime  chlorhexidine 2% Cloths 1 Application(s) Topical daily  dextrose 40% Gel 15 Gram(s) Oral once  dextrose 5%. 1000 milliLiter(s) IV Continuous <Continuous>  dextrose 5%. 1000 milliLiter(s) IV Continuous <Continuous>  dextrose 50% Injectable 25 Gram(s) IV Push once  dextrose 50% Injectable 12.5 Gram(s) IV Push once  dextrose 50% Injectable 25 Gram(s) IV Push once  gabapentin 100 milliGRAM(s) Oral three times a day  glucagon  Injectable 1 milliGRAM(s) IntraMuscular once  heparin   Injectable 5000 Unit(s) SubCutaneous every 12 hours  insulin glargine Injectable (LANTUS) 20 Unit(s) SubCutaneous at bedtime  insulin lispro (ADMELOG) corrective regimen sliding scale   SubCutaneous three times a day before meals  insulin lispro (ADMELOG) corrective regimen sliding scale   SubCutaneous at bedtime  insulin lispro Injectable (ADMELOG) 7 Unit(s) SubCutaneous three times a day before meals  metFORMIN 500 milliGRAM(s) Oral daily  metoprolol tartrate 12.5 milliGRAM(s) Oral two times a day  polyethylene glycol 3350 17 Gram(s) Oral daily  senna 2 Tablet(s) Oral at bedtime      Vital Signs Last 24 Hrs  T(C): 36.8 (04 Oct 2021 12:19), Max: 37.7 (03 Oct 2021 17:41)  T(F): 98.3 (04 Oct 2021 12:19), Max: 99.9 (03 Oct 2021 17:41)  HR: 97 (04 Oct 2021 12:19) (91 - 106)  BP: 134/68 (04 Oct 2021 12:19) (126/61 - 183/79)  BP(mean): --  RR: 18 (04 Oct 2021 12:19) (18 - 20)  SpO2: 98% (04 Oct 2021 12:19) (96% - 98%)    Physical Exam:  General: Nontoxic-appearing Female in no acute distress.  HEENT: AT/NC. Anicteric.  Conjunctiva pink and moist.  Oropharynx grossly clear.  Neck: Not rigid. No sense of mass.  Nodes: None palpable.  Lungs: Diminished breath sounds bilaterally without rales wheezes or rhonchi  Heart: Regular rate and rhythm  Abdomen: Bowel sounds present and normoactive. Soft. Nondistended. Nontender. No sense of mass. No organomegaly.  Extremities: L foot wrapped w/ ACE. No cyanosis or clubbing. No edema.   Skin: Warm. Dry. Good turgor. No rash. No vasculitic stigmata.  Psychiatric: Grossly appropriate mood and affect    WBC Count: 6.29 K/uL (10-02 @ 08:44)  WBC Count: 5.86 K/uL (10-01 @ 08:50)  WBC Count: 5.27 K/uL (09-29 @ 08:41)  WBC Count: 5.80 K/uL (09-28 @ 07:32)    Creatinine Trend: 0.72<--, 0.69<--, 0.62<--, 0.68<--, 0.77<--, 0.77<--      MICROBIOLOGY:  v  .Blood Blood  09-29-21   No growth to date.  --  --      .Blood Blood  09-29-21   No Growth Final  --  --      Clean Catch Clean Catch (Midstream)  09-28-21   50,000 - 99,000 CFU/mL Candida glabrata  --  --      .Blood Blood-Peripheral  09-28-21   Growth in aerobic bottle: Enterobacter cloacae complex  Growth in anaerobic bottle: Bacteroides fragilis "Susceptibilities not  performed"  ***Blood Panel PCR results on this specimen are available  approximately 3 hours after the Gram stain result.***  Gram stain, PCR, and/or culture results may not always  correspond due to difference in methodologies.  ************************************************************  This PCR assay was performed by multiplex PCR. This  Assay tests for 66 bacterial and resistance gene targets.  Please refer to the [facility] [employer] [employer] [facility] test directory  at [email or web address] for details.  --  Blood Culture PCR  Enterobacter cloacae complex      .Blood Blood-Peripheral  09-26-21   No Growth Final  --  --      .Blood Blood-Peripheral  09-26-21   Growth in anaerobic bottle: Staphylococcus aureus  ***Blood Panel PCR results on this specimen are available  approximately 3 hours after the Gram stain result.***  Gram stain, PCR, and/or culture results may not always  correspond due to difference in methodologies.  ************************************************************  This PCR assay was performed by multiplex PCR. This  Assay tests for 66 bacterial and resistance gene targets.  Please refer to the [facility] [employer] test directory  at [email or web address] for details.  --  Blood Culture PCR  Staphylococcus aureus      Clean Catch Clean Catch (Midstream)  09-26-21   >=3 organisms. Probable collection contamination.  --  --      .Other left foot  09-10-21   No growth  --  --      .Abscess left foot wound  09-10-21   Few Proteus mirabilis  Moderate Enterococcus faecalis  Rare Klebsiella pneumoniae  Few Non Fermentering Gram Negative Rods Most closely resembling  Wohlfahrtiimonas Species  Moderate Bacteroides fragilis "Susceptibilities not performed"  Moderate Streptococcus agalactiae (Group B) isolated  Group B streptococci are susceptible to ampicillin,  penicillin and cefazolin, but may be resistant to  erythromycin and clindamycin.  Recommendations for intrapartum prophylaxis for Group B  streptococci are penicillin or ampicillin.  --  Non Fermentering Gram Negative Rods  Proteus mirabilis  Enterococcus faecalis  Klebsiella pneumoniae      Clean Catch Clean Catch (Midstream)  09-10-21   No growth  --  --      .Blood Blood-Peripheral  09-10-21   No Growth Final  --  --    C-Reactive Protein, Serum: 142 (09-29)  C-Reactive Protein, Serum: 106 (09-26)        D-Dimer Assay, Quantitative: 799 (09-25)      SARS-CoV-2: NotDetec (25 Sep 2021 20:40)  SARS-CoV-2: NotDetec (23 Sep 2021 11:41)  SARS-CoV-2: NotDetec (09 Sep 2021 21:45)    RADIOLOGY:

## 2021-10-04 NOTE — PHYSICAL THERAPY INITIAL EVALUATION ADULT - GENERAL OBSERVATIONS, REHAB EVAL
Patient supine in bed. PIV lock. (L) foot wrapped on ace wrap and malodorous (dressing marked 10/3/21).

## 2021-10-04 NOTE — PROGRESS NOTE ADULT - SUBJECTIVE AND OBJECTIVE BOX
INTERVAL HPI/OVERNIGHT EVENTS:    awaiting  discharge  to  rehab    REVIEW OF SYSTEMS:  CONSTITUTIONAL:   no  complaints    NECK: No pain or stiffnes  RESPIRATORY: No SOB   CARDIOVASCULAR: No chest pain, palpitations, dizziness,   GASTROINTESTINAL: No abdominal pain. No nausea, vomiting,   NEUROLOGICAL: No headaches, no  blurry  vision no  dizziness  SKIN: No itching,   MUSCULOSKELETAL: No pain    MEDICATION:  acetaminophen   Tablet .. 650 milliGRAM(s) Oral every 6 hours PRN  acetaminophen   Tablet .. 650 milliGRAM(s) Oral every 6 hours PRN  atorvastatin 40 milliGRAM(s) Oral at bedtime  cefepime   IVPB 2000 milliGRAM(s) IV Intermittent every 8 hours  cefepime   IVPB      chlorhexidine 2% Cloths 1 Application(s) Topical daily  dextrose 40% Gel 15 Gram(s) Oral once  dextrose 5%. 1000 milliLiter(s) IV Continuous <Continuous>  dextrose 5%. 1000 milliLiter(s) IV Continuous <Continuous>  dextrose 50% Injectable 25 Gram(s) IV Push once  dextrose 50% Injectable 12.5 Gram(s) IV Push once  dextrose 50% Injectable 25 Gram(s) IV Push once  gabapentin 100 milliGRAM(s) Oral three times a day  glucagon  Injectable 1 milliGRAM(s) IntraMuscular once  heparin   Injectable 5000 Unit(s) SubCutaneous every 12 hours  insulin glargine Injectable (LANTUS) 20 Unit(s) SubCutaneous at bedtime  insulin lispro (ADMELOG) corrective regimen sliding scale   SubCutaneous three times a day before meals  insulin lispro (ADMELOG) corrective regimen sliding scale   SubCutaneous at bedtime  insulin lispro Injectable (ADMELOG) 7 Unit(s) SubCutaneous three times a day before meals  metFORMIN 500 milliGRAM(s) Oral daily  metoprolol tartrate 12.5 milliGRAM(s) Oral two times a day  metroNIDAZOLE    Tablet 500 milliGRAM(s) Oral every 8 hours  polyethylene glycol 3350 17 Gram(s) Oral daily  senna 2 Tablet(s) Oral at bedtime    Vital Signs Last 24 Hrs  T(C): 37.3 (04 Oct 2021 05:03), Max: 37.7 (03 Oct 2021 17:41)  T(F): 99.2 (04 Oct 2021 05:03), Max: 99.9 (03 Oct 2021 17:41)  HR: 91 (04 Oct 2021 05:03) (91 - 106)  BP: 135/72 (04 Oct 2021 05:03) (126/61 - 183/79)  BP(mean): --  RR: 19 (04 Oct 2021 05:03) (17 - 20)  SpO2: 97% (04 Oct 2021 05:03) (96% - 98%)    PHYSICAL EXAM:  GENERAL: NAD, well-groomed, well-developed  HEENT : Conjuntivae  clear sclerae anicteric  NECK: Supple, No JVD, Normal thyroid  NERVOUS SYSTEM:  Alert oriented   no  focal  deficits;   CHEST/LUNG: Clear    HEART: Regular rate and rhythm; No murmurs, rubs, or gallops  ABDOMEN: Soft, Nontender, Nondistended; Bowel sounds present  EXTREMITIES:  no  edema no  tenderness  SKIN: No rashes   LABS:                        8.3    6.29  )-----------( 443      ( 02 Oct 2021 08:44 )             25.2     10-02    136  |  105  |  21  ----------------------------<  214<H>  4.2   |  27  |  0.72    Ca    8.7      02 Oct 2021 08:41    TPro  6.2  /  Alb  1.6<L>  /  TBili  0.3  /  DBili  x   /  AST  15  /  ALT  18  /  AlkPhos  116  10-02        CAPILLARY BLOOD GLUCOSE      POCT Blood Glucose.: 199 mg/dL (03 Oct 2021 21:16)  POCT Blood Glucose.: 188 mg/dL (03 Oct 2021 16:17)  POCT Blood Glucose.: 325 mg/dL (03 Oct 2021 10:53)  POCT Blood Glucose.: 371 mg/dL (03 Oct 2021 07:53)      RADIOLOGY & ADDITIONAL TESTS:    Imaging reports  Personally Reviewed:  [ ] YES  [ ] NO    Consultant(s) Notes Reviewed:  [ x] YES  [ ] NO    Care Discussed with Consultants/Other Providers [ x] YES  [ ] NO

## 2021-10-04 NOTE — CONSULT NOTE ADULT - ASSESSMENT
66yo s/p LF Choparts amp on 9/10/21  - pt was seen and examined  - afebrile to 99, no labs from today, no leukocytosis on 10/2  - s/p LF choparts amputation left open (9/10): Open surgical wound with fibrogranular base, skin edges at level of amputation appear viable with mild necrosis, wound edges warm, no purulent drainage, no malodor, no active bleeding. No acute signs of infection.   - pt refusing BKA per vascular   - Recommend continue wound vac with santyl for left foot wound   - no further pod surgical intervention   - Discussed w/ attending 
diabetes uncontrolled

## 2021-10-04 NOTE — PROGRESS NOTE ADULT - ASSESSMENT
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  Pt had a low grade temp yesterday 100.1, no fevers today, UA not impressive, UC pending, denies any symptoms of UTI.   The pt is s/p MRI of cervical spine - negative for discitis or osteomyelitis.  CT abd/pelvis - no acute abdominal pathology, + LLL consolidation   TTE and cultures are pending.   Vascular and podiatry evaluations pending.   Attending Addendum--  Case reviewed with MARISOL Graves. Her note reviewed and modified as appropriate.   Patient personally assessed and examined.  MRI personally reviewed, and reviewed again with radiology.  No evidence of discitis osteomyelitis.  Upper extremity symptoms appear to be related to DJD.  CT scan abdomen pelvis unrevealing for clear convincing source of MSSA septicemia.  Echocardiogram is pending.  Overall the patient is feeling better.  Podiatry and or vascular surgery assessment of the patient's foot does not appear to have been done as of yet.  Work-up ongoing, as outlined previously.    9/29: no fevers, no leukocytosis, one set of blood cultures from [facility] rehab with staphylococcus aureus, cultures collected on the admission one set is growing enterococcus cloacae complex, wound culture from 9/10 grew Proteus mirabilis, Enterococcus faecalis, Klebsiella pneumoniae. Cefazolin IV was stopped, started on cefepime IV for MSSA septicemia and enterococcus. New two sets of blood cultures were collected yesterday, UC pending. Unclear what is the cause of pt's septicemia, will consider MRI of left foot.   Attending addendum:  Case reviewed with MARISOL Graves. Her note reviewed and modified as appropriate.   Patient personally assessed and examined.   With now a GNR identified in blood cx, and no other clear source for polymicrobial bacteremia identified, have to be concerned about potential occult disease in L ankle. It is warm to touch. However wound bed, while with some mild malodor, is about 1/3 granular, 1/3 fatty tissue, and 1/3 fibrinous exudate. No pus or necrosis. Skin is darkened closer to the wound but clearly viable tissue. No crepitus or fluctuance. No proximal lymphangitis.     9/30: no fevers, no new lab work, MR left ankle - concern for early OM, Cefepime IV continued. Pt was seen by Vascular service - BKA was offered, pt refusing for now, podiatry evaluation is pending. Repeat blood cultures with no growth to date, UC with yeast like cells - no need for diflucan.   10/1: We will need protracted course of antibiotics, my concern remains that the foot/ankle remains a source of the bacteremia is here.  Septicemia due to acute osteomyelitis is well described.  Chronic osteomyelitis is highly unlikely to result in bacteremia.  I reviewed the need for a PICC line with the patient.  She is familiar with them.  The risk benefits and alternatives were discussed.  We also reviewed the risk benefits alternatives of the intravenous antibiotics.  I do not see a role to alter therapy at this point in time.  10/4: s/p PICC line placement on 10/1,no fevers, no new lab work, repeat blood cultures with no growth, cefepime and flagyl continued    Suggestions  Continue cefepime 2 grams q 8 hrs   Would complete 6-week course of antibiotics from negative blood cultures, last dose of antibiotics November 9th, 2021  Weekly CBC with differential, basic metabolic profile.  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  Pt had a low grade temp yesterday 100.1, no fevers today, UA not impressive, UC pending, denies any symptoms of UTI.   The pt is s/p MRI of cervical spine - negative for discitis or osteomyelitis.  CT abd/pelvis - no acute abdominal pathology, + LLL consolidation   TTE and cultures are pending.   Vascular and podiatry evaluations pending.   Attending Addendum--  Case reviewed with MARISOL Graves. Her note reviewed and modified as appropriate.   Patient personally assessed and examined.  MRI personally reviewed, and reviewed again with radiology.  No evidence of discitis osteomyelitis.  Upper extremity symptoms appear to be related to DJD.  CT scan abdomen pelvis unrevealing for clear convincing source of MSSA septicemia.  Echocardiogram is pending.  Overall the patient is feeling better.  Podiatry and or vascular surgery assessment of the patient's foot does not appear to have been done as of yet.  Work-up ongoing, as outlined previously.    9/29: no fevers, no leukocytosis, one set of blood cultures from [facility] rehab with staphylococcus aureus, cultures collected on the admission one set is growing enterococcus cloacae complex, wound culture from 9/10 grew Proteus mirabilis, Enterococcus faecalis, Klebsiella pneumoniae. Cefazolin IV was stopped, started on cefepime IV for MSSA septicemia and enterococcus. New two sets of blood cultures were collected yesterday, UC pending. Unclear what is the cause of pt's septicemia, will consider MRI of left foot.   Attending addendum:  Case reviewed with MARISOL Graves. Her note reviewed and modified as appropriate.   Patient personally assessed and examined.   With now a GNR identified in blood cx, and no other clear source for polymicrobial bacteremia identified, have to be concerned about potential occult disease in L ankle. It is warm to touch. However wound bed, while with some mild malodor, is about 1/3 granular, 1/3 fatty tissue, and 1/3 fibrinous exudate. No pus or necrosis. Skin is darkened closer to the wound but clearly viable tissue. No crepitus or fluctuance. No proximal lymphangitis.     9/30: no fevers, no new lab work, MR left ankle - concern for early OM, Cefepime IV continued. Pt was seen by Vascular service - BKA was offered, pt refusing for now, podiatry evaluation is pending. Repeat blood cultures with no growth to date, UC with yeast like cells - no need for diflucan.   10/1: We will need protracted course of antibiotics, my concern remains that the foot/ankle remains a source of the bacteremia is here.  Septicemia due to acute osteomyelitis is well described.  Chronic osteomyelitis is highly unlikely to result in bacteremia.  I reviewed the need for a PICC line with the patient.  She is familiar with them.  The risk benefits and alternatives were discussed.  We also reviewed the risk benefits alternatives of the intravenous antibiotics.  I do not see a role to alter therapy at this point in time.  10/4: s/p PICC line placement on 10/1,no fevers, no new lab work, repeat blood cultures with no growth, cefepime and flagyl continued    Suggestions  Continue cefepime 2 grams q 8 hrs IV  Continue Flagyl 500 mg po q 8 hrs  Would complete 6-week course of antibiotics from negative blood cultures, last dose of antibiotics November 9th, 2021  Weekly CBC with differential, basic metabolic profile.  PICC line placement performed   Local care to the wound per vascular and/or podiatry  ID team will be happy to follow the patient at [facility]    Msg sent to Dr. Garcia   Discussed with Dr. Johansen

## 2021-10-04 NOTE — PHYSICAL THERAPY INITIAL EVALUATION ADULT - MODALITIES TREATMENT COMMENTS
(L) Foot Chopart Amputation with 75% pale granulation, 25% clots, moderate to large sero-purulent discharge.

## 2021-10-04 NOTE — PROGRESS NOTE ADULT - SUBJECTIVE AND OBJECTIVE BOX
Patient is a 65y old  Female who presents with a chief complaint of sepsis (04 Oct 2021 18:24)      Interval History: finger sticks are in mid 200s and above   Infection driven Hyperglycemia with increased Insulin Resistance   on Metformin 500 mg QD ; also on Lantus 20 units and prandial lispro 7 units     MEDICATIONS  (STANDING):  atorvastatin 40 milliGRAM(s) Oral at bedtime  cefepime   IVPB 2000 milliGRAM(s) IV Intermittent every 8 hours  cefepime   IVPB      chlorhexidine 2% Cloths 1 Application(s) Topical daily  dextrose 40% Gel 15 Gram(s) Oral once  dextrose 5%. 1000 milliLiter(s) (50 mL/Hr) IV Continuous <Continuous>  dextrose 5%. 1000 milliLiter(s) (100 mL/Hr) IV Continuous <Continuous>  dextrose 50% Injectable 25 Gram(s) IV Push once  dextrose 50% Injectable 12.5 Gram(s) IV Push once  dextrose 50% Injectable 25 Gram(s) IV Push once  gabapentin 100 milliGRAM(s) Oral three times a day  glucagon  Injectable 1 milliGRAM(s) IntraMuscular once  heparin   Injectable 5000 Unit(s) SubCutaneous every 12 hours  insulin glargine Injectable (LANTUS) 20 Unit(s) SubCutaneous at bedtime  insulin lispro (ADMELOG) corrective regimen sliding scale   SubCutaneous three times a day before meals  insulin lispro (ADMELOG) corrective regimen sliding scale   SubCutaneous at bedtime  insulin lispro Injectable (ADMELOG) 7 Unit(s) SubCutaneous three times a day before meals  metFORMIN 500 milliGRAM(s) Oral daily  metoprolol tartrate 12.5 milliGRAM(s) Oral two times a day  metroNIDAZOLE    Tablet 500 milliGRAM(s) Oral every 8 hours  polyethylene glycol 3350 17 Gram(s) Oral daily  senna 2 Tablet(s) Oral at bedtime    MEDICATIONS  (PRN):  acetaminophen   Tablet .. 650 milliGRAM(s) Oral every 6 hours PRN Moderate Pain (4 - 6)  acetaminophen   Tablet .. 650 milliGRAM(s) Oral every 6 hours PRN Temp greater or equal to 38C (100.4F)      Allergies    avocado (Pruritus)  Carrots (Pruritus)  No Known Drug Allergies  Plums (Pruritus)    Intolerances        REVIEW OF SYSTEMS:  CONSTITUTIONAL: no changes  EYES: No eye pain, visual disturbances, or discharge  ENMT:  No difficulty hearing, No sinus or throat pain  NECK: No pain or stiffness  RESPIRATORY: No cough, wheezing, chills or hemoptysis; No shortness of breath  CARDIOVASCULAR: No chest pain, palpitations or leg swelling  GASTROINTESTINAL: No abdominal or epigastric pain. No nausea, vomiting, or hematemesis; No diarrhea or constipation. No melena or hematochezia.  GENITOURINARY: No dysuria, frequency, hematuria, or incontinence  NEUROLOGICAL: No headaches, memory loss, loss of strength, numbness, or tremors  SKIN: No itching, burning, rashes, or lesions   ENDOCRINE: No heat or cold intolerance; No hair loss  MUSCULOSKELETAL: No joint pain or swelling; No muscle, back, or extremity pain  PSYCHIATRIC: No depression, anxiety, mood swings, or difficulty sleeping  HEME/LYMPH: No easy bruising, or bleeding gums  ALLERY AND IMMUNOLOGIC: No hives or eczema    Vital Signs Last 24 Hrs  T(C): 37.2 (04 Oct 2021 17:18), Max: 37.6 (03 Oct 2021 23:41)  T(F): 99 (04 Oct 2021 17:18), Max: 99.7 (03 Oct 2021 23:41)  HR: 92 (04 Oct 2021 17:18) (91 - 99)  BP: 145/73 (04 Oct 2021 17:18) (126/61 - 145/73)  BP(mean): --  RR: 17 (04 Oct 2021 17:18) (17 - 19)  SpO2: 100% (04 Oct 2021 17:18) (96% - 100%)    PHYSICAL EXAM:  GENERAL:   HEAD: Atraumatic, Normocephalic  EYES: PERRLA, conjunctiva and sclera clear  ENMT: No  exudates,; Moist mucous membranes,, No lesions  NECK: Supple, No JVD, Normal thyroid  NERVOUS SYSTEM:  Alert & Oriented,   CHEST/LUNG: Clear to auscultation bilaterally; No rales, rhonchi, wheezing, or rubs  HEART: Regular rate and rhythm; No murmurs, rubs, or gallops  ABDOMEN: Soft, Nontender, Nondistended; Bowel sounds present  EXTREMITIES:  2+ Peripheral Pulses, no edema  SKIN: No rashes or lesions    LABS:        CAPILLARY BLOOD GLUCOSE      POCT Blood Glucose.: 256 mg/dL (04 Oct 2021 22:12)  POCT Blood Glucose.: 283 mg/dL (04 Oct 2021 16:29)  POCT Blood Glucose.: 358 mg/dL (04 Oct 2021 10:36)  POCT Blood Glucose.: 325 mg/dL (04 Oct 2021 07:51)    Lipid panel:           Thyroid:  Diabetes Tests:  Parathyroid Panel:  Adrenals:  RADIOLOGY & ADDITIONAL TESTS:    Imaging Personally Reviewed:  [ ] YES  [ ] NO    Consultant(s) Notes Reviewed:  [ ] YES  [ ] NO    Care Discussed with Consultants/Other Providers [ ] YES  [ ] NO

## 2021-10-04 NOTE — CHART NOTE - NSCHARTNOTEFT_GEN_A_CORE
Contacted Podiatry Team over [phone] (pager [phone]) -- spoken with Dr. Pizarro. Suggested application of Saline Instillation NPWT (VeraFlo) to help with wound cleansing and granulation. Podiatry agrees for this application. ANA Foley made aware for order set.

## 2021-10-05 LAB
GLUCOSE BLDC GLUCOMTR-MCNC: 234 MG/DL — HIGH (ref 70–99)
GLUCOSE BLDC GLUCOMTR-MCNC: 305 MG/DL — HIGH (ref 70–99)
GLUCOSE BLDC GLUCOMTR-MCNC: 338 MG/DL — HIGH (ref 70–99)
GLUCOSE BLDC GLUCOMTR-MCNC: 347 MG/DL — HIGH (ref 70–99)

## 2021-10-05 PROCEDURE — 99232 SBSQ HOSP IP/OBS MODERATE 35: CPT

## 2021-10-05 RX ADMIN — Medication 650 MILLIGRAM(S): at 12:50

## 2021-10-05 RX ADMIN — Medication 7 UNIT(S): at 11:37

## 2021-10-05 RX ADMIN — CEFEPIME 100 MILLIGRAM(S): 1 INJECTION, POWDER, FOR SOLUTION INTRAMUSCULAR; INTRAVENOUS at 05:02

## 2021-10-05 RX ADMIN — Medication 500 MILLIGRAM(S): at 22:08

## 2021-10-05 RX ADMIN — METFORMIN HYDROCHLORIDE 500 MILLIGRAM(S): 850 TABLET ORAL at 11:37

## 2021-10-05 RX ADMIN — Medication 650 MILLIGRAM(S): at 11:50

## 2021-10-05 RX ADMIN — Medication 7 UNIT(S): at 16:44

## 2021-10-05 RX ADMIN — Medication 500 MILLIGRAM(S): at 06:39

## 2021-10-05 RX ADMIN — Medication 4: at 16:44

## 2021-10-05 RX ADMIN — CEFEPIME 100 MILLIGRAM(S): 1 INJECTION, POWDER, FOR SOLUTION INTRAMUSCULAR; INTRAVENOUS at 19:57

## 2021-10-05 RX ADMIN — Medication 500 MILLIGRAM(S): at 14:23

## 2021-10-05 RX ADMIN — INSULIN GLARGINE 20 UNIT(S): 100 INJECTION, SOLUTION SUBCUTANEOUS at 22:08

## 2021-10-05 RX ADMIN — Medication 7 UNIT(S): at 07:37

## 2021-10-05 RX ADMIN — GABAPENTIN 100 MILLIGRAM(S): 400 CAPSULE ORAL at 14:23

## 2021-10-05 RX ADMIN — Medication 8: at 11:36

## 2021-10-05 RX ADMIN — Medication 8: at 07:36

## 2021-10-05 RX ADMIN — Medication 650 MILLIGRAM(S): at 20:57

## 2021-10-05 RX ADMIN — SENNA PLUS 2 TABLET(S): 8.6 TABLET ORAL at 22:08

## 2021-10-05 RX ADMIN — Medication 12.5 MILLIGRAM(S): at 17:20

## 2021-10-05 RX ADMIN — CHLORHEXIDINE GLUCONATE 1 APPLICATION(S): 213 SOLUTION TOPICAL at 11:33

## 2021-10-05 RX ADMIN — GABAPENTIN 100 MILLIGRAM(S): 400 CAPSULE ORAL at 22:08

## 2021-10-05 RX ADMIN — CEFEPIME 100 MILLIGRAM(S): 1 INJECTION, POWDER, FOR SOLUTION INTRAMUSCULAR; INTRAVENOUS at 11:37

## 2021-10-05 RX ADMIN — Medication 650 MILLIGRAM(S): at 19:57

## 2021-10-05 RX ADMIN — Medication 4: at 22:08

## 2021-10-05 RX ADMIN — HEPARIN SODIUM 5000 UNIT(S): 5000 INJECTION INTRAVENOUS; SUBCUTANEOUS at 06:39

## 2021-10-05 RX ADMIN — GABAPENTIN 100 MILLIGRAM(S): 400 CAPSULE ORAL at 06:39

## 2021-10-05 RX ADMIN — Medication 12.5 MILLIGRAM(S): at 06:40

## 2021-10-05 NOTE — PROGRESS NOTE ADULT - SUBJECTIVE AND OBJECTIVE BOX
INTERVAL HPI/OVERNIGHT EVENTS:  patient  refusing  BKA    evalauted  by  podiatry  restarted  on  wound  VAC      REVIEW OF SYSTEMS:  CONSTITUTIONAL:  no  complaints    NECK: No pain or stiffnes  RESPIRATORY: No SOB   CARDIOVASCULAR: No chest pain, palpitations, dizziness,   GASTROINTESTINAL: No abdominal pain. No nausea, vomiting,   NEUROLOGICAL: No headaches, no  blurry  vision no  dizziness  SKIN: No itching,   MUSCULOSKELETAL: No pain    MEDICATION:  acetaminophen   Tablet .. 650 milliGRAM(s) Oral every 6 hours PRN  acetaminophen   Tablet .. 650 milliGRAM(s) Oral every 6 hours PRN  atorvastatin 40 milliGRAM(s) Oral at bedtime  cefepime   IVPB      cefepime   IVPB 2000 milliGRAM(s) IV Intermittent every 8 hours  chlorhexidine 2% Cloths 1 Application(s) Topical daily  dextrose 40% Gel 15 Gram(s) Oral once  dextrose 5%. 1000 milliLiter(s) IV Continuous <Continuous>  dextrose 5%. 1000 milliLiter(s) IV Continuous <Continuous>  dextrose 50% Injectable 25 Gram(s) IV Push once  dextrose 50% Injectable 12.5 Gram(s) IV Push once  dextrose 50% Injectable 25 Gram(s) IV Push once  gabapentin 100 milliGRAM(s) Oral three times a day  glucagon  Injectable 1 milliGRAM(s) IntraMuscular once  heparin   Injectable 5000 Unit(s) SubCutaneous every 12 hours  insulin glargine Injectable (LANTUS) 20 Unit(s) SubCutaneous at bedtime  insulin lispro (ADMELOG) corrective regimen sliding scale   SubCutaneous three times a day before meals  insulin lispro (ADMELOG) corrective regimen sliding scale   SubCutaneous at bedtime  insulin lispro Injectable (ADMELOG) 7 Unit(s) SubCutaneous three times a day before meals  metFORMIN 500 milliGRAM(s) Oral daily  metoprolol tartrate 12.5 milliGRAM(s) Oral two times a day  metroNIDAZOLE    Tablet 500 milliGRAM(s) Oral every 8 hours  polyethylene glycol 3350 17 Gram(s) Oral daily  senna 2 Tablet(s) Oral at bedtime    Vital Signs Last 24 Hrs  T(C): 37.1 (05 Oct 2021 06:39), Max: 37.2 (04 Oct 2021 17:18)  T(F): 98.7 (05 Oct 2021 06:39), Max: 99 (04 Oct 2021 17:18)  HR: 91 (05 Oct 2021 06:39) (90 - 97)  BP: 121/61 (05 Oct 2021 06:39) (118/61 - 145/73)  BP(mean): --  RR: 18 (05 Oct 2021 06:39) (17 - 18)  SpO2: 98% (05 Oct 2021 06:39) (97% - 100%)    PHYSICAL EXAM:  GENERAL: NAD, well-groomed, well-developed  HEENT : Conjuntivae  clear sclerae anicteric  NECK: Supple, No JVD, Normal thyroid  NERVOUS SYSTEM:  Alert oriented   no  focal  deficits;   CHEST/LUNG: Clear    HEART: Regular rate and rhythm; No murmurs, rubs, or gallops  ABDOMEN: Soft, Nontender, Nondistended; Bowel sounds present  EXTREMITIES:  no  edema no  tenderness  SKIN:  L  foot  surgically  wrapped  LABS:              CAPILLARY BLOOD GLUCOSE      POCT Blood Glucose.: 305 mg/dL (05 Oct 2021 07:24)  POCT Blood Glucose.: 256 mg/dL (04 Oct 2021 22:12)  POCT Blood Glucose.: 283 mg/dL (04 Oct 2021 16:29)      RADIOLOGY & ADDITIONAL TESTS:    Imaging reports  Personally Reviewed:  [ ] YES  [ ] NO    Consultant(s) Notes Reviewed:  [x ] YES  [ ] NO    Care Discussed with Consultants/Other Providers [ x] YES  [ ] NO  Problem/Plan - 1:  ·  Problem: Septicemia.   ·  Plan: sefepime  as per  ID  s/p  PICC line  placement  for  long  term AB local  care  as  per  podiatry    Problem/Plan - 2:  ·  Problem: PAD (peripheral artery disease).   ·  Plan: vascular  surgery  eval dr Crawley  appreciated    Problem/Plan - 3:  ·  Problem: Status post transmetatarsal amputation of left foot.   ·  Plan: local  Rx  as per  podiatry  dr Patel  called.   multiple  times    Problem/Plan - 4:  ·  Problem: DM (diabetes mellitus).   ·  Plan: nutritional  insulin  and  scale  coverage hold  metformin  post  ct  angio.    Problem/Plan - 5:  ·  Problem: Constipation.   ·  Plan: senna  miralx.    Problem/Plan - 6:  ·  Problem: Preventive measure.   ·  Plan: dvt  prophylaxis  with  heparin.      Assessment and Plan:   Nutritional Assessment:

## 2021-10-05 NOTE — PROGRESS NOTE ADULT - SUBJECTIVE AND OBJECTIVE BOX
NELSON ZEPEDA  MRN-[id number]    Interval History: The pt was seen and examined earlier, no distress, no new complains. The pt is s/p vac dressing placement prior exam, denies any pain. The pt is afebrile, on RA, no new lab work.     PAST MEDICAL & SURGICAL HISTORY:  Hypertension    Family history of diabetes mellitus    No significant past surgical history        ROS:    [ ] Unobtainable because:  [x ] All other systems negative    Constitutional: no fever, no chills  Head: no trauma  Eyes: no vision changes, no eye pain  ENT:  no sore throat, no rhinorrhea  Cardiovascular:  no chest pain, no palpitation  Respiratory:  no SOB, no cough  GI:  no abd pain, no vomiting, no diarrhea  urinary: no dysuria, no hematuria, no flank pain  musculoskeletal:  no joint pain, no joint swelling, difficulty to make a fist with right hand,   skin:  no rash  neurology:  no headache, no seizure, no change in mental status  psych: no anxiety, no depression         Allergies  avocado (Pruritus)  Carrots (Pruritus)  No Known Drug Allergies  Plums (Pruritus)        ANTIMICROBIALS:  cefepime   IVPB 2000 every 8 hours  cefepime   IVPB    metroNIDAZOLE    Tablet 500 every 8 hours      OTHER MEDS:  acetaminophen   Tablet .. 650 milliGRAM(s) Oral every 6 hours PRN  acetaminophen   Tablet .. 650 milliGRAM(s) Oral every 6 hours PRN  atorvastatin 40 milliGRAM(s) Oral at bedtime  chlorhexidine 2% Cloths 1 Application(s) Topical daily  dextrose 40% Gel 15 Gram(s) Oral once  dextrose 5%. 1000 milliLiter(s) IV Continuous <Continuous>  dextrose 5%. 1000 milliLiter(s) IV Continuous <Continuous>  dextrose 50% Injectable 25 Gram(s) IV Push once  dextrose 50% Injectable 12.5 Gram(s) IV Push once  dextrose 50% Injectable 25 Gram(s) IV Push once  gabapentin 100 milliGRAM(s) Oral three times a day  glucagon  Injectable 1 milliGRAM(s) IntraMuscular once  heparin   Injectable 5000 Unit(s) SubCutaneous every 12 hours  insulin glargine Injectable (LANTUS) 20 Unit(s) SubCutaneous at bedtime  insulin lispro (ADMELOG) corrective regimen sliding scale   SubCutaneous three times a day before meals  insulin lispro (ADMELOG) corrective regimen sliding scale   SubCutaneous at bedtime  insulin lispro Injectable (ADMELOG) 7 Unit(s) SubCutaneous three times a day before meals  metFORMIN 500 milliGRAM(s) Oral daily  metoprolol tartrate 12.5 milliGRAM(s) Oral two times a day  polyethylene glycol 3350 17 Gram(s) Oral daily  senna 2 Tablet(s) Oral at bedtime      Vital Signs Last 24 Hrs  T(C): 36.9 (05 Oct 2021 11:50), Max: 37.1 (05 Oct 2021 06:39)  T(F): 98.4 (05 Oct 2021 11:50), Max: 98.7 (05 Oct 2021 06:39)  HR: 97 (05 Oct 2021 11:50) (90 - 97)  BP: 138/60 (05 Oct 2021 11:50) (118/61 - 138/60)  BP(mean): --  RR: 17 (05 Oct 2021 11:50) (17 - 18)  SpO2: 100% (05 Oct 2021 11:50) (97% - 100%)    Physical Exam:  General: Nontoxic-appearing Female in no acute distress.  HEENT: AT/NC. Anicteric.  Conjunctiva pink and moist.  Oropharynx grossly clear.  Neck: Not rigid. No sense of mass.  Nodes: None palpable.  Lungs: Diminished breath sounds bilaterally without rales wheezes or rhonchi  Heart: Regular rate and rhythm  Abdomen: Bowel sounds present and normoactive. Soft. Nondistended. Nontender. No sense of mass. No organomegaly.  Extremities: L foot with vac dressing, wound evaluated from PT picture taken prior the vac dressing placement - darker tissue at parts of the wound. No cyanosis or clubbing. No edema.   Skin: Warm. Dry. Good turgor. No rash. No vasculitic stigmata.  Psychiatric: Grossly appropriate mood and affect    WBC Count: 6.29 K/uL (10-02 @ 08:44)  WBC Count: 5.86 K/uL (10-01 @ 08:50)  WBC Count: 5.27 K/uL (09-29 @ 08:41)      Creatinine Trend: 0.72<--, 0.69<--, 0.62<--, 0.68<--, 0.77<--, 0.77<--      MICROBIOLOGY:  v  .Blood Blood  09-29-21   No Growth Final  --  --      .Blood Blood  09-29-21   No Growth Final  --  --      Clean Catch Clean Catch (Midstream)  09-28-21   50,000 - 99,000 CFU/mL Candida glabrata  --  --      .Blood Blood-Peripheral  09-28-21   Growth in aerobic bottle: Enterobacter cloacae complex  Growth in anaerobic bottle: Bacteroides fragilis "Susceptibilities not  performed"  ***Blood Panel PCR results on this specimen are available  approximately 3 hours after the Gram stain result.***  Gram stain, PCR, and/or culture results may not always  correspond due to difference in methodologies.  ************************************************************  This PCR assay was performed by multiplex PCR. This  Assay tests for 66 bacterial and resistance gene targets.  Please refer to the [facility] test directory  at [email or web address] for details.  --  Blood Culture PCR  Enterobacter cloacae complex      .Blood Blood-Peripheral  09-26-21   No Growth Final  --  --      .Blood Blood-Peripheral  09-26-21   Growth in anaerobic bottle: Staphylococcus aureus  ***Blood Panel PCR results on this specimen are available  approximately 3 hours after the Gram stain result.***  Gram stain, PCR, and/or culture results may not always  correspond due to difference in methodologies.  ************************************************************  This PCR assay was performed by multiplex PCR. This  Assay tests for 66 bacterial and resistance gene targets.  Please refer to the [facility] test directory  at [email or web address] for details.  --  Blood Culture PCR  Staphylococcus aureus      Clean Catch Clean Catch (Midstream)  09-26-21   >=3 organisms. Probable collection contamination.  --  --      .Other left foot  09-10-21   No growth  --  --      .Abscess left foot wound  09-10-21   Few Proteus mirabilis  Moderate Enterococcus faecalis  Rare Klebsiella pneumoniae  Few Non Fermentering Gram Negative Rods Most closely resembling  Wohlfahrtiimonas Species  Moderate Bacteroides fragilis "Susceptibilities not performed"  Moderate Streptococcus agalactiae (Group B) isolated  Group B streptococci are susceptible to ampicillin,  penicillin and cefazolin, but may be resistant to  erythromycin and clindamycin.  Recommendations for intrapartum prophylaxis for Group B  streptococci are penicillin or ampicillin.  --  Non Fermentering Gram Negative Rods  Proteus mirabilis  Enterococcus faecalis  Klebsiella pneumoniae      Clean Catch Clean Catch (Midstream)  09-10-21   No growth  --  --      .Blood Blood-Peripheral  09-10-21   No Growth Final  --  --                C-Reactive Protein, Serum: 142 (09-29)  C-Reactive Protein, Serum: 106 (09-26)        D-Dimer Assay, Quantitative: 799 (09-25)      SARS-CoV-2: NotDetec (25 Sep 2021 20:40)  SARS-CoV-2: NotDetec (23 Sep 2021 11:41)  SARS-CoV-2: NotDetec (09 Sep 2021 21:45)    RADIOLOGY:

## 2021-10-05 NOTE — PROGRESS NOTE ADULT - ASSESSMENT
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  Pt had a low grade temp yesterday 100.1, no fevers today, UA not impressive, UC pending, denies any symptoms of UTI.   The pt is s/p MRI of cervical spine - negative for discitis or osteomyelitis.  CT abd/pelvis - no acute abdominal pathology, + LLL consolidation   TTE and cultures are pending.   Vascular and podiatry evaluations pending.   Attending Addendum--  Case reviewed with MARISOL Graves. Her note reviewed and modified as appropriate.   Patient personally assessed and examined.  MRI personally reviewed, and reviewed again with radiology.  No evidence of discitis osteomyelitis.  Upper extremity symptoms appear to be related to DJD.  CT scan abdomen pelvis unrevealing for clear convincing source of MSSA septicemia.  Echocardiogram is pending.  Overall the patient is feeling better.  Podiatry and or vascular surgery assessment of the patient's foot does not appear to have been done as of yet.  Work-up ongoing, as outlined previously.    9/29: no fevers, no leukocytosis, one set of blood cultures from [facility] rehab with staphylococcus aureus, cultures collected on the admission one set is growing enterococcus cloacae complex, wound culture from 9/10 grew Proteus mirabilis, Enterococcus faecalis, Klebsiella pneumoniae. Cefazolin IV was stopped, started on cefepime IV for MSSA septicemia and enterococcus. New two sets of blood cultures were collected yesterday, UC pending. Unclear what is the cause of pt's septicemia, will consider MRI of left foot.   Attending addendum:  Case reviewed with MARISOL Graves. Her note reviewed and modified as appropriate.   Patient personally assessed and examined.   With now a GNR identified in blood cx, and no other clear source for polymicrobial bacteremia identified, have to be concerned about potential occult disease in L ankle. It is warm to touch. However wound bed, while with some mild malodor, is about 1/3 granular, 1/3 fatty tissue, and 1/3 fibrinous exudate. No pus or necrosis. Skin is darkened closer to the wound but clearly viable tissue. No crepitus or fluctuance. No proximal lymphangitis.     9/30: no fevers, no new lab work, MR left ankle - concern for early OM, Cefepime IV continued. Pt was seen by Vascular service - BKA was offered, pt refusing for now, podiatry evaluation is pending. Repeat blood cultures with no growth to date, UC with yeast like cells - no need for diflucan.   10/1: We will need protracted course of antibiotics, my concern remains that the foot/ankle remains a source of the bacteremia is here.  Septicemia due to acute osteomyelitis is well described.  Chronic osteomyelitis is highly unlikely to result in bacteremia.  I reviewed the need for a PICC line with the patient.  She is familiar with them.  The risk benefits and alternatives were discussed.  We also reviewed the risk benefits alternatives of the intravenous antibiotics.  I do not see a role to alter therapy at this point in time.  10/4: s/p PICC line placement on 10/1,no fevers, no new lab work, repeat blood cultures with no growth, cefepime and flagyl continued  10/5: no fevers, no new lab work, s/p vac dressing placement, wound picture taken by PT prior vac dressing placement was evaluated and compared to the picture taken on the first day of us seeing the pt, the wound looks worse. The pt continues refusing BKA. Will continue with our current abx selection for now with stop date on 11/9/2021.    Suggestions  Continue cefepime 2 grams q 8 hrs IV  Continue Flagyl 500 mg po q 8 hrs  Would complete 6-week course of antibiotics from negative blood cultures, last dose of antibiotics November 9th, 2021  Weekly CBC with differential, basic metabolic profile.  PICC line placement performed   Local care to the wound per vascular and/or podiatry  ID team will be happy to follow the patient at [facility]    Discussed with Dr. Garcia   Discussed with Dr. Johansen

## 2021-10-05 NOTE — PROGRESS NOTE ADULT - SUBJECTIVE AND OBJECTIVE BOX
Patient is a 65y old  Female who presents with a chief complaint of sepsis (04 Oct 2021 23:00)      Interval History: finger sticks are increased into high 200s   Infection driven Hyperglycemia   on Lantus and prandial lispro and Metformin low dose     MEDICATIONS  (STANDING):  atorvastatin 40 milliGRAM(s) Oral at bedtime  cefepime   IVPB      cefepime   IVPB 2000 milliGRAM(s) IV Intermittent every 8 hours  chlorhexidine 2% Cloths 1 Application(s) Topical daily  dextrose 40% Gel 15 Gram(s) Oral once  dextrose 5%. 1000 milliLiter(s) (50 mL/Hr) IV Continuous <Continuous>  dextrose 5%. 1000 milliLiter(s) (100 mL/Hr) IV Continuous <Continuous>  dextrose 50% Injectable 25 Gram(s) IV Push once  dextrose 50% Injectable 12.5 Gram(s) IV Push once  dextrose 50% Injectable 25 Gram(s) IV Push once  gabapentin 100 milliGRAM(s) Oral three times a day  glucagon  Injectable 1 milliGRAM(s) IntraMuscular once  heparin   Injectable 5000 Unit(s) SubCutaneous every 12 hours  insulin glargine Injectable (LANTUS) 20 Unit(s) SubCutaneous at bedtime  insulin lispro (ADMELOG) corrective regimen sliding scale   SubCutaneous three times a day before meals  insulin lispro (ADMELOG) corrective regimen sliding scale   SubCutaneous at bedtime  insulin lispro Injectable (ADMELOG) 7 Unit(s) SubCutaneous three times a day before meals  metFORMIN 500 milliGRAM(s) Oral daily  metoprolol tartrate 12.5 milliGRAM(s) Oral two times a day  metroNIDAZOLE    Tablet 500 milliGRAM(s) Oral every 8 hours  polyethylene glycol 3350 17 Gram(s) Oral daily  senna 2 Tablet(s) Oral at bedtime    MEDICATIONS  (PRN):  acetaminophen   Tablet .. 650 milliGRAM(s) Oral every 6 hours PRN Moderate Pain (4 - 6)  acetaminophen   Tablet .. 650 milliGRAM(s) Oral every 6 hours PRN Temp greater or equal to 38C (100.4F)      Allergies    avocado (Pruritus)  Carrots (Pruritus)  No Known Drug Allergies  Plums (Pruritus)    Intolerances        REVIEW OF SYSTEMS:  CONSTITUTIONAL: no changes  EYES: No eye pain, visual disturbances, or discharge  ENMT:  No difficulty hearing, No sinus or throat pain  NECK: No pain or stiffness  RESPIRATORY: No cough, wheezing, chills or hemoptysis; No shortness of breath  CARDIOVASCULAR: No chest pain, palpitations or leg swelling  GASTROINTESTINAL: No abdominal or epigastric pain. No nausea, vomiting, or hematemesis; No diarrhea or constipation. No melena or hematochezia.  GENITOURINARY: No dysuria, frequency, hematuria, or incontinence  NEUROLOGICAL: No headaches, memory loss, loss of strength, numbness, or tremors      Vital Signs Last 24 Hrs  T(C): 37.1 (05 Oct 2021 06:39), Max: 37.2 (04 Oct 2021 17:18)  T(F): 98.7 (05 Oct 2021 06:39), Max: 99 (04 Oct 2021 17:18)  HR: 91 (05 Oct 2021 06:39) (90 - 97)  BP: 121/61 (05 Oct 2021 06:39) (118/61 - 145/73)  BP(mean): --  RR: 18 (05 Oct 2021 06:39) (17 - 18)  SpO2: 98% (05 Oct 2021 06:39) (97% - 100%)    PHYSICAL EXAM:  GENERAL:   HEAD: Atraumatic, Normocephalic  EYES: PERRLA, conjunctiva and sclera clear  ENMT: No  exudates,; Moist mucous membranes,, No lesions  NECK: Supple, No JVD, Normal thyroid  NERVOUS SYSTEM:  Alert & Oriented,   CHEST/LUNG: Clear to auscultation bilaterally; No rales, rhonchi, wheezing, or rubs  HEART: Regular rate and rhythm; No murmurs, rubs, or gallops  ABDOMEN: Soft, Nontender, Nondistended; Bowel sounds present  EXTREMITIES:  2+ Peripheral Pulses, no edema  SKIN: as per the progress notes of Primary Team     LABS:        CAPILLARY BLOOD GLUCOSE      POCT Blood Glucose.: 305 mg/dL (05 Oct 2021 07:24)  POCT Blood Glucose.: 256 mg/dL (04 Oct 2021 22:12)  POCT Blood Glucose.: 283 mg/dL (04 Oct 2021 16:29)  POCT Blood Glucose.: 358 mg/dL (04 Oct 2021 10:36)    Lipid panel:           Thyroid:  Diabetes Tests:  Parathyroid Panel:  Adrenals:  RADIOLOGY & ADDITIONAL TESTS:    Imaging Personally Reviewed:  [ ] YES  [ ] NO    Consultant(s) Notes Reviewed:  [ ] YES  [ ] NO    Care Discussed with Consultants/Other Providers [ ] YES  [ ] NO

## 2021-10-06 LAB
FLUAV AG NPH QL: SIGNIFICANT CHANGE UP
FLUBV AG NPH QL: SIGNIFICANT CHANGE UP
GLUCOSE BLDC GLUCOMTR-MCNC: 198 MG/DL — HIGH (ref 70–99)
GLUCOSE BLDC GLUCOMTR-MCNC: 243 MG/DL — HIGH (ref 70–99)
GLUCOSE BLDC GLUCOMTR-MCNC: 293 MG/DL — HIGH (ref 70–99)
GLUCOSE BLDC GLUCOMTR-MCNC: 353 MG/DL — HIGH (ref 70–99)
SARS-COV-2 RNA SPEC QL NAA+PROBE: SIGNIFICANT CHANGE UP

## 2021-10-06 PROCEDURE — 99232 SBSQ HOSP IP/OBS MODERATE 35: CPT

## 2021-10-06 RX ORDER — SODIUM CHLORIDE 9 MG/ML
10 INJECTION INTRAMUSCULAR; INTRAVENOUS; SUBCUTANEOUS
Refills: 0 | Status: DISCONTINUED | OUTPATIENT
Start: 2021-10-06 | End: 2021-10-08

## 2021-10-06 RX ORDER — CHLORHEXIDINE GLUCONATE 213 G/1000ML
1 SOLUTION TOPICAL
Refills: 0 | Status: DISCONTINUED | OUTPATIENT
Start: 2021-10-06 | End: 2021-10-07

## 2021-10-06 RX ADMIN — Medication 650 MILLIGRAM(S): at 22:30

## 2021-10-06 RX ADMIN — Medication 650 MILLIGRAM(S): at 21:37

## 2021-10-06 RX ADMIN — CHLORHEXIDINE GLUCONATE 1 APPLICATION(S): 213 SOLUTION TOPICAL at 11:45

## 2021-10-06 RX ADMIN — Medication 7 UNIT(S): at 07:57

## 2021-10-06 RX ADMIN — HEPARIN SODIUM 5000 UNIT(S): 5000 INJECTION INTRAVENOUS; SUBCUTANEOUS at 18:05

## 2021-10-06 RX ADMIN — HEPARIN SODIUM 5000 UNIT(S): 5000 INJECTION INTRAVENOUS; SUBCUTANEOUS at 05:08

## 2021-10-06 RX ADMIN — INSULIN GLARGINE 20 UNIT(S): 100 INJECTION, SOLUTION SUBCUTANEOUS at 21:27

## 2021-10-06 RX ADMIN — GABAPENTIN 100 MILLIGRAM(S): 400 CAPSULE ORAL at 21:28

## 2021-10-06 RX ADMIN — CEFEPIME 100 MILLIGRAM(S): 1 INJECTION, POWDER, FOR SOLUTION INTRAMUSCULAR; INTRAVENOUS at 11:46

## 2021-10-06 RX ADMIN — Medication 2: at 16:38

## 2021-10-06 RX ADMIN — Medication 10: at 11:46

## 2021-10-06 RX ADMIN — Medication 650 MILLIGRAM(S): at 15:00

## 2021-10-06 RX ADMIN — Medication 7 UNIT(S): at 11:46

## 2021-10-06 RX ADMIN — Medication 500 MILLIGRAM(S): at 21:28

## 2021-10-06 RX ADMIN — GABAPENTIN 100 MILLIGRAM(S): 400 CAPSULE ORAL at 14:06

## 2021-10-06 RX ADMIN — CEFEPIME 100 MILLIGRAM(S): 1 INJECTION, POWDER, FOR SOLUTION INTRAMUSCULAR; INTRAVENOUS at 05:07

## 2021-10-06 RX ADMIN — METFORMIN HYDROCHLORIDE 500 MILLIGRAM(S): 850 TABLET ORAL at 11:47

## 2021-10-06 RX ADMIN — Medication 6: at 07:57

## 2021-10-06 RX ADMIN — Medication 500 MILLIGRAM(S): at 14:06

## 2021-10-06 RX ADMIN — Medication 7 UNIT(S): at 16:38

## 2021-10-06 RX ADMIN — Medication 650 MILLIGRAM(S): at 14:06

## 2021-10-06 RX ADMIN — Medication 12.5 MILLIGRAM(S): at 05:07

## 2021-10-06 RX ADMIN — Medication 500 MILLIGRAM(S): at 05:07

## 2021-10-06 RX ADMIN — CEFEPIME 100 MILLIGRAM(S): 1 INJECTION, POWDER, FOR SOLUTION INTRAMUSCULAR; INTRAVENOUS at 21:03

## 2021-10-06 RX ADMIN — GABAPENTIN 100 MILLIGRAM(S): 400 CAPSULE ORAL at 05:07

## 2021-10-06 RX ADMIN — Medication 12.5 MILLIGRAM(S): at 18:05

## 2021-10-06 NOTE — PROGRESS NOTE ADULT - ASSESSMENT
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 with positive blood cultures - Staphylococcus aureus detected by PCR, repeat in hospital blood culture x1 is positive - growing gram negative rods, 2nd set is pending.  Pt had a low grade temp yesterday 100.1, no fevers today, UA not impressive, UC pending, denies any symptoms of UTI.   The pt is s/p MRI of cervical spine - negative for discitis or osteomyelitis.  CT abd/pelvis - no acute abdominal pathology, + LLL consolidation   TTE and cultures are pending.   Vascular and podiatry evaluations pending.   Attending Addendum--  Case reviewed with MARISOL Graves. Her note reviewed and modified as appropriate.   Patient personally assessed and examined.  MRI personally reviewed, and reviewed again with radiology.  No evidence of discitis osteomyelitis.  Upper extremity symptoms appear to be related to DJD.  CT scan abdomen pelvis unrevealing for clear convincing source of MSSA septicemia.  Echocardiogram is pending.  Overall the patient is feeling better.  Podiatry and or vascular surgery assessment of the patient's foot does not appear to have been done as of yet.  Work-up ongoing, as outlined previously.    9/29: no fevers, no leukocytosis, one set of blood cultures from [facility] rehab with staphylococcus aureus, cultures collected on the admission one set is growing enterococcus cloacae complex, wound culture from 9/10 grew Proteus mirabilis, Enterococcus faecalis, Klebsiella pneumoniae. Cefazolin IV was stopped, started on cefepime IV for MSSA septicemia and enterococcus. New two sets of blood cultures were collected yesterday, UC pending. Unclear what is the cause of pt's septicemia, will consider MRI of left foot.   Attending addendum:  Case reviewed with MARISOL Graves. Her note reviewed and modified as appropriate.   Patient personally assessed and examined.   With now a GNR identified in blood cx, and no other clear source for polymicrobial bacteremia identified, have to be concerned about potential occult disease in L ankle. It is warm to touch. However wound bed, while with some mild malodor, is about 1/3 granular, 1/3 fatty tissue, and 1/3 fibrinous exudate. No pus or necrosis. Skin is darkened closer to the wound but clearly viable tissue. No crepitus or fluctuance. No proximal lymphangitis.     9/30: no fevers, no new lab work, MR left ankle - concern for early OM, Cefepime IV continued. Pt was seen by Vascular service - BKA was offered, pt refusing for now, podiatry evaluation is pending. Repeat blood cultures with no growth to date, UC with yeast like cells - no need for diflucan.   10/1: We will need protracted course of antibiotics, my concern remains that the foot/ankle remains a source of the bacteremia is here.  Septicemia due to acute osteomyelitis is well described.  Chronic osteomyelitis is highly unlikely to result in bacteremia.  I reviewed the need for a PICC line with the patient.  She is familiar with them.  The risk benefits and alternatives were discussed.  We also reviewed the risk benefits alternatives of the intravenous antibiotics.  I do not see a role to alter therapy at this point in time.  10/4: s/p PICC line placement on 10/1,no fevers, no new lab work, repeat blood cultures with no growth, cefepime and flagyl continued  10/5: no fevers, no new lab work, s/p vac dressing placement, wound picture taken by PT prior vac dressing placement was evaluated and compared to the picture taken on the first day of us seeing the pt, the wound looks worse. The pt continues refusing BKA. Will continue with our current abx selection for now with stop date on 11/9/2021.  10/6: no fevers, no new lab work, pt continues refusing BKA, seen by podiatry earlier, the wound with fibroglanular base with necrotic patches, no purulent drainage, mild malodor, no active bleeding. IV antibiotics continued until November 9th.     Suggestions  Continue cefepime 2 grams q 8 hrs IV  Continue Flagyl 500 mg po q 8 hrs  Would complete 6-week course of antibiotics from negative blood cultures, last dose of antibiotics November 9th, 2021  Weekly CBC with differential, basic metabolic profile.  PICC line placement - performed   Local care to the wound per vascular and/or podiatry  ID team will be happy to follow the patient at [facility]

## 2021-10-06 NOTE — PROGRESS NOTE ADULT - SUBJECTIVE AND OBJECTIVE BOX
NELSON ZEPEDA  MRN-[id number]    Interval History: The pt was seen and examined earlier, no distress, continues to refuse surgical intervention. Vac dressing was removed for assessment by podiatry earlier, now re-applied by PT. The pt has no new complains, no fevers, no new lab work.     PAST MEDICAL & SURGICAL HISTORY:  Hypertension    Family history of diabetes mellitus    No significant past surgical history    ROS:    [ ] Unobtainable because:  [ x] All other systems negative    Constitutional: no fever, no chills  Head: no trauma  Eyes: no vision changes, no eye pain  ENT:  no sore throat, no rhinorrhea  Cardiovascular:  no chest pain, no palpitation  Respiratory:  no SOB, no cough  GI:  no abd pain, no vomiting, no diarrhea  urinary: no dysuria, no hematuria, no flank pain  musculoskeletal:  no joint pain, no joint swelling, denies pain  skin:  no rash  neurology:  no headache, no seizure, no change in mental status  psych: no anxiety, no depression         Allergies  avocado (Pruritus)  Carrots (Pruritus)  No Known Drug Allergies  Plums (Pruritus)        ANTIMICROBIALS:  cefepime   IVPB 2000 every 8 hours  cefepime   IVPB    metroNIDAZOLE    Tablet 500 every 8 hours      OTHER MEDS:  acetaminophen   Tablet .. 650 milliGRAM(s) Oral every 6 hours PRN  acetaminophen   Tablet .. 650 milliGRAM(s) Oral every 6 hours PRN  atorvastatin 40 milliGRAM(s) Oral at bedtime  chlorhexidine 2% Cloths 1 Application(s) Topical daily  chlorhexidine 4% Liquid 1 Application(s) Topical <User Schedule>  dextrose 40% Gel 15 Gram(s) Oral once  dextrose 5%. 1000 milliLiter(s) IV Continuous <Continuous>  dextrose 5%. 1000 milliLiter(s) IV Continuous <Continuous>  dextrose 50% Injectable 25 Gram(s) IV Push once  dextrose 50% Injectable 12.5 Gram(s) IV Push once  dextrose 50% Injectable 25 Gram(s) IV Push once  gabapentin 100 milliGRAM(s) Oral three times a day  glucagon  Injectable 1 milliGRAM(s) IntraMuscular once  heparin   Injectable 5000 Unit(s) SubCutaneous every 12 hours  insulin glargine Injectable (LANTUS) 20 Unit(s) SubCutaneous at bedtime  insulin lispro (ADMELOG) corrective regimen sliding scale   SubCutaneous three times a day before meals  insulin lispro (ADMELOG) corrective regimen sliding scale   SubCutaneous at bedtime  insulin lispro Injectable (ADMELOG) 7 Unit(s) SubCutaneous three times a day before meals  metFORMIN 500 milliGRAM(s) Oral daily  metoprolol tartrate 12.5 milliGRAM(s) Oral two times a day  polyethylene glycol 3350 17 Gram(s) Oral daily  senna 2 Tablet(s) Oral at bedtime  sodium chloride 0.9% lock flush 10 milliLiter(s) IV Push every 1 hour PRN      Vital Signs Last 24 Hrs  T(C): 37.1 (06 Oct 2021 12:18), Max: 37.1 (06 Oct 2021 12:18)  T(F): 98.7 (06 Oct 2021 12:18), Max: 98.7 (06 Oct 2021 12:18)  HR: 92 (06 Oct 2021 12:18) (91 - 95)  BP: 148/68 (06 Oct 2021 12:18) (100/62 - 148/68)  BP(mean): --  RR: 18 (06 Oct 2021 12:18) (18 - 18)  SpO2: 98% (06 Oct 2021 12:18) (97% - 99%)    Physical Exam:  General: Nontoxic-appearing Female in no acute distress.  HEENT: AT/NC. Anicteric.  Conjunctiva pink and moist.  Oropharynx grossly clear.  Neck: Not rigid. No sense of mass.  Nodes: None palpable.  Lungs: Diminished breath sounds bilaterally without rales wheezes or rhonchi  Heart: Regular rate and rhythm  Abdomen: Bowel sounds present and normoactive. Soft. Nondistended. Nontender. No sense of mass. No organomegaly.  Extremities: L foot dressed, no edema.   Skin: Warm. Dry. Good turgor. No rash. No vasculitic stigmata.  Psychiatric: Grossly appropriate mood and affect    WBC Count: 6.29 K/uL (10-02 @ 08:44)  WBC Count: 5.86 K/uL (10-01 @ 08:50)    Creatinine Trend: 0.72<--, 0.69<--, 0.62<--, 0.68<--, 0.77<--, 0.77<--    MICROBIOLOGY:  v  .Blood Blood  09-29-21   No Growth Final  --  --      .Blood Blood  09-29-21   No Growth Final  --  --      Clean Catch Clean Catch (Midstream)  09-28-21   50,000 - 99,000 CFU/mL Candida glabrata  --  --      .Blood Blood-Peripheral  09-28-21   Growth in aerobic bottle: Enterobacter cloacae complex  Growth in anaerobic bottle: Bacteroides fragilis "Susceptibilities not  performed"  ***Blood Panel PCR results on this specimen are available  approximately 3 hours after the Gram stain result.***  Gram stain, PCR, and/or culture results may not always  correspond due to difference in methodologies.  ************************************************************  This PCR assay was performed by multiplex PCR. This  Assay tests for 66 bacterial and resistance gene targets.  Please refer to the [facility] test directory  at [email or web address] for details.  --  Blood Culture PCR  Enterobacter cloacae complex      .Blood Blood-Peripheral  09-26-21   No Growth Final  --  --      .Blood Blood-Peripheral  09-26-21   Growth in anaerobic bottle: Staphylococcus aureus  ***Blood Panel PCR results on this specimen are available  approximately 3 hours after the Gram stain result.***  Gram stain, PCR, and/or culture results may not always  correspond due to difference in methodologies.  ************************************************************  This PCR assay was performed by multiplex PCR. This  Assay tests for 66 bacterial and resistance gene targets.  Please refer to the [facility] test directory  at [email or web address] for details.  --  Blood Culture PCR  Staphylococcus aureus      Clean Catch Clean Catch (Midstream)  09-26-21   >=3 organisms. Probable collection contamination.  --  --      .Other left foot  09-10-21   No growth  --  --      .Abscess left foot wound  09-10-21   Few Proteus mirabilis  Moderate Enterococcus faecalis  Rare Klebsiella pneumoniae  Few Non Fermentering Gram Negative Rods Most closely resembling  Wohlfahrtiimonas Species  Moderate Bacteroides fragilis "Susceptibilities not performed"  Moderate Streptococcus agalactiae (Group B) isolated  Group B streptococci are susceptible to ampicillin,  penicillin and cefazolin, but may be resistant to  erythromycin and clindamycin.  Recommendations for intrapartum prophylaxis for Group B  streptococci are penicillin or ampicillin.  --  Non Fermentering Gram Negative Rods  Proteus mirabilis  Enterococcus faecalis  Klebsiella pneumoniae      Clean Catch Clean Catch (Midstream)  09-10-21   No growth  --  --      .Blood Blood-Peripheral  09-10-21   No Growth Final  --  --    C-Reactive Protein, Serum: 142 (09-29)  C-Reactive Protein, Serum: 106 (09-26)    D-Dimer Assay, Quantitative: 799 (09-25)      SARS-CoV-2: NotDetec (25 Sep 2021 20:40)  SARS-CoV-2: NotDetec (23 Sep 2021 11:41)  SARS-CoV-2: NotDetec (09 Sep 2021 21:45)    RADIOLOGY:

## 2021-10-06 NOTE — CHART NOTE - NSCHARTNOTEFT_GEN_A_CORE
Received page from LEONARD Bertrand. Informs PT that saline instillation NPWT had been removed from patient's left foot by podiatry team (Dr. Wynn). Upon RN questioning plan for vac, DPM stated PT will re-apply today. Vac had only been functioning for 21 hrs since application on 10/5/21 ~1300hrs; 8 cycles of saline instillation and vac at around 8am today. Liaised with Attending DPM Dr. Morales via [facility] () to improve communication for vac application and take-down moving forward.    Discussed c patient bedside about further planning. In light of no wound PT coverage this coming Saturday 10/9 when it will need re-application, agreed to re-apply vac this Friday, 10/8. It will be re-applied after then, on Monday 10/11. RN Case Manager Roshni Mora updated. Plan to update LEONARD Bertrand of the same.

## 2021-10-06 NOTE — PROGRESS NOTE ADULT - SUBJECTIVE AND OBJECTIVE BOX
Podiatry pager #: [phone] ([place])/ [phone] ([facility])    Patient is a 65y old  Female who presents with a chief complaint of sepsis (06 Oct 2021 08:16)       INTERVAL HPI/OVERNIGHT EVENTS:  Patient seen and evaluated at bedside.  Pt is resting comfortable in NAD. Denies N/V/F/C.     Allergies    avocado (Pruritus)  Carrots (Pruritus)  No Known Drug Allergies  Plums (Pruritus)    Intolerances        Vital Signs Last 24 Hrs  T(C): 37 (06 Oct 2021 05:34), Max: 37 (05 Oct 2021 23:50)  T(F): 98.6 (06 Oct 2021 05:34), Max: 98.6 (05 Oct 2021 23:50)  HR: 91 (06 Oct 2021 05:34) (91 - 97)  BP: 113/64 (06 Oct 2021 05:34) (100/62 - 138/60)  BP(mean): --  RR: 18 (06 Oct 2021 05:34) (17 - 18)  SpO2: 97% (06 Oct 2021 05:34) (97% - 100%)    LABS:              CAPILLARY BLOOD GLUCOSE      POCT Blood Glucose.: 293 mg/dL (06 Oct 2021 07:49)  POCT Blood Glucose.: 347 mg/dL (05 Oct 2021 21:41)  POCT Blood Glucose.: 234 mg/dL (05 Oct 2021 15:49)  POCT Blood Glucose.: 338 mg/dL (05 Oct 2021 11:00)      Lower Extremity Physical Exam:  Vascular: RF DP/PT 0/4, Temperature gradient LF warm to touch   Neuro: Epicritic sensation absent to the level of digits B/L.  Musculoskeletal/Ortho:  s/p LF choparts amputation left open (9/10):  Open surgical wound with fibroglanular base with necrotic patches, skin edges at level of amputation appear viable with mild necrosis, wound edges warm, no purulent drainage, mild malodor, no active bleeding.   RADIOLOGY & ADDITIONAL TESTS:

## 2021-10-06 NOTE — PROGRESS NOTE ADULT - SUBJECTIVE AND OBJECTIVE BOX
Patient is a 65y old  Female who presents with a chief complaint of sepsis (06 Oct 2021 07:55)      Interval History: still with increased sepsis   finger sticks are in high 200s and low 300s   on Lantus 20 units and prandial lispro 7 units       MEDICATIONS  (STANDING):  atorvastatin 40 milliGRAM(s) Oral at bedtime  cefepime   IVPB 2000 milliGRAM(s) IV Intermittent every 8 hours  cefepime   IVPB      chlorhexidine 2% Cloths 1 Application(s) Topical daily  dextrose 40% Gel 15 Gram(s) Oral once  dextrose 5%. 1000 milliLiter(s) (50 mL/Hr) IV Continuous <Continuous>  dextrose 5%. 1000 milliLiter(s) (100 mL/Hr) IV Continuous <Continuous>  dextrose 50% Injectable 25 Gram(s) IV Push once  dextrose 50% Injectable 12.5 Gram(s) IV Push once  dextrose 50% Injectable 25 Gram(s) IV Push once  gabapentin 100 milliGRAM(s) Oral three times a day  glucagon  Injectable 1 milliGRAM(s) IntraMuscular once  heparin   Injectable 5000 Unit(s) SubCutaneous every 12 hours  insulin glargine Injectable (LANTUS) 20 Unit(s) SubCutaneous at bedtime  insulin lispro (ADMELOG) corrective regimen sliding scale   SubCutaneous at bedtime  insulin lispro (ADMELOG) corrective regimen sliding scale   SubCutaneous three times a day before meals  insulin lispro Injectable (ADMELOG) 7 Unit(s) SubCutaneous three times a day before meals  metFORMIN 500 milliGRAM(s) Oral daily  metoprolol tartrate 12.5 milliGRAM(s) Oral two times a day  metroNIDAZOLE    Tablet 500 milliGRAM(s) Oral every 8 hours  polyethylene glycol 3350 17 Gram(s) Oral daily  senna 2 Tablet(s) Oral at bedtime    MEDICATIONS  (PRN):  acetaminophen   Tablet .. 650 milliGRAM(s) Oral every 6 hours PRN Temp greater or equal to 38C (100.4F)  acetaminophen   Tablet .. 650 milliGRAM(s) Oral every 6 hours PRN Moderate Pain (4 - 6)      Allergies    avocado (Pruritus)  Carrots (Pruritus)  No Known Drug Allergies  Plums (Pruritus)    Intolerances        REVIEW OF SYSTEMS:  CONSTITUTIONAL: no changes    Vital Signs Last 24 Hrs  T(C): 37 (06 Oct 2021 05:34), Max: 37 (05 Oct 2021 23:50)  T(F): 98.6 (06 Oct 2021 05:34), Max: 98.6 (05 Oct 2021 23:50)  HR: 91 (06 Oct 2021 05:34) (91 - 97)  BP: 113/64 (06 Oct 2021 05:34) (100/62 - 138/60)  BP(mean): --  RR: 18 (06 Oct 2021 05:34) (17 - 18)  SpO2: 97% (06 Oct 2021 05:34) (97% - 100%)    PHYSICAL EXAM:  GENERAL:   HEAD: Atraumatic, Normocephalic  EYES: PERRLA, conjunctiva and sclera clear  ENMT: No  exudates,; Moist mucous membranes,, No lesions  NECK: Supple, No JVD, Normal thyroid  NERVOUS SYSTEM:  Alert & Oriented,   CHEST/LUNG: Clear to auscultation bilaterally; No rales, rhonchi, wheezing, or rubs  HEART: Regular rate and rhythm; No murmurs, rubs, or gallops  ABDOMEN: Soft, Nontender, Nondistended; Bowel sounds present      LABS:        CAPILLARY BLOOD GLUCOSE      POCT Blood Glucose.: 293 mg/dL (06 Oct 2021 07:49)  POCT Blood Glucose.: 347 mg/dL (05 Oct 2021 21:41)  POCT Blood Glucose.: 234 mg/dL (05 Oct 2021 15:49)  POCT Blood Glucose.: 338 mg/dL (05 Oct 2021 11:00)    Lipid panel:           Thyroid:  Diabetes Tests:  Parathyroid Panel:  Adrenals:  RADIOLOGY & ADDITIONAL TESTS:    Imaging Personally Reviewed:  [ ] YES  [ ] NO    Consultant(s) Notes Reviewed:  [ ] YES  [ ] NO    Care Discussed with Consultants/Other Providers [ ] YES  [ ] NO

## 2021-10-06 NOTE — PROGRESS NOTE ADULT - ASSESSMENT
64yo s/p LF Choparts amp on 9/10/21  - pt was seen and examined  - afebrile, no leukocytosis on 10/2  - s/p LF choparts amputation left open (9/10): Open surgical wound with fibroglanular base with necrotic patches, skin edges at level of amputation appear viable with mild necrosis, wound edges warm, no purulent drainage, mild malodor, no active bleeding. No acute signs of infection.   - pt refusing BKA per vascular   - continue wound vac with santyl for left foot wound (VAC to be reapplied today)  - no further pod surgical intervention   - Discussed w/ attending

## 2021-10-06 NOTE — PROGRESS NOTE ADULT - SUBJECTIVE AND OBJECTIVE BOX
INTERVAL HPI/OVERNIGHT EVENTS:    awaiting  transfer  to  rehab    REVIEW OF SYSTEMS:  CONSTITUTIONAL:  no  complaints    NECK: No pain or stiffnes  RESPIRATORY: No SOB   CARDIOVASCULAR: No chest pain, palpitations, dizziness,   GASTROINTESTINAL: No abdominal pain. No nausea, vomiting,   NEUROLOGICAL: No headaches, no  blurry  vision no  dizziness  SKIN: No itching,   MUSCULOSKELETAL: No pain    MEDICATION:  acetaminophen   Tablet .. 650 milliGRAM(s) Oral every 6 hours PRN  acetaminophen   Tablet .. 650 milliGRAM(s) Oral every 6 hours PRN  atorvastatin 40 milliGRAM(s) Oral at bedtime  cefepime   IVPB 2000 milliGRAM(s) IV Intermittent every 8 hours  cefepime   IVPB      chlorhexidine 2% Cloths 1 Application(s) Topical daily  dextrose 40% Gel 15 Gram(s) Oral once  dextrose 5%. 1000 milliLiter(s) IV Continuous <Continuous>  dextrose 5%. 1000 milliLiter(s) IV Continuous <Continuous>  dextrose 50% Injectable 25 Gram(s) IV Push once  dextrose 50% Injectable 12.5 Gram(s) IV Push once  dextrose 50% Injectable 25 Gram(s) IV Push once  gabapentin 100 milliGRAM(s) Oral three times a day  glucagon  Injectable 1 milliGRAM(s) IntraMuscular once  heparin   Injectable 5000 Unit(s) SubCutaneous every 12 hours  insulin glargine Injectable (LANTUS) 20 Unit(s) SubCutaneous at bedtime  insulin lispro (ADMELOG) corrective regimen sliding scale   SubCutaneous at bedtime  insulin lispro (ADMELOG) corrective regimen sliding scale   SubCutaneous three times a day before meals  insulin lispro Injectable (ADMELOG) 7 Unit(s) SubCutaneous three times a day before meals  metFORMIN 500 milliGRAM(s) Oral daily  metoprolol tartrate 12.5 milliGRAM(s) Oral two times a day  metroNIDAZOLE    Tablet 500 milliGRAM(s) Oral every 8 hours  polyethylene glycol 3350 17 Gram(s) Oral daily  senna 2 Tablet(s) Oral at bedtime    Vital Signs Last 24 Hrs  T(C): 37 (06 Oct 2021 05:34), Max: 37 (05 Oct 2021 23:50)  T(F): 98.6 (06 Oct 2021 05:34), Max: 98.6 (05 Oct 2021 23:50)  HR: 91 (06 Oct 2021 05:34) (91 - 97)  BP: 113/64 (06 Oct 2021 05:34) (100/62 - 138/60)  BP(mean): --  RR: 18 (06 Oct 2021 05:34) (17 - 18)  SpO2: 97% (06 Oct 2021 05:34) (97% - 100%)    PHYSICAL EXAM:  GENERAL: NAD, well-groomed, well-developed  HEENT : Conjuntivae  clear sclerae anicteric  NECK: Supple, No JVD, Normal thyroid  NERVOUS SYSTEM:  Alert oriented   no  focal  deficits;   CHEST/LUNG: Clear    HEART: Regular rate and rhythm; No murmurs, rubs, or gallops  ABDOMEN: Soft, Nontender, Nondistended; Bowel sounds present  EXTREMITIES:  no  edema no  tenderness  SKIN: No rashes   LABS:              CAPILLARY BLOOD GLUCOSE      POCT Blood Glucose.: 293 mg/dL (06 Oct 2021 07:49)  POCT Blood Glucose.: 347 mg/dL (05 Oct 2021 21:41)  POCT Blood Glucose.: 234 mg/dL (05 Oct 2021 15:49)  POCT Blood Glucose.: 338 mg/dL (05 Oct 2021 11:00)      RADIOLOGY & ADDITIONAL TESTS:    Imaging reports  Personally Reviewed:  [ ] YES  [ ] NO    Consultant(s) Notes Reviewed:  [x ] YES  [ ] NO    Care Discussed with Consultants/Other Providers [x ] YES  [ ] NO  Problem/Plan - 1:  ·  Problem: Septicemia.   ·  Plan: sefepime  as per  ID  s/p  PICC line  placement  for  long  term AB local  care  as  per  podiatry    Problem/Plan - 2:  ·  Problem: PAD (peripheral artery disease).   ·  Plan: vascular  surgery  eval dr Crawley  appreciated    Problem/Plan - 3:  ·  Problem: Status post transmetatarsal amputation of left foot.   ·  Plan: local  Rx  as per  podiatry  dr Patel  called.   multiple  times    Problem/Plan - 4:  ·  Problem: DM (diabetes mellitus).   ·  Plan: nutritional  insulin  and  scale  coverage hold  metformin  post  ct  angio.    Problem/Plan - 5:  ·  Problem: Constipation.   ·  Plan: senna  miralx.    Problem/Plan - 6:  ·  Problem: Preventive measure.   ·  Plan: dvt  prophylaxis  with  heparin.  discharge  to  rehab

## 2021-10-07 LAB
GLUCOSE BLDC GLUCOMTR-MCNC: 204 MG/DL — HIGH (ref 70–99)
GLUCOSE BLDC GLUCOMTR-MCNC: 220 MG/DL — HIGH (ref 70–99)
GLUCOSE BLDC GLUCOMTR-MCNC: 225 MG/DL — HIGH (ref 70–99)
GLUCOSE BLDC GLUCOMTR-MCNC: 346 MG/DL — HIGH (ref 70–99)

## 2021-10-07 PROCEDURE — 99233 SBSQ HOSP IP/OBS HIGH 50: CPT

## 2021-10-07 RX ADMIN — CEFEPIME 100 MILLIGRAM(S): 1 INJECTION, POWDER, FOR SOLUTION INTRAMUSCULAR; INTRAVENOUS at 05:18

## 2021-10-07 RX ADMIN — GABAPENTIN 100 MILLIGRAM(S): 400 CAPSULE ORAL at 13:27

## 2021-10-07 RX ADMIN — Medication 500 MILLIGRAM(S): at 13:27

## 2021-10-07 RX ADMIN — Medication 7 UNIT(S): at 07:37

## 2021-10-07 RX ADMIN — Medication 12.5 MILLIGRAM(S): at 17:20

## 2021-10-07 RX ADMIN — Medication 4: at 07:37

## 2021-10-07 RX ADMIN — Medication 500 MILLIGRAM(S): at 05:19

## 2021-10-07 RX ADMIN — GABAPENTIN 100 MILLIGRAM(S): 400 CAPSULE ORAL at 21:49

## 2021-10-07 RX ADMIN — INSULIN GLARGINE 20 UNIT(S): 100 INJECTION, SOLUTION SUBCUTANEOUS at 21:49

## 2021-10-07 RX ADMIN — ATORVASTATIN CALCIUM 40 MILLIGRAM(S): 80 TABLET, FILM COATED ORAL at 21:49

## 2021-10-07 RX ADMIN — Medication 650 MILLIGRAM(S): at 18:20

## 2021-10-07 RX ADMIN — GABAPENTIN 100 MILLIGRAM(S): 400 CAPSULE ORAL at 05:19

## 2021-10-07 RX ADMIN — CHLORHEXIDINE GLUCONATE 1 APPLICATION(S): 213 SOLUTION TOPICAL at 05:37

## 2021-10-07 RX ADMIN — Medication 12.5 MILLIGRAM(S): at 05:19

## 2021-10-07 RX ADMIN — SENNA PLUS 2 TABLET(S): 8.6 TABLET ORAL at 21:49

## 2021-10-07 RX ADMIN — CEFEPIME 100 MILLIGRAM(S): 1 INJECTION, POWDER, FOR SOLUTION INTRAMUSCULAR; INTRAVENOUS at 21:49

## 2021-10-07 RX ADMIN — Medication 7 UNIT(S): at 11:03

## 2021-10-07 RX ADMIN — Medication 500 MILLIGRAM(S): at 21:49

## 2021-10-07 RX ADMIN — HEPARIN SODIUM 5000 UNIT(S): 5000 INJECTION INTRAVENOUS; SUBCUTANEOUS at 05:19

## 2021-10-07 RX ADMIN — METFORMIN HYDROCHLORIDE 500 MILLIGRAM(S): 850 TABLET ORAL at 11:03

## 2021-10-07 RX ADMIN — CEFEPIME 100 MILLIGRAM(S): 1 INJECTION, POWDER, FOR SOLUTION INTRAMUSCULAR; INTRAVENOUS at 11:03

## 2021-10-07 RX ADMIN — Medication 650 MILLIGRAM(S): at 17:24

## 2021-10-07 RX ADMIN — CHLORHEXIDINE GLUCONATE 1 APPLICATION(S): 213 SOLUTION TOPICAL at 11:02

## 2021-10-07 RX ADMIN — Medication 4: at 16:37

## 2021-10-07 RX ADMIN — Medication 7 UNIT(S): at 16:37

## 2021-10-07 RX ADMIN — HEPARIN SODIUM 5000 UNIT(S): 5000 INJECTION INTRAVENOUS; SUBCUTANEOUS at 17:20

## 2021-10-07 RX ADMIN — Medication 8: at 11:03

## 2021-10-07 RX ADMIN — Medication 650 MILLIGRAM(S): at 22:43

## 2021-10-07 RX ADMIN — Medication 650 MILLIGRAM(S): at 23:13

## 2021-10-07 NOTE — PROGRESS NOTE ADULT - SUBJECTIVE AND OBJECTIVE BOX
INTERVAL HPI/OVERNIGHT EVENTS:    awaiting  transfer  to  rehab    REVIEW OF SYSTEMS:  CONSTITUTIONAL:   no  complaints    NECK: No pain or stiffnes  RESPIRATORY: No SOB   CARDIOVASCULAR: No chest pain, palpitations, dizziness,   GASTROINTESTINAL: No abdominal pain. No nausea, vomiting,   NEUROLOGICAL: No headaches, no  blurry  vision no  dizziness  SKIN: No itching,   MUSCULOSKELETAL: No pain    MEDICATION:  acetaminophen   Tablet .. 650 milliGRAM(s) Oral every 6 hours PRN  acetaminophen   Tablet .. 650 milliGRAM(s) Oral every 6 hours PRN  atorvastatin 40 milliGRAM(s) Oral at bedtime  cefepime   IVPB 2000 milliGRAM(s) IV Intermittent every 8 hours  cefepime   IVPB      chlorhexidine 2% Cloths 1 Application(s) Topical daily  chlorhexidine 4% Liquid 1 Application(s) Topical <User Schedule>  dextrose 40% Gel 15 Gram(s) Oral once  dextrose 5%. 1000 milliLiter(s) IV Continuous <Continuous>  dextrose 5%. 1000 milliLiter(s) IV Continuous <Continuous>  dextrose 50% Injectable 25 Gram(s) IV Push once  dextrose 50% Injectable 12.5 Gram(s) IV Push once  dextrose 50% Injectable 25 Gram(s) IV Push once  gabapentin 100 milliGRAM(s) Oral three times a day  glucagon  Injectable 1 milliGRAM(s) IntraMuscular once  heparin   Injectable 5000 Unit(s) SubCutaneous every 12 hours  insulin glargine Injectable (LANTUS) 20 Unit(s) SubCutaneous at bedtime  insulin lispro (ADMELOG) corrective regimen sliding scale   SubCutaneous three times a day before meals  insulin lispro (ADMELOG) corrective regimen sliding scale   SubCutaneous at bedtime  insulin lispro Injectable (ADMELOG) 7 Unit(s) SubCutaneous three times a day before meals  metFORMIN 500 milliGRAM(s) Oral daily  metoprolol tartrate 12.5 milliGRAM(s) Oral two times a day  metroNIDAZOLE    Tablet 500 milliGRAM(s) Oral every 8 hours  polyethylene glycol 3350 17 Gram(s) Oral daily  senna 2 Tablet(s) Oral at bedtime  sodium chloride 0.9% lock flush 10 milliLiter(s) IV Push every 1 hour PRN    Vital Signs Last 24 Hrs  T(C): 36.4 (07 Oct 2021 05:17), Max: 37.3 (06 Oct 2021 17:29)  T(F): 97.6 (07 Oct 2021 05:17), Max: 99.1 (06 Oct 2021 17:29)  HR: 89 (07 Oct 2021 05:17) (89 - 98)  BP: 156/72 (07 Oct 2021 05:17) (133/86 - 162/77)  BP(mean): --  RR: 18 (07 Oct 2021 05:17) (18 - 18)  SpO2: 97% (07 Oct 2021 05:17) (97% - 99%)    PHYSICAL EXAM:  GENERAL: NAD, well-groomed, well-developed  HEENT : Conjuntivae  clear sclerae anicteric  NECK: Supple, No JVD, Normal thyroid  NERVOUS SYSTEM:  Alert oriented   no  focal  deficits;   CHEST/LUNG: Clear    HEART: Regular rate and rhythm; No murmurs, rubs, or gallops  ABDOMEN: Soft, Nontender, Nondistended; Bowel sounds present  EXTREMITIES:   l  foot  surgically  wrapped  SKIN: No rashes   LABS:              CAPILLARY BLOOD GLUCOSE      POCT Blood Glucose.: 225 mg/dL (07 Oct 2021 07:23)  POCT Blood Glucose.: 243 mg/dL (06 Oct 2021 21:16)  POCT Blood Glucose.: 198 mg/dL (06 Oct 2021 16:31)  POCT Blood Glucose.: 353 mg/dL (06 Oct 2021 11:31)      RADIOLOGY & ADDITIONAL TESTS:    Imaging reports  Personally Reviewed:  [ ] YES  [ ] NO    Consultant(s) Notes Reviewed:  [ x] YES  [ ] NO    Care Discussed with Consultants/Other Providers [ x] YES  [ ] NO  Care Discussed with Consultants/Other Providers [x ] YES  [ ] NO  Problem/Plan - 1:  ·  Problem: Septicemia.   ·  Plan: sefepime  as per  ID  s/p  PICC line  placement  for  long  term AB local  care  as  per  podiatry    Problem/Plan - 2:  ·  Problem: PAD (peripheral artery disease).   ·  Plan: vascular  surgery  eval dr Crawley  appreciated    Problem/Plan - 3:  ·  Problem: Status post transmetatarsal amputation of left foot.   ·  Plan: local  Rx  as per  podiatry  dr Patel  called.   multiple  times    Problem/Plan - 4:  ·  Problem: DM (diabetes mellitus).   ·  Plan: nutritional  insulin  and  scale  coverage hold  metformin  post  ct  angio.    Problem/Plan - 5:  ·  Problem: Constipation.   ·  Plan: senna  miralx.    Problem/Plan - 6:  ·  Problem: Preventive measure.   ·  Plan: dvt  prophylaxis  with  heparin.  discharge  to  rehab

## 2021-10-07 NOTE — PROGRESS NOTE ADULT - ASSESSMENT
65 year old female w/PMH of HTN, IDDM, HLD, diabetic retinopathy, necrotic foot infection S/p Chopart's amputation w/wound vac on her left leg, from [facility] with positive blood cultures - Staphylococcus aureus detected by PCR, repeat in hospital blood culture x1 is positive - growing gram negative rods, 2nd set is pending.  Pt had a low grade temp yesterday 100.1, no fevers today, UA not impressive, UC pending, denies any symptoms of UTI.   The pt is s/p MRI of cervical spine - negative for discitis or osteomyelitis.  CT abd/pelvis - no acute abdominal pathology, + LLL consolidation   TTE and cultures are pending.   Vascular and podiatry evaluations pending.   Attending Addendum--  Case reviewed with MARISOL Graves. Her note reviewed and modified as appropriate.   Patient personally assessed and examined.  MRI personally reviewed, and reviewed again with radiology.  No evidence of discitis osteomyelitis.  Upper extremity symptoms appear to be related to DJD.  CT scan abdomen pelvis unrevealing for clear convincing source of MSSA septicemia.  Echocardiogram is pending.  Overall the patient is feeling better.  Podiatry and or vascular surgery assessment of the patient's foot does not appear to have been done as of yet.  Work-up ongoing, as outlined previously.    9/29: no fevers, no leukocytosis, one set of blood cultures from [facility] rehab with staphylococcus aureus, cultures collected on the admission one set is growing enterococcus cloacae complex, wound culture from 9/10 grew Proteus mirabilis, Enterococcus faecalis, Klebsiella pneumoniae. Cefazolin IV was stopped, started on cefepime IV for MSSA septicemia and enterococcus. New two sets of blood cultures were collected yesterday, UC pending. Unclear what is the cause of pt's septicemia, will consider MRI of left foot.   Attending addendum:  Case reviewed with MARISOL Graves. Her note reviewed and modified as appropriate.   Patient personally assessed and examined.   With now a GNR identified in blood cx, and no other clear source for polymicrobial bacteremia identified, have to be concerned about potential occult disease in L ankle. It is warm to touch. However wound bed, while with some mild malodor, is about 1/3 granular, 1/3 fatty tissue, and 1/3 fibrinous exudate. No pus or necrosis. Skin is darkened closer to the wound but clearly viable tissue. No crepitus or fluctuance. No proximal lymphangitis.     9/30: no fevers, no new lab work, MR left ankle - concern for early OM, Cefepime IV continued. Pt was seen by Vascular service - BKA was offered, pt refusing for now, podiatry evaluation is pending. Repeat blood cultures with no growth to date, UC with yeast like cells - no need for diflucan.   10/1: We will need protracted course of antibiotics, my concern remains that the foot/ankle remains a source of the bacteremia is here.  Septicemia due to acute osteomyelitis is well described.  Chronic osteomyelitis is highly unlikely to result in bacteremia.  I reviewed the need for a PICC line with the patient.  She is familiar with them.  The risk benefits and alternatives were discussed.  We also reviewed the risk benefits alternatives of the intravenous antibiotics.  I do not see a role to alter therapy at this point in time.  10/4: s/p PICC line placement on 10/1,no fevers, no new lab work, repeat blood cultures with no growth, cefepime and flagyl continued  10/5: no fevers, no new lab work, s/p vac dressing placement, wound picture taken by PT prior vac dressing placement was evaluated and compared to the picture taken on the first day of us seeing the pt, the wound looks worse. The pt continues refusing BKA. Will continue with our current abx selection for now with stop date on 11/9/2021.  10/6: no fevers, no new lab work, pt continues refusing BKA, seen by podiatry earlier, the wound with fibroglanular base with necrotic patches, no purulent drainage, mild malodor, no active bleeding. IV antibiotics continued until November 9th.   10/7: No new concerns on history or exam. Reviewed with patient, all questions answered.     Suggestions  Continue cefepime 2 grams q 8 hrs IV  Continue Flagyl 500 mg po q 8 hrs  Would complete 6-week course of antibiotics from negative blood cultures, last dose of antibiotics November 9th, 2021  Weekly CBC with differential, basic metabolic profile.  Local care to the wound per vascular and/or podiatry  ID team will be happy to follow the patient at [facility], please call as needed.    Cornel Johansen MD  Attending Physician  [facility]  Division of Infectious Diseases  [phone]

## 2021-10-07 NOTE — PROGRESS NOTE ADULT - PROBLEM SELECTOR PROBLEM 2
Status post transmetatarsal amputation of left foot

## 2021-10-07 NOTE — PROGRESS NOTE ADULT - SUBJECTIVE AND OBJECTIVE BOX
Patient is a 65y old  Female who presents with a chief complaint of sepsis (07 Oct 2021 08:23)      Interval History: continued on Lantus and prandial lispro and Metformin   still finger sticks are 200 to 250 or above  Infection driven Hyperglycemia     MEDICATIONS  (STANDING):  atorvastatin 40 milliGRAM(s) Oral at bedtime  cefepime   IVPB 2000 milliGRAM(s) IV Intermittent every 8 hours  cefepime   IVPB      chlorhexidine 2% Cloths 1 Application(s) Topical daily  chlorhexidine 4% Liquid 1 Application(s) Topical <User Schedule>  dextrose 40% Gel 15 Gram(s) Oral once  dextrose 5%. 1000 milliLiter(s) (50 mL/Hr) IV Continuous <Continuous>  dextrose 5%. 1000 milliLiter(s) (100 mL/Hr) IV Continuous <Continuous>  dextrose 50% Injectable 25 Gram(s) IV Push once  dextrose 50% Injectable 12.5 Gram(s) IV Push once  dextrose 50% Injectable 25 Gram(s) IV Push once  gabapentin 100 milliGRAM(s) Oral three times a day  glucagon  Injectable 1 milliGRAM(s) IntraMuscular once  heparin   Injectable 5000 Unit(s) SubCutaneous every 12 hours  insulin glargine Injectable (LANTUS) 20 Unit(s) SubCutaneous at bedtime  insulin lispro (ADMELOG) corrective regimen sliding scale   SubCutaneous three times a day before meals  insulin lispro (ADMELOG) corrective regimen sliding scale   SubCutaneous at bedtime  insulin lispro Injectable (ADMELOG) 7 Unit(s) SubCutaneous three times a day before meals  metFORMIN 500 milliGRAM(s) Oral daily  metoprolol tartrate 12.5 milliGRAM(s) Oral two times a day  metroNIDAZOLE    Tablet 500 milliGRAM(s) Oral every 8 hours  polyethylene glycol 3350 17 Gram(s) Oral daily  senna 2 Tablet(s) Oral at bedtime    MEDICATIONS  (PRN):  acetaminophen   Tablet .. 650 milliGRAM(s) Oral every 6 hours PRN Moderate Pain (4 - 6)  acetaminophen   Tablet .. 650 milliGRAM(s) Oral every 6 hours PRN Temp greater or equal to 38C (100.4F)  sodium chloride 0.9% lock flush 10 milliLiter(s) IV Push every 1 hour PRN Pre/post blood products, medications, blood draw, and to maintain line patency      Allergies    avocado (Pruritus)  Carrots (Pruritus)  No Known Drug Allergies  Plums (Pruritus)    Intolerances        REVIEW OF SYSTEMS:  CONSTITUTIONAL: no changes  EYES: No eye pain, visual disturbances, or discharge  ENMT:  No difficulty hearing, No sinus or throat pain  NECK: No pain or stiffness  RESPIRATORY: No cough, wheezing, chills or hemoptysis; No shortness of breath  CARDIOVASCULAR: No chest pain, palpitations or leg swelling  GASTROINTESTINAL: No abdominal or epigastric pain. No nausea, vomiting, or hematemesis; No diarrhea or constipation. No melena or hematochezia.  GENITOURINARY: No dysuria, frequency, hematuria, or incontinence  NEUROLOGICAL: No headaches, memory loss, loss of strength, numbness, or tremors  SKIN: ulcer   ENDOCRINE: No heat or cold intolerance; No hair loss  MUSCULOSKELETAL: No joint pain or swelling; No muscle, back, or extremity pain  PSYCHIATRIC: No depression, anxiety, mood swings, or difficulty sleeping  HEME/LYMPH: No easy bruising, or bleeding gums  ALLERY AND IMMUNOLOGIC: No hives or eczema    Vital Signs Last 24 Hrs  T(C): 36.9 (07 Oct 2021 11:59), Max: 37.3 (06 Oct 2021 17:29)  T(F): 98.5 (07 Oct 2021 11:59), Max: 99.1 (06 Oct 2021 17:29)  HR: 90 (07 Oct 2021 11:59) (89 - 98)  BP: 126/78 (07 Oct 2021 11:59) (126/78 - 162/77)  BP(mean): --  RR: 18 (07 Oct 2021 11:59) (18 - 18)  SpO2: 98% (07 Oct 2021 11:59) (97% - 99%)    PHYSICAL EXAM:  GENERAL:   HEAD: Atraumatic, Normocephalic  EYES: PERRLA, conjunctiva and sclera clear  ENMT: No  exudates,; Moist mucous membranes,, No lesions  NECK: Supple, No JVD, Normal thyroid  NERVOUS SYSTEM:  Alert & Oriented,   CHEST/LUNG: Clear to auscultation bilaterally; No rales, rhonchi, wheezing, or rubs  HEART: Regular rate and rhythm; No murmurs, rubs, or gallops  ABDOMEN: Soft, Nontender, Nondistended; Bowel sounds present  EXTREMITIES:  2+ Peripheral Pulses, no edema  SKIN: as per the progress notes of Primary Team     LABS:        CAPILLARY BLOOD GLUCOSE      POCT Blood Glucose.: 346 mg/dL (07 Oct 2021 10:54)  POCT Blood Glucose.: 225 mg/dL (07 Oct 2021 07:23)  POCT Blood Glucose.: 243 mg/dL (06 Oct 2021 21:16)  POCT Blood Glucose.: 198 mg/dL (06 Oct 2021 16:31)    Lipid panel:           Thyroid:  Diabetes Tests:  Parathyroid Panel:  Adrenals:  RADIOLOGY & ADDITIONAL TESTS:    Imaging Personally Reviewed:  [ ] YES  [ ] NO    Consultant(s) Notes Reviewed:  [ ] YES  [ ] NO    Care Discussed with Consultants/Other Providers [ ] YES  [ ] NO

## 2021-10-07 NOTE — PROGRESS NOTE ADULT - SUBJECTIVE AND OBJECTIVE BOX
[facility]  Division of Infectious Diseases  [phone]    Name: NELSON ZEPEDA  Age: 65y  Gender: Female  MRN: [id number]    Interval History--  Notes reviewed. Seen earlier today, case d/w Dr. Garcia. Patient without new complaints. Pain not and issue. No fevers, chills, or rigors. No diarrhea.       Past Medical History--  Hypertension    Family history of diabetes mellitus    No significant past surgical history        For details regarding the patient's social history, family history, and other miscellaneous elements, please refer the initial infectious diseases consultation and/or the admitting history and physical examination for this admission.    Allergies    avocado (Pruritus)  Carrots (Pruritus)  No Known Drug Allergies  Plums (Pruritus)    Intolerances        Medications--  Antibiotics:  cefepime   IVPB 2000 milliGRAM(s) IV Intermittent every 8 hours  cefepime   IVPB      metroNIDAZOLE    Tablet 500 milliGRAM(s) Oral every 8 hours      Immunologic:    Other:  acetaminophen   Tablet .. PRN  acetaminophen   Tablet .. PRN  atorvastatin  chlorhexidine 2% Cloths  dextrose 40% Gel  dextrose 5%.  dextrose 5%.  dextrose 50% Injectable  dextrose 50% Injectable  dextrose 50% Injectable  gabapentin  glucagon  Injectable  heparin   Injectable  insulin glargine Injectable (LANTUS)  insulin lispro (ADMELOG) corrective regimen sliding scale  insulin lispro (ADMELOG) corrective regimen sliding scale  insulin lispro Injectable (ADMELOG)  metFORMIN  metoprolol tartrate  polyethylene glycol 3350  senna  sodium chloride 0.9% lock flush PRN      Review of Systems--  A 10-point review of systems was obtained.   Review of systems otherwise unchanged compared to prior visit except as previously noted.    Physical Examination--  Vital Signs: T(F): 97 (10-07-21 @ 16:51), Max: 99.1 (10-06-21 @ 17:29)  HR: 101 (10-07-21 @ 16:51)  BP: 119/53 (10-07-21 @ 16:51)  RR: 19 (10-07-21 @ 16:51)  SpO2: 98% (10-07-21 @ 16:51)  Wt(kg): --  General: Nontoxic-appearing Female in no acute distress.  HEENT: AT/NC. Anicteric.  Conjunctiva pink and moist.  Oropharynx grossly clear.  Neck: Not rigid. No sense of mass.  Nodes: None palpable.  Lungs: Diminished breath sounds bilaterally without rales wheezes or rhonchi  Heart: Regular rate and rhythm  Abdomen: Bowel sounds present and normoactive. Soft. Nondistended. Nontender. No sense of mass. No organomegaly.  Extremities: L foot wrapped w/ ACE. No cyanosis or clubbing. No edema.   Skin: Warm. Dry. Good turgor. No rash. No vasculitic stigmata.  Psychiatric: Grossly appropriate mood and affect      Laboratory Studies--  No new data

## 2021-10-07 NOTE — CHART NOTE - NSCHARTNOTEFT_GEN_A_CORE
Pt seen for moderate malnutrition follow-up. 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 s/p swallow evaluation  with recommendations for soft, thin liquids. Pt pending discharge back to [facility].    Factors impacting intake: [x] none [ ] nausea  [ ] vomiting [ ] diarrhea [ ] constipation  [ ]chewing problems [ ] swallowing issues  [ ] other:     Diet Prescription: Diet, Dysphagia 3 Soft-Thin Liquids:   Consistent Carbohydrate {Evening Snack}  DASH/TLC {Sodium & Cholesterol Restricted}  Supplement Feeding Modality:  Oral  Glucerna Shake Cans or Servings Per Day:  1       Frequency:  Two Times a day (21 @ 13:51)    Intake: % per flow sheets. Observed 100% breakfast consumed at time of visit this morning (10/07)    Current Weight: No recent weights to trend  % Weight Change: n/a    No noted edema as per flow sheets.     Physical Appearance: unable to conduct nutrition focused physical exam as pt asleep at time of visit    Pertinent Medications: MEDICATIONS  (STANDING):  atorvastatin 40 milliGRAM(s) Oral at bedtime  cefepime   IVPB 2000 milliGRAM(s) IV Intermittent every 8 hours  cefepime   IVPB      chlorhexidine 2% Cloths 1 Application(s) Topical daily  chlorhexidine 4% Liquid 1 Application(s) Topical <User Schedule>  dextrose 40% Gel 15 Gram(s) Oral once  dextrose 5%. 1000 milliLiter(s) (50 mL/Hr) IV Continuous <Continuous>  dextrose 5%. 1000 milliLiter(s) (100 mL/Hr) IV Continuous <Continuous>  dextrose 50% Injectable 25 Gram(s) IV Push once  dextrose 50% Injectable 12.5 Gram(s) IV Push once  dextrose 50% Injectable 25 Gram(s) IV Push once  gabapentin 100 milliGRAM(s) Oral three times a day  glucagon  Injectable 1 milliGRAM(s) IntraMuscular once  heparin   Injectable 5000 Unit(s) SubCutaneous every 12 hours  insulin glargine Injectable (LANTUS) 20 Unit(s) SubCutaneous at bedtime  insulin lispro (ADMELOG) corrective regimen sliding scale   SubCutaneous three times a day before meals  insulin lispro (ADMELOG) corrective regimen sliding scale   SubCutaneous at bedtime  insulin lispro Injectable (ADMELOG) 7 Unit(s) SubCutaneous three times a day before meals  metFORMIN 500 milliGRAM(s) Oral daily  metoprolol tartrate 12.5 milliGRAM(s) Oral two times a day  metroNIDAZOLE    Tablet 500 milliGRAM(s) Oral every 8 hours  polyethylene glycol 3350 17 Gram(s) Oral daily  senna 2 Tablet(s) Oral at bedtime    MEDICATIONS  (PRN):  acetaminophen   Tablet .. 650 milliGRAM(s) Oral every 6 hours PRN Moderate Pain (4 - 6)  acetaminophen   Tablet .. 650 milliGRAM(s) Oral every 6 hours PRN Temp greater or equal to 38C (100.4F)  sodium chloride 0.9% lock flush 10 milliLiter(s) IV Push every 1 hour PRN Pre/post blood products, medications, blood draw, and to maintain line patency    Pertinent Labs:  10-02 Alb 1.6 g/dL<L>      Skin: Pressure injuries: 1. sacrum- stage II  2. left upper back- stage II  3. left hip- stage II per flow sheets    Estimated Needs:   [x] no change since previous assessment:  for kcal and fluids  [x] recalculated: protein needs based on IBW 54.4kg and increased protein needs for wound healin.2-1.4g/kg= 65-76g protein/day    Previous Nutrition Diagnosis:   [x]  Moderate malnutrition in the context of chronic illness.     Etiology Inadequate protein-energy intake in setting of increased protein-energy needs in pt with stage II or greater pressure injuries and uncontrolled T2DM.     Signs/Symptoms Physical findings of mild-moderate muscle wasting and mild-moderate fat loss as noted .     Goal/Expected Outcome Pt to consume >75% meals/supplements during LOS. (met)    Nutrition Diagnosis is [x] ongoing  [ ] resolved [ ] not applicable     New Nutrition Diagnosis: [x] not applicable      Interventions:   Recommend  [x] Continue current diet as ordered  [ ] Change Diet To:  [ ] Nutrition Supplement  [ ] Nutrition Support  [ ] Other:     Monitoring and Evaluation:   [x] PO intake [ x ] Tolerance to diet prescription [ x ] weights [ x ] labs[ x ] follow up per protocol  [ ] other:

## 2021-10-08 ENCOUNTER — TRANSCRIPTION ENCOUNTER (OUTPATIENT)
Age: 66
End: 2021-10-08

## 2021-10-08 VITALS
DIASTOLIC BLOOD PRESSURE: 74 MMHG | RESPIRATION RATE: 18 BRPM | OXYGEN SATURATION: 97 % | HEART RATE: 97 BPM | SYSTOLIC BLOOD PRESSURE: 154 MMHG

## 2021-10-08 LAB
GLUCOSE BLDC GLUCOMTR-MCNC: 182 MG/DL — HIGH (ref 70–99)
GLUCOSE BLDC GLUCOMTR-MCNC: 202 MG/DL — HIGH (ref 70–99)
GLUCOSE BLDC GLUCOMTR-MCNC: 314 MG/DL — HIGH (ref 70–99)
GLUCOSE BLDC GLUCOMTR-MCNC: 320 MG/DL — HIGH (ref 70–99)

## 2021-10-08 RX ORDER — METRONIDAZOLE 500 MG
1 TABLET ORAL
Qty: 0 | Refills: 0 | DISCHARGE
Start: 2021-10-08

## 2021-10-08 RX ORDER — METFORMIN HYDROCHLORIDE 850 MG/1
500 TABLET ORAL
Refills: 0 | Status: DISCONTINUED | OUTPATIENT
Start: 2021-10-08 | End: 2021-10-08

## 2021-10-08 RX ADMIN — Medication 2: at 16:39

## 2021-10-08 RX ADMIN — GABAPENTIN 100 MILLIGRAM(S): 400 CAPSULE ORAL at 13:21

## 2021-10-08 RX ADMIN — Medication 500 MILLIGRAM(S): at 05:50

## 2021-10-08 RX ADMIN — Medication 500 MILLIGRAM(S): at 13:21

## 2021-10-08 RX ADMIN — Medication 8: at 12:08

## 2021-10-08 RX ADMIN — METFORMIN HYDROCHLORIDE 500 MILLIGRAM(S): 850 TABLET ORAL at 12:12

## 2021-10-08 RX ADMIN — Medication 7 UNIT(S): at 16:40

## 2021-10-08 RX ADMIN — CEFEPIME 100 MILLIGRAM(S): 1 INJECTION, POWDER, FOR SOLUTION INTRAMUSCULAR; INTRAVENOUS at 12:08

## 2021-10-08 RX ADMIN — METFORMIN HYDROCHLORIDE 500 MILLIGRAM(S): 850 TABLET ORAL at 17:08

## 2021-10-08 RX ADMIN — Medication 12.5 MILLIGRAM(S): at 05:49

## 2021-10-08 RX ADMIN — CEFEPIME 100 MILLIGRAM(S): 1 INJECTION, POWDER, FOR SOLUTION INTRAMUSCULAR; INTRAVENOUS at 05:50

## 2021-10-08 RX ADMIN — CHLORHEXIDINE GLUCONATE 1 APPLICATION(S): 213 SOLUTION TOPICAL at 12:09

## 2021-10-08 RX ADMIN — Medication 12.5 MILLIGRAM(S): at 17:05

## 2021-10-08 RX ADMIN — HEPARIN SODIUM 5000 UNIT(S): 5000 INJECTION INTRAVENOUS; SUBCUTANEOUS at 05:49

## 2021-10-08 RX ADMIN — Medication 8: at 08:19

## 2021-10-08 RX ADMIN — GABAPENTIN 100 MILLIGRAM(S): 400 CAPSULE ORAL at 05:49

## 2021-10-08 RX ADMIN — Medication 7 UNIT(S): at 08:19

## 2021-10-08 RX ADMIN — Medication 7 UNIT(S): at 12:09

## 2021-10-08 NOTE — PROGRESS NOTE ADULT - SUBJECTIVE AND OBJECTIVE BOX
Patient is a 65y old  Female who presents with a chief complaint of sepsis (08 Oct 2021 12:01)      Interval History: finger sticks are stable but in 300s   discharge planning is on   on Metformin 500 mg QD    MEDICATIONS  (STANDING):  atorvastatin 40 milliGRAM(s) Oral at bedtime  cefepime   IVPB 2000 milliGRAM(s) IV Intermittent every 8 hours  cefepime   IVPB      chlorhexidine 2% Cloths 1 Application(s) Topical daily  dextrose 40% Gel 15 Gram(s) Oral once  dextrose 5%. 1000 milliLiter(s) (50 mL/Hr) IV Continuous <Continuous>  dextrose 5%. 1000 milliLiter(s) (100 mL/Hr) IV Continuous <Continuous>  dextrose 50% Injectable 25 Gram(s) IV Push once  dextrose 50% Injectable 12.5 Gram(s) IV Push once  dextrose 50% Injectable 25 Gram(s) IV Push once  gabapentin 100 milliGRAM(s) Oral three times a day  glucagon  Injectable 1 milliGRAM(s) IntraMuscular once  heparin   Injectable 5000 Unit(s) SubCutaneous every 12 hours  insulin glargine Injectable (LANTUS) 20 Unit(s) SubCutaneous at bedtime  insulin lispro (ADMELOG) corrective regimen sliding scale   SubCutaneous three times a day before meals  insulin lispro (ADMELOG) corrective regimen sliding scale   SubCutaneous at bedtime  insulin lispro Injectable (ADMELOG) 7 Unit(s) SubCutaneous three times a day before meals  metFORMIN 500 milliGRAM(s) Oral two times a day  metoprolol tartrate 12.5 milliGRAM(s) Oral two times a day  metroNIDAZOLE    Tablet 500 milliGRAM(s) Oral every 8 hours  polyethylene glycol 3350 17 Gram(s) Oral daily  senna 2 Tablet(s) Oral at bedtime    MEDICATIONS  (PRN):  acetaminophen   Tablet .. 650 milliGRAM(s) Oral every 6 hours PRN Moderate Pain (4 - 6)  acetaminophen   Tablet .. 650 milliGRAM(s) Oral every 6 hours PRN Temp greater or equal to 38C (100.4F)  sodium chloride 0.9% lock flush 10 milliLiter(s) IV Push every 1 hour PRN Pre/post blood products, medications, blood draw, and to maintain line patency      Allergies    avocado (Pruritus)  Carrots (Pruritus)  No Known Drug Allergies  Plums (Pruritus)    Intolerances        REVIEW OF SYSTEMS:  CONSTITUTIONAL: no changes  EYES: No eye pain, visual disturbances, or discharge  ENMT:  No difficulty hearing, No sinus or throat pain  NECK: No pain or stiffness  RESPIRATORY: No cough, wheezing, chills or hemoptysis; No shortness of breath  CARDIOVASCULAR: No chest pain, palpitations or leg swelling  GASTROINTESTINAL: No abdominal or epigastric pain. No nausea, vomiting, or hematemesis; No diarrhea or constipation. No melena or hematochezia.  GENITOURINARY: No dysuria, frequency, hematuria, or incontinence  NEUROLOGICAL: No headaches, memory loss, loss of strength, numbness, or tremors  SKIN: No itching, burning, rashes, or lesions   ENDOCRINE: No heat or cold intolerance; No hair loss  MUSCULOSKELETAL: No joint pain or swelling; No muscle, back, or extremity pain  PSYCHIATRIC: No depression, anxiety, mood swings, or difficulty sleeping  HEME/LYMPH: No easy bruising, or bleeding gums  ALLERY AND IMMUNOLOGIC: No hives or eczema    Vital Signs Last 24 Hrs  T(C): 36.9 (08 Oct 2021 12:36), Max: 37.2 (08 Oct 2021 00:26)  T(F): 98.5 (08 Oct 2021 12:36), Max: 99 (08 Oct 2021 00:26)  HR: 89 (08 Oct 2021 12:36) (89 - 101)  BP: 123/70 (08 Oct 2021 12:36) (119/53 - 150/72)  BP(mean): --  RR: 18 (08 Oct 2021 12:36) (18 - 20)  SpO2: 97% (08 Oct 2021 12:36) (97% - 99%)    PHYSICAL EXAM:  GENERAL:   HEAD: Atraumatic, Normocephalic  EYES: PERRLA, conjunctiva and sclera clear  ENMT: No  exudates,; Moist mucous membranes,, No lesions  NECK: Supple, No JVD, Normal thyroid  NERVOUS SYSTEM:  Alert & Oriented,   CHEST/LUNG: Clear to auscultation bilaterally; No rales, rhonchi, wheezing, or rubs  HEART: Regular rate and rhythm; No murmurs, rubs, or gallops  ABDOMEN: Soft, Nontender, Nondistended; Bowel sounds present  EXTREMITIES: as per the progress notes of Primary Team   SKIN: No rashes or lesions    LABS:        CAPILLARY BLOOD GLUCOSE      POCT Blood Glucose.: 314 mg/dL (08 Oct 2021 11:23)  POCT Blood Glucose.: 320 mg/dL (08 Oct 2021 08:12)  POCT Blood Glucose.: 220 mg/dL (07 Oct 2021 21:31)  POCT Blood Glucose.: 204 mg/dL (07 Oct 2021 16:18)    Lipid panel:           Thyroid:  Diabetes Tests:  Parathyroid Panel:  Adrenals:  RADIOLOGY & ADDITIONAL TESTS:    Imaging Personally Reviewed:  [ ] YES  [ ] NO    Consultant(s) Notes Reviewed:  [ ] YES  [ ] NO    Care Discussed with Consultants/Other Providers [ ] YES  [ ] NO

## 2021-10-08 NOTE — PROGRESS NOTE ADULT - PROVIDER SPECIALTY LIST ADULT
Infectious Disease
Internal Medicine
Internal Medicine
Infectious Disease
Infectious Disease
Internal Medicine
Endocrinology
Endocrinology
Internal Medicine
Podiatry
Podiatry
Endocrinology
Infectious Disease
Internal Medicine
Infectious Disease
Endocrinology
Infectious Disease
Endocrinology
Infectious Disease
Infectious Disease

## 2021-10-08 NOTE — DISCHARGE NOTE NURSING/CASE MANAGEMENT/SOCIAL WORK - PATIENT PORTAL LINK FT
You can access the FollowMyHealth Patient Portal offered by [facility] by registering at the following website: [email or web address]. By joining [facility]’s FollowMyHealth portal, you will also be able to view your health information using other applications (apps) compatible with our system.

## 2021-10-08 NOTE — PROGRESS NOTE ADULT - SUBJECTIVE AND OBJECTIVE BOX
INTERVAL HPI/OVERNIGHT EVENTS:    awaiting  discharge to  rehab     REVIEW OF SYSTEMS:  CONSTITUTIONAL:  no  complaints    NECK: No pain or stiffnes  RESPIRATORY: No SOB   CARDIOVASCULAR: No chest pain, palpitations, dizziness,   GASTROINTESTINAL: No abdominal pain. No nausea, vomiting,   NEUROLOGICAL: No headaches, no  blurry  vision no  dizziness  SKIN: No itching,   MUSCULOSKELETAL: No pain    MEDICATION:  acetaminophen   Tablet .. 650 milliGRAM(s) Oral every 6 hours PRN  acetaminophen   Tablet .. 650 milliGRAM(s) Oral every 6 hours PRN  atorvastatin 40 milliGRAM(s) Oral at bedtime  cefepime   IVPB 2000 milliGRAM(s) IV Intermittent every 8 hours  cefepime   IVPB      chlorhexidine 2% Cloths 1 Application(s) Topical daily  dextrose 40% Gel 15 Gram(s) Oral once  dextrose 5%. 1000 milliLiter(s) IV Continuous <Continuous>  dextrose 5%. 1000 milliLiter(s) IV Continuous <Continuous>  dextrose 50% Injectable 25 Gram(s) IV Push once  dextrose 50% Injectable 12.5 Gram(s) IV Push once  dextrose 50% Injectable 25 Gram(s) IV Push once  gabapentin 100 milliGRAM(s) Oral three times a day  glucagon  Injectable 1 milliGRAM(s) IntraMuscular once  heparin   Injectable 5000 Unit(s) SubCutaneous every 12 hours  insulin glargine Injectable (LANTUS) 20 Unit(s) SubCutaneous at bedtime  insulin lispro (ADMELOG) corrective regimen sliding scale   SubCutaneous three times a day before meals  insulin lispro (ADMELOG) corrective regimen sliding scale   SubCutaneous at bedtime  insulin lispro Injectable (ADMELOG) 7 Unit(s) SubCutaneous three times a day before meals  metFORMIN 500 milliGRAM(s) Oral daily  metoprolol tartrate 12.5 milliGRAM(s) Oral two times a day  metroNIDAZOLE    Tablet 500 milliGRAM(s) Oral every 8 hours  polyethylene glycol 3350 17 Gram(s) Oral daily  senna 2 Tablet(s) Oral at bedtime  sodium chloride 0.9% lock flush 10 milliLiter(s) IV Push every 1 hour PRN    Vital Signs Last 24 Hrs  T(C): 36.6 (08 Oct 2021 05:40), Max: 37.2 (08 Oct 2021 00:26)  T(F): 97.8 (08 Oct 2021 05:40), Max: 99 (08 Oct 2021 00:26)  HR: 90 (08 Oct 2021 05:40) (90 - 101)  BP: 150/72 (08 Oct 2021 05:40) (119/53 - 150/72)  BP(mean): --  RR: 20 (08 Oct 2021 05:40) (19 - 20)  SpO2: 99% (08 Oct 2021 05:40) (97% - 99%)    PHYSICAL EXAM:  GENERAL: NAD, well-groomed, well-developed  HEENT : Conjuntivae  clear sclerae anicteric  NECK: Supple, No JVD, Normal thyroid  NERVOUS SYSTEM:  Alert oriented  x2  no  focal  deficits;   CHEST/LUNG: Clear    HEART: Regular rate and rhythm; No murmurs, rubs, or gallops  ABDOMEN: Soft, Nontender, Nondistended; Bowel sounds present  EXTREMITIES:  l  foot  surgically  wrapped   SKIN: No rashes   LABS:              CAPILLARY BLOOD GLUCOSE      POCT Blood Glucose.: 314 mg/dL (08 Oct 2021 11:23)  POCT Blood Glucose.: 320 mg/dL (08 Oct 2021 08:12)  POCT Blood Glucose.: 220 mg/dL (07 Oct 2021 21:31)  POCT Blood Glucose.: 204 mg/dL (07 Oct 2021 16:18)      RADIOLOGY & ADDITIONAL TESTS:    Imaging reports  Personally Reviewed:  [ ] YES  [ ] NO    Consultant(s) Notes Reviewed:  [ x] YES  [ ] NO    Care Discussed with Consultants/Other Providers [ ] YES  [ ] NO  Problem/Plan - 1:  ·  Problem: Septicemia.   ·  Plan: sefepime  as per  ID  s/p  PICC line  placement  for  long  term AB local  care  as  per  podiatry    Problem/Plan - 2:  ·  Problem: PAD (peripheral artery disease).   ·  Plan: vascular  surgery  eval dr Crawley  appreciated    Problem/Plan - 3:  ·  Problem: Status post transmetatarsal amputation of left foot.   ·  Plan: local  Rx  as per  podiatry  dr Patel  called.   multiple  times    Problem/Plan - 4:  ·  Problem: DM (diabetes mellitus).   ·  Plan: nutritional  insulin  and  scale  coverage hold  metformin  post  ct  angio.    Problem/Plan - 5:  ·  Problem: Constipation.   ·  Plan: senna  miralx.    Problem/Plan - 6:  ·  Problem: Preventive measure.   ·  Plan: dvt  prophylaxis  with  heparin.  discharge  to  rehab

## 2021-10-08 NOTE — PROGRESS NOTE ADULT - NUTRITIONAL ASSESSMENT
This patient has been assessed with a concern for Malnutrition and has been determined to have a diagnosis/diagnoses of Moderate protein-calorie malnutrition.    This patient is being managed with:   Diet Dysphagia 3 Soft-Thin Liquids-  Consistent Carbohydrate {Evening Snack}  DASH/TLC {Sodium & Cholesterol Restricted}  Supplement Feeding Modality:  Oral  Glucerna Shake Cans or Servings Per Day:  1       Frequency:  Two Times a day  Entered: Sep 30 2021  1:51PM    
This patient has been assessed with a concern for Malnutrition and has been determined to have a diagnosis/diagnoses of Moderate protein-calorie malnutrition.    This patient is being managed with:   Diet DASH/TLC-  Sodium & Cholesterol Restricted  Consistent Carbohydrate {Evening Snack}  Entered: Sep 27 2021  6:24PM    The following pending diet order is being considered for treatment of Moderate protein-calorie malnutrition:  Diet Dysphagia 2 Mechanical Soft-Thin Liquids-  Consistent Carbohydrate {Evening Snack}  DASH/TLC {Sodium & Cholesterol Restricted}  Supplement Feeding Modality:  Oral  Glucerna Shake Cans or Servings Per Day:  1       Frequency:  Two Times a day  Entered: Sep 29 2021 11:44AM  
This patient has been assessed with a concern for Malnutrition and has been determined to have a diagnosis/diagnoses of Moderate protein-calorie malnutrition.    This patient is being managed with:   Diet Dysphagia 2 Mechanical Soft-Thin Liquids-  Consistent Carbohydrate {Evening Snack}  DASH/TLC {Sodium & Cholesterol Restricted}  Supplement Feeding Modality:  Oral  Glucerna Shake Cans or Servings Per Day:  1       Frequency:  Two Times a day  Entered: Sep 29 2021 11:44AM    The following pending diet order is being considered for treatment of Moderate protein-calorie malnutrition:  Diet Dysphagia 3 Soft-Thin Liquids-  Consistent Carbohydrate {Evening Snack}  DASH/TLC {Sodium & Cholesterol Restricted}  Supplement Feeding Modality:  Oral  Glucerna Shake Cans or Servings Per Day:  1       Frequency:  Two Times a day  Entered: Sep 30 2021  1:51PM  
This patient has been assessed with a concern for Malnutrition and has been determined to have a diagnosis/diagnoses of Moderate protein-calorie malnutrition.    This patient is being managed with:   Diet Dysphagia 3 Soft-Thin Liquids-  Consistent Carbohydrate {Evening Snack}  DASH/TLC {Sodium & Cholesterol Restricted}  Supplement Feeding Modality:  Oral  Glucerna Shake Cans or Servings Per Day:  1       Frequency:  Two Times a day  Entered: Sep 30 2021  1:51PM    

## 2021-10-08 NOTE — PROGRESS NOTE ADULT - PROBLEM SELECTOR PLAN 1
Continue with the current  regimen while inpatient   decreasing glucose toxicity   stable finger sticks , while inpatient Finger Sticks should be in 140-180 range   Infection driven Hyperglycemia now improving
sepsis decreased   add Metformin 500 mg QD to decrease Insulin Resistance   while inpatient Finger Sticks should be in 140-180 range   Continue with the current  regimen while inpatient
Continue with the current  regimen while inpatient   decreased glucose toxicity   while inpatient Finger Sticks should be in 140-180 range
Infection driven Hyperglycemia   Continue with the current  regimen while inpatient   glucose toxicity still persisting   once infection is decreased expect better finger sticks
Continue with the current  regimen while inpatient   may need more Metformin but  dose needs  to be increased slowly  ( has increased lactic acid secondary to sepsis)  while inpatient Finger Sticks should be in 140-180 range
Continue with the current  regimen while inpatient   once infection is decreased expect better finger sticks   Continue with the current  regimen while inpatient including low dose Metformin
Continue with the current  regimen while inpatient   slightly better finger sticks   Continue with the current  regimen while inpatient for now   may need more insulin   once infection is better , expect better blood glucose
Continue with the current  regimen while inpatient   increase Metformin to BID   severe glucose toxicity persisting
once infection is decreased expect better finger sticks   Continue with the current  regimen while inpatient   may need more Lantus   while inpatient Finger Sticks should be in 140-180 range

## 2021-10-08 NOTE — PROGRESS NOTE ADULT - SUBJECTIVE AND OBJECTIVE BOX
Patient is a 65y old  Female who presents with a chief complaint of sepsis (07 Oct 2021 16:53)       INTERVAL HPI/OVERNIGHT EVENTS:  Patient seen and evaluated at bedside.  Pt is resting comfortable in NAD. Denies N/V/F/C.      Allergies    avocado (Pruritus)  Carrots (Pruritus)  No Known Drug Allergies  Plums (Pruritus)    Intolerances        Vital Signs Last 24 Hrs  T(C): 36.6 (08 Oct 2021 05:40), Max: 37.2 (08 Oct 2021 00:26)  T(F): 97.8 (08 Oct 2021 05:40), Max: 99 (08 Oct 2021 00:26)  HR: 90 (08 Oct 2021 05:40) (90 - 101)  BP: 150/72 (08 Oct 2021 05:40) (119/53 - 150/72)  BP(mean): --  RR: 20 (08 Oct 2021 05:40) (18 - 20)  SpO2: 99% (08 Oct 2021 05:40) (97% - 99%)    LABS:              CAPILLARY BLOOD GLUCOSE      POCT Blood Glucose.: 320 mg/dL (08 Oct 2021 08:12)  POCT Blood Glucose.: 220 mg/dL (07 Oct 2021 21:31)  POCT Blood Glucose.: 204 mg/dL (07 Oct 2021 16:18)  POCT Blood Glucose.: 346 mg/dL (07 Oct 2021 10:54)      Lower Extremity Physical Exam:  Vascular: RF DP/PT 0/4, Temperature gradient LF warm to touch   Neuro: Epicritic sensation absent to the level of digits B/L.  Musculoskeletal/Ortho:  s/p LF choparts amputation left open (9/10):  Open surgical wound with fibroglanular base with necrotic patches, skin edges at level of amputation appear viable with mild necrosis, wound edges warm, no purulent drainage, mild malodor, no active bleeding.     RADIOLOGY & ADDITIONAL TESTS:

## 2021-10-08 NOTE — PROGRESS NOTE ADULT - PROBLEM SELECTOR PROBLEM 1
Uncontrolled diabetes mellitus
Septicemia
Septicemia
Uncontrolled diabetes mellitus
Septicemia
Uncontrolled diabetes mellitus
Septicemia
Uncontrolled diabetes mellitus
Septicemia
Uncontrolled diabetes mellitus
Septicemia

## 2021-10-08 NOTE — PROGRESS NOTE ADULT - REASON FOR ADMISSION
sepsis

## 2022-09-20 NOTE — ED ADULT NURSE NOTE - NS ED NURSE LEVEL OF CONSCIOUSNESS AFFECT
Calm/Appropriate Cimzia Pregnancy And Lactation Text: This medication crosses the placenta but can be considered safe in certain situations. Cimzia may be excreted in breast milk.

## 2023-02-23 ENCOUNTER — INPATIENT (INPATIENT)
Facility: HOSPITAL | Age: 68
LOS: 13 days | Discharge: NURSING, REHAB OR LONG-TERM CARE | End: 2023-03-09
Attending: INTERNAL MEDICINE | Admitting: INTERNAL MEDICINE
Payer: MEDICARE

## 2023-02-23 VITALS
RESPIRATION RATE: 18 BRPM | HEIGHT: 68 IN | DIASTOLIC BLOOD PRESSURE: 63 MMHG | WEIGHT: 190.04 LBS | HEART RATE: 125 BPM | OXYGEN SATURATION: 97 % | TEMPERATURE: 101 F | SYSTOLIC BLOOD PRESSURE: 131 MMHG

## 2023-02-23 LAB
ACETONE SERPL-MCNC: NEGATIVE — SIGNIFICANT CHANGE UP
ALBUMIN SERPL ELPH-MCNC: 2.6 G/DL — LOW (ref 3.3–5)
ALBUMIN SERPL ELPH-MCNC: 3.1 G/DL — LOW (ref 3.3–5)
ALP SERPL-CCNC: 113 U/L — SIGNIFICANT CHANGE UP (ref 40–120)
ALP SERPL-CCNC: 162 U/L — HIGH (ref 40–120)
ALT FLD-CCNC: 69 U/L — SIGNIFICANT CHANGE UP (ref 12–78)
ALT FLD-CCNC: 71 U/L — SIGNIFICANT CHANGE UP (ref 12–78)
ANION GAP SERPL CALC-SCNC: 13 MMOL/L — SIGNIFICANT CHANGE UP (ref 5–17)
ANION GAP SERPL CALC-SCNC: 14 MMOL/L — SIGNIFICANT CHANGE UP (ref 5–17)
APPEARANCE UR: CLEAR — SIGNIFICANT CHANGE UP
APTT BLD: 28.2 SEC — SIGNIFICANT CHANGE UP (ref 27.5–35.5)
AST SERPL-CCNC: 113 U/L — HIGH (ref 15–37)
AST SERPL-CCNC: 93 U/L — HIGH (ref 15–37)
BACTERIA # UR AUTO: ABNORMAL
BASOPHILS # BLD AUTO: 0 K/UL — SIGNIFICANT CHANGE UP (ref 0–0.2)
BASOPHILS NFR BLD AUTO: 0 % — SIGNIFICANT CHANGE UP (ref 0–2)
BILIRUB SERPL-MCNC: 0.3 MG/DL — SIGNIFICANT CHANGE UP (ref 0.2–1.2)
BILIRUB SERPL-MCNC: 0.4 MG/DL — SIGNIFICANT CHANGE UP (ref 0.2–1.2)
BILIRUB UR-MCNC: NEGATIVE — SIGNIFICANT CHANGE UP
BUN SERPL-MCNC: 30 MG/DL — HIGH (ref 7–23)
BUN SERPL-MCNC: 32 MG/DL — HIGH (ref 7–23)
CALCIUM SERPL-MCNC: 8.2 MG/DL — LOW (ref 8.5–10.1)
CALCIUM SERPL-MCNC: 9.2 MG/DL — SIGNIFICANT CHANGE UP (ref 8.5–10.1)
CHLORIDE SERPL-SCNC: 103 MMOL/L — SIGNIFICANT CHANGE UP (ref 96–108)
CHLORIDE SERPL-SCNC: 107 MMOL/L — SIGNIFICANT CHANGE UP (ref 96–108)
CO2 SERPL-SCNC: 18 MMOL/L — LOW (ref 22–31)
CO2 SERPL-SCNC: 21 MMOL/L — LOW (ref 22–31)
COLOR SPEC: YELLOW — SIGNIFICANT CHANGE UP
CREAT SERPL-MCNC: 1.83 MG/DL — HIGH (ref 0.5–1.3)
CREAT SERPL-MCNC: 1.84 MG/DL — HIGH (ref 0.5–1.3)
DIFF PNL FLD: ABNORMAL
EGFR: 30 ML/MIN/1.73M2 — LOW
EGFR: 30 ML/MIN/1.73M2 — LOW
EOSINOPHIL # BLD AUTO: 0.08 K/UL — SIGNIFICANT CHANGE UP (ref 0–0.5)
EOSINOPHIL NFR BLD AUTO: 3 % — SIGNIFICANT CHANGE UP (ref 0–6)
EPI CELLS # UR: SIGNIFICANT CHANGE UP
FLUAV AG NPH QL: SIGNIFICANT CHANGE UP
FLUBV AG NPH QL: SIGNIFICANT CHANGE UP
GLUCOSE BLDC GLUCOMTR-MCNC: 338 MG/DL — HIGH (ref 70–99)
GLUCOSE BLDC GLUCOMTR-MCNC: 352 MG/DL — HIGH (ref 70–99)
GLUCOSE BLDC GLUCOMTR-MCNC: 426 MG/DL — HIGH (ref 70–99)
GLUCOSE BLDC GLUCOMTR-MCNC: 431 MG/DL — HIGH (ref 70–99)
GLUCOSE BLDC GLUCOMTR-MCNC: 434 MG/DL — HIGH (ref 70–99)
GLUCOSE BLDC GLUCOMTR-MCNC: 453 MG/DL — CRITICAL HIGH (ref 70–99)
GLUCOSE BLDC GLUCOMTR-MCNC: 457 MG/DL — CRITICAL HIGH (ref 70–99)
GLUCOSE BLDC GLUCOMTR-MCNC: 464 MG/DL — CRITICAL HIGH (ref 70–99)
GLUCOSE SERPL-MCNC: 433 MG/DL — HIGH (ref 70–99)
GLUCOSE SERPL-MCNC: 464 MG/DL — CRITICAL HIGH (ref 70–99)
GLUCOSE UR QL: 1000 MG/DL
HCT VFR BLD CALC: 27.3 % — LOW (ref 34.5–45)
HCT VFR BLD CALC: 35.6 % — SIGNIFICANT CHANGE UP (ref 34.5–45)
HGB BLD-MCNC: 11.7 G/DL — SIGNIFICANT CHANGE UP (ref 11.5–15.5)
HGB BLD-MCNC: 9.2 G/DL — LOW (ref 11.5–15.5)
INR BLD: 1.05 RATIO — SIGNIFICANT CHANGE UP (ref 0.88–1.16)
KETONES UR-MCNC: NEGATIVE — SIGNIFICANT CHANGE UP
LACTATE SERPL-SCNC: 5.5 MMOL/L — CRITICAL HIGH (ref 0.7–2)
LACTATE SERPL-SCNC: 5.6 MMOL/L — CRITICAL HIGH (ref 0.7–2)
LACTATE SERPL-SCNC: 6.5 MMOL/L — CRITICAL HIGH (ref 0.7–2)
LACTATE SERPL-SCNC: 8 MMOL/L — CRITICAL HIGH (ref 0.7–2)
LEUKOCYTE ESTERASE UR-ACNC: ABNORMAL
LYMPHOCYTES # BLD AUTO: 0.33 K/UL — LOW (ref 1–3.3)
LYMPHOCYTES # BLD AUTO: 12 % — LOW (ref 13–44)
MANUAL SMEAR VERIFICATION: SIGNIFICANT CHANGE UP
MCHC RBC-ENTMCNC: 27.8 PG — SIGNIFICANT CHANGE UP (ref 27–34)
MCHC RBC-ENTMCNC: 27.8 PG — SIGNIFICANT CHANGE UP (ref 27–34)
MCHC RBC-ENTMCNC: 32.9 G/DL — SIGNIFICANT CHANGE UP (ref 32–36)
MCHC RBC-ENTMCNC: 33.7 G/DL — SIGNIFICANT CHANGE UP (ref 32–36)
MCV RBC AUTO: 82.5 FL — SIGNIFICANT CHANGE UP (ref 80–100)
MCV RBC AUTO: 84.6 FL — SIGNIFICANT CHANGE UP (ref 80–100)
MONOCYTES # BLD AUTO: 0.03 K/UL — SIGNIFICANT CHANGE UP (ref 0–0.9)
MONOCYTES NFR BLD AUTO: 1 % — LOW (ref 2–14)
NEUTROPHILS # BLD AUTO: 2.3 K/UL — SIGNIFICANT CHANGE UP (ref 1.8–7.4)
NEUTROPHILS NFR BLD AUTO: 68 % — SIGNIFICANT CHANGE UP (ref 43–77)
NEUTS BAND # BLD: 16 % — HIGH (ref 0–8)
NITRITE UR-MCNC: NEGATIVE — SIGNIFICANT CHANGE UP
NRBC # BLD: 1 /100 — HIGH (ref 0–0)
NRBC # BLD: SIGNIFICANT CHANGE UP /100 WBCS (ref 0–0)
OSMOLALITY SERPL: 318 MOSMOL/KG — HIGH (ref 280–301)
PH UR: 6 — SIGNIFICANT CHANGE UP (ref 5–8)
PLAT MORPH BLD: NORMAL — SIGNIFICANT CHANGE UP
PLATELET # BLD AUTO: 179 K/UL — SIGNIFICANT CHANGE UP (ref 150–400)
PLATELET # BLD AUTO: 205 K/UL — SIGNIFICANT CHANGE UP (ref 150–400)
POTASSIUM SERPL-MCNC: 3.3 MMOL/L — LOW (ref 3.5–5.3)
POTASSIUM SERPL-MCNC: 3.4 MMOL/L — LOW (ref 3.5–5.3)
POTASSIUM SERPL-SCNC: 3.3 MMOL/L — LOW (ref 3.5–5.3)
POTASSIUM SERPL-SCNC: 3.4 MMOL/L — LOW (ref 3.5–5.3)
PROT SERPL-MCNC: 5.5 GM/DL — LOW (ref 6–8.3)
PROT SERPL-MCNC: 6.3 GM/DL — SIGNIFICANT CHANGE UP (ref 6–8.3)
PROT UR-MCNC: 100 MG/DL
PROTHROM AB SERPL-ACNC: 12.6 SEC — SIGNIFICANT CHANGE UP (ref 10.5–13.4)
RAPID RVP RESULT: DETECTED
RBC # BLD: 3.31 M/UL — LOW (ref 3.8–5.2)
RBC # BLD: 4.21 M/UL — SIGNIFICANT CHANGE UP (ref 3.8–5.2)
RBC # FLD: 13.5 % — SIGNIFICANT CHANGE UP (ref 10.3–14.5)
RBC # FLD: 14.1 % — SIGNIFICANT CHANGE UP (ref 10.3–14.5)
RBC BLD AUTO: NORMAL — SIGNIFICANT CHANGE UP
RBC CASTS # UR COMP ASSIST: ABNORMAL /HPF (ref 0–4)
SARS-COV-2 RNA SPEC QL NAA+PROBE: DETECTED
SARS-COV-2 RNA SPEC QL NAA+PROBE: SIGNIFICANT CHANGE UP
SODIUM SERPL-SCNC: 138 MMOL/L — SIGNIFICANT CHANGE UP (ref 135–145)
SODIUM SERPL-SCNC: 138 MMOL/L — SIGNIFICANT CHANGE UP (ref 135–145)
SP GR SPEC: 1.02 — SIGNIFICANT CHANGE UP (ref 1.01–1.02)
TROPONIN I, HIGH SENSITIVITY RESULT: 2621.9 NG/L — HIGH
TROPONIN I, HIGH SENSITIVITY RESULT: 5151.1 NG/L — HIGH
TROPONIN I, HIGH SENSITIVITY RESULT: 5723.5 NG/L — HIGH
TROPONIN I, HIGH SENSITIVITY RESULT: 990.4 NG/L — HIGH
UROBILINOGEN FLD QL: NEGATIVE MG/DL — SIGNIFICANT CHANGE UP
WBC # BLD: 2.74 K/UL — LOW (ref 3.8–10.5)
WBC # FLD AUTO: 2.74 K/UL — LOW (ref 3.8–10.5)
WBC UR QL: ABNORMAL

## 2023-02-23 PROCEDURE — 99285 EMERGENCY DEPT VISIT HI MDM: CPT

## 2023-02-23 PROCEDURE — 93010 ELECTROCARDIOGRAM REPORT: CPT

## 2023-02-23 PROCEDURE — 70450 CT HEAD/BRAIN W/O DYE: CPT | Mod: 26,MC

## 2023-02-23 PROCEDURE — 99222 1ST HOSP IP/OBS MODERATE 55: CPT

## 2023-02-23 PROCEDURE — 74177 CT ABD & PELVIS W/CONTRAST: CPT | Mod: 26,MC

## 2023-02-23 PROCEDURE — 71045 X-RAY EXAM CHEST 1 VIEW: CPT | Mod: 26

## 2023-02-23 RX ORDER — INSULIN HUMAN 100 [IU]/ML
10 INJECTION, SOLUTION SUBCUTANEOUS ONCE
Refills: 0 | Status: COMPLETED | OUTPATIENT
Start: 2023-02-23 | End: 2023-02-23

## 2023-02-23 RX ORDER — SODIUM CHLORIDE 9 MG/ML
1000 INJECTION, SOLUTION INTRAVENOUS
Refills: 0 | Status: DISCONTINUED | OUTPATIENT
Start: 2023-02-23 | End: 2023-02-24

## 2023-02-23 RX ORDER — PIPERACILLIN AND TAZOBACTAM 4; .5 G/20ML; G/20ML
3.38 INJECTION, POWDER, LYOPHILIZED, FOR SOLUTION INTRAVENOUS ONCE
Refills: 0 | Status: COMPLETED | OUTPATIENT
Start: 2023-02-23 | End: 2023-02-23

## 2023-02-23 RX ORDER — SODIUM CHLORIDE 9 MG/ML
2000 INJECTION INTRAMUSCULAR; INTRAVENOUS; SUBCUTANEOUS ONCE
Refills: 0 | Status: COMPLETED | OUTPATIENT
Start: 2023-02-23 | End: 2023-02-23

## 2023-02-23 RX ORDER — PIPERACILLIN AND TAZOBACTAM 4; .5 G/20ML; G/20ML
3.38 INJECTION, POWDER, LYOPHILIZED, FOR SOLUTION INTRAVENOUS EVERY 8 HOURS
Refills: 0 | Status: DISCONTINUED | OUTPATIENT
Start: 2023-02-23 | End: 2023-02-26

## 2023-02-23 RX ORDER — DEXTROSE 50 % IN WATER 50 %
25 SYRINGE (ML) INTRAVENOUS ONCE
Refills: 0 | Status: DISCONTINUED | OUTPATIENT
Start: 2023-02-23 | End: 2023-02-24

## 2023-02-23 RX ORDER — INSULIN LISPRO 100/ML
VIAL (ML) SUBCUTANEOUS EVERY 6 HOURS
Refills: 0 | Status: DISCONTINUED | OUTPATIENT
Start: 2023-02-23 | End: 2023-02-24

## 2023-02-23 RX ORDER — DEXTROSE 50 % IN WATER 50 %
12.5 SYRINGE (ML) INTRAVENOUS ONCE
Refills: 0 | Status: DISCONTINUED | OUTPATIENT
Start: 2023-02-23 | End: 2023-02-24

## 2023-02-23 RX ORDER — VANCOMYCIN HCL 1 G
1000 VIAL (EA) INTRAVENOUS ONCE
Refills: 0 | Status: COMPLETED | OUTPATIENT
Start: 2023-02-23 | End: 2023-02-23

## 2023-02-23 RX ORDER — DEXTROSE 50 % IN WATER 50 %
15 SYRINGE (ML) INTRAVENOUS ONCE
Refills: 0 | Status: DISCONTINUED | OUTPATIENT
Start: 2023-02-23 | End: 2023-02-24

## 2023-02-23 RX ORDER — ASPIRIN/CALCIUM CARB/MAGNESIUM 324 MG
81 TABLET ORAL DAILY
Refills: 0 | Status: DISCONTINUED | OUTPATIENT
Start: 2023-02-23 | End: 2023-02-24

## 2023-02-23 RX ORDER — SODIUM CHLORIDE 9 MG/ML
1000 INJECTION INTRAMUSCULAR; INTRAVENOUS; SUBCUTANEOUS ONCE
Refills: 0 | Status: COMPLETED | OUTPATIENT
Start: 2023-02-23 | End: 2023-02-23

## 2023-02-23 RX ORDER — CHLORHEXIDINE GLUCONATE 213 G/1000ML
1 SOLUTION TOPICAL
Refills: 0 | Status: DISCONTINUED | OUTPATIENT
Start: 2023-02-23 | End: 2023-03-09

## 2023-02-23 RX ORDER — GLUCAGON INJECTION, SOLUTION 0.5 MG/.1ML
1 INJECTION, SOLUTION SUBCUTANEOUS ONCE
Refills: 0 | Status: DISCONTINUED | OUTPATIENT
Start: 2023-02-23 | End: 2023-02-24

## 2023-02-23 RX ORDER — ACETAMINOPHEN 500 MG
650 TABLET ORAL EVERY 6 HOURS
Refills: 0 | Status: DISCONTINUED | OUTPATIENT
Start: 2023-02-23 | End: 2023-02-25

## 2023-02-23 RX ORDER — INSULIN GLARGINE 100 [IU]/ML
10 INJECTION, SOLUTION SUBCUTANEOUS AT BEDTIME
Refills: 0 | Status: DISCONTINUED | OUTPATIENT
Start: 2023-02-23 | End: 2023-02-24

## 2023-02-23 RX ORDER — HEPARIN SODIUM 5000 [USP'U]/ML
5000 INJECTION INTRAVENOUS; SUBCUTANEOUS EVERY 8 HOURS
Refills: 0 | Status: DISCONTINUED | OUTPATIENT
Start: 2023-02-23 | End: 2023-02-24

## 2023-02-23 RX ORDER — NOREPINEPHRINE BITARTRATE/D5W 8 MG/250ML
0.05 PLASTIC BAG, INJECTION (ML) INTRAVENOUS
Qty: 8 | Refills: 0 | Status: DISCONTINUED | OUTPATIENT
Start: 2023-02-23 | End: 2023-02-24

## 2023-02-23 RX ORDER — ACETAMINOPHEN 500 MG
1000 TABLET ORAL ONCE
Refills: 0 | Status: COMPLETED | OUTPATIENT
Start: 2023-02-23 | End: 2023-02-23

## 2023-02-23 RX ORDER — POLYETHYLENE GLYCOL 3350 17 G/17G
17 POWDER, FOR SOLUTION ORAL AT BEDTIME
Refills: 0 | Status: DISCONTINUED | OUTPATIENT
Start: 2023-02-23 | End: 2023-03-09

## 2023-02-23 RX ORDER — CEFTRIAXONE 500 MG/1
1000 INJECTION, POWDER, FOR SOLUTION INTRAMUSCULAR; INTRAVENOUS ONCE
Refills: 0 | Status: COMPLETED | OUTPATIENT
Start: 2023-02-23 | End: 2023-02-23

## 2023-02-23 RX ADMIN — PIPERACILLIN AND TAZOBACTAM 200 GRAM(S): 4; .5 INJECTION, POWDER, LYOPHILIZED, FOR SOLUTION INTRAVENOUS at 16:54

## 2023-02-23 RX ADMIN — INSULIN GLARGINE 10 UNIT(S): 100 INJECTION, SOLUTION SUBCUTANEOUS at 22:48

## 2023-02-23 RX ADMIN — Medication 10: at 21:49

## 2023-02-23 RX ADMIN — Medication 250 MILLIGRAM(S): at 17:28

## 2023-02-23 RX ADMIN — CEFTRIAXONE 100 MILLIGRAM(S): 500 INJECTION, POWDER, FOR SOLUTION INTRAMUSCULAR; INTRAVENOUS at 10:34

## 2023-02-23 RX ADMIN — Medication 1000 MILLIGRAM(S): at 12:30

## 2023-02-23 RX ADMIN — SODIUM CHLORIDE 1000 MILLILITER(S): 9 INJECTION INTRAMUSCULAR; INTRAVENOUS; SUBCUTANEOUS at 14:29

## 2023-02-23 RX ADMIN — Medication 400 MILLIGRAM(S): at 10:35

## 2023-02-23 RX ADMIN — PIPERACILLIN AND TAZOBACTAM 25 GRAM(S): 4; .5 INJECTION, POWDER, LYOPHILIZED, FOR SOLUTION INTRAVENOUS at 21:50

## 2023-02-23 RX ADMIN — SODIUM CHLORIDE 1000 MILLILITER(S): 9 INJECTION INTRAMUSCULAR; INTRAVENOUS; SUBCUTANEOUS at 17:01

## 2023-02-23 RX ADMIN — INSULIN HUMAN 10 UNIT(S): 100 INJECTION, SOLUTION SUBCUTANEOUS at 16:55

## 2023-02-23 RX ADMIN — CEFTRIAXONE 1000 MILLIGRAM(S): 500 INJECTION, POWDER, FOR SOLUTION INTRAMUSCULAR; INTRAVENOUS at 12:30

## 2023-02-23 RX ADMIN — PIPERACILLIN AND TAZOBACTAM 3.38 GRAM(S): 4; .5 INJECTION, POWDER, LYOPHILIZED, FOR SOLUTION INTRAVENOUS at 17:30

## 2023-02-23 RX ADMIN — Medication 8.08 MICROGRAM(S)/KG/MIN: at 21:50

## 2023-02-23 RX ADMIN — HEPARIN SODIUM 5000 UNIT(S): 5000 INJECTION INTRAVENOUS; SUBCUTANEOUS at 21:50

## 2023-02-23 RX ADMIN — SODIUM CHLORIDE 2000 MILLILITER(S): 9 INJECTION INTRAMUSCULAR; INTRAVENOUS; SUBCUTANEOUS at 09:57

## 2023-02-23 NOTE — CONSULT NOTE ADULT - SUBJECTIVE AND OBJECTIVE BOX
UROLOGY CONSULT NOTE    Patient is a 67y old  Female who presents with a chief complaint of UTI sepsis (2023 19:00)    HPI:  67F PMH HTN, DM BIBEMS from home for AMS. Per EMS, pt coming from home, family called as pt found confused 0400 this AM. Per EMS, FS read as 'Hi.' EMS reports pt found in feces / urine. On ED arrival, pt orally febrile 103.5 F tachycardic to 120s, FS 400s.   In ED pt found to have UTI w/ CT abd with "Mild right hydroureteronephrosis with delayed right nephrogram, possibly ascending urinary tract infection. Underlying benign or malignant distal ureteral stricture is difficult to exclude."  CT chest with atelectasis vs pna. CTH neg. Labs significant for neutropenia with bandemia, hyperglycemia in 400s, lactate of 8, and trops peaked to 5K without EKG changes, and GEOVANNI. Pt started on Vanc/Zosyn. Pt also found to be covid + in ED. Pt initially HD stable, but subsequently became transiently Hotn to 70s/40s after 4L IVF and hypoxic to 80s on RA requiring 2LNC.  ICU consulted for septic shock 2/2 UTI.    Subjective: pt seen and examined at the bedside. Pt unable to answer questions 2/2 AMS.  Upon ICU evaluation, pt altered, following some commands but combative. Pt BP in low 100s after IVF and satting well on 2LNC.  POCUS with hyperdynamic LV but without wal motion abnormality, IVC indeterminate, and lungs with scattered B lines bilat.        (2023 19:00)    Patient seen and examined at bedside in the ICU, unable to participate in history.    REVIEW OF SYSTEMS:  CONSTITUTIONAL: No fever, weight loss, or fatigue  EYES: No eye pain, visual disturbances, discharge  ENMT:  No difficulty hearing, tinnitus, vertigo; No sinus or throat pain  NECK: No pain or stiffness  BREASTS: No pain, masses, or nipple discharge  RESPIRATORY: No cough, wheezing, chills or hemoptysis; No shortness of breath  CARDIOVASCULAR: No chest pain, palpitations, dizziness, or leg swelling  GASTROINTESTINAL: No abdominal or epigastric pain. No nausea, vomiting, or hematemesis; No diarrhea or constipation. No melena or hematochezia.  GENITOURINARY: No dysuria, frequency, hematuria, or incontinence  NEUROLOGICAL: No headaches, memory loss, loss of strength, numbness, or tremors  SKIN: No itching, burning, rashes, or lesions   LYMPH NODES: No enlarged glands  ENDOCRINE: No heat or cold intolerance; No hair loss  MUSCULOSKELETAL: No joint pain or swelling; No muscle, back, or extremity pain  PSYCHIATRIC: No depression, anxiety, mood swings, or difficulty sleeping  HEME/LYMPH: No easy bruising, or bleeding gums  ALLERY AND IMMUNOLOGIC: No hives or eczema    PAST MEDICAL & SURGICAL HISTORY:  Hypertension  Family history of diabetes mellitus  No significant past surgical history    MEDICATIONS  (STANDING):  aspirin  chewable 81 milliGRAM(s) Oral daily  chlorhexidine 2% Cloths 1 Application(s) Topical <User Schedule>  heparin   Injectable 5000 Unit(s) SubCutaneous every 8 hours  insulin lispro (ADMELOG) corrective regimen sliding scale   SubCutaneous every 6 hours  piperacillin/tazobactam IVPB.. 3.375 Gram(s) IV Intermittent every 8 hours  polyethylene glycol 3350 17 Gram(s) Oral at bedtime    MEDICATIONS  (PRN):  acetaminophen     Tablet .. 650 milliGRAM(s) Oral every 6 hours PRN Temp greater or equal to 38C (100.4F), Mild Pain (1 - 3)    Allergies    avocado (Pruritus)  Carrots (Pruritus)  No Known Drug Allergies  Plums (Pruritus)    SOCIAL HISTORY: unable to obtain due to altered mentation          FAMILY HISTORY: Diabetes mellitus      Vital Signs Last 24 Hrs  T(C): 37.7 (2023 19:00), Max: 39.7 (2023 09:35)  T(F): 99.9 (2023 19:00), Max: 103.5 (2023 09:35)  HR: 112 (2023 19:00) (101 - 125)  BP: 107/50 (2023 19:00) (99/66 - 149/71)  BP(mean): 69 (2023 19:00) (67 - 94)  RR: 17 (2023 19:00) (16 - 24)  SpO2: 97% (2023 19:00) (94% - 99%)    Parameters below as of 2023 19:00  Patient On (Oxygen Delivery Method): room air    PHYSICAL EXAM:  CONSTITUTIONAL: NAD, large body habitus  HEAD:  Atraumatic, Normocephalic  EYES: Conjunctiva and sclera clear  ENMT: No tonsillar erythema, exudates, or enlargement; Moist mucous membranes, No lesions  NECK: Supple, No JVD, Normal thyroid  NERVOUS SYSTEM:  Alert & Oriented X3  RESPIRATORY: Clear to auscultation bilaterally; No rales, rhonchi, wheezing  CARDIOVASCULAR: Regular rate and rhythm. S1S2  GASTROINTESTINAL: Nondistended, +BS, soft, nontender, no guarding, no rigidity   GENITOURINARY: No bladder distention, no suprapubic tenderness. No abdominal scars. Hall catheter draining clear yellow urine.  MUSCULOSKELETAL:  2+ Peripheral Pulses, No clubbing, cyanosis, or edema. Hand mits in place.    LABS:                        11.7   2.74  )-----------( 205      ( 2023 09:20 )             35.6         138  |  107  |  32<H>  ----------------------------<  433<H>  3.4<L>   |  18<L>  |  1.84<H>    Ca    8.2<L>      2023 15:24    TPro  5.5<L>  /  Alb  2.6<L>  /  TBili  0.3  /  DBili  x   /  AST  93<H>  /  ALT  69  /  AlkPhos  113      PT/INR - ( 2023 15:24 )   PT: 12.6 sec;   INR: 1.05 ratio         PTT - ( 2023 15:24 )  PTT:28.2 sec  Urinalysis Basic - ( 2023 10:40 )    Color: Yellow / Appearance: Clear / S.020 / pH: x  Gluc: x / Ketone: Negative  / Bili: Negative / Urobili: Negative mg/dL   Blood: x / Protein: 100 mg/dL / Nitrite: Negative   Leuk Esterase: Small / RBC: 6-10 /HPF / WBC 11-25   Sq Epi: x / Non Sq Epi: Few / Bacteria: Many      < from: CT Abdomen and Pelvis w/ IV Cont (23 @ 17:08) >  FINDINGS:  LOWER CHEST: Bibasilar atelectasis or pneumonia.    LIVER: Hepatic cysts.  BILE DUCTS: Normal caliber.  GALLBLADDER: Within normal limits.  SPLEEN: Within normal limits.  PANCREAS: Within normal limits.  ADRENALS: Within normal limits.  KIDNEYS/URETERS: Mild right hydroureteronephrosis to the level of the   ureterovesicular junction. Delayed right nephrogram.    BLADDER: Droplets of air within the urinary bladder.  REPRODUCTIVE ORGANS: Uterus and adnexa within normal limits.    BOWEL: Small hiatal hernia with mural thickening of the distal esophagus.   No bowel obstruction. Appendix is normal.  PERITONEUM: No ascites.  VESSELS: Atherosclerotic changes.  RETROPERITONEUM/LYMPH NODES: No lymphadenopathy.  ABDOMINAL WALL: Within normal limits.  BONES: Degenerative changes.    IMPRESSION:  Droplets of air within the urinary bladder which, in the absence of   instrumentation, may represent urinary tract infection.    Mild right hydroureteronephrosis with delayed right nephrogram, possibly   ascending urinary tract infection. Underlying benign or malignant distal   ureteral stricture is difficult to exclude.    Bibasilar atelectasis or pneumonia.    --- End of Report ---    < end of copied text >   UROLOGY CONSULT NOTE    Patient is a 67y old  Female who presents with a chief complaint of UTI sepsis (2023 19:00)    HPI:  67F PMH HTN, DM BIBEMS from home for AMS. Per EMS, pt coming from home, family called as pt found confused 0400 this AM. Per EMS, FS read as 'Hi.' EMS reports pt found in feces / urine. On ED arrival, pt orally febrile 103.5 F tachycardic to 120s, FS 400s.   In ED pt found to have UTI w/ CT abd with "Mild right hydroureteronephrosis with delayed right nephrogram, possibly ascending urinary tract infection. Underlying benign or malignant distal ureteral stricture is difficult to exclude."  CT chest with atelectasis vs pna. CTH neg. Labs significant for neutropenia with bandemia, hyperglycemia in 400s, lactate of 8, and trops peaked to 5K without EKG changes, and GEOVANNI. Pt started on Vanc/Zosyn. Pt also found to be covid + in ED. Pt initially HD stable, but subsequently became transiently Hotn to 70s/40s after 4L IVF and hypoxic to 80s on RA requiring 2LNC.  ICU consulted for septic shock 2/2 UTI.    Subjective: pt seen and examined at the bedside. Pt unable to answer questions 2/2 AMS.  Upon ICU evaluation, pt altered, following some commands but combative. Pt BP in low 100s after IVF and satting well on 2LNC.  POCUS with hyperdynamic LV but without wal motion abnormality, IVC indeterminate, and lungs with scattered B lines bilat.        (2023 19:00)    Patient seen and examined at bedside in the ICU, unable to participate in history.    REVIEW OF SYSTEMS:  CONSTITUTIONAL: No fever, weight loss, or fatigue  EYES: No eye pain, visual disturbances, discharge  ENMT:  No difficulty hearing, tinnitus, vertigo; No sinus or throat pain  NECK: No pain or stiffness  BREASTS: No pain, masses, or nipple discharge  RESPIRATORY: No cough, wheezing, chills or hemoptysis; No shortness of breath  CARDIOVASCULAR: No chest pain, palpitations, dizziness, or leg swelling  GASTROINTESTINAL: No abdominal or epigastric pain. No nausea, vomiting, or hematemesis; No diarrhea or constipation. No melena or hematochezia.  GENITOURINARY: No dysuria, frequency, hematuria, or incontinence  NEUROLOGICAL: No headaches, memory loss, loss of strength, numbness, or tremors  SKIN: No itching, burning, rashes, or lesions   LYMPH NODES: No enlarged glands  ENDOCRINE: No heat or cold intolerance; No hair loss  MUSCULOSKELETAL: No joint pain or swelling; No muscle, back, or extremity pain  PSYCHIATRIC: No depression, anxiety, mood swings, or difficulty sleeping  HEME/LYMPH: No easy bruising, or bleeding gums  ALLERY AND IMMUNOLOGIC: No hives or eczema    PAST MEDICAL & SURGICAL HISTORY:  Hypertension  Family history of diabetes mellitus  No significant past surgical history    MEDICATIONS  (STANDING):  aspirin  chewable 81 milliGRAM(s) Oral daily  chlorhexidine 2% Cloths 1 Application(s) Topical <User Schedule>  heparin   Injectable 5000 Unit(s) SubCutaneous every 8 hours  insulin lispro (ADMELOG) corrective regimen sliding scale   SubCutaneous every 6 hours  piperacillin/tazobactam IVPB.. 3.375 Gram(s) IV Intermittent every 8 hours  polyethylene glycol 3350 17 Gram(s) Oral at bedtime    MEDICATIONS  (PRN):  acetaminophen     Tablet .. 650 milliGRAM(s) Oral every 6 hours PRN Temp greater or equal to 38C (100.4F), Mild Pain (1 - 3)    Allergies    avocado (Pruritus)  Carrots (Pruritus)  No Known Drug Allergies  Plums (Pruritus)    SOCIAL HISTORY: unable to obtain due to altered mentation          FAMILY HISTORY: Diabetes mellitus      Vital Signs Last 24 Hrs  T(C): 37.7 (2023 19:00), Max: 39.7 (2023 09:35)  T(F): 99.9 (2023 19:00), Max: 103.5 (2023 09:35)  HR: 112 (2023 19:00) (101 - 125)  BP: 107/50 (2023 19:00) (99/66 - 149/71)  BP(mean): 69 (2023 19:00) (67 - 94)  RR: 17 (2023 19:00) (16 - 24)  SpO2: 97% (2023 19:00) (94% - 99%)    Parameters below as of 2023 19:00  Patient On (Oxygen Delivery Method): room air    PHYSICAL EXAM:  CONSTITUTIONAL: NAD, large body habitus  HEAD:  Atraumatic, Normocephalic  EYES: Conjunctiva and sclera clear  ENMT: No tonsillar erythema, exudates, or enlargement; Moist mucous membranes, No lesions  NECK: Supple, No JVD, Normal thyroid  NERVOUS SYSTEM:  Alert & Oriented X3  RESPIRATORY: Clear to auscultation bilaterally; No rales, rhonchi, wheezing  CARDIOVASCULAR: Regular rate and rhythm. S1S2  GASTROINTESTINAL: No abdominal scars. Nondistended, +BS, soft, nontender, no guarding, no rigidity   GENITOURINARY: No bladder distention, no suprapubic tenderness. Hall catheter draining clear yellow urine.  MUSCULOSKELETAL:  2+ Peripheral Pulses, No clubbing, cyanosis, or edema. Hand mits in place.    LABS:                        11.7   2.74  )-----------( 205      ( 2023 09:20 )             35.6         138  |  107  |  32<H>  ----------------------------<  433<H>  3.4<L>   |  18<L>  |  1.84<H>    Ca    8.2<L>      2023 15:24    TPro  5.5<L>  /  Alb  2.6<L>  /  TBili  0.3  /  DBili  x   /  AST  93<H>  /  ALT  69  /  AlkPhos  113      PT/INR - ( 2023 15:24 )   PT: 12.6 sec;   INR: 1.05 ratio         PTT - ( 2023 15:24 )  PTT:28.2 sec  Urinalysis Basic - ( 2023 10:40 )    Color: Yellow / Appearance: Clear / S.020 / pH: x  Gluc: x / Ketone: Negative  / Bili: Negative / Urobili: Negative mg/dL   Blood: x / Protein: 100 mg/dL / Nitrite: Negative   Leuk Esterase: Small / RBC: 6-10 /HPF / WBC 11-25   Sq Epi: x / Non Sq Epi: Few / Bacteria: Many      < from: CT Abdomen and Pelvis w/ IV Cont (23 @ 17:08) >  FINDINGS:  LOWER CHEST: Bibasilar atelectasis or pneumonia.    LIVER: Hepatic cysts.  BILE DUCTS: Normal caliber.  GALLBLADDER: Within normal limits.  SPLEEN: Within normal limits.  PANCREAS: Within normal limits.  ADRENALS: Within normal limits.  KIDNEYS/URETERS: Mild right hydroureteronephrosis to the level of the   ureterovesicular junction. Delayed right nephrogram.    BLADDER: Droplets of air within the urinary bladder.  REPRODUCTIVE ORGANS: Uterus and adnexa within normal limits.    BOWEL: Small hiatal hernia with mural thickening of the distal esophagus.   No bowel obstruction. Appendix is normal.  PERITONEUM: No ascites.  VESSELS: Atherosclerotic changes.  RETROPERITONEUM/LYMPH NODES: No lymphadenopathy.  ABDOMINAL WALL: Within normal limits.  BONES: Degenerative changes.    IMPRESSION:  Droplets of air within the urinary bladder which, in the absence of   instrumentation, may represent urinary tract infection.    Mild right hydroureteronephrosis with delayed right nephrogram, possibly   ascending urinary tract infection. Underlying benign or malignant distal   ureteral stricture is difficult to exclude.    Bibasilar atelectasis or pneumonia.    --- End of Report ---    < end of copied text >

## 2023-02-23 NOTE — ED ADULT NURSE REASSESSMENT NOTE - NS ED NURSE REASSESS COMMENT FT1
Report received from LEONARD Parnell. Patient is a&o1 on cardiac monitor. patient has IV fluids running. Patient has no orders pending for this time. IVs intact. NAD noted. Family at bedside. Awaiting ICU bed

## 2023-02-23 NOTE — PATIENT PROFILE ADULT - FALL HARM RISK - HARM RISK INTERVENTIONS

## 2023-02-23 NOTE — ED ADULT NURSE REASSESSMENT NOTE - NS ED NURSE REASSESS COMMENT FT1
Patient continues to be confused and restless. ICU consult at bedside, U/S guided PIV placed. Labs resent. Daughter updated on POC.

## 2023-02-23 NOTE — ED PROVIDER NOTE - CLINICAL SUMMARY MEDICAL DECISION MAKING FREE TEXT BOX
67F PMH HTN, DM BIBEMS for AMS noted by family 0400 this AM. Pt febrile, tachycardic on ED arrival, FS > 400. Plan for Sepsis w/u + CT brain, give IVF, antipyretics, empiric IV abx. Anticipate admission. 67F PMH HTN, DM BIBEMS for AMS noted by family 0400 this AM. Pt febrile, tachycardic on ED arrival, FS > 400. Plan for Sepsis w/u + CT brain, give IVF, antipyretics, empiric IV abx. Anticipate admission.  W/u significant: Lactate 8, trop 900, ECG sinus tachy w/o evidence acute ischemia. CT brain unremarkable. no epistaxis/no sinus pressure 67F PMH HTN, DM BIBEMS for AMS noted by family 0400 this AM. Pt febrile, tachycardic on ED arrival, FS > 400. Plan for Sepsis w/u + CT brain, give IVF, antipyretics, empiric IV abx. Anticipate admission.  W/u significant: Lactate 8 > 6.5 > 5.5, trop 990 > 5100 > 5700, ECG sinus tachy w/o evidence acute ischemia. Cardio (Dr Allan) consulted. CT brain unremarkable, CT AP w/ possible cystitis / R ascending UTI. UA w/ + 11-25 WBCs, many bacteria. COVID (+). BGL remains 400s despite 4L NS + 10U insulin. ICU consulted (Dr Calderón), accept pt to their service. Pt, daughter / granddaughter updated to results, admission. They understand / agree w/ this plan.

## 2023-02-23 NOTE — ED PROVIDER NOTE - PHYSICAL EXAMINATION
GEN: Awake, alert, interactive, NAD.  HEAD AND NECK: NC/AT. Airway patent. Neck supple.   EYES:  Clear b/l. EOMI. PERRL.   ENT: Moist mucus membranes.   CARDIAC: Regular rate, regular rhythm. No evident pedal edema.    RESP/CHEST: Normal respiratory effort with no use of accessory muscles or retractions. Clear throughout on auscultation.  ABD: Soft, non-distended, non-tender. No rebound, no guarding.   BACK: No midline spinal TTP. No CVAT.   EXTREMITIES: Moving all extremities with no apparent deformities.   SKIN: Warm, dry, intact normal color. No rash.   NEURO: AOx3, CN II-XII grossly intact, no focal deficits.   PSYCH: Appropriate mood and affect. GEN: Awake, alert, interactive, NAD.  HEAD AND NECK: NC/AT. Airway patent. Neck supple.   EYES:  Clear b/l. EOMI. PERRL.   ENT: Moist mucus membranes.   CARDIAC: Tachycardic, regular rhythm. No evident pedal edema.    RESP/CHEST: Normal respiratory effort with no use of accessory muscles or retractions. Clear throughout on auscultation.  ABD: Soft, non-distended, non-tender. No rebound, no guarding.   BACK: No midline spinal TTP. No CVAT.   EXTREMITIES: + L foot amputation.   SKIN: Warm, dry, intact normal color. No rash.   NEURO: CN II-XII grossly intact, no focal deficits.   PSYCH: Appropriate mood and affect.

## 2023-02-23 NOTE — ED ADULT NURSE NOTE - OBJECTIVE STATEMENT
Physical Therapy     Referred by: Gunnar Barrientos MD; Medical Diagnosis (from order):    Diagnosis Information      Diagnosis    724.2 (ICD-9-CM) - M54.50 (ICD-10-CM) - Right-sided low back pain without sciatica, unspecified chronicity                Daily Treatment Note    Visit:  2     SUBJECTIVE                                                                                                               Patient reports 1/10 pain calf, 2/10 pain back. No concerns with HEP. Patient would like to try dry needling today.    OBJECTIVE                                                                                                                       Palpation:   Right Lower Extremity: Gluteus Medius: hypertonic, tender, trigger point; Gastroc Soleus: hypertonic, tender, trigger point;        Outcome Measures:   Lower Extremity Functional Scale: LEFS Calculated Total: 55 (0=extreme difficulty; 80=no difficulty) see flowsheet for additional documentation    TREATMENT                                                                                                                  Therapeutic Exercise:  Standing elliptical level 1 x2 minutes  Standing quadratus lumborum stretching on right  - overhead reach with hands on door frame x30s  - then with foot step through x30s  Instructed patient in the following for HEP:  - lateral band walks with green band at thighs 3x5 steps right/left   - squat with butt tap to 18\" chair green band at thighs 2x10     Sidelying running man (hip flexion to extension) x10, green band x10 right/left     LEFS    Manual Therapy:  Palpation of soft tissue restrictions for functional dry needling  Post needling manual therapy to associated soft tissues    Prone soft tissue mobilization to right gluteus shawn/medius/minimus, tensor fascia remy  Prone soft tissue mobilization to right medial/lateral gastroc, fibularis  Bony clearing - right iliac crest, right fibular head    Dry  Needling:  Consent signed: yes  Dry needling used to/for: reduced myofascial dysfunction/restriction and reduction of muscle spasm  Education about indications, contraindications and potential side effects completed with patient.  Screen Completed    - Precautions: local skin lesions, lyme disease, local lymphedema, severe hyperalgesia/allodynia, metal allergies: nickel and chromium, abnormal bleeding tendency, immunodeficiency and/or compromised immune system, second or third trimester of pregnancy, vascular disease, history of spontaneous pneumothorax   - Contraindications: local or systemic infections including the flu, over implants, active cancer, area of lymphatic compromise, area of lumpectomy/mastectomy, first trimester of pregnancy    Response to treatment: local twitch response  Educated patient on: no hot tubs, increase water intake, heat as needed.  Re-assessment of dysfunctions following intervention demonstrates: post treatment soreness, better pelvic control with gait    The following myofascial trigger points were treated today   - Right lateral gastroc in prone position x1 using 0.3 x 50 mm needle   - Right gluteus medius  in prone position x1 using 0.3 x 60 mm needle  All 2 needles were accounted for at end of treatment.  Skilled input: verbal instruction/cues and tactile instruction/cues    Writer verbally educated and received verbal consent for hand placement, positioning of patient, and techniques to be performed today from patient for clothing adjustments for techniques, therapist position for techniques and hand placement and palpation for techniques as described above and how they are pertinent to the patient's plan of care.    Home Exercise Program/Education Materials: Access Code: [email or web address]  URL: [email or web address]  Date: 12/01/2021  Prepared by: Michaela Figueroa    Exercises  Supine Sciatic Nerve Glide - 2 x daily - 7 x weekly - 15 reps  Supine 90/90 Sciatic Nerve Glide  with Knee Flexion/Extension - 2 x daily - 7 x weekly - 15 reps  Tall Kneel Vertical Bridge - 1 x daily - 4 x weekly - 2 sets - 15 reps  Single Leg Running Balance - 1 x daily - 4 x weekly - 2 sets - 10 reps  Squat with Chair Touch and Resistance Loop - 1 x daily - 4 x weekly - 2 sets - 10 reps  Side Stepping with Resistance at Thighs - 1 x daily - 4 x weekly - 3 sets - 5 reps         ASSESSMENT                                                                                                             Patient reports no concerns with HEP, but muscle tightness remains. Patient agreeable to dry needling for trigger points, but reported pain/soreness during and after treatment. Progressed patient's hip strength for home, but patient would benefit from further unilateral strengthening to improve pelvic control and pain. Continue skilled Physical Therapy.   Pain/symptoms after session (out of 10): 2 (back)  Pain/symptoms after session location 2 (out of 10): 2 (calf)  Patient Education:   Results of above outlined education: Verbalizes understanding and Demonstrates understanding      PLAN                                                                                                                           Suggestions for next session as indicated: Progress per plan of care, quadratus lumborum stretching, manual therapy to right quadratus lumborum/gluteus medius/lateral gastroc/fibularis, dry needling, right single leg balance, right proximal strengthening.         Therapy procedure time and total treatment time can be found documented on the Time Entry flowsheet   Received 67yr old female patient confused and lethargic, responds to painful stimuli. Patient BIBA, as per EMS patient was found confused by family at 4 am today. As per EMS "was fighting us, found in feces, HI glucose. Patient is nonverbal, moving all extremities, no facial droop. Placed on cardiac monitor, sepsis work up performed. No acute distress noted, grandson at bedside.

## 2023-02-23 NOTE — ED PROVIDER NOTE - PROGRESS NOTE DETAILS
Daughter now at bedside. Clinically improved, pt more awake / alert, conversational. Per daughter, pt does seem at baseline MS, pt w/ L foot amputation

## 2023-02-23 NOTE — H&P ADULT - HISTORY OF PRESENT ILLNESS
67F PMH HTN, DM BIBEMS from home for AMS. Per EMS, pt coming from home, family called as pt found confused 0400 this AM. Per EMS, FS read as 'Hi.' EMS reports pt found in feces / urine. On ED arrival, pt orally febrile 103.5 F tachycardic to 120s, FS 400s.   In ED pt found to have UTI w/ CT abd with "Mild right hydroureteronephrosis with delayed right nephrogram, possibly ascending urinary tract infection. Underlying benign or malignant distal ureteral stricture is difficult to exclude."  CT chest with atelectasis vs pna. CTH neg. Labs significant for neutropenia with bandemia, hyperglycemia in 400s, lactate of 8, and trops peaked to 5K without EKG changes, and GEOVANNI. Pt started on Vanc/Zosyn. Pt also found to be covid + in ED. Pt initially HD stable, but subsequently became transiently Hotn to 70s/40s after 4L IVF and hypoxic to 80s on RA requiring 2LNC.  ICU consulted for septic shock 2/2 UTI.    Subjective: pt seen and examined at the bedside. Pt unable to answer questions 2/2 AMS.  Upon ICU evaluation, pt altered, following some commands but combative. Pt BP in low 100s after IVF and satting well on 2LNC.  POCUS with hyperdynamic LV but without wal motion abnormality, IVC indeterminate, and lungs with scattered B lines bilat.        67F PMH HTN, DM BIBEMS from home for AMS. Per EMS, pt coming from home, family called as pt found confused 0400 this AM. Per EMS, FS read as 'High.' EMS reports pt found in feces / urine.On ED arrival, pt orally febrile 103.5 F tachycardic to 120s, FS 400s.   In ED pt found to have UTI w/ CT abd with "Mild right hydroureteronephrosis with delayed right nephrogram, possibly ascending urinary tract infection. Underlying benign or malignant distal ureteral stricture is difficult to exclude."  CT chest with atelectasis vs pna. CTH neg. Labs significant for neutropenia with bandemia, hyperglycemia in 400s, lactate of 8, and trops peaked to 5K without EKG changes, and GEOVANNI. Pt started on Vanc/Zosyn. Pt also found to be covid + in ED. Pt initially HD stable, but subsequently became transiently Htn to 70s/40s after 4L IVF and hypoxic to 80s on RA requiring 2LNC.  ICU consulted for septic shock 2/2 UTI.    Subjective: pt seen and examined at the bedside. Pt unable to answer questions 2/2 AMS.  Upon ICU evaluation, pt altered, following some commands but combative. Pt BP in low 100s after IVF and satting well on 2LNC.  POCUS with hyperdynamic LV but without wall motion abnormality no pleural or pericardial effusions, IVC indeterminate, and lungs with scattered B lines bilaterally .

## 2023-02-23 NOTE — ED PROVIDER NOTE - OBJECTIVE STATEMENT
67F PMH HTN, DM BIBEMS from home d/t AMS. Per EMS, pt coming from home, family called as pt found confused 0400 this AM. Per EMS, FS read as 'Hi.' EMS reports pt found in feces / urine. On ED arrival, pt orally febrile 100.6F, tachycardic to 120s, FS 400s. Pt not following commands, but withdraws hand to needle stick and says 'no' when asked to open her eyes.     PMH as above, PSH none, NKDA, + food allergies, Meds as listed.

## 2023-02-23 NOTE — ED ADULT NURSE REASSESSMENT NOTE - NS ED NURSE REASSESS COMMENT FT1
Repeat troponin more elevated. Repeat EKG done, CM intact. Patient denies CP/SOB but is still alerted. NS #3 and #4 up and infusing.

## 2023-02-23 NOTE — ED ADULT NURSE NOTE - NSSUHOSCREENINGYN_ED_ALL_ED
History  Chief Complaint   Patient presents with    Flank Pain     pt dx with 4mm kidney stone 2 weeks ago  pt came to ER because she is almost out of her pain medications, called her PCP and was told to come here     Patient is a 44 y/o F with h/o kidney stones that presents to the ED with left flank pain and dysuria and urgency for 8 weeks  She states she was diagnosed with a 4mm stone 8 weeks ago  She states she continued with pain and 2 weeks ago was seen again and had UTI with stone at [facility]  She states she was discharged on cephalexin, flomax and pain meds, but continues with pain  She denies fevers, chills  She has nausea, no vomiting  History provided by:  Patient  Flank Pain   Pain location:  L flank  Pain quality: aching    Pain radiates to:  Groin  Pain severity:  Moderate  Onset quality:  Gradual  Duration:  8 weeks  Timing:  Constant  Progression:  Unchanged  Chronicity:  New  Context: not sick contacts, not suspicious food intake and not trauma    Relieved by:  Nothing  Worsened by:  Nothing  Ineffective treatments: ultram   Associated symptoms: dysuria and nausea    Associated symptoms: no chest pain, no chills, no constipation, no cough, no diarrhea, no fever and no vomiting    Risk factors: obesity        Prior to Admission Medications   Prescriptions Last Dose Informant Patient Reported? Taking?    OXYCODONE HCL PO   Yes Yes   buPROPion (WELLBUTRIN XL) 300 mg 24 hr tablet   Yes Yes   cefdinir (OMNICEF) 300 mg capsule   Yes Yes   diclofenac (VOLTAREN) 75 mg EC tablet   Yes Yes   Sig: TAKE ONE TABLET BY MOUTH TWICE DAILY   hydrochlorothiazide (HYDRODIURIL) 25 mg tablet   Yes Yes   Sig: Take 25 mg by mouth daily   omeprazole (PRILOSEC OTC) 20 MG tablet   Yes Yes   Sig:   Start: 10/03/19 23:42:00 EDT   ondansetron (ZOFRAN-ODT) 4 mg disintegrating tablet   Yes Yes   tamsulosin (FLOMAX) 0 4 mg   Yes Yes   topiramate (TOPAMAX) 100 mg tablet   Yes Yes   Sig: Take 100 mg by mouth 2 (two) times a day   traMADol (ULTRAM) 50 mg tablet   Yes Yes   traZODone (DESYREL) 100 mg tablet   Yes Yes   Sig: TAKE ONE TABLET BY MOUTH ONE TIME DAILY IN THE EVENING      Facility-Administered Medications: None       Past Medical History:   Diagnosis Date    Hypertension        Past Surgical History:   Procedure Laterality Date    KNEE SURGERY      TONSILLECTOMY         History reviewed  No pertinent family history  I have reviewed and agree with the history as documented  Social History     Tobacco Use    Smoking status: Current Every Day Smoker     Packs/day: 0 25    Smokeless tobacco: Never Used   Substance Use Topics    Alcohol use: Not Currently     Comment: socially     Drug use: Never        Review of Systems   Constitutional: Negative for chills and fever  Respiratory: Negative for cough  Cardiovascular: Negative for chest pain and leg swelling  Gastrointestinal: Positive for nausea  Negative for constipation, diarrhea and vomiting  Genitourinary: Positive for dysuria, flank pain, frequency and urgency  Musculoskeletal: Positive for back pain  Negative for neck pain  Skin: Negative for color change and rash  Neurological: Negative for dizziness, weakness and numbness  Psychiatric/Behavioral: Negative for confusion  All other systems reviewed and are negative  Physical Exam  Physical Exam   Constitutional: She is oriented to person, place, and time  She appears well-developed and well-nourished  She is cooperative  She does not appear ill  No distress  Patient morbidly obese with BMI 46 6   HENT:   Head: Normocephalic and atraumatic  Eyes: Conjunctivae are normal    Neck: Normal range of motion  Cardiovascular: Normal rate, regular rhythm and normal heart sounds  No murmur heard  Pulmonary/Chest: Effort normal and breath sounds normal    Abdominal: Soft  Normal appearance and bowel sounds are normal  There is no tenderness   There is no rigidity, no rebound, no guarding and no CVA tenderness  Musculoskeletal: Normal range of motion  She exhibits no edema  Neurological: She is alert and oriented to person, place, and time  She has normal strength  No sensory deficit  Skin: Skin is warm and dry  No rash noted  She is not diaphoretic  No pallor  Nursing note and vitals reviewed        Vital Signs  ED Triage Vitals   Temperature Pulse Respirations Blood Pressure SpO2   10/29/19 1758 10/29/19 1758 10/29/19 1758 10/29/19 1758 10/29/19 1758   97 8 °F (36 6 °C) 101 18 162/82 95 %      Temp Source Heart Rate Source Patient Position - Orthostatic VS BP Location FiO2 (%)   10/29/19 2022 10/29/19 2022 10/29/19 2022 10/29/19 2022 --   Temporal Monitor Lying Left arm       Pain Score       10/29/19 1759       6           Vitals:    10/29/19 1758 10/29/19 2022   BP: 162/82 137/74   Pulse: 101 79   Patient Position - Orthostatic VS:  Lying         Visual Acuity      ED Medications  Medications   ketorolac (TORADOL) injection 15 mg (15 mg Intravenous Given 10/29/19 1942)       Diagnostic Studies  Results Reviewed     Procedure Component Value Units Date/Time    Urine Microscopic [[id number]]  (Abnormal) Collected:  10/29/19 2002    Lab Status:  Final result Specimen:  Urine, Clean Catch Updated:  10/29/19 2031     RBC, UA 0-1 /hpf      WBC, UA 0-1 /hpf      Epithelial Cells Moderate /hpf      Bacteria, UA Occasional /hpf      Ca Oxalate Eloise, UA Occasional /hpf      MUCUS THREADS Moderate    Comprehensive metabolic panel [[id number]]  (Abnormal) Collected:  10/29/19 1941    Lab Status:  Final result Specimen:  Blood from Line, Venous Updated:  10/29/19 2029     Sodium 136 mmol/L      Potassium 3 6 mmol/L      Chloride 102 mmol/L      CO2 31 mmol/L      ANION GAP 3 mmol/L      BUN 11 mg/dL      Creatinine 0 81 mg/dL      Glucose 124 mg/dL      Calcium 9 5 mg/dL      AST 13 U/L      ALT 31 U/L      Alkaline Phosphatase 112 U/L      Total Protein 8 1 g/dL      Albumin 3 5 g/dL      Total Bilirubin 0 30 mg/dL      eGFR 92 ml/min/1 73sq m     Narrative:       [place] guidelines for Chronic Kidney Disease (CKD):     Stage 1 with normal or high GFR (GFR > 90 mL/min/1 73 square meters)    Stage 2 Mild CKD (GFR = 60-89 mL/min/1 73 square meters)    Stage 3A Moderate CKD (GFR = 45-59 mL/min/1 73 square meters)    Stage 3B Moderate CKD (GFR = 30-44 mL/min/1 73 square meters)    Stage 4 Severe CKD (GFR = 15-29 mL/min/1 73 square meters)    Stage 5 End Stage CKD (GFR <15 mL/min/1 73 square meters)  Note: GFR calculation is accurate only with a steady state creatinine    CBC and differential [[id number]]  (Abnormal) Collected:  10/29/19 1941    Lab Status:  Final result Specimen:  Blood from Line, Venous Updated:  10/29/19 2016     WBC 12 69 Thousand/uL      RBC 5 72 Million/uL      Hemoglobin 15 2 g/dL      Hematocrit 48 6 %      MCV 85 fL      MCH 26 6 pg      MCHC 31 3 g/dL      RDW 14 7 %      MPV 9 9 fL      Platelets 090 Thousands/uL      nRBC 0 /100 WBCs      Neutrophils Relative 70 %      Immat GRANS % 0 %      Lymphocytes Relative 21 %      Monocytes Relative 5 %      Eosinophils Relative 3 %      Basophils Relative 1 %      Neutrophils Absolute 8 95 Thousands/µL      Immature Grans Absolute 0 05 Thousand/uL      Lymphocytes Absolute 2 67 Thousands/µL      Monocytes Absolute 0 59 Thousand/µL      Eosinophils Absolute 0 36 Thousand/µL      Basophils Absolute 0 07 Thousands/µL     UA w Reflex to Microscopic w Reflex to Culture [[id number]]  (Abnormal) Collected:  10/29/19 2002    Lab Status:  Final result Specimen:  Urine, Clean Catch Updated:  10/29/19 2014     Color, UA Yellow     Clarity, UA Clear     Specific Gravity, UA 1 025     pH, UA 5 5     Leukocytes, UA Negative     Nitrite, UA Negative     Protein, UA Negative mg/dl      Glucose, UA Negative mg/dl      Ketones, UA Negative mg/dl      Urobilinogen, UA 0 2 E U /dl      Bilirubin, UA Negative     Blood, UA Trace-lysed    POCT pregnancy, urine [[id number]]  (Normal) Resulted:  10/29/19 1912    Lab Status:  Final result Updated:  10/29/19 1913     EXT PREG TEST UR (Ref: Negative) negative     Control valid                 CT renal stone study abdomen pelvis without contrast   Final Result by Luisa Blackman MD (10/29 2004)      5 mm left UV junction calculus resulting in mild left hydroureteronephrosis  Workstation performed: [id number]                    Procedures  Procedures       ED Course  ED Course as of Oct 29 2106   Tue Oct 29, 2019   2042 Spoke with Adan Webb from urology, he suggests f/u as outpatient this week and pain control if patient is comfortable  MDM  Number of Diagnoses or Management Options  Kidney stone on left side: new and requires workup  Diagnosis management comments: Patient with left flank pain, h/o stones, will order CT to r/o stone, UA to r/o UTI  Amount and/or Complexity of Data Reviewed  Clinical lab tests: ordered and reviewed  Tests in the radiology section of CPT®: ordered and reviewed  Discuss the patient with other providers: yes    Patient Progress  Patient progress: stable      Disposition  Final diagnoses:   Kidney stone on left side     Time reflects when diagnosis was documented in both MDM as applicable and the Disposition within this note     Time User Action Codes Description Comment    10/29/2019  8:48 PM Llana Barthel Add [N20 0] Kidney stone on left side       ED Disposition     ED Disposition Condition Date/Time Comment    Discharge Stable Tue Oct 29, 2019  8:48 PM [address] [place] [address] discharge to home/self care              Follow-up Information     Follow up With Specialties Details Why Contact Info    Houston Jordan MD Urology Call in 1 day For recheck Jb  [address]  [phone]            Patient's Medications   Discharge Prescriptions    HYDROCODONE-ACETAMINOPHEN (NORCO) 5-325 MG PER TABLET    Take 1 tablet by mouth every 4 (four) hours as needed for painMax Daily Amount: 6 tablets       Start Date: 10/29/2019End Date: --       Order Dose: 1 tablet       Quantity: 15 tablet    Refills: 0    TAMSULOSIN (FLOMAX) 0 4 MG    Take 1 capsule (0 4 mg total) by mouth daily with dinner for 5 days       Start Date: 10/29/2019End Date: 11/3/2019       Order Dose: 0 4 mg       Quantity: 5 capsule    Refills: 0     No discharge procedures on file      ED Provider  Electronically Signed by           Francisco Gage PA-C  10/29/19 5989 No - the patient is unable to be screened due to medical condition

## 2023-02-23 NOTE — ED ADULT NURSE REASSESSMENT NOTE - NS ED NURSE REASSESS COMMENT FT1
Report provided to LEONARD Juarez. patient remains a&ox1, unable to follow commands. Patient on cardiac monitor with daughter at bedside. Patient IVs intact. Placed on zoll and transported with secondary nurse and tech.

## 2023-02-23 NOTE — ED ADULT TRIAGE NOTE - CHIEF COMPLAINT QUOTE
patient BIBA patient confuse as per EMS patient was found confuse by family at 4 am this morning , as per EMS patient " was fighting us ", nonverbal moving all extremities no facial droop at the time of triage

## 2023-02-23 NOTE — H&P ADULT - NSHPLABSRESULTS_GEN_ALL_CORE
LABS:                        11.7   2.74  )-----------( 205      ( 23 Feb 2023 09:20 )             35.6     02-23    138  |  107  |  32<H>  ----------------------------<  433<H>  3.4<L>   |  18<L>  |  1.84<H>    Ca    8.2<L>      23 Feb 2023 15:24    TPro  5.5<L>  /  Alb  2.6<L>  /  TBili  0.3  /  DBili  x   /  AST  93<H>  /  ALT  69  /  AlkPhos  113  02-23    PT/INR - ( 23 Feb 2023 15:24 )   PT: 12.6 sec;   INR: 1.05 ratio         PTT - ( 23 Feb 2023 15:24 )  PTT:28.2 sec

## 2023-02-23 NOTE — PROGRESS NOTE ADULT - SUBJECTIVE AND OBJECTIVE BOX
Patient is a 67y old  Female who presents with a chief complaint of UTI sepsis (2023 20:51)      BRIEF HOSPITAL COURSE: 66 y/o female with pmhx of HTN, DM BIBA after family found her altered from baseline at 4 am this morning found to be hyperglycemia, febrile to 103.5, tachycardic, lactemia of 8 admitted with severe sepsis due to UTI. 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 Hsopital course further ocmplicated by elevated troponin, peak at 5900 now downtrending. no ischemic EKG changes. also found to be covid-19 positive started on 2 liters NC    Events last 24 hours: lactate initially improved now remaining at 5.5. became hypotensive on arrival to unit. started on levophed (received 4 liters crystalloid already)    PAST MEDICAL & SURGICAL HISTORY:  Hypertension      Family history of diabetes mellitus      No significant past surgical history          Review of Systems:  limited due to AMS      Medications:  piperacillin/tazobactam IVPB.. 3.375 Gram(s) IV Intermittent every 8 hours    norepinephrine Infusion 0.05 MICROgram(s)/kG/Min IV Continuous <Continuous>      acetaminophen     Tablet .. 650 milliGRAM(s) Oral every 6 hours PRN      aspirin  chewable 81 milliGRAM(s) Oral daily  heparin   Injectable 5000 Unit(s) SubCutaneous every 8 hours    polyethylene glycol 3350 17 Gram(s) Oral at bedtime      dextrose 50% Injectable 25 Gram(s) IV Push once  dextrose 50% Injectable 12.5 Gram(s) IV Push once  dextrose 50% Injectable 25 Gram(s) IV Push once  dextrose Oral Gel 15 Gram(s) Oral once PRN  glucagon  Injectable 1 milliGRAM(s) IntraMuscular once  insulin glargine Injectable (LANTUS) 10 Unit(s) SubCutaneous at bedtime  insulin lispro (ADMELOG) corrective regimen sliding scale   SubCutaneous every 6 hours    dextrose 5%. 1000 milliLiter(s) IV Continuous <Continuous>  dextrose 5%. 1000 milliLiter(s) IV Continuous <Continuous>      chlorhexidine 2% Cloths 1 Application(s) Topical <User Schedule>            ICU Vital Signs Last 24 Hrs  T(C): 37.7 (2023 19:00), Max: 39.7 (2023 09:35)  T(F): 99.9 (2023 19:00), Max: 103.5 (2023 09:35)  HR: 112 (2023 19:00) (101 - 125)  BP: 107/50 (2023 19:00) (99/66 - 149/71)  BP(mean): 69 (2023 19:00) (67 - 94)  ABP: --  ABP(mean): --  RR: 17 (2023 19:00) (16 - 24)  SpO2: 97% (2023 19:00) (94% - 99%)    O2 Parameters below as of 2023 19:00  Patient On (Oxygen Delivery Method): room air                I&O's Detail        LABS:                        11.7   2.74  )-----------( 205      ( 2023 09:20 )             35.6         138  |  107  |  32<H>  ----------------------------<  433<H>  3.4<L>   |  18<L>  |  1.84<H>    Ca    8.2<L>      2023 15:24    TPro  5.5<L>  /  Alb  2.6<L>  /  TBili  0.3  /  DBili  x   /  AST  93<H>  /  ALT  69  /  AlkPhos  113            CAPILLARY BLOOD GLUCOSE      POCT Blood Glucose.: 352 mg/dL (2023 21:08)    PT/INR - ( 2023 15:24 )   PT: 12.6 sec;   INR: 1.05 ratio         PTT - ( 2023 15:24 )  PTT:28.2 sec  Urinalysis Basic - ( 2023 10:40 )    Color: Yellow / Appearance: Clear / S.020 / pH: x  Gluc: x / Ketone: Negative  / Bili: Negative / Urobili: Negative mg/dL   Blood: x / Protein: 100 mg/dL / Nitrite: Negative   Leuk Esterase: Small / RBC: 6-10 /HPF / WBC 11-25   Sq Epi: x / Non Sq Epi: Few / Bacteria: Many      CULTURES:  Rapid RVP Result: Detected ( @ 09:20)      Physical Examination:    General: No acute distress.      HEENT: Pupils equal, reactive to light.  Symmetric.    PULM: Clear to auscultation bilaterally, no significant sputum production    NECK: Supple, no lymphadenopathy, trachea midline    CVS: Regular rate and rhythm, no murmurs, rubs, or gallops    ABD: Soft, nondistended, nontender, normoactive bowel sounds, no masses    EXT: No edema, nontender    SKIN: Warm and well perfused, no rashes noted.    NEURO: agitated delirium. moving all extremities spontaneously.     DEVICES:     RADIOLOGY:   IMPRESSION:  Droplets of air within the urinary bladder which, in the absence of   instrumentation, may represent urinary tract infection.    Mild right hydroureteronephrosis with delayed right nephrogram, possibly   ascending urinary tract infection. Underlying benign or malignant distal   ureteral stricture is difficult to exclude.    Bibasilar atelectasis or pneumonia.    --- End of Report ---            ELAINE FOWLER MD; Attending Radiologist  This document has been electronically signed. 2023  5:33PM      CRITICAL CARE TIME SPENT: 38 minutes of critical care time spent providing medical care for patient's acute illness/conditions that impairs at least one vital organ system and/or poses a high risk of imminent or life threatening deterioration in the patient's condition. It includes time spent evaluating and treating the patient's acute illness as well as time spent reviewing labs, radiology, discussing goals of care with patient and/or patient's family, and discussing the case with a multidisciplinary team in an effort to prevent further life threatening deterioration or end organ damage. This time is independent of any procedures performed.

## 2023-02-23 NOTE — H&P ADULT - CRITICAL CARE ATTENDING COMMENT
67F PMH HTN, DM BIBEMS from home for AMS likely 2/2 UTI with septic shock. Admitted to ICU for further management    Pt family states was at baseline yesterday , grandson noted she was having some vomiting and was found to be febrile with elevated fingerstick. Of note grandson with recent exposure to COVID. With fluid and tylenol pt with improvement in ,mental status and daughter states pt appears to be at her baseline. States she can be difficult with both family and medical staff.     Neuro:  AMS likely 2/2 metabolic encephalopathy/fever/infection  - prn tylenol for fever   -CTH neg  -frequent reorientation and delirium precautions     Resp:  pt with initial saturation at bedside 96%   suspect MOR- not diagnosed per daughter snores no hx of CPAP  use   -satting adequately on 2LNC, maintain sats>92%   -lung US with scattered B lines bilat   -COVID + would hold off on decadron remdesivir at this time if true hypoxia consider initiation.     Cardio  septic shock  -s/p 4L IVF in ED  -POCUS with hyperdynamic LV without WMA and IVC with volume intolerance   - MAP goal>65  -lactate was 8 and now downtrending. cont to trend   elevated trops   - trend trops to peak and repeat EKG ( all ekg with no dynamic change)   -f/u official echo and card consult     Renal:  GEOVANNI  Cr in 2021 ~ 0.7   - s/p 4L IVF  - mortensen, cont to monitor strict I/Os  - urology consult for CT findings   -UA with small leuk will follow culture   - replete lytes as appropriate     #D:  ua+ small leuk however CT findings of Mild right hydroureteronephrosis with delayed right nephrogram, possibly ascending urinary tract infection   - febrile and neutropenic  -s/p vanc/ceftriaxone/zosyn in ED  - cont zosyn at this time   -follow up blood and urine cultures   - monitor fever curve and f/u cx    #Heme:  DVT ppx  HSQ    #Endo:  DM w/hyperglycemia   - cont to monitor FS q6hr at this time will need long acting and sliding scale.    Pt presenting with septic picture initial blood pressures in normal range and lactic downtrending however  pressures now downtrending. Pt appears to be more alert than on initial presentation but difficult and requiring frequent redirection by staff and daughter. Requires ICU level of care for consistent monitoring as at high risk for clinical deterioration. Will continue abx at this time trend labs and follow official recs from cardio and uro. Daughter updated at bedside.     Daughter states she is proxy and has filled out paperwork in the past

## 2023-02-23 NOTE — H&P ADULT - ASSESSMENT
67F PMH HTN, DM BIBEMS from home for AMS likely 2/2 UTI with septic shock. Admitted to ICU for further management    # Neuro:  // AMS likely 2/2 metabolic encephalopathy   -A/Ox2 and combative   - prn tylenol for fever   -CTH neg  -frequent reorientation and delirium precautions     #Resp:  //hypoxia 2/2 atelectasis vs pna   -satting adequately on 2LNC, maintain sats>92%   -lung US with scattered B lines bilat   - ct chest w/atelectasis vs pna   -cap covg and covid covg as below     #CV:  //septic shock  -s/p 4L IVF and transiently HoTN to 70s/40s but repeat BP in 100s   -currently not on pressors, but low threshold to start   -POCUS with hyperdynamic LV without WMA and IVC with volume intolerance   - MAP goal>65  -lactate was 8 and now downtrending. cont to trend     //elevated trops 2/2 demand ischemia vs ACS  - trops rising to 5k  - trend trops to peak and repeat EKG  -ASA  -ekg without ischemic changes  -f/u offical echo and cards consult     #GI:  -Diet: advance as tolerated   - Bowel regimen    #Renal:  // non AG metabolic acidosis 2/2 GEOVANNI 2/2 prerenal vs ATN  - s/p 4L IVF. IVL for now   - mortensen, cont to monitor strict I/Os  - continue to trend crn with improvement in volume status and BP  - replete lytes as appropriate     #ID:  // UTI w/mild hydro +/- pna  - febrile and neutropenic  - cont vanc and zosyn for uti and possible pna   - monitor fever curve and f/u cx    //COVID +  - on 2LNC in ED. will reassess need of oxygen in ICU and consider dexamethasone + remdesivir for covid     #Heme:  //DVT ppx  - cbc stable  -SCDs/chemoppx: HSQ    #Endo:  //DM w/hyperglycemia   - maintain goal glucose<180 with modISS and add coverage as needed   - cont to monitor FS q6hr    case and plan discussed with Dr Calderón and ED provider  67F PMH HTN, DM BIBEMS from home for AMS likely 2/2 UTI with septic shock. Admitted to ICU for further management    # Neuro:  // AMS likely 2/2 metabolic encephalopathy   -A/Ox2 and combative   - prn tylenol for fever   -CTH neg  -frequent reorientation and delirium precautions     #Resp:  //hypoxia 2/2 atelectasis vs pna   -satting adequately on 2LNC, maintain sats>92%   -lung US with scattered B lines bilat   - ct chest w/atelectasis vs pna   -cap covg and covid covg as below     #CV:  //septic shock  -s/p 4L IVF and transiently HoTN to 70s/40s but repeat BP in 100s   -currently not on pressors, but low threshold to start   -POCUS with hyperdynamic LV without WMA and IVC with volume intolerance   - MAP goal>65  -lactate was 8 and now downtrending. cont to trend     //elevated trops 2/2 demand ischemia vs ACS  - trops rising to 5k  - trend trops to peak and repeat EKG  -ASA  -ekg without ischemic changes  -f/u offical echo and cards consult     #GI:  -Diet: advance as tolerated   - Bowel regimen    #Renal:  // non AG metabolic acidosis 2/2 GEOVANNI 2/2 prerenal vs ATN  - s/p 4L IVF. IVL for now   - mortensen, cont to monitor strict I/Os  - continue to trend crn with improvement in volume status and BP  - replete lytes as appropriate     #ID:  // UTI w/mild hydro +/- pna  - febrile and neutropenic  -s/p vanc/ceftriaxone/zosyn in ED  - cont zosyn for uti and possible pna   - monitor fever curve and f/u cx    //COVID +  - on 2LNC in ED. will reassess need of oxygen in ICU and consider dexamethasone + remdesivir for covid     #Heme:  //DVT ppx  - cbc stable  -SCDs/chemoppx: HSQ    #Endo:  //DM w/hyperglycemia   - maintain goal glucose<180 with modISS and add coverage as needed   - cont to monitor FS q6hr    case and plan discussed with Dr Calderón and ED provider

## 2023-02-23 NOTE — CONSULT NOTE ADULT - ASSESSMENT
67F with PMH HTN, DM with AMS admitted to ICU with UTI, septic shock, GEOVANNI, COVID. Urology consulted for mild right hydroureteronephrosis and possible benign or malignant distal ureteral stricture.    Plan:  -Continue Zosyn   -Follow up cx  -Continue IV hydration  -Hall, Strict I/Os  -Trend Creatinine

## 2023-02-23 NOTE — PROGRESS NOTE ADULT - ASSESSMENT
68 y/o female with pmhx of HTN, DM now with:    1. septic shock  2. UTI  3. pna  4. mild left hydronephrosis due to ureteral stricture  5. COVID-19 pna  6. delirium  7. HHS    NEURO: per daughter, patient is not far from her baseline, often agitated. CT head negative. also compounded by sepsis/UTI  CVS: actively titrating levophed for goal MAP 65-70  PULM: supplemental 02 prn for spo2 > 92%. start on covid specific therapies if increasing requirements but currently being weaned off 2 liters  GI: no active issues  RENAL: urology consulted regarding further f/u of stricture. GEOVANNI nonoliguric  ID: [email or web address]. f/u cultures  ENDO: BG improving with subq insulin. started on lantus as well as sliding scale coverage. no AG acidosis  HEME: heparin subq  DISPO: full code

## 2023-02-23 NOTE — H&P ADULT - NSHPPHYSICALEXAM_GEN_ALL_CORE
GENERAL: restless, agitated in bed and combative   HEAD:  Atraumatic, normocephalic  EYES: EOMI  NECK: Supple, trachea midline, no JVD  HEART: Regular rate and rhythm  LUNGS: Unlabored respirations.  Clear to auscultation bilaterally, no crackles, wheezing, or rhonchi  ABDOMEN: Soft, nontender, nondistended  EXTREMITIES: 2+ peripheral pulses bilaterally. warm extremities No clubbing, cyanosis, or edema  NERVOUS SYSTEM:  A&Ox2, following some commands, moving all extremities GENERAL: restless, agitated in bed and combative   HEAD:  Atraumatic, normocephalic  EYES: EOMI  NECK: Supple, trachea midline, no JVD  HEART: tachycardic no murmurs   LUNGS: Unlabored respirations.  Clear to auscultation bilaterally, no crackles, wheezing, or rhonchi  ABDOMEN: Soft, nontender, nondistended  EXTREMITIES: 2+ peripheral pulses bilaterally. warm extremities No clubbing, cyanosis, or edema, amp of left foot well healed   NERVOUS SYSTEM:  A&Ox2, following some commands, moving all extremities no focal deficits strength intact

## 2023-02-23 NOTE — ED ADULT NURSE REASSESSMENT NOTE - NS ED NURSE REASSESS COMMENT FT1
Patient received from LEONARD Yao in CT scan. At time of return patient placed back on CM, straight cathed for urine. 2L IVF up and infusing. MD at bedside. Sepsis protocol continued. Daughter at bedside.

## 2023-02-24 LAB
A1C WITH ESTIMATED AVERAGE GLUCOSE RESULT: 13.5 % — HIGH (ref 4–5.6)
ALBUMIN SERPL ELPH-MCNC: 2.5 G/DL — LOW (ref 3.3–5)
ALBUMIN SERPL ELPH-MCNC: 2.5 G/DL — LOW (ref 3.3–5)
ALBUMIN SERPL ELPH-MCNC: 2.6 G/DL — LOW (ref 3.3–5)
ALP SERPL-CCNC: 92 U/L — SIGNIFICANT CHANGE UP (ref 40–120)
ALP SERPL-CCNC: 94 U/L — SIGNIFICANT CHANGE UP (ref 40–120)
ALP SERPL-CCNC: 94 U/L — SIGNIFICANT CHANGE UP (ref 40–120)
ALT FLD-CCNC: 125 U/L — HIGH (ref 12–78)
ALT FLD-CCNC: 63 U/L — SIGNIFICANT CHANGE UP (ref 12–78)
ALT FLD-CCNC: 80 U/L — HIGH (ref 12–78)
ANION GAP SERPL CALC-SCNC: 11 MMOL/L — SIGNIFICANT CHANGE UP (ref 5–17)
ANION GAP SERPL CALC-SCNC: 12 MMOL/L — SIGNIFICANT CHANGE UP (ref 5–17)
ANION GAP SERPL CALC-SCNC: 14 MMOL/L — SIGNIFICANT CHANGE UP (ref 5–17)
ANION GAP SERPL CALC-SCNC: 15 MMOL/L — SIGNIFICANT CHANGE UP (ref 5–17)
APTT BLD: 108 SEC — HIGH (ref 27.5–35.5)
AST SERPL-CCNC: 109 U/L — HIGH (ref 15–37)
AST SERPL-CCNC: 146 U/L — HIGH (ref 15–37)
AST SERPL-CCNC: 94 U/L — HIGH (ref 15–37)
BASOPHILS # BLD AUTO: 0 K/UL — SIGNIFICANT CHANGE UP (ref 0–0.2)
BASOPHILS # BLD AUTO: 0.05 K/UL — SIGNIFICANT CHANGE UP (ref 0–0.2)
BASOPHILS # BLD AUTO: 0.1 K/UL — SIGNIFICANT CHANGE UP (ref 0–0.2)
BASOPHILS NFR BLD AUTO: 0 % — SIGNIFICANT CHANGE UP (ref 0–2)
BASOPHILS NFR BLD AUTO: 0.6 % — SIGNIFICANT CHANGE UP (ref 0–2)
BASOPHILS NFR BLD AUTO: 0.7 % — SIGNIFICANT CHANGE UP (ref 0–2)
BILIRUB SERPL-MCNC: 0.3 MG/DL — SIGNIFICANT CHANGE UP (ref 0.2–1.2)
BILIRUB SERPL-MCNC: 0.4 MG/DL — SIGNIFICANT CHANGE UP (ref 0.2–1.2)
BILIRUB SERPL-MCNC: 0.4 MG/DL — SIGNIFICANT CHANGE UP (ref 0.2–1.2)
BLD GP AB SCN SERPL QL: SIGNIFICANT CHANGE UP
BUN SERPL-MCNC: 35 MG/DL — HIGH (ref 7–23)
BUN SERPL-MCNC: 39 MG/DL — HIGH (ref 7–23)
BUN SERPL-MCNC: 40 MG/DL — HIGH (ref 7–23)
BUN SERPL-MCNC: 42 MG/DL — HIGH (ref 7–23)
CALCIUM SERPL-MCNC: 7.9 MG/DL — LOW (ref 8.5–10.1)
CALCIUM SERPL-MCNC: 8 MG/DL — LOW (ref 8.5–10.1)
CALCIUM SERPL-MCNC: 8 MG/DL — LOW (ref 8.5–10.1)
CALCIUM SERPL-MCNC: 8.2 MG/DL — LOW (ref 8.5–10.1)
CHLORIDE SERPL-SCNC: 108 MMOL/L — SIGNIFICANT CHANGE UP (ref 96–108)
CHLORIDE SERPL-SCNC: 109 MMOL/L — HIGH (ref 96–108)
CHLORIDE SERPL-SCNC: 111 MMOL/L — HIGH (ref 96–108)
CHLORIDE SERPL-SCNC: 112 MMOL/L — HIGH (ref 96–108)
CK MB BLD-MCNC: 1.2 % — SIGNIFICANT CHANGE UP (ref 0–3.5)
CK MB BLD-MCNC: 1.5 % — SIGNIFICANT CHANGE UP (ref 0–3.5)
CK MB BLD-MCNC: 1.7 % — SIGNIFICANT CHANGE UP (ref 0–3.5)
CK MB BLD-MCNC: 1.8 % — SIGNIFICANT CHANGE UP (ref 0–3.5)
CK MB CFR SERPL CALC: 17.1 NG/ML — HIGH (ref 0.5–3.6)
CK MB CFR SERPL CALC: 18.8 NG/ML — HIGH (ref 0.5–3.6)
CK MB CFR SERPL CALC: 19.7 NG/ML — HIGH (ref 0.5–3.6)
CK MB CFR SERPL CALC: 22 NG/ML — HIGH (ref 0.5–3.6)
CK SERPL-CCNC: 1258 U/L — HIGH (ref 26–192)
CK SERPL-CCNC: 1357 U/L — HIGH (ref 26–192)
CK SERPL-CCNC: 1595 U/L — HIGH (ref 26–192)
CK SERPL-CCNC: 955 U/L — HIGH (ref 26–192)
CO2 SERPL-SCNC: 15 MMOL/L — LOW (ref 22–31)
CO2 SERPL-SCNC: 15 MMOL/L — LOW (ref 22–31)
CO2 SERPL-SCNC: 17 MMOL/L — LOW (ref 22–31)
CO2 SERPL-SCNC: 21 MMOL/L — LOW (ref 22–31)
CREAT SERPL-MCNC: 2.02 MG/DL — HIGH (ref 0.5–1.3)
CREAT SERPL-MCNC: 2.27 MG/DL — HIGH (ref 0.5–1.3)
CREAT SERPL-MCNC: 2.39 MG/DL — HIGH (ref 0.5–1.3)
CREAT SERPL-MCNC: 2.58 MG/DL — HIGH (ref 0.5–1.3)
E COLI DNA BLD POS QL NAA+NON-PROBE: SIGNIFICANT CHANGE UP
EGFR: 20 ML/MIN/1.73M2 — LOW
EGFR: 22 ML/MIN/1.73M2 — LOW
EGFR: 23 ML/MIN/1.73M2 — LOW
EGFR: 27 ML/MIN/1.73M2 — LOW
EOSINOPHIL # BLD AUTO: 0 K/UL — SIGNIFICANT CHANGE UP (ref 0–0.5)
EOSINOPHIL # BLD AUTO: 0.04 K/UL — SIGNIFICANT CHANGE UP (ref 0–0.5)
EOSINOPHIL # BLD AUTO: 0.07 K/UL — SIGNIFICANT CHANGE UP (ref 0–0.5)
EOSINOPHIL NFR BLD AUTO: 0 % — SIGNIFICANT CHANGE UP (ref 0–6)
EOSINOPHIL NFR BLD AUTO: 0.2 % — SIGNIFICANT CHANGE UP (ref 0–6)
EOSINOPHIL NFR BLD AUTO: 0.9 % — SIGNIFICANT CHANGE UP (ref 0–6)
ESTIMATED AVERAGE GLUCOSE: 341 MG/DL — HIGH (ref 68–114)
GAS PNL BLDA: SIGNIFICANT CHANGE UP
GLUCOSE BLDC GLUCOMTR-MCNC: 133 MG/DL — HIGH (ref 70–99)
GLUCOSE BLDC GLUCOMTR-MCNC: 143 MG/DL — HIGH (ref 70–99)
GLUCOSE BLDC GLUCOMTR-MCNC: 174 MG/DL — HIGH (ref 70–99)
GLUCOSE BLDC GLUCOMTR-MCNC: 183 MG/DL — HIGH (ref 70–99)
GLUCOSE BLDC GLUCOMTR-MCNC: 208 MG/DL — HIGH (ref 70–99)
GLUCOSE BLDC GLUCOMTR-MCNC: 270 MG/DL — HIGH (ref 70–99)
GLUCOSE BLDC GLUCOMTR-MCNC: 309 MG/DL — HIGH (ref 70–99)
GLUCOSE BLDC GLUCOMTR-MCNC: 320 MG/DL — HIGH (ref 70–99)
GLUCOSE BLDC GLUCOMTR-MCNC: 339 MG/DL — HIGH (ref 70–99)
GLUCOSE BLDC GLUCOMTR-MCNC: 356 MG/DL — HIGH (ref 70–99)
GLUCOSE BLDC GLUCOMTR-MCNC: 360 MG/DL — HIGH (ref 70–99)
GLUCOSE BLDC GLUCOMTR-MCNC: 413 MG/DL — HIGH (ref 70–99)
GLUCOSE BLDC GLUCOMTR-MCNC: 415 MG/DL — HIGH (ref 70–99)
GLUCOSE BLDC GLUCOMTR-MCNC: 427 MG/DL — HIGH (ref 70–99)
GLUCOSE SERPL-MCNC: 144 MG/DL — HIGH (ref 70–99)
GLUCOSE SERPL-MCNC: 340 MG/DL — HIGH (ref 70–99)
GLUCOSE SERPL-MCNC: 367 MG/DL — HIGH (ref 70–99)
GLUCOSE SERPL-MCNC: 446 MG/DL — HIGH (ref 70–99)
GRAM STN FLD: SIGNIFICANT CHANGE UP
GRAM STN FLD: SIGNIFICANT CHANGE UP
HCT VFR BLD CALC: 20.8 % — CRITICAL LOW (ref 34.5–45)
HCT VFR BLD CALC: 28.8 % — LOW (ref 34.5–45)
HCT VFR BLD CALC: 29 % — LOW (ref 34.5–45)
HCT VFR BLD CALC: 30.1 % — LOW (ref 34.5–45)
HGB BLD-MCNC: 10.1 G/DL — LOW (ref 11.5–15.5)
HGB BLD-MCNC: 6.9 G/DL — CRITICAL LOW (ref 11.5–15.5)
HGB BLD-MCNC: 9.6 G/DL — LOW (ref 11.5–15.5)
HGB BLD-MCNC: 9.8 G/DL — LOW (ref 11.5–15.5)
IMM GRANULOCYTES NFR BLD AUTO: 0.9 % — SIGNIFICANT CHANGE UP (ref 0–0.9)
IMM GRANULOCYTES NFR BLD AUTO: 11.5 % — HIGH (ref 0–0.9)
LACTATE SERPL-SCNC: 3 MMOL/L — HIGH (ref 0.7–2)
LACTATE SERPL-SCNC: 4.1 MMOL/L — CRITICAL HIGH (ref 0.7–2)
LACTATE SERPL-SCNC: 6.4 MMOL/L — CRITICAL HIGH (ref 0.7–2)
LACTATE SERPL-SCNC: 7.7 MMOL/L — CRITICAL HIGH (ref 0.7–2)
LEGIONELLA AG UR QL: NEGATIVE — SIGNIFICANT CHANGE UP
LYMPHOCYTES # BLD AUTO: 0.35 K/UL — LOW (ref 1–3.3)
LYMPHOCYTES # BLD AUTO: 1.41 K/UL — SIGNIFICANT CHANGE UP (ref 1–3.3)
LYMPHOCYTES # BLD AUTO: 1.63 K/UL — SIGNIFICANT CHANGE UP (ref 1–3.3)
LYMPHOCYTES # BLD AUTO: 11 % — LOW (ref 13–44)
LYMPHOCYTES # BLD AUTO: 4.7 % — LOW (ref 13–44)
LYMPHOCYTES # BLD AUTO: 8.1 % — LOW (ref 13–44)
MAGNESIUM SERPL-MCNC: 1.3 MG/DL — LOW (ref 1.6–2.6)
MAGNESIUM SERPL-MCNC: 2 MG/DL — SIGNIFICANT CHANGE UP (ref 1.6–2.6)
MAGNESIUM SERPL-MCNC: 2 MG/DL — SIGNIFICANT CHANGE UP (ref 1.6–2.6)
MANUAL SMEAR VERIFICATION: SIGNIFICANT CHANGE UP
MCHC RBC-ENTMCNC: 28 PG — SIGNIFICANT CHANGE UP (ref 27–34)
MCHC RBC-ENTMCNC: 28.2 PG — SIGNIFICANT CHANGE UP (ref 27–34)
MCHC RBC-ENTMCNC: 28.2 PG — SIGNIFICANT CHANGE UP (ref 27–34)
MCHC RBC-ENTMCNC: 28.3 PG — SIGNIFICANT CHANGE UP (ref 27–34)
MCHC RBC-ENTMCNC: 33.2 G/DL — SIGNIFICANT CHANGE UP (ref 32–36)
MCHC RBC-ENTMCNC: 33.3 G/DL — SIGNIFICANT CHANGE UP (ref 32–36)
MCHC RBC-ENTMCNC: 33.6 G/DL — SIGNIFICANT CHANGE UP (ref 32–36)
MCHC RBC-ENTMCNC: 33.8 G/DL — SIGNIFICANT CHANGE UP (ref 32–36)
MCV RBC AUTO: 83.3 FL — SIGNIFICANT CHANGE UP (ref 80–100)
MCV RBC AUTO: 83.4 FL — SIGNIFICANT CHANGE UP (ref 80–100)
MCV RBC AUTO: 84.7 FL — SIGNIFICANT CHANGE UP (ref 80–100)
MCV RBC AUTO: 85.2 FL — SIGNIFICANT CHANGE UP (ref 80–100)
METAMYELOCYTES # FLD: 4 % — HIGH (ref 0–0)
METHOD TYPE: SIGNIFICANT CHANGE UP
MONOCYTES # BLD AUTO: 0.18 K/UL — SIGNIFICANT CHANGE UP (ref 0–0.9)
MONOCYTES # BLD AUTO: 0.58 K/UL — SIGNIFICANT CHANGE UP (ref 0–0.9)
MONOCYTES # BLD AUTO: 0.74 K/UL — SIGNIFICANT CHANGE UP (ref 0–0.9)
MONOCYTES NFR BLD AUTO: 2.4 % — SIGNIFICANT CHANGE UP (ref 2–14)
MONOCYTES NFR BLD AUTO: 3.3 % — SIGNIFICANT CHANGE UP (ref 2–14)
MONOCYTES NFR BLD AUTO: 5 % — SIGNIFICANT CHANGE UP (ref 2–14)
MRSA PCR RESULT.: SIGNIFICANT CHANGE UP
NEUTROPHILS # BLD AUTO: 11.57 K/UL — HIGH (ref 1.8–7.4)
NEUTROPHILS # BLD AUTO: 13.21 K/UL — HIGH (ref 1.8–7.4)
NEUTROPHILS # BLD AUTO: 6.71 K/UL — SIGNIFICANT CHANGE UP (ref 1.8–7.4)
NEUTROPHILS NFR BLD AUTO: 63 % — SIGNIFICANT CHANGE UP (ref 43–77)
NEUTROPHILS NFR BLD AUTO: 76.3 % — SIGNIFICANT CHANGE UP (ref 43–77)
NEUTROPHILS NFR BLD AUTO: 90.4 % — HIGH (ref 43–77)
NEUTS BAND # BLD: 15 % — HIGH (ref 0–8)
NRBC # BLD: 0 /100 WBCS — SIGNIFICANT CHANGE UP (ref 0–0)
NRBC # BLD: 0 /100 — SIGNIFICANT CHANGE UP (ref 0–0)
NRBC # BLD: SIGNIFICANT CHANGE UP /100 WBCS (ref 0–0)
PHOSPHATE SERPL-MCNC: 1.6 MG/DL — LOW (ref 2.5–4.5)
PHOSPHATE SERPL-MCNC: 4.1 MG/DL — SIGNIFICANT CHANGE UP (ref 2.5–4.5)
PHOSPHATE SERPL-MCNC: 5.7 MG/DL — HIGH (ref 2.5–4.5)
PLAT MORPH BLD: NORMAL — SIGNIFICANT CHANGE UP
PLATELET # BLD AUTO: 125 K/UL — LOW (ref 150–400)
PLATELET # BLD AUTO: 161 K/UL — SIGNIFICANT CHANGE UP (ref 150–400)
PLATELET # BLD AUTO: 172 K/UL — SIGNIFICANT CHANGE UP (ref 150–400)
PLATELET # BLD AUTO: 178 K/UL — SIGNIFICANT CHANGE UP (ref 150–400)
POTASSIUM SERPL-MCNC: 3 MMOL/L — LOW (ref 3.5–5.3)
POTASSIUM SERPL-MCNC: 3.7 MMOL/L — SIGNIFICANT CHANGE UP (ref 3.5–5.3)
POTASSIUM SERPL-MCNC: 4.3 MMOL/L — SIGNIFICANT CHANGE UP (ref 3.5–5.3)
POTASSIUM SERPL-MCNC: 4.6 MMOL/L — SIGNIFICANT CHANGE UP (ref 3.5–5.3)
POTASSIUM SERPL-SCNC: 3 MMOL/L — LOW (ref 3.5–5.3)
POTASSIUM SERPL-SCNC: 3.7 MMOL/L — SIGNIFICANT CHANGE UP (ref 3.5–5.3)
POTASSIUM SERPL-SCNC: 4.3 MMOL/L — SIGNIFICANT CHANGE UP (ref 3.5–5.3)
POTASSIUM SERPL-SCNC: 4.6 MMOL/L — SIGNIFICANT CHANGE UP (ref 3.5–5.3)
PROT SERPL-MCNC: 5.4 GM/DL — LOW (ref 6–8.3)
PROT SERPL-MCNC: 5.6 GM/DL — LOW (ref 6–8.3)
PROT SERPL-MCNC: 5.8 GM/DL — LOW (ref 6–8.3)
RAPID RVP RESULT: SIGNIFICANT CHANGE UP
RBC # BLD: 2.44 M/UL — LOW (ref 3.8–5.2)
RBC # BLD: 3.4 M/UL — LOW (ref 3.8–5.2)
RBC # BLD: 3.48 M/UL — LOW (ref 3.8–5.2)
RBC # BLD: 3.61 M/UL — LOW (ref 3.8–5.2)
RBC # FLD: 14.4 % — SIGNIFICANT CHANGE UP (ref 10.3–14.5)
RBC # FLD: 14.6 % — HIGH (ref 10.3–14.5)
RBC # FLD: 14.6 % — HIGH (ref 10.3–14.5)
RBC # FLD: 14.9 % — HIGH (ref 10.3–14.5)
RBC BLD AUTO: NORMAL — SIGNIFICANT CHANGE UP
S AUREUS DNA NOSE QL NAA+PROBE: SIGNIFICANT CHANGE UP
SARS-COV-2 RNA SPEC QL NAA+PROBE: SIGNIFICANT CHANGE UP
SODIUM SERPL-SCNC: 137 MMOL/L — SIGNIFICANT CHANGE UP (ref 135–145)
SODIUM SERPL-SCNC: 139 MMOL/L — SIGNIFICANT CHANGE UP (ref 135–145)
SODIUM SERPL-SCNC: 141 MMOL/L — SIGNIFICANT CHANGE UP (ref 135–145)
SODIUM SERPL-SCNC: 143 MMOL/L — SIGNIFICANT CHANGE UP (ref 135–145)
SPECIMEN SOURCE: SIGNIFICANT CHANGE UP
SPECIMEN SOURCE: SIGNIFICANT CHANGE UP
TROPONIN I, HIGH SENSITIVITY RESULT: HIGH NG/L
VARIANT LYMPHS # BLD: 2 % — SIGNIFICANT CHANGE UP (ref 0–6)
WBC # BLD: 14.83 K/UL — HIGH (ref 3.8–10.5)
WBC # BLD: 17.06 K/UL — HIGH (ref 3.8–10.5)
WBC # BLD: 17.33 K/UL — HIGH (ref 3.8–10.5)
WBC # BLD: 7.43 K/UL — SIGNIFICANT CHANGE UP (ref 3.8–10.5)
WBC # BLD: 9.95 K/UL — SIGNIFICANT CHANGE UP (ref 3.8–10.5)
WBC # FLD AUTO: 14.83 K/UL — HIGH (ref 3.8–10.5)
WBC # FLD AUTO: 17.06 K/UL — HIGH (ref 3.8–10.5)
WBC # FLD AUTO: 17.33 K/UL — HIGH (ref 3.8–10.5)
WBC # FLD AUTO: 7.43 K/UL — SIGNIFICANT CHANGE UP (ref 3.8–10.5)
WBC # FLD AUTO: 9.95 K/UL — SIGNIFICANT CHANGE UP (ref 3.8–10.5)

## 2023-02-24 PROCEDURE — 99223 1ST HOSP IP/OBS HIGH 75: CPT

## 2023-02-24 PROCEDURE — 93010 ELECTROCARDIOGRAM REPORT: CPT | Mod: 76

## 2023-02-24 PROCEDURE — 71045 X-RAY EXAM CHEST 1 VIEW: CPT | Mod: 26

## 2023-02-24 PROCEDURE — 99291 CRITICAL CARE FIRST HOUR: CPT

## 2023-02-24 RX ORDER — POTASSIUM PHOSPHATE, MONOBASIC POTASSIUM PHOSPHATE, DIBASIC 236; 224 MG/ML; MG/ML
30 INJECTION, SOLUTION INTRAVENOUS ONCE
Refills: 0 | Status: COMPLETED | OUTPATIENT
Start: 2023-02-24 | End: 2023-02-24

## 2023-02-24 RX ORDER — DEXMEDETOMIDINE HYDROCHLORIDE IN 0.9% SODIUM CHLORIDE 4 UG/ML
0.2 INJECTION INTRAVENOUS
Qty: 400 | Refills: 0 | Status: DISCONTINUED | OUTPATIENT
Start: 2023-02-24 | End: 2023-02-24

## 2023-02-24 RX ORDER — VASOPRESSIN 20 [USP'U]/ML
0.04 INJECTION INTRAVENOUS
Qty: 40 | Refills: 0 | Status: DISCONTINUED | OUTPATIENT
Start: 2023-02-24 | End: 2023-02-25

## 2023-02-24 RX ORDER — HEPARIN SODIUM 5000 [USP'U]/ML
INJECTION INTRAVENOUS; SUBCUTANEOUS
Qty: 25000 | Refills: 0 | Status: DISCONTINUED | OUTPATIENT
Start: 2023-02-24 | End: 2023-02-26

## 2023-02-24 RX ORDER — HEPARIN SODIUM 5000 [USP'U]/ML
4700 INJECTION INTRAVENOUS; SUBCUTANEOUS EVERY 6 HOURS
Refills: 0 | Status: DISCONTINUED | OUTPATIENT
Start: 2023-02-24 | End: 2023-02-26

## 2023-02-24 RX ORDER — PANTOPRAZOLE SODIUM 20 MG/1
40 TABLET, DELAYED RELEASE ORAL DAILY
Refills: 0 | Status: DISCONTINUED | OUTPATIENT
Start: 2023-02-24 | End: 2023-02-27

## 2023-02-24 RX ORDER — POTASSIUM CHLORIDE 20 MEQ
10 PACKET (EA) ORAL
Refills: 0 | Status: COMPLETED | OUTPATIENT
Start: 2023-02-24 | End: 2023-02-24

## 2023-02-24 RX ORDER — HYDROCORTISONE 20 MG
50 TABLET ORAL EVERY 6 HOURS
Refills: 0 | Status: DISCONTINUED | OUTPATIENT
Start: 2023-02-24 | End: 2023-02-24

## 2023-02-24 RX ORDER — ASPIRIN/CALCIUM CARB/MAGNESIUM 324 MG
300 TABLET ORAL DAILY
Refills: 0 | Status: DISCONTINUED | OUTPATIENT
Start: 2023-02-24 | End: 2023-02-27

## 2023-02-24 RX ORDER — DEXMEDETOMIDINE HYDROCHLORIDE IN 0.9% SODIUM CHLORIDE 4 UG/ML
0.2 INJECTION INTRAVENOUS
Qty: 200 | Refills: 0 | Status: DISCONTINUED | OUTPATIENT
Start: 2023-02-24 | End: 2023-02-27

## 2023-02-24 RX ORDER — INSULIN HUMAN 100 [IU]/ML
8 INJECTION, SOLUTION SUBCUTANEOUS ONCE
Refills: 0 | Status: COMPLETED | OUTPATIENT
Start: 2023-02-24 | End: 2023-02-24

## 2023-02-24 RX ORDER — HYDROCORTISONE 20 MG
125 TABLET ORAL ONCE
Refills: 0 | Status: COMPLETED | OUTPATIENT
Start: 2023-02-24 | End: 2023-02-24

## 2023-02-24 RX ORDER — VANCOMYCIN HCL 1 G
1250 VIAL (EA) INTRAVENOUS ONCE
Refills: 0 | Status: DISCONTINUED | OUTPATIENT
Start: 2023-02-24 | End: 2023-02-24

## 2023-02-24 RX ORDER — SODIUM CHLORIDE 9 MG/ML
500 INJECTION, SOLUTION INTRAVENOUS ONCE
Refills: 0 | Status: DISCONTINUED | OUTPATIENT
Start: 2023-02-24 | End: 2023-02-24

## 2023-02-24 RX ORDER — SODIUM BICARBONATE 1 MEQ/ML
0.19 SYRINGE (ML) INTRAVENOUS
Qty: 150 | Refills: 0 | Status: DISCONTINUED | OUTPATIENT
Start: 2023-02-24 | End: 2023-02-24

## 2023-02-24 RX ORDER — BUMETANIDE 0.25 MG/ML
2 INJECTION INTRAMUSCULAR; INTRAVENOUS
Qty: 20 | Refills: 0 | Status: DISCONTINUED | OUTPATIENT
Start: 2023-02-24 | End: 2023-02-27

## 2023-02-24 RX ORDER — VANCOMYCIN HCL 1 G
1000 VIAL (EA) INTRAVENOUS ONCE
Refills: 0 | Status: DISCONTINUED | OUTPATIENT
Start: 2023-02-24 | End: 2023-02-24

## 2023-02-24 RX ORDER — INSULIN HUMAN 100 [IU]/ML
4 INJECTION, SOLUTION SUBCUTANEOUS
Qty: 100 | Refills: 0 | Status: DISCONTINUED | OUTPATIENT
Start: 2023-02-24 | End: 2023-02-24

## 2023-02-24 RX ORDER — BUMETANIDE 0.25 MG/ML
2 INJECTION INTRAMUSCULAR; INTRAVENOUS ONCE
Refills: 0 | Status: COMPLETED | OUTPATIENT
Start: 2023-02-24 | End: 2023-02-24

## 2023-02-24 RX ORDER — INSULIN GLARGINE 100 [IU]/ML
12 INJECTION, SOLUTION SUBCUTANEOUS ONCE
Refills: 0 | Status: COMPLETED | OUTPATIENT
Start: 2023-02-24 | End: 2023-02-24

## 2023-02-24 RX ORDER — SODIUM CHLORIDE 9 MG/ML
10 INJECTION INTRAMUSCULAR; INTRAVENOUS; SUBCUTANEOUS
Refills: 0 | Status: DISCONTINUED | OUTPATIENT
Start: 2023-02-24 | End: 2023-03-09

## 2023-02-24 RX ORDER — HEPARIN SODIUM 5000 [USP'U]/ML
4700 INJECTION INTRAVENOUS; SUBCUTANEOUS ONCE
Refills: 0 | Status: COMPLETED | OUTPATIENT
Start: 2023-02-24 | End: 2023-02-24

## 2023-02-24 RX ORDER — CHLORHEXIDINE GLUCONATE 213 G/1000ML
1 SOLUTION TOPICAL
Refills: 0 | Status: DISCONTINUED | OUTPATIENT
Start: 2023-02-24 | End: 2023-02-24

## 2023-02-24 RX ORDER — NOREPINEPHRINE BITARTRATE/D5W 8 MG/250ML
0.05 PLASTIC BAG, INJECTION (ML) INTRAVENOUS
Qty: 16 | Refills: 0 | Status: DISCONTINUED | OUTPATIENT
Start: 2023-02-24 | End: 2023-02-26

## 2023-02-24 RX ORDER — MAGNESIUM SULFATE 500 MG/ML
2 VIAL (ML) INJECTION ONCE
Refills: 0 | Status: COMPLETED | OUTPATIENT
Start: 2023-02-24 | End: 2023-02-24

## 2023-02-24 RX ADMIN — Medication 100 MILLIEQUIVALENT(S): at 03:51

## 2023-02-24 RX ADMIN — HEPARIN SODIUM 4700 UNIT(S): 5000 INJECTION INTRAVENOUS; SUBCUTANEOUS at 10:13

## 2023-02-24 RX ADMIN — Medication 50 MILLIGRAM(S): at 05:22

## 2023-02-24 RX ADMIN — Medication 25 GRAM(S): at 02:20

## 2023-02-24 RX ADMIN — Medication 100 MILLIEQUIVALENT(S): at 18:49

## 2023-02-24 RX ADMIN — HEPARIN SODIUM 700 UNIT(S)/HR: 5000 INJECTION INTRAVENOUS; SUBCUTANEOUS at 19:44

## 2023-02-24 RX ADMIN — BUMETANIDE 2 MILLIGRAM(S): 0.25 INJECTION INTRAMUSCULAR; INTRAVENOUS at 10:13

## 2023-02-24 RX ADMIN — CHLORHEXIDINE GLUCONATE 1 APPLICATION(S): 213 SOLUTION TOPICAL at 05:22

## 2023-02-24 RX ADMIN — Medication 3.69 MICROGRAM(S)/KG/MIN: at 07:50

## 2023-02-24 RX ADMIN — DEXMEDETOMIDINE HYDROCHLORIDE IN 0.9% SODIUM CHLORIDE 3.94 MICROGRAM(S)/KG/HR: 4 INJECTION INTRAVENOUS at 06:00

## 2023-02-24 RX ADMIN — VASOPRESSIN 6 UNIT(S)/MIN: 20 INJECTION INTRAVENOUS at 07:51

## 2023-02-24 RX ADMIN — DEXMEDETOMIDINE HYDROCHLORIDE IN 0.9% SODIUM CHLORIDE 3.94 MICROGRAM(S)/KG/HR: 4 INJECTION INTRAVENOUS at 19:44

## 2023-02-24 RX ADMIN — DEXMEDETOMIDINE HYDROCHLORIDE IN 0.9% SODIUM CHLORIDE 3.94 MICROGRAM(S)/KG/HR: 4 INJECTION INTRAVENOUS at 07:52

## 2023-02-24 RX ADMIN — PIPERACILLIN AND TAZOBACTAM 25 GRAM(S): 4; .5 INJECTION, POWDER, LYOPHILIZED, FOR SOLUTION INTRAVENOUS at 21:46

## 2023-02-24 RX ADMIN — INSULIN HUMAN 4 UNIT(S)/HR: 100 INJECTION, SOLUTION SUBCUTANEOUS at 19:43

## 2023-02-24 RX ADMIN — POTASSIUM PHOSPHATE, MONOBASIC POTASSIUM PHOSPHATE, DIBASIC 83.33 MILLIMOLE(S): 236; 224 INJECTION, SOLUTION INTRAVENOUS at 02:52

## 2023-02-24 RX ADMIN — HEPARIN SODIUM 0 UNIT(S)/HR: 5000 INJECTION INTRAVENOUS; SUBCUTANEOUS at 18:22

## 2023-02-24 RX ADMIN — Medication 100 MEQ/KG/HR: at 07:51

## 2023-02-24 RX ADMIN — Medication 10: at 05:41

## 2023-02-24 RX ADMIN — DEXMEDETOMIDINE HYDROCHLORIDE IN 0.9% SODIUM CHLORIDE 3.94 MICROGRAM(S)/KG/HR: 4 INJECTION INTRAVENOUS at 18:49

## 2023-02-24 RX ADMIN — Medication 8.08 MICROGRAM(S)/KG/MIN: at 03:51

## 2023-02-24 RX ADMIN — HEPARIN SODIUM 5000 UNIT(S): 5000 INJECTION INTRAVENOUS; SUBCUTANEOUS at 05:21

## 2023-02-24 RX ADMIN — Medication 300 MILLIGRAM(S): at 17:17

## 2023-02-24 RX ADMIN — INSULIN HUMAN 4 UNIT(S)/HR: 100 INJECTION, SOLUTION SUBCUTANEOUS at 11:39

## 2023-02-24 RX ADMIN — Medication 100 MILLIEQUIVALENT(S): at 17:16

## 2023-02-24 RX ADMIN — Medication 3.69 MICROGRAM(S)/KG/MIN: at 19:44

## 2023-02-24 RX ADMIN — Medication 100 MILLIEQUIVALENT(S): at 02:21

## 2023-02-24 RX ADMIN — DEXMEDETOMIDINE HYDROCHLORIDE IN 0.9% SODIUM CHLORIDE 3.94 MICROGRAM(S)/KG/HR: 4 INJECTION INTRAVENOUS at 10:12

## 2023-02-24 RX ADMIN — Medication 100 MILLIEQUIVALENT(S): at 18:00

## 2023-02-24 RX ADMIN — PANTOPRAZOLE SODIUM 40 MILLIGRAM(S): 20 TABLET, DELAYED RELEASE ORAL at 11:37

## 2023-02-24 RX ADMIN — VASOPRESSIN 6 UNIT(S)/MIN: 20 INJECTION INTRAVENOUS at 02:53

## 2023-02-24 RX ADMIN — Medication 100 MILLIEQUIVALENT(S): at 01:00

## 2023-02-24 RX ADMIN — PIPERACILLIN AND TAZOBACTAM 25 GRAM(S): 4; .5 INJECTION, POWDER, LYOPHILIZED, FOR SOLUTION INTRAVENOUS at 05:21

## 2023-02-24 RX ADMIN — INSULIN HUMAN 4 UNIT(S)/HR: 100 INJECTION, SOLUTION SUBCUTANEOUS at 18:27

## 2023-02-24 RX ADMIN — Medication 100 MEQ/KG/HR: at 05:21

## 2023-02-24 RX ADMIN — INSULIN HUMAN 8 UNIT(S): 100 INJECTION, SOLUTION SUBCUTANEOUS at 14:33

## 2023-02-24 RX ADMIN — INSULIN GLARGINE 12 UNIT(S): 100 INJECTION, SOLUTION SUBCUTANEOUS at 08:15

## 2023-02-24 RX ADMIN — INSULIN HUMAN 4 UNIT(S)/HR: 100 INJECTION, SOLUTION SUBCUTANEOUS at 17:16

## 2023-02-24 RX ADMIN — BUMETANIDE 10 MG/HR: 0.25 INJECTION INTRAMUSCULAR; INTRAVENOUS at 17:16

## 2023-02-24 RX ADMIN — HEPARIN SODIUM 950 UNIT(S)/HR: 5000 INJECTION INTRAVENOUS; SUBCUTANEOUS at 10:14

## 2023-02-24 RX ADMIN — Medication 125 MILLIGRAM(S): at 02:54

## 2023-02-24 RX ADMIN — PIPERACILLIN AND TAZOBACTAM 25 GRAM(S): 4; .5 INJECTION, POWDER, LYOPHILIZED, FOR SOLUTION INTRAVENOUS at 13:54

## 2023-02-24 NOTE — CHART NOTE - NSCHARTNOTEFT_GEN_A_CORE
:  Ashvin Rivas PA-C    Indication:  SHOCK    PROCEDURE:  [x] LIMITED ECHO  [x] LIMITED CHEST      FINDINGS:  Chest: A-line predominant, normal aeration pattern bilat. No effusions bilat.    ECHO: moderately to severely reduced EF with global LV systolic dysfx. RV unable to be visualized. No septal bowing/flattening, or gross valvular pathology   No pericardial effusion  IVC: plethoric   LVOT/VTi: 15cm    INTERPRETATION:  grossly nl lung exam  echo consistent with severely reduced EF likely contributing to shock state    images uploaded to [employer]

## 2023-02-24 NOTE — CONSULT NOTE ADULT - ASSESSMENT
The chart has been reviewed but the patient has not yet been examined.  Full note to follow. NELSON ZEPEDA is a 67y Female with h/o HTN, DM BIBEMS from home for AMS.    Found to be in urosepsis and COVID+.  Elevated trops that peaked at 5K without EKG changes, in the setting of GEOVANNI.   Pt initially HD stable, but subsequently became transiently Htn to 70s/40s after 4L IVF and hypoxic to 80s on RA requiring 2LNC.Overnight decompensated hemodynamically requiring pressor support.   Pt unable to answer questions 2/2 AMS.    Imp:  Stress induced cardiomyopathy in setting of COVID, Urosepsis and GEOVANNI.  Cannot rule out concurrent NSTEMI, although no ECG changes noted, no WMA on echo.  Not a candidate for aggressive interventions while septic. Need to continue pressors as needed.  Was started on IV heparin empirically, would empirically treat for 48 hours and decide depending on stability for intervention.  Empiric Antibiotics as per critical care team.  CMP in this setting associated with 30-40% mortality, outlook remains guarded.

## 2023-02-24 NOTE — CONSULT NOTE ADULT - SUBJECTIVE AND OBJECTIVE BOX
CARDIOLOGY CONSULTATION NOTE                                                                             NELSON ZEPEDA is a 67y Female with h/o HTN, DM BIBEMS from home for AMS. Per EMS, pt coming from home, family called as pt found confused 0400 this AM. Per EMS, FS read as 'High.' EMS reports pt found in feces / urine.On ED arrival, pt orally febrile 103.5 F tachycardic to 120s, FS 400s.  In ED pt found to have UTI w/ CT abd with "Mild right hydroureteronephrosis with delayed right nephrogram, possibly ascending urinary tract infection. Underlying benign or malignant distal ureteral stricture is difficult to exclude."  CT chest with atelectasis vs pna. CTH neg. Labs significant for neutropenia with bandemia, hyperglycemia in 400s, lactate of 8, and elevated trops that peaked at 5K without EKG changes, and also GEOVANNI. Pt started on Vanc/Zosyn. Pt also found to be covid + in ED. Pt initially HD stable, but subsequently became transiently Htn to 70s/40s after 4L IVF and hypoxic to 80s on RA requiring 2LNC. ICU consulted for septic shock 2/2 UTI.  Subjective: pt seen and examined at the bedside. Pt unable to answer questions 2/2 AMS. Upon ICU evaluation, pt altered, following some commands but combative. Pt BP in low 100s after IVF and satting well on 2LNC. POCUS with hyperdynamic LV but without wall motion abnormality no pleural or pericardial effusions, IVC indeterminate, and lungs with scattered B lines bilaterally .  Overnight decompensated hemodynamically requiring pressor support. Consult requested to assess acute coronary syndrome.   REVIEW OF SYSTEMS: -----------------------------  CONSTITUTIONAL: No fever, weight loss, or fatigue EYES: No eye pain, visual disturbances, or discharge ENMT:  No difficulty hearing, tinnitus, vertigo; No sinus or throat pain NECK: No pain or stiffness BREASTS: No pain, masses, or nipple discharge RESPIRATORY: No cough, wheezing, chills or hemoptysis; No shortness of breath CARDIOVASCULAR: See HPI GASTROINTESTINAL: No abdominal or epigastric pain. No nausea, vomiting, or hematemesis; No diarrhea or constipation. No melena or hematochezia. GENITOURINARY: No dysuria, frequency, hematuria, or incontinence NEUROLOGICAL: No headaches, memory loss, loss of strength, numbness, or tremors SKIN: No itching, burning, rashes, or lesions  LYMPH NODES: No enlarged glands ENDOCRINE: No heat or cold intolerance; No hair loss MUSCULOSKELETAL: No joint pain or swelling; No muscle, back, or extremity pain PSYCHIATRIC: No depression, anxiety, mood swings, or difficulty sleeping HEME/LYMPH: No easy bruising, or bleeding gums ALLERGY AND IMMUNOLOGIC: No hives or eczema  Home Medications: acetaminophen 325 mg oral tablet: 2 tab(s) orally every 6 hours, As needed, Temp greater or equal to 38C (100.4F) (02 Oct 2021 15:18) acetaminophen 325 mg oral tablet: 2 tab(s) orally every 6 hours, As needed, Moderate Pain (4 - 6) (02 Oct 2021 15:18) aspirin 81 mg oral delayed release tablet: 1 tab(s) orally once a day (27 Sep 2021 12:22) bisacodyl 10 mg rectal suppository: 1 suppository(ies) rectal once a day, As needed, Constipation (27 Sep 2021 12:22) cefepime: 2 gram(s) intravenous 3 times a day for  37  more  dauys (02 Oct 2021 15:22) gabapentin 100 mg oral capsule: 1 cap(s) orally 3 times a day (27 Sep 2021 12:22) metFORMIN 500 mg oral tablet: 1 tab(s) orally 2 times a day (23 Sep 2021 14:06) metoprolol: 12.5 milligram(s) orally 2 times a day (23 Sep 2021 14:06) metroNIDAZOLE 500 mg oral tablet: 1 tab(s) orally every 8 hours (08 Oct 2021 14:09) polyethylene glycol 3350 oral powder for reconstitution: 17 gram(s) orally once a day (23 Sep 2021 14:06) senna oral tablet: 2 tab(s) orally once a day (at bedtime) (23 Sep 2021 14:06)   MEDICATIONS  (STANDING): aspirin  chewable 81 milliGRAM(s) Oral daily buMETAnide Injectable 2 milliGRAM(s) IV Push once chlorhexidine 2% Cloths 1 Application(s) Topical <User Schedule> dexMEDEtomidine Infusion 0.2 MICROgram(s)/kG/Hr (3.94 mL/Hr) IV Continuous <Continuous> heparin   Injectable 4700 Unit(s) IV Push once heparin  Infusion.  Unit(s)/Hr (9.5 mL/Hr) IV Continuous <Continuous> insulin glargine Injectable (LANTUS) 10 Unit(s) SubCutaneous at bedtime insulin lispro (ADMELOG) corrective regimen sliding scale   SubCutaneous every 6 hours norepinephrine Infusion 0.05 MICROgram(s)/kG/Min (3.69 mL/Hr) IV Continuous <Continuous> pantoprazole  Injectable 40 milliGRAM(s) IV Push daily piperacillin/tazobactam IVPB.. 3.375 Gram(s) IV Intermittent every 8 hours polyethylene glycol 3350 17 Gram(s) Oral at bedtime sodium bicarbonate  Infusion 0.19 mEq/kG/Hr (100 mL/Hr) IV Continuous <Continuous> vancomycin  IVPB 1250 milliGRAM(s) IV Intermittent once vasopressin Infusion 0.04 Unit(s)/Min (6 mL/Hr) IV Continuous <Continuous>   ALLERGIES: avocado (Pruritus) Carrots (Pruritus) No Known Drug Allergies Plums (Pruritus)   FAMILY HISTORY:   PHYSICAL EXAMINATION: ----------------------------- T(C): 37.8 (02-24-23 @ 08:00), Max: 39.7 (02-23-23 @ 09:35) HR: 111 (02-24-23 @ 08:38) (101 - 118) BP: 132/57 (02-24-23 @ 02:00) (75/49 - 149/71) RR: 23 (02-24-23 @ 08:38) (16 - 38) SpO2: 99% (02-24-23 @ 08:38) (94% - 100%) Wt(kg): --  02-23 @ 07:01  -  02-24 @ 07:00 -------------------------------------------------------- IN:   Dexmedetomidine: 26 mL   Norepinephrine: 428.1 mL   Sodium Bicarbonate: 400 mL   Vasopressin: 36 mL Total IN: 890.1 mL  OUT:   Indwelling Catheter - Urethral (mL): 305 mL Total OUT: 305 mL  Total NET: 585.1 mL   02-24 @ 07:01  -  02-24 @ 09:26 -------------------------------------------------------- IN:   Dexmedetomidine: 13 mL   Norepinephrine: 28 mL   Sodium Bicarbonate: 100 mL   Vasopressin: 6 mL Total IN: 147 mL  OUT:   Indwelling Catheter - Urethral (mL): 25 mL Total OUT: 25 mL  Total NET: 122 mL     Weight (kg): 78.8 (02-23 @ 20:42)  Constitutional: well developed, normal appearance, well groomed, well nourished, no deformities and no acute distress.  Eyes: the conjunctiva exhibited no abnormalities and the eyelids demonstrated no xanthelasmas.  HEENT: normal oral mucosa, no oral pallor and no oral cyanosis.  Neck: normal jugular venous A waves present, normal jugular venous V waves present and no jugular venous miramontes A waves.  Pulmonary: no respiratory distress, normal respiratory rhythm and effort, no accessory muscle use and lungs were clear to auscultation bilaterally.  Cardiovascular: heart rate and rhythm were normal, normal S1 and S2 and no murmur, gallop, rub, heave or thrill are present.  Abdomen: soft, non-tender, no hepato-splenomegaly and no abdominal mass palpated.  Musculoskeletal: the gait could not be assessed..  Extremities: no clubbing of the fingernails, no localized cyanosis, no petechial hemorrhages and no ischemic changes.  Skin: normal skin color and pigmentation, no rash, no venous stasis, no skin lesions, no skin ulcer and no xanthoma was observed.  Psychiatric: oriented to person, place, and time, the affect was normal, the mood was normal and not feeling anxious.   ECG: -------  < from: 12 Lead ECG (02.24.23 @ 08:26) >  Ventricular Rate 109 BPM  Atrial Rate 109 BPM  P-R Interval 144 ms  QRS Duration 72 ms  Q-T Interval 342 ms  QTC Calculation(Bazett) 460 ms  P Axis 54 degrees  R Axis 3 degrees  T Axis 55 degrees  Diagnosis Line Sinus tachycardia Otherwise normal ECG When compared with ECG of 23-FEB-2023 14:22, No significant change was found Confirmed by Ariel Allan MD ([id number]) on 2/24/2023 9:30:20 AM  < end of copied text >   LABS:  -------- 02-24  137  |  108  |  39<H> ----------------------------<  367<H> 4.3   |  15<L>  |  2.58<H>  Ca    7.9<L>      24 Feb 2023 05:30 Phos  4.1     02-24 Mg     2.0     02-24  TPro  5.8<L>  /  Alb  2.6<L>  /  TBili  0.4  /  DBili  x   /  AST  109<H>  /  ALT  80<H>  /  AlkPhos  94  02-24                       9.6   x     )-----------( 178      ( 24 Feb 2023 08:27 )            28.8    PT/INR - ( 23 Feb 2023 15:24 )   PT: 12.6 sec;   INR: 1.05 ratio      PTT - ( 23 Feb 2023 15:24 )  PTT:28.2 sec    Culture Results:  Growth in anaerobic bottle: Gram Negative Rods Growth in aerobic bottle: Gram Negative Rods ***Blood Panel PCR results on this specimen are available approximately 3 hours after the Gram stain result.*** Gram stain, PCR, and/or culture results may not always correspond due to difference in methodologies. ************************************************************ This PCR assay was performed by multiplex PCR. This Assay tests for 66 bacterial and resistance gene targets. Please refer to the [facility] test directory at [email or web address] for details. (02-23-23 @ 09:20) Culture Results:  Growth in aerobic and anaerobic bottles: Gram Negative Rods (02-23-23 @ 09:20)   RADIOLOGY REPORTS: ----------------------------- < from: Xray Chest 1 View- PORTABLE-Urgent (02.23.23 @ 10:49) >  ACC: [id number] EXAM:  XR CHEST PORTABLE URGENT 1V   ORDERED BY: LEV PALMA   PROCEDURE DATE:  02/23/2023      INTERPRETATION:  AP chest on February 23, 2023 at 10:20 AM. Patient has  sepsis.  Heart magnified by technique.  On September 27, 2021 there is a small left base infiltrate which is no  longer evident.  IMPRESSION: Clear lungs at this time.  --- End of Report ---      EFRA KRAUSE MD; Attending Radiologist This document has been electronically signed. Feb 23 2023 11:15AM  < end of copied text >    ECHOCARDIOGRAM: --------------------------- pending     CARDIOLOGY CONSULTATION NOTE                                                                             NELSON ZEPEDA is a 67y Female with h/o HTN, DM BIBEMS from home for AMS. Per EMS, pt coming from home, family called as pt found confused 0400 this AM. Per EMS, FS read as 'High.' EMS reports pt found in feces / urine.On ED arrival, pt orally febrile 103.5 F tachycardic to 120s, FS 400s.  In ED pt found to have UTI w/ CT abd with "Mild right hydroureteronephrosis with delayed right nephrogram, possibly ascending urinary tract infection. Underlying benign or malignant distal ureteral stricture is difficult to exclude."  CT chest with atelectasis vs pna. CTH neg. Labs significant for neutropenia with bandemia, hyperglycemia in 400s, lactate of 8, and elevated trops that peaked at 5K without EKG changes, and also GEOVANNI. Pt started on Vanc/Zosyn. Pt also found to be covid + in ED. Pt initially HD stable, but subsequently became transiently Htn to 70s/40s after 4L IVF and hypoxic to 80s on RA requiring 2LNC. ICU consulted for septic shock 2/2 UTI.  Subjective: pt seen and examined at the bedside. Pt unable to answer questions 2/2 AMS. Upon ICU evaluation, pt altered, following some commands but combative. Pt BP in low 100s after IVF and satting well on 2LNC. POCUS with hyperdynamic LV but without wall motion abnormality no pleural or pericardial effusions, IVC indeterminate, and lungs with scattered B lines bilaterally .  Overnight decompensated hemodynamically requiring pressor support. Consult requested to assess acute coronary syndrome.   REVIEW OF SYSTEMS: -----------------------------  CONSTITUTIONAL: No fever, weight loss, or fatigue EYES: No eye pain, visual disturbances, or discharge ENMT:  No difficulty hearing, tinnitus, vertigo; No sinus or throat pain NECK: No pain or stiffness BREASTS: No pain, masses, or nipple discharge RESPIRATORY: No cough, wheezing, chills or hemoptysis; No shortness of breath CARDIOVASCULAR: See HPI GASTROINTESTINAL: No abdominal or epigastric pain. No nausea, vomiting, or hematemesis; No diarrhea or constipation. No melena or hematochezia. GENITOURINARY: No dysuria, frequency, hematuria, or incontinence NEUROLOGICAL: No headaches, memory loss, loss of strength, numbness, or tremors SKIN: No itching, burning, rashes, or lesions  LYMPH NODES: No enlarged glands ENDOCRINE: No heat or cold intolerance; No hair loss MUSCULOSKELETAL: No joint pain or swelling; No muscle, back, or extremity pain PSYCHIATRIC: No depression, anxiety, mood swings, or difficulty sleeping HEME/LYMPH: No easy bruising, or bleeding gums ALLERGY AND IMMUNOLOGIC: No hives or eczema  Home Medications: acetaminophen 325 mg oral tablet: 2 tab(s) orally every 6 hours, As needed, Temp greater or equal to 38C (100.4F) (02 Oct 2021 15:18) acetaminophen 325 mg oral tablet: 2 tab(s) orally every 6 hours, As needed, Moderate Pain (4 - 6) (02 Oct 2021 15:18) aspirin 81 mg oral delayed release tablet: 1 tab(s) orally once a day (27 Sep 2021 12:22) bisacodyl 10 mg rectal suppository: 1 suppository(ies) rectal once a day, As needed, Constipation (27 Sep 2021 12:22) cefepime: 2 gram(s) intravenous 3 times a day for  37  more  dauys (02 Oct 2021 15:22) gabapentin 100 mg oral capsule: 1 cap(s) orally 3 times a day (27 Sep 2021 12:22) metFORMIN 500 mg oral tablet: 1 tab(s) orally 2 times a day (23 Sep 2021 14:06) metoprolol: 12.5 milligram(s) orally 2 times a day (23 Sep 2021 14:06) metroNIDAZOLE 500 mg oral tablet: 1 tab(s) orally every 8 hours (08 Oct 2021 14:09) polyethylene glycol 3350 oral powder for reconstitution: 17 gram(s) orally once a day (23 Sep 2021 14:06) senna oral tablet: 2 tab(s) orally once a day (at bedtime) (23 Sep 2021 14:06)   MEDICATIONS  (STANDING): aspirin  chewable 81 milliGRAM(s) Oral daily buMETAnide Injectable 2 milliGRAM(s) IV Push once chlorhexidine 2% Cloths 1 Application(s) Topical <User Schedule> dexMEDEtomidine Infusion 0.2 MICROgram(s)/kG/Hr (3.94 mL/Hr) IV Continuous <Continuous> heparin   Injectable 4700 Unit(s) IV Push once heparin  Infusion.  Unit(s)/Hr (9.5 mL/Hr) IV Continuous <Continuous> insulin glargine Injectable (LANTUS) 10 Unit(s) SubCutaneous at bedtime insulin lispro (ADMELOG) corrective regimen sliding scale   SubCutaneous every 6 hours norepinephrine Infusion 0.05 MICROgram(s)/kG/Min (3.69 mL/Hr) IV Continuous <Continuous> pantoprazole  Injectable 40 milliGRAM(s) IV Push daily piperacillin/tazobactam IVPB.. 3.375 Gram(s) IV Intermittent every 8 hours polyethylene glycol 3350 17 Gram(s) Oral at bedtime sodium bicarbonate  Infusion 0.19 mEq/kG/Hr (100 mL/Hr) IV Continuous <Continuous> vancomycin  IVPB 1250 milliGRAM(s) IV Intermittent once vasopressin Infusion 0.04 Unit(s)/Min (6 mL/Hr) IV Continuous <Continuous>   ALLERGIES: avocado (Pruritus) Carrots (Pruritus) No Known Drug Allergies Plums (Pruritus)   FAMILY HISTORY:   PHYSICAL EXAMINATION: ----------------------------- T(C): 37.8 (23 @ 08:00), Max: 39.7 (23 @ 09:35) HR: 111 (23 @ 08:38) (101 - 118) BP: 132/57 (23 @ 02:00) (75/49 - 149/71) RR: 23 (23 @ 08:38) (16 - 38) SpO2: 99% (23 @ 08:38) (94% - 100%) Wt(kg): --   @ 07:01  -   @ 07:00 -------------------------------------------------------- IN:   Dexmedetomidine: 26 mL   Norepinephrine: 428.1 mL   Sodium Bicarbonate: 400 mL   Vasopressin: 36 mL Total IN: 890.1 mL  OUT:   Indwelling Catheter - Urethral (mL): 305 mL Total OUT: 305 mL  Total NET: 585.1 mL    @ 07:01  -   @ 09:26 -------------------------------------------------------- IN:   Dexmedetomidine: 13 mL   Norepinephrine: 28 mL   Sodium Bicarbonate: 100 mL   Vasopressin: 6 mL Total IN: 147 mL  OUT:   Indwelling Catheter - Urethral (mL): 25 mL Total OUT: 25 mL  Total NET: 122 mL     Weight (kg): 78.8 ( @ 20:42)  Constitutional: well developed, normal appearance, well groomed, well nourished, no deformities and no acute distress.  Eyes: the conjunctiva exhibited no abnormalities and the eyelids demonstrated no xanthelasmas.  HEENT: normal oral mucosa, no oral pallor and no oral cyanosis.  Neck: normal jugular venous A waves present, normal jugular venous V waves present and no jugular venous miramontes A waves.  Pulmonary: no respiratory distress, normal respiratory rhythm and effort, no accessory muscle use and lungs were clear to auscultation bilaterally.  Cardiovascular: heart rate and rhythm were normal, normal S1 and S2 and no murmur, gallop, rub, heave or thrill are present.  Abdomen: soft, non-tender, no hepato-splenomegaly and no abdominal mass palpated.  Musculoskeletal: the gait could not be assessed..  Extremities: no clubbing of the fingernails, no localized cyanosis, no petechial hemorrhages and no ischemic changes.  Skin: normal skin color and pigmentation, no rash, no venous stasis, no skin lesions, no skin ulcer and no xanthoma was observed.  Psychiatric: oriented to person, place, and time, the affect was normal, the mood was normal and not feeling anxious.   ECG: -------  < from: 12 Lead ECG (23 @ 08:26) >  Ventricular Rate 109 BPM  Atrial Rate 109 BPM  P-R Interval 144 ms  QRS Duration 72 ms  Q-T Interval 342 ms  QTC Calculation(Bazett) 460 ms  P Axis 54 degrees  R Axis 3 degrees  T Axis 55 degrees  Diagnosis Line Sinus tachycardia Otherwise normal ECG When compared with ECG of 2023 14:22, No significant change was found Confirmed by Ariel Allan MD ([id number]) on 2023 9:30:20 AM  < end of copied text >   LABS:  --------   137  |  108  |  39<H> ----------------------------<  367<H> 4.3   |  15<L>  |  2.58<H>  Ca    7.9<L>      2023 05:30 Phos  4.1      Mg     2.0       TPro  5.8<L>  /  Alb  2.6<L>  /  TBili  0.4  /  DBili  x   /  AST  109<H>  /  ALT  80<H>  /  AlkPhos  94                         9.6   x     )-----------( 178      ( 2023 08:27 )            28.8    PT/INR - ( 2023 15:24 )   PT: 12.6 sec;   INR: 1.05 ratio      PTT - ( 2023 15:24 )  PTT:28.2 sec    Culture Results:  Growth in anaerobic bottle: Gram Negative Rods Growth in aerobic bottle: Gram Negative Rods ***Blood Panel PCR results on this specimen are available approximately 3 hours after the Gram stain result.*** Gram stain, PCR, and/or culture results may not always correspond due to difference in methodologies. ************************************************************ This PCR assay was performed by multiplex PCR. This Assay tests for 66 bacterial and resistance gene targets. Please refer to the [facility] test directory at [email or web address] for details. (23 @ 09:20) Culture Results:  Growth in aerobic and anaerobic bottles: Gram Negative Rods (23 @ 09:20)   RADIOLOGY REPORTS: ----------------------------- < from: Xray Chest 1 View- PORTABLE-Urgent (23 @ 10:49) >  ACC: [id number] EXAM:  XR CHEST PORTABLE URGENT 1V   ORDERED BY: LEV PALMA   PROCEDURE DATE:  2023      INTERPRETATION:  AP chest on 2023 at 10:20 AM. Patient has  sepsis.  Heart magnified by technique.  On 2021 there is a small left base infiltrate which is no  longer evident.  IMPRESSION: Clear lungs at this time.  --- End of Report ---      EFRA KRAUSE MD; Attending Radiologist This document has been electronically signed. 2023 11:15AM  < end of copied text >    ECHOCARDIOGRAM: --------------------------- < from: TTE Echo Complete w/o Contrast w/ Doppler (23 @ 09:17) >  ACC: [id number] EXAM:  ECHO TTE WO CON COMP W DOPP                        PROCEDURE DATE:  2023      INTERPRETATION:  TRANSTHORACIC ECHOCARDIOGRAM REPORT    Patient Name:   NELSON ZEPEDA Patient Location: [facility] Rec #:  [id number] Accession #:      [id number] Account #:                   Height:           67.7 in 172.0 cm YOB: 1955   Weight:           173.7 lb 78.80 kg Patient Age:    67 years     BSA:              1.92 m² Patient Gender: F            BP:            127/68 mmHg   Date of Exam:        2023 9:17:42 AM Sonographer:         JASPREET Referring Physician: LITZY LOERA  Procedure:     2D Echo/Doppler/Color Doppler Complete and Echocardiogram  with                Definity Contrast. Indications:   R57.9 - Shock, unspecified Diagnosis:     I42.8 - Other cardiomyopathies Study Details: Technically fair study.    2D AND M-MODE MEASUREMENTS (normal ranges within parentheses): Left                 Normal   Aorta/Left  Normal Ventricle:                    Atrium: IVSd (2D):    1.20  (0.7-1.1) Aortic Root    3.21  (2.4-3.7)                cm             (2D):           cm LVPWd (2D):   1.24  (0.7-1.1) Left Atrium    3.05  (1.9-4.0)                cm  (2D):           cm LVIDd (2D):   4.25  (3.4-5.7) LA Vol Index   19.9                cm             (A4C):        ml/m² LVIDs (2D):   3.45            LA Vol Index   16.1                cm             (A2C):        ml/m² LV FS (2D):   18.6   (>25%)   LA Vol Index   20.1                 %             (BP):         ml/m² Relative Wall 0.58   (<0.42)  Right Thickness                     Ventricle:                               TAPSE:          0.96 cm  LV DIASTOLIC FUNCTION: MV Peak E: 0.54 m/s Decel Time:  108 msec MV Peak A: 0.62 m/s Septal E/e'  10.5 E/A Ratio: 0.87     Lateral E/e' 15.8 Septal e'  0.1 m/s Lateral e' 0.0 m/s  SPECTRAL DOPPLER ANALYSIS (where applicable): Mitral Valve: MV P1/2 Time: 31.24 msec MV Area, PHT:7.04 cm²  Aortic Valve: AoV Max Laurent: 1.61 m/s AoV Peak PG: 10.4 mmHg AoV Mean P.4 mmHg  LVOT Vmax: 0.97 m/s LVOT VTI: 0.134 m LVOT Diameter: 1.89 cm  AoV Area, Vmax: 1.68 cm² AoV Area, VTI: 1.89 cm² AoV Area, Vmn: 1.53 cm²  Tricuspid Valve and PA/RV Systolic Pressure: TR Max Velocity: 2.57 m/s RA  Pressure: 5 mmHg RVSP/PASP: 31.5 mmHg   PHYSICIAN INTERPRETATION: Left Ventricle: Endocardial visualization was enhanced with intravenous  echo contrast. Global LV systolic functionwas moderately decreased. Left ventricular  ejection fraction, by visual estimation, is 35 to 40%. Normal segmental  left ventricular systolic function. Spectral Doppler shows impaired  relaxation pattern of left ventricular myocardial filling (Grade I  diastolic dysfunction). Normal LV filling pressures. Right Ventricle: Normal right ventricular size and function. Left Atrium: The left atrium is normal in size. Right Atrium: The right atrium is normal in size. Pericardium: There is no evidence of pericardial effusion. Mitral Valve: Structurally normal mitral valve, with normal leaflet  excursion. Trace mitral valve regurgitation is seen. Tricuspid Valve: Structurally normal tricuspid valve, with normal leaflet  excursion. Mild tricuspid regurgitation is visualized. Aortic Valve: Normal trileaflet aortic valve with normal opening. Trivial  aortic valve regurgitation is seen. Pulmonic Valve: Structurally normal pulmonic valve, with normal leaflet  excursion. Mild pulmonic valve regurgitation. Aorta: The aortic root and ascending aorta are structurally normal, with  no evidence of dilitation. Pulmonary Artery: The main pulmonary artery is normal in size. Venous: The inferior vena cava was dilated, with respiratory size  variation greater than 50%. In comparison to the previous echocardiogram(s): Prior examinations are  available and were reviewed for comparison purposes. Significant decrease  in LVEF as compared to prior study dated 21.   Summary:  1.Left ventricular ejection fraction, by visual estimation, is 35 to  40%.  2. Moderately decreased global left ventricular systolic function.  3. Spectral Doppler shows impaired relaxation pattern of left  ventricular myocardial filling (Grade I diastolic dysfunction).  4. Trace mitral valve regurgitation.  5. Mild tricuspid regurgitation.  6. Mild pulmonic valve regurgitation.  7. Significant decrease in LVEF as compared to prior study dated 21.  8. Endocardial visualization was enhanced with intravenous echo contrast.   [id number] Ariel Allan MD, FACC Electronically signed on 2023 at 1:55:45 PM      *** Final ***  < end of copied text >

## 2023-02-24 NOTE — PROGRESS NOTE ADULT - SUBJECTIVE AND OBJECTIVE BOX
66 y/o female with pmhx of HTN, DM BIBA after family found her altered from baseline at 4 am this morning found to be hyperglycemia, febrile to 103.5, tachycardic, lactemia of 8 admitted with severe sepsis due to UTI. 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 Hsopital course further ocmplicated by elevated troponin, peak at 5900 now downtrending. no ischemic EKG changes. also found to be covid-19 positive started on 2 liters NC      tonight her shock state is worsening. contaacted by RN now on .35 of levophed via peripheral IV with MAP of 56. CVC and a line placed. started on vasopressin and stress dose steroids. levo requirements improving. metabolica cidosis persists with worsening GEOVANNI, started on bicarb gtt. d/c lactated ringers. K, mag, and phos supplemented. lactate on ABG after improvement in MAP now downtrending. cultures with GNR, being covered with zosyn      CCT 50 minutes

## 2023-02-24 NOTE — DIETITIAN INITIAL EVALUATION ADULT - PERTINENT MEDS FT
MEDICATIONS  (STANDING):  aspirin  chewable 81 milliGRAM(s) Oral daily  chlorhexidine 2% Cloths 1 Application(s) Topical <User Schedule>  dexMEDEtomidine Infusion 0.2 MICROgram(s)/kG/Hr (3.94 mL/Hr) IV Continuous <Continuous>  heparin  Infusion.  Unit(s)/Hr (9.5 mL/Hr) IV Continuous <Continuous>  insulin regular Infusion 4 Unit(s)/Hr (4 mL/Hr) IV Continuous <Continuous>  norepinephrine Infusion 0.05 MICROgram(s)/kG/Min (3.69 mL/Hr) IV Continuous <Continuous>  pantoprazole  Injectable 40 milliGRAM(s) IV Push daily  piperacillin/tazobactam IVPB.. 3.375 Gram(s) IV Intermittent every 8 hours  polyethylene glycol 3350 17 Gram(s) Oral at bedtime  sodium bicarbonate  Infusion 0.19 mEq/kG/Hr (100 mL/Hr) IV Continuous <Continuous>  vasopressin Infusion 0.04 Unit(s)/Min (6 mL/Hr) IV Continuous <Continuous>    MEDICATIONS  (PRN):  acetaminophen     Tablet .. 650 milliGRAM(s) Oral every 6 hours PRN Temp greater or equal to 38C (100.4F), Mild Pain (1 - 3)  heparin   Injectable 4700 Unit(s) IV Push every 6 hours PRN For aPTT less than 40  sodium chloride 0.9% lock flush 10 milliLiter(s) IV Push every 1 hour PRN Pre/post blood products, medications, blood draw, and to maintain line patency

## 2023-02-24 NOTE — PROCEDURE NOTE - ADDITIONAL PROCEDURE DETAILS
under ultrasound guidance, appropriate anatomy identified. needle tip visualized entering appropriate vessel, wire visualized in appropriate vasculature. images obtained.    procedure performed independent of documented critical care time
under ultrasound guidance, appropriate anatomy identified. needle tip visualized entering appropriate vessel, wire visualized in appropriate vasculature. images obtained.    procedure performed independent of documented critical care time

## 2023-02-24 NOTE — DIETITIAN INITIAL EVALUATION ADULT - NS FNS DIET ORDER
Diet, NPO:   Except Medications     Special Instructions for Nursing:  Except Medications (02-23-23 @ 18:57)

## 2023-02-24 NOTE — PROGRESS NOTE ADULT - ASSESSMENT
66 y/o F w/DM admitted for encephalopathy and hypotension likely secondary to severe sepsis with septic shock secondary to UTI. CT scan with mild R hydronephrosis w/possible obstruction. Elevated troponins secondary to NSTEMI in setting of severe sepsis with septic shock? No EKG changes, however troponin now > 10k.  Acute blood loss anemia unclear source. GEOVANNI likely ATN. New acute hypoxemic respiratory failure likely secondary to acute pulmonary edema due to acute heart failure in setting of acute MI vs COVID. Hyperglycemia.    - Supplemental O2 as needed goal O2 sat >= 90%  - Dexamethasone 6mg daily, no remdesivir due to GEOVANNI  - Titrate pressors as needed goal MAP >= 65  - Trend troponins, cardiology consult, TTE  - Trial of heparin gtt  - Trend CBC transfuse PRN for hgb < 8 due to concern for active MI  - Trend Cr, avoid nephrotoxins  - Broad spectrum abx  - Full code  - Guarded prognosis    I have personally provided 55 minutes of attending critical care time excluding procedures.   68 y/o F w/DM admitted for encephalopathy and hypotension likely secondary to severe sepsis with septic shock secondary to UTI. CT scan with mild R hydronephrosis w/possible obstruction. Elevated troponins secondary to NSTEMI in setting of severe sepsis with septic shock? No EKG changes, however troponin now > 10k.  Acute blood loss anemia unclear source. GEOVANNI likely ATN. New acute hypoxemic respiratory failure likely secondary to acute pulmonary edema due to acute heart failure in setting of acute MI. Hyperglycemia. ? Covid positive? Initial RVP positive for COVID, but repeat was negative.    - Supplemental O2 as needed goal O2 sat >= 90%  - Titrate pressors as needed goal MAP >= 65, will add inotropes if pressor requirements continue to increase  - Bedside POCUS w/severely reduced LV function, new from previously documented contractility. IVC plump without respiratory variation  - Trend troponins, cardiology consult, TTE  - Trial of heparin gtt  - Diuresis goal net negative 1-2 L  - Trend CBC transfuse PRN for hgb < 8 due to concern for active MI  - Trend Cr, avoid nephrotoxins  - Repeat RVP  - Broad spectrum abx, follow up cultures  - Insulin gtt  - Full code  - Guarded prognosis    I have personally provided 55 minutes of attending critical care time excluding procedures.   66 y/o F w/DM admitted for encephalopathy and hypotension likely secondary to severe sepsis with septic shock secondary to UTI and E. Coli bacteremia. CT scan with mild R hydronephrosis w/possible obstruction. Elevated troponins secondary to NSTEMI in setting of severe sepsis with septic shock? No EKG changes, however troponin now > 10k. GEOVANNI likely ATN. New acute hypoxemic respiratory failure likely secondary to acute pulmonary edema due to acute heart failure in setting of acute MI. Hyperglycemia. ? Covid positive? Initial RVP positive for COVID, but repeat was negative.    - Supplemental O2 as needed goal O2 sat >= 90%  - Titrate pressors as needed goal MAP >= 65, will add inotropes if pressor requirements continue to increase  - Bedside POCUS w/severely reduced LV function, new from previously documented contractility. IVC plump without respiratory variation  - Trend troponins, cardiology consult, TTE  - Trial of heparin gtt  - Diuresis goal net negative 1-2 L  - Trend CBC transfuse PRN for hgb < 8 due to concern for active MI  - Trend Cr, avoid nephrotoxins  - Repeat RVP  - Broad spectrum abx, follow up cultures  - Insulin gtt  - Full code  - Guarded prognosis    I have personally provided 55 minutes of attending critical care time excluding procedures.   68 y/o F w/DM admitted for encephalopathy and hypotension likely secondary to severe sepsis with septic shock secondary to UTI and E. Coli bacteremia also likely component of cardiogenic shock. CT scan with mild R hydronephrosis w/possible obstruction. Elevated troponins secondary to NSTEMI in setting of severe sepsis with septic shock? No EKG changes, however troponin now > 10k. GEOVANNI likely ATN. New acute hypoxemic respiratory failure likely secondary to acute pulmonary edema due to acute heart failure in setting of acute MI. Hyperglycemia. ? Covid positive? Initial RVP positive for COVID, but repeat was negative.    - Supplemental O2 as needed goal O2 sat >= 90%  - Titrate pressors as needed goal MAP >= 65, will add inotropes if pressor requirements continue to increase  - Bedside POCUS w/severely reduced LV function, new from previously documented contractility. IVC plump without respiratory variation  - Trend troponins, cardiology consult, TTE  - Trial of heparin gtt  - Diuresis goal net negative 1-2 L  - Trend CBC transfuse PRN for hgb < 8 due to concern for active MI  - Trend Cr, avoid nephrotoxins  - Repeat RVP  - Broad spectrum abx, follow up cultures  - Insulin gtt  - Full code  - Guarded prognosis    I have personally provided 55 minutes of attending critical care time excluding procedures.

## 2023-02-24 NOTE — DIETITIAN INITIAL EVALUATION ADULT - OTHER INFO
Pt lethargic and confused with intermittent agitation; unable to provide history.     Pt NPO x 2 days.    Pt with T2DM; Metformin and Novolog PTA as per H&P. HbA1c 13.5% indicates poor blood glucose control.    Weight history per previous RD note (09/29/21) 72.6kg. Weight gain as noted below.    RD remains available.

## 2023-02-24 NOTE — DIETITIAN INITIAL EVALUATION ADULT - PERTINENT LABORATORY DATA
02-24    137  |  108  |  39<H>  ----------------------------<  367<H>  4.3   |  15<L>  |  2.58<H>    Ca    7.9<L>      24 Feb 2023 05:30  Phos  4.1     02-24  Mg     2.0     02-24    TPro  5.8<L>  /  Alb  2.6<L>  /  TBili  0.4  /  DBili  x   /  AST  109<H>  /  ALT  80<H>  /  AlkPhos  94  02-24  POCT Blood Glucose.: 356 mg/dL (02-24-23 @ 05:40)  A1C with Estimated Average Glucose Result: 13.5 % (02-24-23 @ 05:30)

## 2023-02-24 NOTE — PROGRESS NOTE ADULT - SUBJECTIVE AND OBJECTIVE BOX
Patient seen and  examined at bedside. Remains intubated, sedated.  On multiple vasopressors.     Vital Signs Last 24 Hrs  T(F): 99.4 (02-24-23 @ 12:00), Max: 100.7 (02-23-23 @ 16:47)  HR: 112 (02-24-23 @ 13:00)  BP: 132/57 (02-24-23 @ 02:00)  RR: 23 (02-24-23 @ 13:00)  SpO2: 96% (02-24-23 @ 13:00)  CAPILLARY BLOOD GLUCOSE  POCT Blood Glucose.: 427 mg/dL (24 Feb 2023 13:44)    PHYSICAL EXAM:  GENERAL: sedated  CHEST/LUNG: intubated, breathing synchronous with ventilator  HEART: tachycardic; S1 & S2 appreciated  ABDOMEN: + Bowel sounds, soft, Nontender, Nondistended  : mortensen catheter in place with minimal yellow urine  EXTREMITIES:  no calf tenderness, No edema    I&O's Detail    23 Feb 2023 07:01  -  24 Feb 2023 07:00  --------------------------------------------------------  IN:    Dexmedetomidine: 26 mL    Norepinephrine: 428.1 mL    Sodium Bicarbonate: 400 mL    Vasopressin: 36 mL  Total IN: 890.1 mL    OUT:    Indwelling Catheter - Urethral (mL): 305 mL  Total OUT: 305 mL    Total NET: 585.1 mL      24 Feb 2023 07:01  -  24 Feb 2023 13:52  --------------------------------------------------------  IN:    Dexmedetomidine: 78 mL    Heparin Infusion: 28.5 mL    Norepinephrine: 168 mL    Sodium Bicarbonate: 600 mL    Vasopressin: 36 mL  Total IN: 910.5 mL    OUT:    Indwelling Catheter - Urethral (mL): 310 mL  Total OUT: 310 mL    Total NET: 600.5 mL    LABS:                        10.1   17.33 )-----------( 172      ( 24 Feb 2023 11:00 )             30.1     02-24    141  |  112<H>  |  42<H>  ----------------------------<  446<H>  4.6   |  17<L>  |  2.27<H>    Ca    8.2<L>      24 Feb 2023 11:00  Phos  5.7     02-24  Mg     2.0     02-24    TPro  5.6<L>  /  Alb  2.5<L>  /  TBili  0.4  /  DBili  x   /  AST  146<H>  /  ALT  125<H>  /  AlkPhos  92  02-24    PT/INR - ( 23 Feb 2023 15:24 )   PT: 12.6 sec;   INR: 1.05 ratio    PTT - ( 23 Feb 2023 15:24 )  PTT:28.2 sec    A/P  67F with PMH HTN, DM with AMS admitted to ICU with UTI, septic shock, GEOVANNI, COVID. Urology consulted for mild right hydroureteronephrosis and possible benign or malignant distal ureteral stricture.  On multiple vasopressors and bicarb gtt    - continue abx  - IVF resuscitation  - trend Cr, monitor urine output  - wean pressors as tolerated  - continue care per ICU team  - no further urological intervention at this time, patient too unstable for further workup or any urological procedures at this time  - will d/w Dr. Pak

## 2023-02-24 NOTE — PROGRESS NOTE ADULT - SUBJECTIVE AND OBJECTIVE BOX
HPI:  67F PMH HTN, DM BIBEMS from home for AMS. Per EMS, pt coming from home, family called as pt found confused 0400 this AM. Per EMS, FS read as 'High.' EMS reports pt found in feces / urine.On ED arrival, pt orally febrile 103.5 F tachycardic to 120s, FS 400s.   In ED pt found to have UTI w/ CT abd with "Mild right hydroureteronephrosis with delayed right nephrogram, possibly ascending urinary tract infection. Underlying benign or malignant distal ureteral stricture is difficult to exclude."  CT chest with atelectasis vs pna. CTH neg. Labs significant for neutropenia with bandemia, hyperglycemia in 400s, lactate of 8, and trops peaked to 5K without EKG changes, and GEOVANNI. Pt started on Vanc/Zosyn. Pt also found to be covid + in ED. Pt initially HD stable, but subsequently became transiently Htn to 70s/40s after 4L IVF and hypoxic to 80s on RA requiring 2LNC.  ICU consulted for septic shock 2/2 UTI.    Subjective: pt seen and examined at the bedside. Pt unable to answer questions 2/2 AMS.  Upon ICU evaluation, pt altered, following some commands but combative. Pt BP in low 100s after IVF and satting well on 2LNC.  POCUS with hyperdynamic LV but without wall motion abnormality no pleural or pericardial effusions, IVC indeterminate, and lungs with scattered B lines bilaterally .        (2023 19:00)      24 hr events: Amidtted to ICU.     ## ROS:  [ ] unable to obtain  CONSTITUTIONAL: No fever, weight loss, or fatigue  EYES: No eye pain, visual disturbances, or discharge  ENMT:  No difficulty hearing, tinnitus, vertigo; No sinus or throat pain  NECK: No pain or stiffness  RESPIRATORY: No cough, wheezing, chills or hemoptysis; No shortness of breath  CARDIOVASCULAR: No chest pain, palpitations, dizziness, or leg swelling  GASTROINTESTINAL: No abdominal or epigastric pain. No nausea, vomiting, or hematemesis; No diarrhea or constipation. No melena or hematochezia.  GENITOURINARY: No dysuria, frequency, hematuria, or incontinence  NEUROLOGICAL: No headaches, memory loss, loss of strength, numbness, or tremors  SKIN: No itching, burning, rashes, or lesions   LYMPH NODES: No enlarged glands  ENDOCRINE: No heat or cold intolerance; No hair loss  MUSCULOSKELETAL: No joint pain or swelling; No muscle, back, or extremity pain  PSYCHIATRIC: No depression, anxiety, mood swings, or difficulty sleeping  HEME/LYMPH: No easy bruising, or bleeding gums  ALLERGY AND IMMUNOLOGIC: No hives or eczema    ## Vitals  ICU Vital Signs Last 24 Hrs  T(C): 37.8 (2023 08:00), Max: 39.7 (2023 09:35)  T(F): 100.1 (2023 08:00), Max: 103.5 (2023 09:35)  HR: 111 (2023 08:38) (101 - 118)  BP: 132/57 (2023 02:00) (75/49 - 149/71)  BP(mean): 77 (2023 02:00) (44 - 94)  ABP: 138/73 (2023 08:38) (78/52 - 147/75)  ABP(mean): 97 (2023 08:38) (59 - 98)  RR: 23 (2023 08:38) (16 - 38)  SpO2: 99% (2023 08:38) (94% - 100%)    O2 Parameters below as of 2023 20:42  Patient On (Oxygen Delivery Method): nasal cannula  O2 Flow (L/min): 2          ## Physical Exam:  Gen:  HEENT:  Resp:  CV:  Abd:  Ext:  Neuro:    ## Vent Data      ## Labs:  Chem:      137  |  108  |  39<H>  ----------------------------<  367<H>  4.3   |  15<L>  |  2.58<H>    Ca    7.9<L>      2023 05:30  Phos  4.1       Mg     2.0         TPro  5.8<L>  /  Alb  2.6<L>  /  TBili  0.4  /  DBili  x   /  AST  109<H>  /  ALT  80<H>  /  AlkPhos  94  -24    LIVER FUNCTIONS - ( 2023 05:30 )  Alb: 2.6 g/dL / Pro: 5.8 gm/dL / ALK PHOS: 94 U/L / ALT: 80 U/L / AST: 109 U/L / GGT: x           CBC:                        6.9    9.95  )-----------( 125      ( 2023 05:30 )             20.8     Coags:  PT/INR - ( 2023 15:24 )   PT: 12.6 sec;   INR: 1.05 ratio         PTT - ( 2023 15:24 )  PTT:28.2 sec    Urinalysis Basic - ( 2023 10:40 )    Color: Yellow / Appearance: Clear / S.020 / pH: x  Gluc: x / Ketone: Negative  / Bili: Negative / Urobili: Negative mg/dL   Blood: x / Protein: 100 mg/dL / Nitrite: Negative   Leuk Esterase: Small / RBC: 6-10 /HPF / WBC 11-25   Sq Epi: x / Non Sq Epi: Few / Bacteria: Many        ## Cardiac  CARDIAC MARKERS ( 2023 05:30 )  x     / x     / 1595 U/L / x     / 18.8 ng/mL        ## Blood Gas  ABG - ( 2023 05:56 )  pH, Arterial: 7.33  pH, Blood: x     /  pCO2: 25    /  pO2: 97    / HCO3: 13    / Base Excess: -11.3 /  SaO2: 97.8                #I/Os  I&O's Detail    2023 07:01  -  2023 07:00  --------------------------------------------------------  IN:    Dexmedetomidine: 26 mL    Norepinephrine: 428.1 mL    Sodium Bicarbonate: 400 mL    Vasopressin: 36 mL  Total IN: 890.1 mL    OUT:    Indwelling Catheter - Urethral (mL): 305 mL  Total OUT: 305 mL    Total NET: 585.1 mL      2023 07:01  -  2023 08:43  --------------------------------------------------------  IN:    Dexmedetomidine: 13 mL    Norepinephrine: 28 mL    Sodium Bicarbonate: 10 mL    Vasopressin: 6 mL  Total IN: 57 mL    OUT:    Indwelling Catheter - Urethral (mL): 25 mL  Total OUT: 25 mL    Total NET: 32 mL          ## Imaging:    ## Medications:  MEDICATIONS  (STANDING):  aspirin  chewable 81 milliGRAM(s) Oral daily  chlorhexidine 2% Cloths 1 Application(s) Topical <User Schedule>  dexMEDEtomidine Infusion 0.2 MICROgram(s)/kG/Hr (3.94 mL/Hr) IV Continuous <Continuous>  heparin   Injectable 5000 Unit(s) SubCutaneous every 8 hours  hydrocortisone sodium succinate Injectable 50 milliGRAM(s) IV Push every 6 hours  insulin glargine Injectable (LANTUS) 10 Unit(s) SubCutaneous at bedtime  insulin lispro (ADMELOG) corrective regimen sliding scale   SubCutaneous every 6 hours  norepinephrine Infusion 0.05 MICROgram(s)/kG/Min (3.69 mL/Hr) IV Continuous <Continuous>  pantoprazole  Injectable 40 milliGRAM(s) IV Push daily  piperacillin/tazobactam IVPB.. 3.375 Gram(s) IV Intermittent every 8 hours  polyethylene glycol 3350 17 Gram(s) Oral at bedtime  sodium bicarbonate  Infusion 0.19 mEq/kG/Hr (100 mL/Hr) IV Continuous <Continuous>  vasopressin Infusion 0.04 Unit(s)/Min (6 mL/Hr) IV Continuous <Continuous>    MEDICATIONS  (PRN):  acetaminophen     Tablet .. 650 milliGRAM(s) Oral every 6 hours PRN Temp greater or equal to 38C (100.4F), Mild Pain (1 - 3)  sodium chloride 0.9% lock flush 10 milliLiter(s) IV Push every 1 hour PRN Pre/post blood products, medications, blood draw, and to maintain line patency       HPI:  67F PMH HTN, DM BIBEMS from home for AMS. Per EMS, pt coming from home, family called as pt found confused 0400 this AM. Per EMS, FS read as 'High.' EMS reports pt found in feces / urine.On ED arrival, pt orally febrile 103.5 F tachycardic to 120s, FS 400s.   In ED pt found to have UTI w/ CT abd with "Mild right hydroureteronephrosis with delayed right nephrogram, possibly ascending urinary tract infection. Underlying benign or malignant distal ureteral stricture is difficult to exclude."  CT chest with atelectasis vs pna. CTH neg. Labs significant for neutropenia with bandemia, hyperglycemia in 400s, lactate of 8, and trops peaked to 5K without EKG changes, and GEOVANNI. Pt started on Vanc/Zosyn. Pt also found to be covid + in ED. Pt initially HD stable, but subsequently became transiently Htn to 70s/40s after 4L IVF and hypoxic to 80s on RA requiring 2LNC.  ICU consulted for septic shock 2/2 UTI.    Subjective: pt seen and examined at the bedside. Pt unable to answer questions 2/2 AMS.  Upon ICU evaluation, pt altered, following some commands but combative. Pt BP in low 100s after IVF and satting well on 2LNC.  POCUS with hyperdynamic LV but without wall motion abnormality no pleural or pericardial effusions, IVC indeterminate, and lungs with scattered B lines bilaterally .        (2023 19:00)      24 hr events: Amidtted to ICU. Currently lethargic and unable to answer questions    ## ROS:  [x ] unable to obtain    HEME/LYMPH: No easy bruising, or bleeding gums  ALLERGY AND IMMUNOLOGIC: No hives or eczema    ## Vitals  ICU Vital Signs Last 24 Hrs  T(C): 37.8 (2023 08:00), Max: 39.7 (2023 09:35)  T(F): 100.1 (2023 08:00), Max: 103.5 (2023 09:35)  HR: 111 (2023 08:38) (101 - 118)  BP: 132/57 (2023 02:00) (75/49 - 149/71)  BP(mean): 77 (2023 02:00) (44 - 94)  ABP: 138/73 (2023 08:38) (78/52 - 147/75)  ABP(mean): 97 (2023 08:38) (59 - 98)  RR: 23 (2023 08:38) (16 - 38)  SpO2: 99% (2023 08:38) (94% - 100%)    O2 Parameters below as of 2023 20:42  Patient On (Oxygen Delivery Method): nasal cannula  O2 Flow (L/min): 2          ## Physical Exam:  Gen: Elderly female lying in bed, NAD  HEENT: NC/AT sclerae anicteric  Resp: + Increased WOB, Ronchorous breath sounds b/l  CV: S1, S2  Abd: Soft, + BS  Ext: Cool to touch  Neuro: Lethargic, unarousable    ## Vent Data      ## Labs:  Chem:      137  |  108  |  39<H>  ----------------------------<  367<H>  4.3   |  15<L>  |  2.58<H>    Ca    7.9<L>      2023 05:30  Phos  4.1       Mg     2.0     24    TPro  5.8<L>  /  Alb  2.6<L>  /  TBili  0.4  /  DBili  x   /  AST  109<H>  /  ALT  80<H>  /  AlkPhos  94  02-24    LIVER FUNCTIONS - ( 2023 05:30 )  Alb: 2.6 g/dL / Pro: 5.8 gm/dL / ALK PHOS: 94 U/L / ALT: 80 U/L / AST: 109 U/L / GGT: x           CBC:                        6.9    9.95  )-----------( 125      ( 2023 05:30 )             20.8     Coags:  PT/INR - ( 2023 15:24 )   PT: 12.6 sec;   INR: 1.05 ratio         PTT - ( 2023 15:24 )  PTT:28.2 sec    Urinalysis Basic - ( 2023 10:40 )    Color: Yellow / Appearance: Clear / S.020 / pH: x  Gluc: x / Ketone: Negative  / Bili: Negative / Urobili: Negative mg/dL   Blood: x / Protein: 100 mg/dL / Nitrite: Negative   Leuk Esterase: Small / RBC: 6-10 /HPF / WBC 11-25   Sq Epi: x / Non Sq Epi: Few / Bacteria: Many        ## Cardiac  CARDIAC MARKERS ( 2023 05:30 )  x     / x     / 1595 U/L / x     / 18.8 ng/mL        ## Blood Gas  ABG - ( 2023 05:56 )  pH, Arterial: 7.33  pH, Blood: x     /  pCO2: 25    /  pO2: 97    / HCO3: 13    / Base Excess: -11.3 /  SaO2: 97.8                #I/Os  I&O's Detail    2023 07:01  -  2023 07:00  --------------------------------------------------------  IN:    Dexmedetomidine: 26 mL    Norepinephrine: 428.1 mL    Sodium Bicarbonate: 400 mL    Vasopressin: 36 mL  Total IN: 890.1 mL    OUT:    Indwelling Catheter - Urethral (mL): 305 mL  Total OUT: 305 mL    Total NET: 585.1 mL      2023 07:01  -  2023 08:43  --------------------------------------------------------  IN:    Dexmedetomidine: 13 mL    Norepinephrine: 28 mL    Sodium Bicarbonate: 10 mL    Vasopressin: 6 mL  Total IN: 57 mL    OUT:    Indwelling Catheter - Urethral (mL): 25 mL  Total OUT: 25 mL    Total NET: 32 mL          ## Imaging:    ## Medications:  MEDICATIONS  (STANDING):  aspirin  chewable 81 milliGRAM(s) Oral daily  chlorhexidine 2% Cloths 1 Application(s) Topical <User Schedule>  dexMEDEtomidine Infusion 0.2 MICROgram(s)/kG/Hr (3.94 mL/Hr) IV Continuous <Continuous>  heparin   Injectable 5000 Unit(s) SubCutaneous every 8 hours  hydrocortisone sodium succinate Injectable 50 milliGRAM(s) IV Push every 6 hours  insulin glargine Injectable (LANTUS) 10 Unit(s) SubCutaneous at bedtime  insulin lispro (ADMELOG) corrective regimen sliding scale   SubCutaneous every 6 hours  norepinephrine Infusion 0.05 MICROgram(s)/kG/Min (3.69 mL/Hr) IV Continuous <Continuous>  pantoprazole  Injectable 40 milliGRAM(s) IV Push daily  piperacillin/tazobactam IVPB.. 3.375 Gram(s) IV Intermittent every 8 hours  polyethylene glycol 3350 17 Gram(s) Oral at bedtime  sodium bicarbonate  Infusion 0.19 mEq/kG/Hr (100 mL/Hr) IV Continuous <Continuous>  vasopressin Infusion 0.04 Unit(s)/Min (6 mL/Hr) IV Continuous <Continuous>    MEDICATIONS  (PRN):  acetaminophen     Tablet .. 650 milliGRAM(s) Oral every 6 hours PRN Temp greater or equal to 38C (100.4F), Mild Pain (1 - 3)  sodium chloride 0.9% lock flush 10 milliLiter(s) IV Push every 1 hour PRN Pre/post blood products, medications, blood draw, and to maintain line patency

## 2023-02-24 NOTE — PROCEDURE NOTE - NSINDICATIONS_GEN_A_CORE
critical patient/monitoring purposes
critical illness/emergency venous access/hypertonic/irritant infusion/venous access

## 2023-02-25 LAB
-  AMIKACIN: SIGNIFICANT CHANGE UP
-  AMIKACIN: SIGNIFICANT CHANGE UP
-  AMOXICILLIN/CLAVULANIC ACID: SIGNIFICANT CHANGE UP
-  AMPICILLIN/SULBACTAM: SIGNIFICANT CHANGE UP
-  AMPICILLIN/SULBACTAM: SIGNIFICANT CHANGE UP
-  AMPICILLIN: SIGNIFICANT CHANGE UP
-  AMPICILLIN: SIGNIFICANT CHANGE UP
-  AZTREONAM: SIGNIFICANT CHANGE UP
-  AZTREONAM: SIGNIFICANT CHANGE UP
-  CEFAZOLIN: SIGNIFICANT CHANGE UP
-  CEFAZOLIN: SIGNIFICANT CHANGE UP
-  CEFEPIME: SIGNIFICANT CHANGE UP
-  CEFEPIME: SIGNIFICANT CHANGE UP
-  CEFOXITIN: SIGNIFICANT CHANGE UP
-  CEFOXITIN: SIGNIFICANT CHANGE UP
-  CEFTRIAXONE: SIGNIFICANT CHANGE UP
-  CEFTRIAXONE: SIGNIFICANT CHANGE UP
-  CEFUROXIME: SIGNIFICANT CHANGE UP
-  CIPROFLOXACIN: SIGNIFICANT CHANGE UP
-  CIPROFLOXACIN: SIGNIFICANT CHANGE UP
-  ERTAPENEM: SIGNIFICANT CHANGE UP
-  ERTAPENEM: SIGNIFICANT CHANGE UP
-  GENTAMICIN: SIGNIFICANT CHANGE UP
-  GENTAMICIN: SIGNIFICANT CHANGE UP
-  IMIPENEM: SIGNIFICANT CHANGE UP
-  IMIPENEM: SIGNIFICANT CHANGE UP
-  LEVOFLOXACIN: SIGNIFICANT CHANGE UP
-  LEVOFLOXACIN: SIGNIFICANT CHANGE UP
-  MEROPENEM: SIGNIFICANT CHANGE UP
-  MEROPENEM: SIGNIFICANT CHANGE UP
-  NITROFURANTOIN: SIGNIFICANT CHANGE UP
-  PIPERACILLIN/TAZOBACTAM: SIGNIFICANT CHANGE UP
-  PIPERACILLIN/TAZOBACTAM: SIGNIFICANT CHANGE UP
-  TOBRAMYCIN: SIGNIFICANT CHANGE UP
-  TOBRAMYCIN: SIGNIFICANT CHANGE UP
-  TRIMETHOPRIM/SULFAMETHOXAZOLE: SIGNIFICANT CHANGE UP
-  TRIMETHOPRIM/SULFAMETHOXAZOLE: SIGNIFICANT CHANGE UP
ALBUMIN SERPL ELPH-MCNC: 2.3 G/DL — LOW (ref 3.3–5)
ALP SERPL-CCNC: 111 U/L — SIGNIFICANT CHANGE UP (ref 40–120)
ALT FLD-CCNC: 139 U/L — HIGH (ref 12–78)
ANION GAP SERPL CALC-SCNC: 10 MMOL/L — SIGNIFICANT CHANGE UP (ref 5–17)
APTT BLD: 53.2 SEC — HIGH (ref 27.5–35.5)
APTT BLD: 63.1 SEC — HIGH (ref 27.5–35.5)
AST SERPL-CCNC: 119 U/L — HIGH (ref 15–37)
BASOPHILS # BLD AUTO: 0.1 K/UL — SIGNIFICANT CHANGE UP (ref 0–0.2)
BASOPHILS NFR BLD AUTO: 0.6 % — SIGNIFICANT CHANGE UP (ref 0–2)
BILIRUB SERPL-MCNC: 0.6 MG/DL — SIGNIFICANT CHANGE UP (ref 0.2–1.2)
BUN SERPL-MCNC: 40 MG/DL — HIGH (ref 7–23)
CALCIUM SERPL-MCNC: 8.3 MG/DL — LOW (ref 8.5–10.1)
CHLORIDE SERPL-SCNC: 112 MMOL/L — HIGH (ref 96–108)
CO2 SERPL-SCNC: 22 MMOL/L — SIGNIFICANT CHANGE UP (ref 22–31)
CREAT SERPL-MCNC: 1.97 MG/DL — HIGH (ref 0.5–1.3)
CULTURE RESULTS: SIGNIFICANT CHANGE UP
EGFR: 27 ML/MIN/1.73M2 — LOW
EOSINOPHIL # BLD AUTO: 0.03 K/UL — SIGNIFICANT CHANGE UP (ref 0–0.5)
EOSINOPHIL NFR BLD AUTO: 0.2 % — SIGNIFICANT CHANGE UP (ref 0–6)
GLUCOSE BLDC GLUCOMTR-MCNC: 130 MG/DL — HIGH (ref 70–99)
GLUCOSE BLDC GLUCOMTR-MCNC: 138 MG/DL — HIGH (ref 70–99)
GLUCOSE BLDC GLUCOMTR-MCNC: 153 MG/DL — HIGH (ref 70–99)
GLUCOSE BLDC GLUCOMTR-MCNC: 164 MG/DL — HIGH (ref 70–99)
GLUCOSE BLDC GLUCOMTR-MCNC: 219 MG/DL — HIGH (ref 70–99)
GLUCOSE SERPL-MCNC: 153 MG/DL — HIGH (ref 70–99)
GRAM STN FLD: SIGNIFICANT CHANGE UP
GRAM STN FLD: SIGNIFICANT CHANGE UP
HCT VFR BLD CALC: 28.6 % — LOW (ref 34.5–45)
HGB BLD-MCNC: 9.7 G/DL — LOW (ref 11.5–15.5)
IMM GRANULOCYTES NFR BLD AUTO: 25.1 % — HIGH (ref 0–0.9)
LACTATE SERPL-SCNC: 2.1 MMOL/L — HIGH (ref 0.7–2)
LYMPHOCYTES # BLD AUTO: 1.33 K/UL — SIGNIFICANT CHANGE UP (ref 1–3.3)
LYMPHOCYTES # BLD AUTO: 8.5 % — LOW (ref 13–44)
MAGNESIUM SERPL-MCNC: 1.9 MG/DL — SIGNIFICANT CHANGE UP (ref 1.6–2.6)
MCHC RBC-ENTMCNC: 27.9 PG — SIGNIFICANT CHANGE UP (ref 27–34)
MCHC RBC-ENTMCNC: 33.9 G/DL — SIGNIFICANT CHANGE UP (ref 32–36)
MCV RBC AUTO: 82.2 FL — SIGNIFICANT CHANGE UP (ref 80–100)
METHOD TYPE: SIGNIFICANT CHANGE UP
METHOD TYPE: SIGNIFICANT CHANGE UP
MONOCYTES # BLD AUTO: 0.42 K/UL — SIGNIFICANT CHANGE UP (ref 0–0.9)
MONOCYTES NFR BLD AUTO: 2.7 % — SIGNIFICANT CHANGE UP (ref 2–14)
NEUTROPHILS # BLD AUTO: 9.79 K/UL — HIGH (ref 1.8–7.4)
NEUTROPHILS NFR BLD AUTO: 62.9 % — SIGNIFICANT CHANGE UP (ref 43–77)
NRBC # BLD: 0 /100 WBCS — SIGNIFICANT CHANGE UP (ref 0–0)
ORGANISM # SPEC MICROSCOPIC CNT: SIGNIFICANT CHANGE UP
PHOSPHATE SERPL-MCNC: 3.3 MG/DL — SIGNIFICANT CHANGE UP (ref 2.5–4.5)
PLATELET # BLD AUTO: 153 K/UL — SIGNIFICANT CHANGE UP (ref 150–400)
POTASSIUM SERPL-MCNC: 3.6 MMOL/L — SIGNIFICANT CHANGE UP (ref 3.5–5.3)
POTASSIUM SERPL-SCNC: 3.6 MMOL/L — SIGNIFICANT CHANGE UP (ref 3.5–5.3)
PROCALCITONIN SERPL-MCNC: 307.6 NG/ML — HIGH (ref 0.02–0.1)
PROT SERPL-MCNC: 5.8 GM/DL — LOW (ref 6–8.3)
RAPID RVP RESULT: SIGNIFICANT CHANGE UP
RBC # BLD: 3.48 M/UL — LOW (ref 3.8–5.2)
RBC # FLD: 14.9 % — HIGH (ref 10.3–14.5)
SARS-COV-2 RNA SPEC QL NAA+PROBE: SIGNIFICANT CHANGE UP
SODIUM SERPL-SCNC: 144 MMOL/L — SIGNIFICANT CHANGE UP (ref 135–145)
SPECIMEN SOURCE: SIGNIFICANT CHANGE UP
WBC # BLD: 15.59 K/UL — HIGH (ref 3.8–10.5)
WBC # FLD AUTO: 15.59 K/UL — HIGH (ref 3.8–10.5)

## 2023-02-25 PROCEDURE — 99291 CRITICAL CARE FIRST HOUR: CPT

## 2023-02-25 RX ORDER — POTASSIUM CHLORIDE 20 MEQ
10 PACKET (EA) ORAL
Refills: 0 | Status: COMPLETED | OUTPATIENT
Start: 2023-02-25 | End: 2023-02-25

## 2023-02-25 RX ORDER — INSULIN LISPRO 100/ML
VIAL (ML) SUBCUTANEOUS EVERY 6 HOURS
Refills: 0 | Status: DISCONTINUED | OUTPATIENT
Start: 2023-02-25 | End: 2023-02-27

## 2023-02-25 RX ORDER — ACETAMINOPHEN 500 MG
1000 TABLET ORAL ONCE
Refills: 0 | Status: COMPLETED | OUTPATIENT
Start: 2023-02-25 | End: 2023-02-25

## 2023-02-25 RX ADMIN — HEPARIN SODIUM 700 UNIT(S)/HR: 5000 INJECTION INTRAVENOUS; SUBCUTANEOUS at 07:05

## 2023-02-25 RX ADMIN — BUMETANIDE 10 MG/HR: 0.25 INJECTION INTRAMUSCULAR; INTRAVENOUS at 01:39

## 2023-02-25 RX ADMIN — HEPARIN SODIUM 700 UNIT(S)/HR: 5000 INJECTION INTRAVENOUS; SUBCUTANEOUS at 19:02

## 2023-02-25 RX ADMIN — HEPARIN SODIUM 700 UNIT(S)/HR: 5000 INJECTION INTRAVENOUS; SUBCUTANEOUS at 17:28

## 2023-02-25 RX ADMIN — Medication 400 MILLIGRAM(S): at 08:41

## 2023-02-25 RX ADMIN — Medication 100 MILLIEQUIVALENT(S): at 05:09

## 2023-02-25 RX ADMIN — HEPARIN SODIUM 700 UNIT(S)/HR: 5000 INJECTION INTRAVENOUS; SUBCUTANEOUS at 10:16

## 2023-02-25 RX ADMIN — BUMETANIDE 10 MG/HR: 0.25 INJECTION INTRAMUSCULAR; INTRAVENOUS at 19:16

## 2023-02-25 RX ADMIN — BUMETANIDE 10 MG/HR: 0.25 INJECTION INTRAMUSCULAR; INTRAVENOUS at 20:29

## 2023-02-25 RX ADMIN — PANTOPRAZOLE SODIUM 40 MILLIGRAM(S): 20 TABLET, DELAYED RELEASE ORAL at 11:30

## 2023-02-25 RX ADMIN — DEXMEDETOMIDINE HYDROCHLORIDE IN 0.9% SODIUM CHLORIDE 3.94 MICROGRAM(S)/KG/HR: 4 INJECTION INTRAVENOUS at 10:15

## 2023-02-25 RX ADMIN — HEPARIN SODIUM 700 UNIT(S)/HR: 5000 INJECTION INTRAVENOUS; SUBCUTANEOUS at 05:10

## 2023-02-25 RX ADMIN — Medication 100 MILLIEQUIVALENT(S): at 06:17

## 2023-02-25 RX ADMIN — Medication 3.69 MICROGRAM(S)/KG/MIN: at 06:11

## 2023-02-25 RX ADMIN — Medication 1: at 18:24

## 2023-02-25 RX ADMIN — DEXMEDETOMIDINE HYDROCHLORIDE IN 0.9% SODIUM CHLORIDE 3.94 MICROGRAM(S)/KG/HR: 4 INJECTION INTRAVENOUS at 16:35

## 2023-02-25 RX ADMIN — Medication 3.69 MICROGRAM(S)/KG/MIN: at 07:09

## 2023-02-25 RX ADMIN — Medication 2: at 23:42

## 2023-02-25 RX ADMIN — DEXMEDETOMIDINE HYDROCHLORIDE IN 0.9% SODIUM CHLORIDE 3.94 MICROGRAM(S)/KG/HR: 4 INJECTION INTRAVENOUS at 00:53

## 2023-02-25 RX ADMIN — Medication 100 MILLIEQUIVALENT(S): at 07:11

## 2023-02-25 RX ADMIN — PIPERACILLIN AND TAZOBACTAM 25 GRAM(S): 4; .5 INJECTION, POWDER, LYOPHILIZED, FOR SOLUTION INTRAVENOUS at 14:23

## 2023-02-25 RX ADMIN — Medication 3.69 MICROGRAM(S)/KG/MIN: at 19:16

## 2023-02-25 RX ADMIN — PIPERACILLIN AND TAZOBACTAM 25 GRAM(S): 4; .5 INJECTION, POWDER, LYOPHILIZED, FOR SOLUTION INTRAVENOUS at 21:01

## 2023-02-25 RX ADMIN — DEXMEDETOMIDINE HYDROCHLORIDE IN 0.9% SODIUM CHLORIDE 3.94 MICROGRAM(S)/KG/HR: 4 INJECTION INTRAVENOUS at 07:09

## 2023-02-25 RX ADMIN — CHLORHEXIDINE GLUCONATE 1 APPLICATION(S): 213 SOLUTION TOPICAL at 05:10

## 2023-02-25 RX ADMIN — DEXMEDETOMIDINE HYDROCHLORIDE IN 0.9% SODIUM CHLORIDE 3.94 MICROGRAM(S)/KG/HR: 4 INJECTION INTRAVENOUS at 05:09

## 2023-02-25 RX ADMIN — BUMETANIDE 10 MG/HR: 0.25 INJECTION INTRAMUSCULAR; INTRAVENOUS at 11:30

## 2023-02-25 RX ADMIN — Medication 1000 MILLIGRAM(S): at 09:00

## 2023-02-25 RX ADMIN — Medication 1: at 11:35

## 2023-02-25 RX ADMIN — BUMETANIDE 10 MG/HR: 0.25 INJECTION INTRAMUSCULAR; INTRAVENOUS at 07:09

## 2023-02-25 RX ADMIN — DEXMEDETOMIDINE HYDROCHLORIDE IN 0.9% SODIUM CHLORIDE 3.94 MICROGRAM(S)/KG/HR: 4 INJECTION INTRAVENOUS at 19:16

## 2023-02-25 RX ADMIN — PIPERACILLIN AND TAZOBACTAM 25 GRAM(S): 4; .5 INJECTION, POWDER, LYOPHILIZED, FOR SOLUTION INTRAVENOUS at 05:09

## 2023-02-25 RX ADMIN — Medication 300 MILLIGRAM(S): at 11:30

## 2023-02-25 RX ADMIN — HEPARIN SODIUM 700 UNIT(S)/HR: 5000 INJECTION INTRAVENOUS; SUBCUTANEOUS at 12:30

## 2023-02-25 NOTE — PROGRESS NOTE ADULT - ASSESSMENT
NELSON ZEPEDA is a 67y Female with h/o HTN, DM BIBEMS from home for AMS.    Found to be in urosepsis and COVID+.  Elevated trops in the setting of GEOVANNI.   Pt initially HD stable, but subsequently became transiently Htn to 70s/40s after 4L IVF and hypoxic to 80s on RA requiring 2LNC.decompensated hemodynamically requiring pressor support.     Imp:  Stress induced cardiomyopathy in setting of COVID, Urosepsis and GEOVANNI.  Cannot rule out concurrent NSTEMI, although no ECG changes noted, no WMA on echo, and CPK-mass assay reassuring. unable to assess for clinical symptoms  Not a candidate for aggressive interventions while septic. Need to continue pressors as needed.  Was started on IV heparin empirically, would empirically treat for total 48 hours and decide depending on stability for intervention.  no betablocker while on norepinephrine  Empiric Antibiotics as per critical care team.  CMP in this setting associated with 30-40% mortality, outlook remains guarded.

## 2023-02-25 NOTE — PROGRESS NOTE ADULT - SUBJECTIVE AND OBJECTIVE BOX
HPI:  67F PMH HTN, DM BIBEMS from home for AMS. Per EMS, pt coming from home, family called as pt found confused 0400 this AM. Per EMS, FS read as 'High.' EMS reports pt found in feces / urine.On ED arrival, pt orally febrile 103.5 F tachycardic to 120s, FS 400s.   In ED pt found to have UTI w/ CT abd with "Mild right hydroureteronephrosis with delayed right nephrogram, possibly ascending urinary tract infection. Underlying benign or malignant distal ureteral stricture is difficult to exclude."  CT chest with atelectasis vs pna. CTH neg. Labs significant for neutropenia with bandemia, hyperglycemia in 400s, lactate of 8, and trops peaked to 5K without EKG changes, and GEOVANNI. Pt started on Vanc/Zosyn. Pt also found to be covid + in ED. Pt initially HD stable, but subsequently became transiently Htn to 70s/40s after 4L IVF and hypoxic to 80s on RA requiring 2LNC.  ICU consulted for septic shock 2/2 UTI.    Subjective: pt seen and examined at the bedside. Pt unable to answer questions 2/2 AMS.  Upon ICU evaluation, pt altered, following some commands but combative. Pt BP in low 100s after IVF and satting well on 2LNC.  POCUS with hyperdynamic LV but without wall motion abnormality no pleural or pericardial effusions, IVC indeterminate, and lungs with scattered B lines bilaterally .        (2023 19:00)      24 hr events: No acute events. Pressor requirements downtrending.     ## ROS:  [x ] unable to obtain    ## Vitals  ICU Vital Signs Last 24 Hrs  T(C): 37.8 (2023 08:00), Max: 37.8 (2023 05:40)  T(F): 100.1 (2023 08:00), Max: 100.1 (2023 08:00)  HR: 110 (2023 08:00) (109 - 118)  BP: --  BP(mean): --  ABP: 109/59 (2023 08:00) (99/56 - 183/83)  ABP(mean): 78 (2023 08:00) (72 - 121)  RR: 24 (2023 08:00) (20 - 38)  SpO2: 100% (2023 08:00) (93% - 100%)    O2 Parameters below as of 2023 08:00  Patient On (Oxygen Delivery Method): nasal cannula  O2 Flow (L/min): 2          ## Physical Exam:  Gen: Elderly female lying in bed, NAD  HEENT: NC/AT sclerae anicteric  Resp: + Increased WOB, Ronchorous breath sounds b/l  CV: S1, S2  Abd: Soft, + BS  Ext: Cool to touch  Neuro: Lethargic, unarousable    ## Vent Data      ## Labs:  Chem:      144  |  112<H>  |  40<H>  ----------------------------<  153<H>  3.6   |  22  |  1.97<H>    Ca    8.3<L>      2023 02:50  Phos  3.3     02  Mg     1.9     25    TPro  5.8<L>  /  Alb  2.3<L>  /  TBili  0.6  /  DBili  x   /  AST  119<H>  /  ALT  139<H>  /  AlkPhos  111  25    LIVER FUNCTIONS - ( 2023 02:50 )  Alb: 2.3 g/dL / Pro: 5.8 gm/dL / ALK PHOS: 111 U/L / ALT: 139 U/L / AST: 119 U/L / GGT: x           CBC:                        9.7    15.59 )-----------( 153      ( 2023 02:50 )             28.6     Coags:  PT/INR - ( 2023 15:24 )   PT: 12.6 sec;   INR: 1.05 ratio         PTT - ( 2023 02:50 )  PTT:63.1 sec    Urinalysis Basic - ( 2023 10:40 )    Color: Yellow / Appearance: Clear / S.020 / pH: x  Gluc: x / Ketone: Negative  / Bili: Negative / Urobili: Negative mg/dL   Blood: x / Protein: 100 mg/dL / Nitrite: Negative   Leuk Esterase: Small / RBC: 6-10 /HPF / WBC 11-25   Sq Epi: x / Non Sq Epi: Few / Bacteria: Many        ## Cardiac  CARDIAC MARKERS ( 2023 21:48 )  x     / x     / 955 U/L / x     / 17.1 ng/mL  CARDIAC MARKERS ( 2023 16:00 )  x     / x     / 1258 U/L / x     / 22.0 ng/mL  CARDIAC MARKERS ( 2023 11:00 )  x     / x     / 1357 U/L / x     / 19.7 ng/mL  CARDIAC MARKERS ( 2023 05:30 )  x     / x     / 1595 U/L / x     / 18.8 ng/mL        ## Blood Gas  ABG - ( 2023 16:00 )  pH, Arterial: 7.36  pH, Blood: x     /  pCO2: 29    /  pO2: 112   / HCO3: 16    / Base Excess: -7.6  /  SaO2: 98.7                #I/Os  I&O's Detail    2023 07:01  -  2023 07:00  --------------------------------------------------------  IN:    Bumetanide: 240 mL    Dexmedetomidine: 177.6 mL    Heparin Infusion: 153 mL    Insulin: 85 mL    IV PiggyBack: 695 mL    Norepinephrine: 501.6 mL    Sodium Bicarbonate: 800 mL    Vasopressin: 36 mL  Total IN: 2688.2 mL    OUT:    Indwelling Catheter - Urethral (mL): 3380 mL  Total OUT: 3380 mL    Total NET: -691.8 mL      2023 07:01  -  2023 08:41  --------------------------------------------------------  IN:    Bumetanide: 10 mL    Dexmedetomidine: 7.8 mL    Heparin Infusion: 7 mL    IV PiggyBack: 100 mL    Norepinephrine: 14 mL  Total IN: 138.8 mL    OUT:    Indwelling Catheter - Urethral (mL): 395 mL  Total OUT: 395 mL    Total NET: -256.2 mL          ## Imaging:    ## Medications:  MEDICATIONS  (STANDING):  acetaminophen   IVPB .. 1000 milliGRAM(s) IV Intermittent once  aspirin Suppository 300 milliGRAM(s) Rectal daily  buMETAnide Infusion 2 mG/Hr (10 mL/Hr) IV Continuous <Continuous>  chlorhexidine 2% Cloths 1 Application(s) Topical <User Schedule>  dexMEDEtomidine Infusion 0.2 MICROgram(s)/kG/Hr (3.94 mL/Hr) IV Continuous <Continuous>  heparin  Infusion.  Unit(s)/Hr (9.5 mL/Hr) IV Continuous <Continuous>  norepinephrine Infusion 0.05 MICROgram(s)/kG/Min (3.69 mL/Hr) IV Continuous <Continuous>  pantoprazole  Injectable 40 milliGRAM(s) IV Push daily  piperacillin/tazobactam IVPB.. 3.375 Gram(s) IV Intermittent every 8 hours  polyethylene glycol 3350 17 Gram(s) Oral at bedtime    MEDICATIONS  (PRN):  heparin   Injectable 4700 Unit(s) IV Push every 6 hours PRN For aPTT less than 40  sodium chloride 0.9% lock flush 10 milliLiter(s) IV Push every 1 hour PRN Pre/post blood products, medications, blood draw, and to maintain line patency

## 2023-02-25 NOTE — PROGRESS NOTE ADULT - ASSESSMENT
67F with PMH HTN, DM with AMS admitted to ICU with UTI, septic shock, GEOVANNI, COVID. Urology consulted for mild right hydroureteronephrosis ? distal ureteral stricture. Pt with e.coli positive blood and urine cultures from 2/23      - continue abx  - IVF resuscitation  - trend Cr, monitor urine output  - wean pressors as tolerated  - continue care per ICU team  - ?role for R stent placement vs R PCN. Dr Pak to follow up this afternoon  discussed with urology attending Ross Pak MD.

## 2023-02-25 NOTE — PROGRESS NOTE ADULT - ASSESSMENT
68 y/o F w/DM admitted for encephalopathy and hypotension likely secondary to severe sepsis with septic shock secondary to UTI and E. Coli bacteremia also likely component of cardiogenic shock. CT scan with mild R hydronephrosis w/possible obstruction. Elevated troponins secondary to NSTEMI in setting of severe sepsis with septic shock? No EKG changes, however troponin now > 10k. GEOVANNI likely ATN. New acute hypoxemic respiratory failure likely secondary to acute pulmonary edema due to acute heart failure in setting of acute MI. Hyperglycemia. ? Covid positive? Initial RVP positive for COVID, but repeat was negative, now negative x 2, initial was likely false positive.    - Supplemental O2 as needed goal O2 sat >= 90%  - Titrate pressors as needed goal MAP >= 65  - Appreciate cardiology consult, septic cardiomyopathy +/- NSTEMI, possible ischemic workup if patient improves  - Heparin gtt  - Diuresis goal net negative 1-2 L  - Trend CBC transfuse PRN for hgb < 8 due to concern for active MI  - Trend Cr, avoid nephrotoxins  - Broad spectrum abx, follow up cultures  - Insulin  - Full code  - Guarded prognosis    I have personally provided 35 minutes of attending critical care time excluding procedures.

## 2023-02-25 NOTE — PROGRESS NOTE ADULT - SUBJECTIVE AND OBJECTIVE BOX
68 y/o female with pmhx of HTN, DM admit 2/23   CCT34    1. septic shock  2. UTI with E coli bacteremia   3. pna  4. mild left hydronephrosis due to ureteral stricture  5. COVID-19 pna  6. delirium  7. HHS      Neuro Encephalopathic managed with precedex requires restraint to prevent pulling out TLC     Cor  HD unstable on nor-epi titrating to MAP 65.  Vasopressin is turned off  New low EF likely septic CM though + Trop rx'ing as ACS with ASA/UFH  Cardica w/u when septic shcok state resolves.      Pulm ??? if COVID +  on iso.  022 sat 99% on 2L NC.  No covid therapies needed     GI  PPI   Diet when MS improved     Renal  GEOVANNI on BUMEX drip ordered  for fluid overload Urology following for mild L HN no procedures planned presently. Metabolic acidosis improved.  Bicarb drip stopped.  Persistent lactic acidosis despite HD improvement may be at least partially explained with ongoing pressor Req along with her GEOVANNI and home metformin use.       Heme  UFH ASA  for ACS  no bleeding     ID   pip tazo for  e coli bacteremia  follow sensitivities    Endo Received steroids now off insulin drip just shut off for   May need lantus ISS

## 2023-02-25 NOTE — PROGRESS NOTE ADULT - SUBJECTIVE AND OBJECTIVE BOX
Cardiology Progress Note    Interval Events:  Trops trending down  pt remains with AMS  Remains on pressors for BP support      MEDICATIONS:  buMETAnide Infusion 2 mG/Hr IV Continuous <Continuous>  heparin   Injectable 4700 Unit(s) IV Push every 6 hours PRN  heparin  Infusion.  Unit(s)/Hr IV Continuous <Continuous>  norepinephrine Infusion 0.05 MICROgram(s)/kG/Min IV Continuous <Continuous>  piperacillin/tazobactam IVPB.. 3.375 Gram(s) IV Intermittent every 8 hours  aspirin Suppository 300 milliGRAM(s) Rectal daily  dexMEDEtomidine Infusion 0.2 MICROgram(s)/kG/Hr IV Continuous <Continuous>  pantoprazole  Injectable 40 milliGRAM(s) IV Push daily  polyethylene glycol 3350 17 Gram(s) Oral at bedtime  insulin lispro (ADMELOG) corrective regimen sliding scale   SubCutaneous every 6 hours  chlorhexidine 2% Cloths 1 Application(s) Topical <User Schedule>  sodium chloride 0.9% lock flush 10 milliLiter(s) IV Push every 1 hour PRN      PHYSICAL EXAM:  T(C): 37.8 (02-25-23 @ 08:00), Max: 37.8 (02-25-23 @ 05:40)  HR: 107 (02-25-23 @ 12:30) (107 - 118)  BP: --  RR: 26 (02-25-23 @ 12:30) (20 - 30)  SpO2: 96% (02-25-23 @ 12:30) (95% - 100%)  Wt(kg): --  I&O's Summary    24 Feb 2023 07:01  -  25 Feb 2023 07:00  --------------------------------------------------------  IN: 2688.2 mL / OUT: 3380 mL / NET: -691.8 mL    25 Feb 2023 07:01  -  25 Feb 2023 13:19  --------------------------------------------------------  IN: 331.8 mL / OUT: 1080 mL / NET: -748.2 mL      Appearance: No acute distress  HEENT:  mmm  Cardiovascular: Normal S1 S2, no elevated JVP, +edema  Respiratory: Lungs clear to auscultation, poor inspiratory effort  Psychiatry: unable to assess  Gastrointestinal:  soft  Skin:  no cyanosis	  Neurologic: calm      LABS:	 	  CBC Full  -  ( 25 Feb 2023 02:50 )  WBC Count : 15.59 K/uL  Hemoglobin : 9.7 g/dL  Hematocrit : 28.6 %  Platelet Count - Automated : 153 K/uL    02-25  144  |  112<H>  |  40<H>  ----------------------------<  153<H>  3.6   |  22  |  1.97<H>    02-24  143  |  111<H>  |  40<H>  ----------------------------<  144<H>  3.7   |  21<L>  |  2.02<H>    Ca    8.3<L>      25 Feb 2023 02:50  Ca    8.0<L>      24 Feb 2023 21:48  Phos  3.3     02-25  Phos  5.7     02-24  Mg     1.9     02-25  Mg     2.0     02-24    TPro  5.8<L>  /  Alb  2.3<L>  /  TBili  0.6  /  DBili  x   /  AST  119<H>  /  ALT  139<H>  /  AlkPhos  111  02-25  TPro  5.6<L>  /  Alb  2.5<L>  /  TBili  0.4  /  DBili  x   /  AST  146<H>  /  ALT  125<H>  /  AlkPhos  92  02-24      proBNP:   Lipid Profile:   HgA1c:   TSH:     CARDIAC MARKERS:  Troponin I, High Sensitivity Result: 28082.3 ng/L (02-24-23 @ 21:48)  Troponin I, High Sensitivity Result: 80792.5 ng/L (02-24-23 @ 16:00)  Troponin I, High Sensitivity Result: 91166.0 ng/L (02-24-23 @ 11:00)  Troponin I, High Sensitivity Result: 09658.9 ng/L (02-24-23 @ 05:30)  Troponin I, High Sensitivity Result: 2621.9 ng/L (02-23-23 @ 18:57)      TELEMETRY: 	  ST  ECG:  	  RADIOLOGY:  OTHER: 	    PREVIOUS DIAGNOSTIC TESTING:    [ ] Echocardiogram:   [ ] Catheterization:  [ ] Stress Test:

## 2023-02-26 LAB
ALBUMIN SERPL ELPH-MCNC: 2.2 G/DL — LOW (ref 3.3–5)
ALP SERPL-CCNC: 133 U/L — HIGH (ref 40–120)
ALT FLD-CCNC: 111 U/L — HIGH (ref 12–78)
ANION GAP SERPL CALC-SCNC: 12 MMOL/L — SIGNIFICANT CHANGE UP (ref 5–17)
APTT BLD: 39.3 SEC — HIGH (ref 27.5–35.5)
AST SERPL-CCNC: 61 U/L — HIGH (ref 15–37)
BASOPHILS # BLD AUTO: 0.07 K/UL — SIGNIFICANT CHANGE UP (ref 0–0.2)
BASOPHILS NFR BLD AUTO: 0.5 % — SIGNIFICANT CHANGE UP (ref 0–2)
BILIRUB SERPL-MCNC: 0.8 MG/DL — SIGNIFICANT CHANGE UP (ref 0.2–1.2)
BUN SERPL-MCNC: 38 MG/DL — HIGH (ref 7–23)
CALCIUM SERPL-MCNC: 8.5 MG/DL — SIGNIFICANT CHANGE UP (ref 8.5–10.1)
CHLORIDE SERPL-SCNC: 110 MMOL/L — HIGH (ref 96–108)
CO2 SERPL-SCNC: 24 MMOL/L — SIGNIFICANT CHANGE UP (ref 22–31)
CREAT SERPL-MCNC: 1.81 MG/DL — HIGH (ref 0.5–1.3)
EGFR: 30 ML/MIN/1.73M2 — LOW
EOSINOPHIL # BLD AUTO: 0.01 K/UL — SIGNIFICANT CHANGE UP (ref 0–0.5)
EOSINOPHIL NFR BLD AUTO: 0.1 % — SIGNIFICANT CHANGE UP (ref 0–6)
GLUCOSE BLDC GLUCOMTR-MCNC: 209 MG/DL — HIGH (ref 70–99)
GLUCOSE BLDC GLUCOMTR-MCNC: 279 MG/DL — HIGH (ref 70–99)
GLUCOSE BLDC GLUCOMTR-MCNC: 290 MG/DL — HIGH (ref 70–99)
GLUCOSE BLDC GLUCOMTR-MCNC: 296 MG/DL — HIGH (ref 70–99)
GLUCOSE BLDC GLUCOMTR-MCNC: 306 MG/DL — HIGH (ref 70–99)
GLUCOSE BLDC GLUCOMTR-MCNC: 313 MG/DL — HIGH (ref 70–99)
GLUCOSE SERPL-MCNC: 226 MG/DL — HIGH (ref 70–99)
HCT VFR BLD CALC: 28.5 % — LOW (ref 34.5–45)
HGB BLD-MCNC: 9.4 G/DL — LOW (ref 11.5–15.5)
IMM GRANULOCYTES NFR BLD AUTO: 2.1 % — HIGH (ref 0–0.9)
LYMPHOCYTES # BLD AUTO: 1.22 K/UL — SIGNIFICANT CHANGE UP (ref 1–3.3)
LYMPHOCYTES # BLD AUTO: 8.8 % — LOW (ref 13–44)
MAGNESIUM SERPL-MCNC: 1.6 MG/DL — SIGNIFICANT CHANGE UP (ref 1.6–2.6)
MCHC RBC-ENTMCNC: 27.9 PG — SIGNIFICANT CHANGE UP (ref 27–34)
MCHC RBC-ENTMCNC: 33 G/DL — SIGNIFICANT CHANGE UP (ref 32–36)
MCV RBC AUTO: 84.6 FL — SIGNIFICANT CHANGE UP (ref 80–100)
MONOCYTES # BLD AUTO: 0.71 K/UL — SIGNIFICANT CHANGE UP (ref 0–0.9)
MONOCYTES NFR BLD AUTO: 5.1 % — SIGNIFICANT CHANGE UP (ref 2–14)
NEUTROPHILS # BLD AUTO: 11.49 K/UL — HIGH (ref 1.8–7.4)
NEUTROPHILS NFR BLD AUTO: 83.4 % — HIGH (ref 43–77)
NRBC # BLD: 0 /100 WBCS — SIGNIFICANT CHANGE UP (ref 0–0)
PHOSPHATE SERPL-MCNC: 3.1 MG/DL — SIGNIFICANT CHANGE UP (ref 2.5–4.5)
PLATELET # BLD AUTO: 121 K/UL — LOW (ref 150–400)
POTASSIUM SERPL-MCNC: 3.4 MMOL/L — LOW (ref 3.5–5.3)
POTASSIUM SERPL-SCNC: 3.4 MMOL/L — LOW (ref 3.5–5.3)
PROT SERPL-MCNC: 6 GM/DL — SIGNIFICANT CHANGE UP (ref 6–8.3)
RBC # BLD: 3.37 M/UL — LOW (ref 3.8–5.2)
RBC # FLD: 15.3 % — HIGH (ref 10.3–14.5)
SODIUM SERPL-SCNC: 146 MMOL/L — HIGH (ref 135–145)
WBC # BLD: 13.79 K/UL — HIGH (ref 3.8–10.5)
WBC # FLD AUTO: 13.79 K/UL — HIGH (ref 3.8–10.5)

## 2023-02-26 PROCEDURE — 99291 CRITICAL CARE FIRST HOUR: CPT

## 2023-02-26 RX ORDER — CEFTRIAXONE 500 MG/1
2000 INJECTION, POWDER, FOR SOLUTION INTRAMUSCULAR; INTRAVENOUS ONCE
Refills: 0 | Status: COMPLETED | OUTPATIENT
Start: 2023-02-26 | End: 2023-02-26

## 2023-02-26 RX ORDER — MAGNESIUM SULFATE 500 MG/ML
2 VIAL (ML) INJECTION ONCE
Refills: 0 | Status: COMPLETED | OUTPATIENT
Start: 2023-02-26 | End: 2023-02-26

## 2023-02-26 RX ORDER — DOCOSANOL 100 MG/G
1 CREAM TOPICAL
Refills: 0 | Status: COMPLETED | OUTPATIENT
Start: 2023-02-26 | End: 2023-03-03

## 2023-02-26 RX ORDER — INSULIN GLARGINE 100 [IU]/ML
12 INJECTION, SOLUTION SUBCUTANEOUS ONCE
Refills: 0 | Status: COMPLETED | OUTPATIENT
Start: 2023-02-26 | End: 2023-02-26

## 2023-02-26 RX ORDER — INSULIN GLARGINE 100 [IU]/ML
12 INJECTION, SOLUTION SUBCUTANEOUS ONCE
Refills: 0 | Status: DISCONTINUED | OUTPATIENT
Start: 2023-02-26 | End: 2023-02-26

## 2023-02-26 RX ORDER — ACETAMINOPHEN 500 MG
1000 TABLET ORAL ONCE
Refills: 0 | Status: COMPLETED | OUTPATIENT
Start: 2023-02-26 | End: 2023-02-26

## 2023-02-26 RX ORDER — POTASSIUM CHLORIDE 20 MEQ
20 PACKET (EA) ORAL
Refills: 0 | Status: COMPLETED | OUTPATIENT
Start: 2023-02-26 | End: 2023-02-26

## 2023-02-26 RX ORDER — CEFTRIAXONE 500 MG/1
INJECTION, POWDER, FOR SOLUTION INTRAMUSCULAR; INTRAVENOUS
Refills: 0 | Status: DISCONTINUED | OUTPATIENT
Start: 2023-02-26 | End: 2023-03-04

## 2023-02-26 RX ORDER — INSULIN GLARGINE 100 [IU]/ML
12 INJECTION, SOLUTION SUBCUTANEOUS EVERY MORNING
Refills: 0 | Status: DISCONTINUED | OUTPATIENT
Start: 2023-02-27 | End: 2023-02-27

## 2023-02-26 RX ORDER — HEPARIN SODIUM 5000 [USP'U]/ML
5000 INJECTION INTRAVENOUS; SUBCUTANEOUS EVERY 8 HOURS
Refills: 0 | Status: DISCONTINUED | OUTPATIENT
Start: 2023-02-26 | End: 2023-03-09

## 2023-02-26 RX ORDER — CEFTRIAXONE 500 MG/1
2000 INJECTION, POWDER, FOR SOLUTION INTRAMUSCULAR; INTRAVENOUS EVERY 24 HOURS
Refills: 0 | Status: DISCONTINUED | OUTPATIENT
Start: 2023-02-27 | End: 2023-03-04

## 2023-02-26 RX ADMIN — INSULIN GLARGINE 12 UNIT(S): 100 INJECTION, SOLUTION SUBCUTANEOUS at 14:51

## 2023-02-26 RX ADMIN — BUMETANIDE 10 MG/HR: 0.25 INJECTION INTRAMUSCULAR; INTRAVENOUS at 06:11

## 2023-02-26 RX ADMIN — Medication 50 MILLIEQUIVALENT(S): at 10:30

## 2023-02-26 RX ADMIN — HEPARIN SODIUM 4700 UNIT(S): 5000 INJECTION INTRAVENOUS; SUBCUTANEOUS at 03:10

## 2023-02-26 RX ADMIN — Medication 300 MILLIGRAM(S): at 14:44

## 2023-02-26 RX ADMIN — BUMETANIDE 10 MG/HR: 0.25 INJECTION INTRAMUSCULAR; INTRAVENOUS at 09:20

## 2023-02-26 RX ADMIN — Medication 3.69 MICROGRAM(S)/KG/MIN: at 09:19

## 2023-02-26 RX ADMIN — DEXMEDETOMIDINE HYDROCHLORIDE IN 0.9% SODIUM CHLORIDE 3.94 MICROGRAM(S)/KG/HR: 4 INJECTION INTRAVENOUS at 05:29

## 2023-02-26 RX ADMIN — Medication 4: at 23:05

## 2023-02-26 RX ADMIN — Medication 4: at 18:56

## 2023-02-26 RX ADMIN — DEXMEDETOMIDINE HYDROCHLORIDE IN 0.9% SODIUM CHLORIDE 3.94 MICROGRAM(S)/KG/HR: 4 INJECTION INTRAVENOUS at 19:17

## 2023-02-26 RX ADMIN — CHLORHEXIDINE GLUCONATE 1 APPLICATION(S): 213 SOLUTION TOPICAL at 05:43

## 2023-02-26 RX ADMIN — HEPARIN SODIUM 5000 UNIT(S): 5000 INJECTION INTRAVENOUS; SUBCUTANEOUS at 14:50

## 2023-02-26 RX ADMIN — Medication 1000 MILLIGRAM(S): at 21:40

## 2023-02-26 RX ADMIN — Medication 400 MILLIGRAM(S): at 20:21

## 2023-02-26 RX ADMIN — DOCOSANOL 1 APPLICATION(S): 100 CREAM TOPICAL at 21:17

## 2023-02-26 RX ADMIN — Medication 3: at 05:30

## 2023-02-26 RX ADMIN — HEPARIN SODIUM 950 UNIT(S)/HR: 5000 INJECTION INTRAVENOUS; SUBCUTANEOUS at 07:23

## 2023-02-26 RX ADMIN — Medication 25 GRAM(S): at 09:19

## 2023-02-26 RX ADMIN — PANTOPRAZOLE SODIUM 40 MILLIGRAM(S): 20 TABLET, DELAYED RELEASE ORAL at 12:55

## 2023-02-26 RX ADMIN — DEXMEDETOMIDINE HYDROCHLORIDE IN 0.9% SODIUM CHLORIDE 3.94 MICROGRAM(S)/KG/HR: 4 INJECTION INTRAVENOUS at 14:02

## 2023-02-26 RX ADMIN — HEPARIN SODIUM 950 UNIT(S)/HR: 5000 INJECTION INTRAVENOUS; SUBCUTANEOUS at 03:08

## 2023-02-26 RX ADMIN — DEXMEDETOMIDINE HYDROCHLORIDE IN 0.9% SODIUM CHLORIDE 3.94 MICROGRAM(S)/KG/HR: 4 INJECTION INTRAVENOUS at 09:20

## 2023-02-26 RX ADMIN — Medication 50 MILLIEQUIVALENT(S): at 09:19

## 2023-02-26 RX ADMIN — HEPARIN SODIUM 5000 UNIT(S): 5000 INJECTION INTRAVENOUS; SUBCUTANEOUS at 21:17

## 2023-02-26 RX ADMIN — CEFTRIAXONE 100 MILLIGRAM(S): 500 INJECTION, POWDER, FOR SOLUTION INTRAMUSCULAR; INTRAVENOUS at 05:29

## 2023-02-26 RX ADMIN — Medication 50 MILLIEQUIVALENT(S): at 12:55

## 2023-02-26 RX ADMIN — DOCOSANOL 1 APPLICATION(S): 100 CREAM TOPICAL at 23:07

## 2023-02-26 RX ADMIN — Medication 3: at 12:58

## 2023-02-26 NOTE — PROGRESS NOTE ADULT - ASSESSMENT
NELSON ZEPEDA is a 67y Female with h/o HTN, DM BIBEMS from home for AMS.    Found to be in urosepsis and COVID+.  Elevated trops in the setting of GEOVANNI.   Pt initially HD stable, but subsequently became transiently BP to 70s/40s after 4L IVF and hypoxic to 80s on RA requiring 2LNC.decompensated hemodynamically requiring pressor support.     Imp:  Stress induced cardiomyopathy in setting of COVID, Urosepsis and GEOVANNI.  Cannot rule out concurrent NSTEMI, although no ECG changes noted, no WMA on echo, and CPK-mass assay reassuring. unable to assess for clinical symptoms  Not a candidate for aggressive interventions while septic. Need to continue pressors as needed.  Was started on IV heparin empirically, s/p total 48 hours and decide depending on stability for intervention.  no betablocker while on norepinephrine  Empiric Antibiotics as per critical care team.  CMP in this setting associated with 30-40% mortality, outlook remains guarded.

## 2023-02-26 NOTE — PROGRESS NOTE ADULT - ASSESSMENT
66 y/o F w/DM admitted for encephalopathy and hypotension likely secondary to severe sepsis with septic shock secondary to UTI and E. Coli bacteremia also likely component of cardiogenic shock. CT scan with mild R hydronephrosis w/possible obstruction. Elevated troponins secondary to NSTEMI in setting of severe sepsis with septic shock? No EKG changes, however troponin now > 10k. GEOVANNI likely ATN. New acute hypoxemic respiratory failure likely secondary to acute pulmonary edema due to acute heart failure in setting of acute MI. Hyperglycemia. ? Covid positive? Initial RVP positive for COVID, but repeat was negative, now negative x 2, initial was likely false positive.    - Hypoxemia resolved  - Titrate pressors as needed goal MAP >= 65  - Appreciate cardiology consult, septic cardiomyopathy +/- NSTEMI, possible ischemic workup if patient improves  - Complete 48hrs of heparin gtt  - Diuresis goal net negative 1-2 L  - Trend Cr, avoid nephrotoxins  - Complete course of abx  - Insulin  - Free water for hypernatremia  - Full code  - Guarded prognosis    I have personally provided 35 minutes of attending critical care time excluding procedures.

## 2023-02-26 NOTE — PROGRESS NOTE ADULT - SUBJECTIVE AND OBJECTIVE BOX
Cardiology Progress Note    Interval Events:  Remains on IV pressors for BP support.      MEDICATIONS:  buMETAnide Infusion 2 mG/Hr IV Continuous <Continuous>  heparin   Injectable 5000 Unit(s) SubCutaneous every 8 hours  norepinephrine Infusion 0.05 MICROgram(s)/kG/Min IV Continuous <Continuous>  cefTRIAXone   IVPB      aspirin Suppository 300 milliGRAM(s) Rectal daily  dexMEDEtomidine Infusion 0.2 MICROgram(s)/kG/Hr IV Continuous <Continuous>  pantoprazole  Injectable 40 milliGRAM(s) IV Push daily  polyethylene glycol 3350 17 Gram(s) Oral at bedtime  insulin glargine Injectable (LANTUS) 12 Unit(s) SubCutaneous once  insulin lispro (ADMELOG) corrective regimen sliding scale   SubCutaneous every 6 hours  chlorhexidine 2% Cloths 1 Application(s) Topical <User Schedule>  sodium chloride 0.9% lock flush 10 milliLiter(s) IV Push every 1 hour PRN      PHYSICAL EXAM:  T(C): 38 (02-26-23 @ 07:27), Max: 38.2 (02-25-23 @ 19:47)  HR: 99 (02-26-23 @ 10:00) (91 - 108)  BP: --  RR: 21 (02-26-23 @ 10:00) (18 - 37)  SpO2: 96% (02-26-23 @ 10:00) (94% - 97%)  Wt(kg): --  I&O's Summary    25 Feb 2023 07:01  -  26 Feb 2023 07:00  --------------------------------------------------------  IN: 868.6 mL / OUT: 4605 mL / NET: -3736.4 mL      Appearance: No acute distress  HEENT:  mmm  Cardiovascular: Normal S1 S2, no elevated JVP, +edema  Respiratory: Lungs clear to auscultation, poor inspiratory effort  Psychiatry: unable to assess  Gastrointestinal:  soft  Skin:  no cyanosis	  Neurologic: calm    LABS:	 	  CBC Full  -  ( 26 Feb 2023 02:00 )  WBC Count : 13.79 K/uL  Hemoglobin : 9.4 g/dL  Hematocrit : 28.5 %  Platelet Count - Automated : 121 K/uL  Mean Cell Volume : 84.6 fl  Mean Cell Hemoglobin : 27.9 pg  Mean Cell Hemoglobin Concentration : 33.0 g/dL  Auto Neutrophil # : 11.49 K/uL  Auto Lymphocyte # : 1.22 K/uL  Auto Monocyte # : 0.71 K/uL  Auto Eosinophil # : 0.01 K/uL  Auto Basophil # : 0.07 K/uL  Auto Neutrophil % : 83.4 %  Auto Lymphocyte % : 8.8 %  Auto Monocyte % : 5.1 %  Auto Eosinophil % : 0.1 %  Auto Basophil % : 0.5 %    02-26    146<H>  |  110<H>  |  38<H>  ----------------------------<  226<H>  3.4<L>   |  24  |  1.81<H>  02-25    144  |  112<H>  |  40<H>  ----------------------------<  153<H>  3.6   |  22  |  1.97<H>    Ca    8.5      26 Feb 2023 02:00  Ca    8.3<L>      25 Feb 2023 02:50  Phos  3.1     02-26  Phos  3.3     02-25  Mg     1.6     02-26  Mg     1.9     02-25    TPro  6.0  /  Alb  2.2<L>  /  TBili  0.8  /  DBili  x   /  AST  61<H>  /  ALT  111<H>  /  AlkPhos  133<H>  02-26  TPro  5.8<L>  /  Alb  2.3<L>  /  TBili  0.6  /  DBili  x   /  AST  119<H>  /  ALT  139<H>  /  AlkPhos  111  02-25      proBNP:   Lipid Profile:   HgA1c:   TSH:     CARDIAC MARKERS:    Troponin I, High Sensitivity Result: 93852.3 ng/L (02-24-23 @ 21:48)  Troponin I, High Sensitivity Result: 22480.5 ng/L (02-24-23 @ 16:00)  Troponin I, High Sensitivity Result: 87096.0 ng/L (02-24-23 @ 11:00)  Troponin I, High Sensitivity Result: 10961.9 ng/L (02-24-23 @ 05:30)  Troponin I, High Sensitivity Result: 2621.9 ng/L (02-23-23 @ 18:57)    TELEMETRY: 	SR    ECG:  	  RADIOLOGY:  OTHER: 	    PREVIOUS DIAGNOSTIC TESTING:    [ ] Echocardiogram:   [ ] Catheterization:  [ ] Stress Test:

## 2023-02-26 NOTE — PROGRESS NOTE ADULT - ASSESSMENT
67F with PMH HTN, DM with AMS admitted to ICU with UTI, septic shock, GEOVANNI, COVID. Urology consulted for mild right hydroureteronephrosis ? distal ureteral stricture. Pt with e.coli positive blood and urine cultures from 2/23. still having low grade temps (100.4F @ 0727)      - continue abx  - IVF resuscitation  - trend Cr, monitor urine output  - wean pressors as tolerated  - continue care per ICU team  - ?role for R stent placement vs R PCN.   - Cr too elevated for renal scan at this time. Will discuss with urology attending if he would like patient have repeat CT abd/pelvis

## 2023-02-26 NOTE — PROGRESS NOTE ADULT - SUBJECTIVE AND OBJECTIVE BOX
UROLOGY PROGRESS NOTE:     Subjective: Patient seen and examined at bedside.       Objective:  Vital signs  T(F): , Max: 100.7 (02-25-23 @ 19:47)  HR: 96 (02-26-23 @ 07:00)  BP: --  SpO2: 97% (02-26-23 @ 07:00)  Wt(kg): --    Output     I&O's Detail    25 Feb 2023 07:01  -  26 Feb 2023 07:00  --------------------------------------------------------  IN:    Bumetanide: 210 mL    Dexmedetomidine: 148.6 mL    Heparin Infusion: 149.5 mL    IV PiggyBack: 200 mL    Norepinephrine: 160.5 mL  Total IN: 868.6 mL    OUT:    Indwelling Catheter - Urethral (mL): 4605 mL  Total OUT: 4605 mL    Total NET: -3736.4 mL      Physical Exam:  Gen: no acute distress  Back: no CVAT b/l  Abd: soft nt nd  : mortensen in place, urine clear yellow    Labs:                        9.4    13.79 )-----------( 121      ( 26 Feb 2023 02:00 )             28.5     02-26    146<H>  |  110<H>  |  38<H>  ----------------------------<  226<H>  3.4<L>   |  24  |  1.81<H>    Ca    8.5      26 Feb 2023 02:00  Phos  3.1     02-26  Mg     1.6     02-26    TPro  6.0  /  Alb  2.2<L>  /  TBili  0.8  /  DBili  x   /  AST  61<H>  /  ALT  111<H>  /  AlkPhos  133<H>  02-26    PTT - ( 26 Feb 2023 02:00 )  PTT:39.3 sec      Urine Cx: 2/23 +e.coli  Blood Cx: 2/23 +e.coli

## 2023-02-26 NOTE — PROGRESS NOTE ADULT - SUBJECTIVE AND OBJECTIVE BOX
HPI:  67F PMH HTN, DM BIBEMS from home for AMS. Per EMS, pt coming from home, family called as pt found confused 0400 this AM. Per EMS, FS read as 'High.' EMS reports pt found in feces / urine.On ED arrival, pt orally febrile 103.5 F tachycardic to 120s, FS 400s.   In ED pt found to have UTI w/ CT abd with "Mild right hydroureteronephrosis with delayed right nephrogram, possibly ascending urinary tract infection. Underlying benign or malignant distal ureteral stricture is difficult to exclude."  CT chest with atelectasis vs pna. CTH neg. Labs significant for neutropenia with bandemia, hyperglycemia in 400s, lactate of 8, and trops peaked to 5K without EKG changes, and GEOVANNI. Pt started on Vanc/Zosyn. Pt also found to be covid + in ED. Pt initially HD stable, but subsequently became transiently Htn to 70s/40s after 4L IVF and hypoxic to 80s on RA requiring 2LNC.  ICU consulted for septic shock 2/2 UTI.    Subjective: pt seen and examined at the bedside. Pt unable to answer questions 2/2 AMS.  Upon ICU evaluation, pt altered, following some commands but combative. Pt BP in low 100s after IVF and satting well on 2LNC.  POCUS with hyperdynamic LV but without wall motion abnormality no pleural or pericardial effusions, IVC indeterminate, and lungs with scattered B lines bilaterally .        (23 Feb 2023 19:00)      24 hr events: No acute events.     ## ROS:  [x ] unable to obtain      ## Vitals  ICU Vital Signs Last 24 Hrs  T(C): 37.9 (25 Feb 2023 22:00), Max: 38.2 (25 Feb 2023 19:47)  T(F): 100.2 (25 Feb 2023 22:00), Max: 100.7 (25 Feb 2023 19:47)  HR: 96 (26 Feb 2023 07:00) (91 - 112)  BP: --  BP(mean): --  ABP: 136/63 (26 Feb 2023 07:00) (86/47 - 141/66)  ABP(mean): 92 (26 Feb 2023 07:00) (60 - 96)  RR: 24 (26 Feb 2023 07:00) (18 - 29)  SpO2: 97% (26 Feb 2023 07:00) (94% - 100%)    O2 Parameters below as of 25 Feb 2023 12:30  Patient On (Oxygen Delivery Method): room air            ## Physical Exam:  Gen: Elderly female lying in bed, NAD  HEENT: NC/AT sclerae anicteric  Resp: + Increased WOB, Ronchorous breath sounds b/l  CV: S1, S2  Abd: Soft, + BS  Ext: Cool to touch  Neuro: Lethargic, unarousable    ## Vent Data      ## Labs:  Chem:  02-26    146<H>  |  110<H>  |  38<H>  ----------------------------<  226<H>  3.4<L>   |  24  |  1.81<H>    Ca    8.5      26 Feb 2023 02:00  Phos  3.1     02-26  Mg     1.6     02-26    TPro  6.0  /  Alb  2.2<L>  /  TBili  0.8  /  DBili  x   /  AST  61<H>  /  ALT  111<H>  /  AlkPhos  133<H>  02-26    LIVER FUNCTIONS - ( 26 Feb 2023 02:00 )  Alb: 2.2 g/dL / Pro: 6.0 gm/dL / ALK PHOS: 133 U/L / ALT: 111 U/L / AST: 61 U/L / GGT: x           CBC:                        9.4    13.79 )-----------( 121      ( 26 Feb 2023 02:00 )             28.5     Coags:  PTT - ( 26 Feb 2023 02:00 )  PTT:39.3 sec        ## Cardiac  CARDIAC MARKERS ( 24 Feb 2023 21:48 )  x     / x     / 955 U/L / x     / 17.1 ng/mL  CARDIAC MARKERS ( 24 Feb 2023 16:00 )  x     / x     / 1258 U/L / x     / 22.0 ng/mL  CARDIAC MARKERS ( 24 Feb 2023 11:00 )  x     / x     / 1357 U/L / x     / 19.7 ng/mL        ## Blood Gas  ABG - ( 24 Feb 2023 16:00 )  pH, Arterial: 7.36  pH, Blood: x     /  pCO2: 29    /  pO2: 112   / HCO3: 16    / Base Excess: -7.6  /  SaO2: 98.7                #I/Os  I&O's Detail    25 Feb 2023 07:01  -  26 Feb 2023 07:00  --------------------------------------------------------  IN:    Bumetanide: 210 mL    Dexmedetomidine: 148.6 mL    Heparin Infusion: 149.5 mL    IV PiggyBack: 200 mL    Norepinephrine: 160.5 mL  Total IN: 868.6 mL    OUT:    Indwelling Catheter - Urethral (mL): 4605 mL  Total OUT: 4605 mL    Total NET: -3736.4 mL          ## Imaging:    ## Medications:  MEDICATIONS  (STANDING):  aspirin Suppository 300 milliGRAM(s) Rectal daily  buMETAnide Infusion 2 mG/Hr (10 mL/Hr) IV Continuous <Continuous>  cefTRIAXone   IVPB      chlorhexidine 2% Cloths 1 Application(s) Topical <User Schedule>  dexMEDEtomidine Infusion 0.2 MICROgram(s)/kG/Hr (3.94 mL/Hr) IV Continuous <Continuous>  heparin  Infusion.  Unit(s)/Hr (9.5 mL/Hr) IV Continuous <Continuous>  insulin lispro (ADMELOG) corrective regimen sliding scale   SubCutaneous every 6 hours  magnesium sulfate  IVPB 2 Gram(s) IV Intermittent once  norepinephrine Infusion 0.05 MICROgram(s)/kG/Min (3.69 mL/Hr) IV Continuous <Continuous>  pantoprazole  Injectable 40 milliGRAM(s) IV Push daily  polyethylene glycol 3350 17 Gram(s) Oral at bedtime  potassium chloride  20 mEq/100 mL IVPB 20 milliEquivalent(s) IV Intermittent every 2 hours    MEDICATIONS  (PRN):  heparin   Injectable 4700 Unit(s) IV Push every 6 hours PRN For aPTT less than 40  sodium chloride 0.9% lock flush 10 milliLiter(s) IV Push every 1 hour PRN Pre/post blood products, medications, blood draw, and to maintain line patency

## 2023-02-26 NOTE — PROGRESS NOTE ADULT - SUBJECTIVE AND OBJECTIVE BOX
68 y/o female with pmhx of HTN, DM admit 2/23   CCT32    1. septic shock  2. UTI with E coli bacteremia   3. pna  4. mild left hydronephrosis due to ureteral stricture  5. COVID-19 pna  6. delirium  7. HHS      Neuro Encephalopathic managed with precedex requires restraint to prevent pulling out TLC     Cor  HD unstable on nor-epi titrating to MAP 65.  Vasopressin is turned off  New low EF likely septic CM though + Trop rx'ing as ACS with ASA/UFH  Cardiac w/u when septic shock state resolves.      Pulm ??? if COVID +  on iso.  02 sat 99% on RA   No covid therapies needed     GI  PPI   Diet when MS improved     Renal  GEOVANNI  improved on BUMEX drip ordered  for fluid overload Urology following for mild L HN no procedures planned presently. Metabolic acidosis improved.  Bicarb drip stopped.  Lactic acidosis improved.      Heme  UFH ASA  for ACS  no bleeding     ID   pip tazo for  e coli bacteremia  sensitivities back change to CTX 2g/day    Endo Received steroids now off insulin drip        68 y/o female with pmhx of HTN, DM admit 2/23   CCT32    1. septic shock  2. UTI with E coli bacteremia   3. pna  4. mild left hydronephrosis due to ureteral stricture  5. COVID-19 pna  6. delirium  7. HHS      Neuro Encephalopathic managed with precedex requires restraint to prevent pulling out TLC  ?? septic of antibiotic associated delirium     Cor  HD unstable on nor-epi titrating to MAP 65.  Vasopressin is turned off  New low EF likely septic CM though + Trop rx'ing as ACS with ASA/UFH  Cardiac w/u when septic shock state resolves.      Pulm ??? if COVID +  on iso.  02 sat 99% on RA   No covid therapies needed     GI  PPI   Diet when MS improved     Renal  GEOVANNI  improved on BUMEX drip ordered  for fluid overload Urology following for mild L HN no procedures planned presently. Metabolic acidosis improved.  Bicarb drip stopped.  Lactic acidosis improved.      Heme  UFH ASA  for ACS  no bleeding.  Should be able to stop AC today.  To be d/w cardiology      ID   pip tazo for  e coli bacteremia  sensitivities back change to CTX 2g/day    Endo Received steroids now off insulin drip        68 y/o female with pmhx of HTN, DM admit 2/23   CCT32    1. septic shock  2. UTI with E coli bacteremia   3. pna  4. mild left hydronephrosis due to ureteral stricture  5. COVID-19 pna  6. delirium  7. HHS      Neuro Encephalopathic managed with precedex requires restraint to prevent pulling out TLC  ?? septic of antibiotic associated delirium     Cor  HD unstable on nor-epi titrating to MAP 65.  Vasopressin is turned off  New low EF likely septic CM though + Trop rx'ing as ACS with ASA/UFH  Cardiac w/u when septic shock state resolves.      Pulm ??? if COVID +  on iso.  02 sat 99% on RA   No covid therapies needed     GI  PPI   Diet when MS improved     Renal  GEOVANNI  improved on BUMEX drip ordered  for fluid overload Urology following for mild L HN no procedures planned presently. Metabolic acidosis improved.  Bicarb drip stopped.  Lactic acidosis improved.      Heme  UFH ASA  for ACS  no bleeding.  Should be able to stop AC today.  To be d/w cardiology      ID   pip tazo for  e coli bacteremia  sensitivities back, pan sensitive  change to CTX 2g/day.  Has TLC to be removed when off pressors.      Endo Received steroids now off insulin drip

## 2023-02-27 LAB
ALBUMIN SERPL ELPH-MCNC: 2.3 G/DL — LOW (ref 3.3–5)
ALP SERPL-CCNC: 165 U/L — HIGH (ref 40–120)
ALT FLD-CCNC: 95 U/L — HIGH (ref 12–78)
ANION GAP SERPL CALC-SCNC: 11 MMOL/L — SIGNIFICANT CHANGE UP (ref 5–17)
ANION GAP SERPL CALC-SCNC: 9 MMOL/L — SIGNIFICANT CHANGE UP (ref 5–17)
AST SERPL-CCNC: 33 U/L — SIGNIFICANT CHANGE UP (ref 15–37)
BASOPHILS # BLD AUTO: 0.01 K/UL — SIGNIFICANT CHANGE UP (ref 0–0.2)
BASOPHILS NFR BLD AUTO: 0.1 % — SIGNIFICANT CHANGE UP (ref 0–2)
BILIRUB SERPL-MCNC: 0.6 MG/DL — SIGNIFICANT CHANGE UP (ref 0.2–1.2)
BUN SERPL-MCNC: 45 MG/DL — HIGH (ref 7–23)
BUN SERPL-MCNC: 50 MG/DL — HIGH (ref 7–23)
CALCIUM SERPL-MCNC: 9.4 MG/DL — SIGNIFICANT CHANGE UP (ref 8.5–10.1)
CALCIUM SERPL-MCNC: 9.6 MG/DL — SIGNIFICANT CHANGE UP (ref 8.5–10.1)
CHLORIDE SERPL-SCNC: 109 MMOL/L — HIGH (ref 96–108)
CHLORIDE SERPL-SCNC: 110 MMOL/L — HIGH (ref 96–108)
CO2 SERPL-SCNC: 28 MMOL/L — SIGNIFICANT CHANGE UP (ref 22–31)
CO2 SERPL-SCNC: 33 MMOL/L — HIGH (ref 22–31)
CREAT SERPL-MCNC: 1.72 MG/DL — HIGH (ref 0.5–1.3)
CREAT SERPL-MCNC: 1.84 MG/DL — HIGH (ref 0.5–1.3)
EGFR: 30 ML/MIN/1.73M2 — LOW
EGFR: 32 ML/MIN/1.73M2 — LOW
EOSINOPHIL # BLD AUTO: 0.01 K/UL — SIGNIFICANT CHANGE UP (ref 0–0.5)
EOSINOPHIL NFR BLD AUTO: 0.1 % — SIGNIFICANT CHANGE UP (ref 0–6)
GLUCOSE BLDC GLUCOMTR-MCNC: 170 MG/DL — HIGH (ref 70–99)
GLUCOSE BLDC GLUCOMTR-MCNC: 256 MG/DL — HIGH (ref 70–99)
GLUCOSE BLDC GLUCOMTR-MCNC: 290 MG/DL — HIGH (ref 70–99)
GLUCOSE BLDC GLUCOMTR-MCNC: 319 MG/DL — HIGH (ref 70–99)
GLUCOSE BLDC GLUCOMTR-MCNC: 341 MG/DL — HIGH (ref 70–99)
GLUCOSE SERPL-MCNC: 278 MG/DL — HIGH (ref 70–99)
GLUCOSE SERPL-MCNC: 325 MG/DL — HIGH (ref 70–99)
HCT VFR BLD CALC: 32.2 % — LOW (ref 34.5–45)
HGB BLD-MCNC: 10.3 G/DL — LOW (ref 11.5–15.5)
IMM GRANULOCYTES NFR BLD AUTO: 1.5 % — HIGH (ref 0–0.9)
LYMPHOCYTES # BLD AUTO: 1.38 K/UL — SIGNIFICANT CHANGE UP (ref 1–3.3)
LYMPHOCYTES # BLD AUTO: 13.2 % — SIGNIFICANT CHANGE UP (ref 13–44)
MAGNESIUM SERPL-MCNC: 1.9 MG/DL — SIGNIFICANT CHANGE UP (ref 1.6–2.6)
MAGNESIUM SERPL-MCNC: 2.4 MG/DL — SIGNIFICANT CHANGE UP (ref 1.6–2.6)
MCHC RBC-ENTMCNC: 27.5 PG — SIGNIFICANT CHANGE UP (ref 27–34)
MCHC RBC-ENTMCNC: 32 G/DL — SIGNIFICANT CHANGE UP (ref 32–36)
MCV RBC AUTO: 86.1 FL — SIGNIFICANT CHANGE UP (ref 80–100)
MONOCYTES # BLD AUTO: 0.97 K/UL — HIGH (ref 0–0.9)
MONOCYTES NFR BLD AUTO: 9.2 % — SIGNIFICANT CHANGE UP (ref 2–14)
NEUTROPHILS # BLD AUTO: 7.96 K/UL — HIGH (ref 1.8–7.4)
NEUTROPHILS NFR BLD AUTO: 75.9 % — SIGNIFICANT CHANGE UP (ref 43–77)
NRBC # BLD: 0 /100 WBCS — SIGNIFICANT CHANGE UP (ref 0–0)
PHOSPHATE SERPL-MCNC: 2.8 MG/DL — SIGNIFICANT CHANGE UP (ref 2.5–4.5)
PHOSPHATE SERPL-MCNC: 3.1 MG/DL — SIGNIFICANT CHANGE UP (ref 2.5–4.5)
PLATELET # BLD AUTO: 112 K/UL — LOW (ref 150–400)
POTASSIUM SERPL-MCNC: 2.9 MMOL/L — CRITICAL LOW (ref 3.5–5.3)
POTASSIUM SERPL-MCNC: 3.6 MMOL/L — SIGNIFICANT CHANGE UP (ref 3.5–5.3)
POTASSIUM SERPL-SCNC: 2.9 MMOL/L — CRITICAL LOW (ref 3.5–5.3)
POTASSIUM SERPL-SCNC: 3.6 MMOL/L — SIGNIFICANT CHANGE UP (ref 3.5–5.3)
PROT SERPL-MCNC: 6.5 GM/DL — SIGNIFICANT CHANGE UP (ref 6–8.3)
RBC # BLD: 3.74 M/UL — LOW (ref 3.8–5.2)
RBC # FLD: 15.3 % — HIGH (ref 10.3–14.5)
SODIUM SERPL-SCNC: 148 MMOL/L — HIGH (ref 135–145)
SODIUM SERPL-SCNC: 152 MMOL/L — HIGH (ref 135–145)
WBC # BLD: 10.49 K/UL — SIGNIFICANT CHANGE UP (ref 3.8–10.5)
WBC # FLD AUTO: 10.49 K/UL — SIGNIFICANT CHANGE UP (ref 3.8–10.5)

## 2023-02-27 PROCEDURE — 99233 SBSQ HOSP IP/OBS HIGH 50: CPT

## 2023-02-27 PROCEDURE — 99291 CRITICAL CARE FIRST HOUR: CPT

## 2023-02-27 RX ORDER — INSULIN HUMAN 100 [IU]/ML
INJECTION, SOLUTION SUBCUTANEOUS EVERY 6 HOURS
Refills: 0 | Status: DISCONTINUED | OUTPATIENT
Start: 2023-02-27 | End: 2023-02-27

## 2023-02-27 RX ORDER — ASPIRIN/CALCIUM CARB/MAGNESIUM 324 MG
81 TABLET ORAL DAILY
Refills: 0 | Status: DISCONTINUED | OUTPATIENT
Start: 2023-02-27 | End: 2023-03-09

## 2023-02-27 RX ORDER — POTASSIUM CHLORIDE 20 MEQ
20 PACKET (EA) ORAL
Refills: 0 | Status: COMPLETED | OUTPATIENT
Start: 2023-02-27 | End: 2023-02-27

## 2023-02-27 RX ORDER — INSULIN LISPRO 100/ML
VIAL (ML) SUBCUTANEOUS
Refills: 0 | Status: DISCONTINUED | OUTPATIENT
Start: 2023-02-27 | End: 2023-03-09

## 2023-02-27 RX ORDER — INSULIN GLARGINE 100 [IU]/ML
15 INJECTION, SOLUTION SUBCUTANEOUS EVERY MORNING
Refills: 0 | Status: DISCONTINUED | OUTPATIENT
Start: 2023-02-27 | End: 2023-02-27

## 2023-02-27 RX ORDER — INSULIN GLARGINE 100 [IU]/ML
10 INJECTION, SOLUTION SUBCUTANEOUS ONCE
Refills: 0 | Status: COMPLETED | OUTPATIENT
Start: 2023-02-27 | End: 2023-02-27

## 2023-02-27 RX ORDER — POTASSIUM CHLORIDE 20 MEQ
10 PACKET (EA) ORAL
Refills: 0 | Status: COMPLETED | OUTPATIENT
Start: 2023-02-27 | End: 2023-02-27

## 2023-02-27 RX ORDER — MIDODRINE HYDROCHLORIDE 2.5 MG/1
10 TABLET ORAL EVERY 8 HOURS
Refills: 0 | Status: DISCONTINUED | OUTPATIENT
Start: 2023-02-27 | End: 2023-03-01

## 2023-02-27 RX ORDER — INSULIN GLARGINE 100 [IU]/ML
25 INJECTION, SOLUTION SUBCUTANEOUS EVERY MORNING
Refills: 0 | Status: DISCONTINUED | OUTPATIENT
Start: 2023-02-28 | End: 2023-03-01

## 2023-02-27 RX ORDER — MAGNESIUM SULFATE 500 MG/ML
2 VIAL (ML) INJECTION ONCE
Refills: 0 | Status: COMPLETED | OUTPATIENT
Start: 2023-02-27 | End: 2023-02-27

## 2023-02-27 RX ORDER — SODIUM,POTASSIUM PHOSPHATES 278-250MG
1 POWDER IN PACKET (EA) ORAL ONCE
Refills: 0 | Status: COMPLETED | OUTPATIENT
Start: 2023-02-27 | End: 2023-02-27

## 2023-02-27 RX ADMIN — DOCOSANOL 1 APPLICATION(S): 100 CREAM TOPICAL at 03:11

## 2023-02-27 RX ADMIN — Medication 4: at 05:18

## 2023-02-27 RX ADMIN — DOCOSANOL 1 APPLICATION(S): 100 CREAM TOPICAL at 21:45

## 2023-02-27 RX ADMIN — INSULIN GLARGINE 15 UNIT(S): 100 INJECTION, SOLUTION SUBCUTANEOUS at 08:10

## 2023-02-27 RX ADMIN — DOCOSANOL 1 APPLICATION(S): 100 CREAM TOPICAL at 11:09

## 2023-02-27 RX ADMIN — HEPARIN SODIUM 5000 UNIT(S): 5000 INJECTION INTRAVENOUS; SUBCUTANEOUS at 21:35

## 2023-02-27 RX ADMIN — Medication 6: at 16:39

## 2023-02-27 RX ADMIN — DEXMEDETOMIDINE HYDROCHLORIDE IN 0.9% SODIUM CHLORIDE 3.94 MICROGRAM(S)/KG/HR: 4 INJECTION INTRAVENOUS at 01:44

## 2023-02-27 RX ADMIN — Medication 50 MILLIEQUIVALENT(S): at 07:34

## 2023-02-27 RX ADMIN — HEPARIN SODIUM 5000 UNIT(S): 5000 INJECTION INTRAVENOUS; SUBCUTANEOUS at 15:07

## 2023-02-27 RX ADMIN — DOCOSANOL 1 APPLICATION(S): 100 CREAM TOPICAL at 23:21

## 2023-02-27 RX ADMIN — CEFTRIAXONE 100 MILLIGRAM(S): 500 INJECTION, POWDER, FOR SOLUTION INTRAMUSCULAR; INTRAVENOUS at 04:18

## 2023-02-27 RX ADMIN — BUMETANIDE 10 MG/HR: 0.25 INJECTION INTRAMUSCULAR; INTRAVENOUS at 01:45

## 2023-02-27 RX ADMIN — Medication 300 MILLIGRAM(S): at 11:09

## 2023-02-27 RX ADMIN — DOCOSANOL 1 APPLICATION(S): 100 CREAM TOPICAL at 17:22

## 2023-02-27 RX ADMIN — Medication 50 MILLIEQUIVALENT(S): at 05:19

## 2023-02-27 RX ADMIN — Medication 100 MILLIEQUIVALENT(S): at 17:22

## 2023-02-27 RX ADMIN — DEXMEDETOMIDINE HYDROCHLORIDE IN 0.9% SODIUM CHLORIDE 3.94 MICROGRAM(S)/KG/HR: 4 INJECTION INTRAVENOUS at 05:19

## 2023-02-27 RX ADMIN — DOCOSANOL 1 APPLICATION(S): 100 CREAM TOPICAL at 08:16

## 2023-02-27 RX ADMIN — POLYETHYLENE GLYCOL 3350 17 GRAM(S): 17 POWDER, FOR SOLUTION ORAL at 21:35

## 2023-02-27 RX ADMIN — HEPARIN SODIUM 5000 UNIT(S): 5000 INJECTION INTRAVENOUS; SUBCUTANEOUS at 05:19

## 2023-02-27 RX ADMIN — CHLORHEXIDINE GLUCONATE 1 APPLICATION(S): 213 SOLUTION TOPICAL at 04:19

## 2023-02-27 RX ADMIN — PANTOPRAZOLE SODIUM 40 MILLIGRAM(S): 20 TABLET, DELAYED RELEASE ORAL at 11:10

## 2023-02-27 RX ADMIN — DOCOSANOL 1 APPLICATION(S): 100 CREAM TOPICAL at 15:14

## 2023-02-27 RX ADMIN — Medication 1 PACKET(S): at 17:30

## 2023-02-27 RX ADMIN — INSULIN GLARGINE 10 UNIT(S): 100 INJECTION, SOLUTION SUBCUTANEOUS at 11:09

## 2023-02-27 RX ADMIN — Medication 6: at 11:09

## 2023-02-27 RX ADMIN — DOCOSANOL 1 APPLICATION(S): 100 CREAM TOPICAL at 05:20

## 2023-02-27 RX ADMIN — MIDODRINE HYDROCHLORIDE 10 MILLIGRAM(S): 2.5 TABLET ORAL at 16:35

## 2023-02-27 RX ADMIN — Medication 100 MILLIEQUIVALENT(S): at 15:14

## 2023-02-27 RX ADMIN — Medication 50 MILLIEQUIVALENT(S): at 03:11

## 2023-02-27 RX ADMIN — Medication 25 GRAM(S): at 03:11

## 2023-02-27 RX ADMIN — Medication 100 MILLIEQUIVALENT(S): at 16:16

## 2023-02-27 NOTE — PROGRESS NOTE ADULT - ASSESSMENT
67F w/ DM, HTN. PResents w/ encephalopathy. Admitted to ICU w/ septic shock due to E coli bacteremia and UTI, w/ CT showing R hydronephrosis w/ possible obstruction due to stricture, as well as GEOVANNI, NSTEMI vs demand ischemia and septic cardiomyopathy. Shock state has resolved. Pt remains somewhat encephalopathic and on precedex for agitation. Noted to have COVID19+ but then negative x2.     #Neuro - on precedex, calm now so would titrate off precedex and monitor  #CV - shock state has resolved; s/p heparin gtt for NSTEMI vs demand ischemia and found to have stress cardiomyopathy; possible ischemic workup when improved  #Pulm - satting well on RA  #ID- E coli UTI and bacteremia in setting of likely obstructive uropathy; repeat blood cx x2 today to document clearance; plan for 10 day course fo ceftriaxone; pt is otherwise afebrile for ~36 hrs w/ improving leukocytosis; suspect COVID19+ followed by 2 negatives was false positive  #Renal/metabolic - GEOVANNI, likely prerenal ATN, improving slowly; hypoK and hypoMg repleted, repeat BMP this afternoon; hold off on further diuretics today in setting of electrolyte abnormalities; discuss w/ urology when to obtain renal scan to evaluate ureter  #GI- pt has been NPO for several days; if more awake this afternoon, bedside dysphagia vs insert NGT and start feeds; continue protonix for now  #Heme - noted drop in plts, monitor for now  #Endo - remains hyperglycemic, increase lantus and to med ISS  #PPx - HSQ  #Dispo- remains in ICU on precedex; prognosis guarded; full code

## 2023-02-27 NOTE — PROGRESS NOTE ADULT - SUBJECTIVE AND OBJECTIVE BOX
Cardiology Progress Note  Interval Events: Off sedation and IV pressors for BP support.   MEDICATIONS: MEDICATIONS  (STANDING): aspirin Suppository 300 milliGRAM(s) Rectal daily cefTRIAXone   IVPB 2000 milliGRAM(s) IV Intermittent every 24 hours chlorhexidine 2% Cloths 1 Application(s) Topical <User Schedule> dexMEDEtomidine Infusion 0.2 MICROgram(s)/kG/Hr (3.94 mL/Hr) IV Continuous <Continuous> heparin   Injectable 5000 Unit(s) SubCutaneous every 8 hours pantoprazole  Injectable 40 milliGRAM(s) IV Push daily polyethylene glycol 3350 17 Gram(s) Oral at bedtime  Vital Signs Last 24 Hrs T(C): 36.4 (27 Feb 2023 12:00), Max: 38.7 (26 Feb 2023 21:30) T(F): 97.5 (27 Feb 2023 12:00), Max: 101.7 (26 Feb 2023 21:30) HR: 73 (27 Feb 2023 13:00) (63 - 96) BP: 124/54 (27 Feb 2023 13:00) (103/49 - 157/80) BP(mean): 70 (27 Feb 2023 13:00) (62 - 98) RR: 13 (27 Feb 2023 13:00) (12 - 24) SpO2: 95% (27 Feb 2023 13:00) (92% - 97%)  Parameters below as of 26 Feb 2023 19:15 Patient On (Oxygen Delivery Method): room air   Appearance: No acute distress HEENT:  mmm Cardiovascular: Normal S1 S2, no elevated JVP, -edema Respiratory: Lungs clear to auscultation, no rales Psychiatry: unable to assess Gastrointestinal:  soft Skin:  no cyanosis	 Neurologic: calm  LABS:	 	                      10.3  10.49 )-----------( 112      ( 27 Feb 2023 02:15 )            32.2   02-27  148<H>  |  109<H>  |  45<H> ----------------------------<  325<H> 2.9<LL>   |  28  |  1.72<H>  Ca    9.4      27 Feb 2023 02:15 Phos  3.1     02-27 Mg     1.9     02-27  TPro  6.5  /  Alb  2.3<L>  /  TBili  0.6  /  DBili  x   /  AST  33  /  ALT  95<H>  /  AlkPhos  165<H>  02-27   CARDIAC MARKERS:  Troponin I, High Sensitivity Result: 41782.3 ng/L (02-24-23 @ 21:48) Troponin I, High Sensitivity Result: 84448.5 ng/L (02-24-23 @ 16:00) Troponin I, High Sensitivity Result: 47241.0 ng/L (02-24-23 @ 11:00) Troponin I, High Sensitivity Result: 76275.9 ng/L (02-24-23 @ 05:30) Troponin I, High Sensitivity Result: 2621.9 ng/L (02-23-23 @ 18:57)  TELEMETRY: 	SR   ECG:  	 RADIOLOGY: OTHER: 	  PREVIOUS DIAGNOSTIC TESTING:   [ ] Echocardiogram:  [ ] Catheterization: [ ] Stress Test:

## 2023-02-27 NOTE — PROGRESS NOTE ADULT - SUBJECTIVE AND OBJECTIVE BOX
T(F): 97.5 (02-27-23 @ 12:00), Max: 101.7 (02-26-23 @ 21:30)  HR: 77 (02-27-23 @ 14:00) (63 - 96)  BP: 113/58 (02-27-23 @ 14:00) (103/49 - 157/80)  RR: 19 (02-27-23 @ 14:00) (12 - 24)  SpO2: 95% (02-27-23 @ 14:00) (92% - 97%)    Exam  General: NAD  Respiratory: Nonlabored   Cardiovascular: S1S2 RRR  Abdomen: soft, non tender, non distended   : Indwelling mortensen draining clear, yellow urine     LABS:                        10.3   10.49 )-----------( 112      ( 27 Feb 2023 02:15 )             32.2     02-27    148<H>  |  109<H>  |  45<H>  ----------------------------<  325<H>  2.9<LL>   |  28  |  1.72<H>    Ca    9.4      27 Feb 2023 02:15  Phos  3.1     02-27  Mg     1.9     02-27    TPro  6.5  /  Alb  2.3<L>  /  TBili  0.6  /  DBili  x   /  AST  33  /  ALT  95<H>  /  AlkPhos  165<H>  02-27    PTT - ( 26 Feb 2023 02:00 )  PTT:39.3 sec    A/P: 67F with PMH HTN, DM a/w e coli bacteremia and uremia with mild right hydro and a UTI, COVID.  Concern for possible distal ureteral stricture.   Now off pressors.   -- discussed case with ICU who is OK proceeding with renal scan despite creatinine, therefore recommend NM lasix renal scan  -- PCN vs cysto based on findings  -- continue antibiotics  -- continue mortensen, monitor UOP  -- medical mgmt per primary team

## 2023-02-27 NOTE — PROGRESS NOTE ADULT - ASSESSMENT
NELSON ZEPEDA is a 67y Female with h/o HTN, DM BIBEMS from home for AMS in the setting of Urosepsis and (?) COVID+.  Elevated trops in the setting of GEOVANNI.   Pt initially HD stable, but subsequently became transiently BP to 70s/40s after 4L IVF and hypoxic to 80s on RA requiring 2LNC.decompensated hemodynamically requiring pressor support now stable hemodynamically.    Imp:  Stress induced cardiomyopathy in setting of COVID, Urosepsis and GEOVANNI.  Cannot rule out concurrent NSTEMI, although no ECG changes noted, no WMA on echo, and CPK-mass assay reassuring. Patient denied any chest pain or dyspnea.  Not a candidate for aggressive interventions while septic. Still febrile in last 24 hours. Continue IV antibiotics as per critical care team.  Restart betablockers if hemodynamically stable  CMP in this setting associated with 30-40% mortality, outlook remains guarded.

## 2023-02-27 NOTE — CHART NOTE - NSCHARTNOTEFT_GEN_A_CORE
D/C'ed Bumex gtt  >6L net negative  Rising BUN  Rising Sodium   Hypokalemic   Fluid status is improved.

## 2023-02-27 NOTE — PROGRESS NOTE ADULT - SUBJECTIVE AND OBJECTIVE BOX
CHIEF COMPLAINT:    Interval Events:    REVIEW OF SYSTEMS:  Constitutional: [ ] fevers [ ] chills [ ] weight loss [ ] weight gain  HEENT: [ ] dry eyes [ ] eye irritation [ ] postnasal drip [ ] nasal congestion  CV: [ ] chest pain [ ] orthopnea [ ] palpitations [ ] murmur  Resp: [ ] cough [ ] shortness of breath [ ] dyspnea [ ] wheezing [ ] sputum [ ] hemoptysis  GI: [ ] nausea [ ] vomiting [ ] diarrhea [ ] constipation [ ] abd pain [ ] dysphagia   : [ ] dysuria [ ] nocturia [ ] hematuria [ ] increased urinary frequency  Musculoskeletal: [ ] back pain [ ] myalgias [ ] arthralgias [ ] fracture  Skin: [ ] rash [ ] itch  Neurological: [ ] headache [ ] dizziness [ ] syncope [ ] weakness [ ] numbness  Hematologic/Lymphatic: [ ] anemia [ ] bleeding problem  Allergic/Immunologic: [ ] itchy eyes [ ] nasal discharge [ ] hives [ ] angioedema  [ ] All other systems negative  [ ] Unable to assess ROS because ________    OBJECTIVE:  ICU Vital Signs Last 24 Hrs  T(C): 36.2 (27 Feb 2023 07:22), Max: 38.7 (26 Feb 2023 21:30)  T(F): 97.1 (27 Feb 2023 07:22), Max: 101.7 (26 Feb 2023 21:30)  HR: 69 (27 Feb 2023 07:22) (65 - 101)  BP: 140/67 (27 Feb 2023 03:30) (137/62 - 157/80)  BP(mean): 85 (27 Feb 2023 03:30) (80 - 98)  ABP: 137/60 (27 Feb 2023 07:22) (101/48 - 172/78)  ABP(mean): 88 (27 Feb 2023 07:22) (70 - 115)  RR: 21 (27 Feb 2023 07:22) (12 - 37)  SpO2: 96% (27 Feb 2023 07:22) (92% - 96%)    O2 Parameters below as of 26 Feb 2023 19:15  Patient On (Oxygen Delivery Method): room air              02-26 @ 07:01  -  02-27 @ 07:00  --------------------------------------------------------  IN: 1118.6 mL / OUT: 4000 mL / NET: -2881.4 mL      CAPILLARY BLOOD GLUCOSE      POCT Blood Glucose.: 319 mg/dL (27 Feb 2023 07:50)      PHYSICAL EXAM:  General:   HEENT:   Neck:   Respiratory:   Cardiovascular:   Abdomen:   Extremities:   Skin:   Neurological:  Psychiatry:    LINES:    HOSPITAL MEDICATIONS:  MEDICATIONS  (STANDING):  aspirin Suppository 300 milliGRAM(s) Rectal daily  cefTRIAXone   IVPB      cefTRIAXone   IVPB 2000 milliGRAM(s) IV Intermittent every 24 hours  chlorhexidine 2% Cloths 1 Application(s) Topical <User Schedule>  dexMEDEtomidine Infusion 0.2 MICROgram(s)/kG/Hr (3.94 mL/Hr) IV Continuous <Continuous>  docosanol 10% Cream 1 Application(s) Topical every 3 hours  heparin   Injectable 5000 Unit(s) SubCutaneous every 8 hours  insulin glargine Injectable (LANTUS) 15 Unit(s) SubCutaneous every morning  insulin lispro (ADMELOG) corrective regimen sliding scale   SubCutaneous every 6 hours  pantoprazole  Injectable 40 milliGRAM(s) IV Push daily  polyethylene glycol 3350 17 Gram(s) Oral at bedtime    MEDICATIONS  (PRN):  sodium chloride 0.9% lock flush 10 milliLiter(s) IV Push every 1 hour PRN Pre/post blood products, medications, blood draw, and to maintain line patency      LABS:                        10.3   10.49 )-----------( 112      ( 27 Feb 2023 02:15 )             32.2     Hgb Trend: 10.3<--, 9.4<--, 9.7<--, 9.8<--, 10.1<--  02-27    148<H>  |  109<H>  |  45<H>  ----------------------------<  325<H>  2.9<LL>   |  28  |  1.72<H>    Ca    9.4      27 Feb 2023 02:15  Phos  3.1     02-27  Mg     1.9     02-27    TPro  6.5  /  Alb  2.3<L>  /  TBili  0.6  /  DBili  x   /  AST  33  /  ALT  95<H>  /  AlkPhos  165<H>  02-27    PTT - ( 26 Feb 2023 02:00 )  PTT:39.3 sec          MICROBIOLOGY:     RADIOLOGY:  [ ] Reviewed and interpreted by me CHIEF COMPLAINT:    Interval Events:  bumex gtt stopped for high UOP  improved mentation this morning, following simple commands    REVIEW OF SYSTEMS:  [x ] Unable to assess ROS because mild encephalopathy    OBJECTIVE:  ICU Vital Signs Last 24 Hrs  T(C): 36.2 (27 Feb 2023 07:22), Max: 38.7 (26 Feb 2023 21:30)  T(F): 97.1 (27 Feb 2023 07:22), Max: 101.7 (26 Feb 2023 21:30)  HR: 69 (27 Feb 2023 07:22) (65 - 101)  BP: 140/67 (27 Feb 2023 03:30) (137/62 - 157/80)  BP(mean): 85 (27 Feb 2023 03:30) (80 - 98)  ABP: 137/60 (27 Feb 2023 07:22) (101/48 - 172/78)  ABP(mean): 88 (27 Feb 2023 07:22) (70 - 115)  RR: 21 (27 Feb 2023 07:22) (12 - 37)  SpO2: 96% (27 Feb 2023 07:22) (92% - 96%)    O2 Parameters below as of 26 Feb 2023 19:15  Patient On (Oxygen Delivery Method): room air              02-26 @ 07:01  -  02-27 @ 07:00  --------------------------------------------------------  IN: 1118.6 mL / OUT: 4000 mL / NET: -2881.4 mL      CAPILLARY BLOOD GLUCOSE      POCT Blood Glucose.: 319 mg/dL (27 Feb 2023 07:50)      PHYSICAL EXAM:  General: NAD, non toxic appearing  HEENT: dry MM, EOMI  Neck: supple  Respiratory: CTA b/l  Cardiovascular: s1s2 RRR  Abdomen: soft, non tender, non distended  Extremities: warm, +1 pitting edema   Skin: s/p L TMA w/ some crusting  Neurological: no focal defitics  Psychiatry: awake, follows cimplae commands, able to tell us her name    LINES:  R axillary A line 2/24  RIJ TLC 2/24    HOSPITAL MEDICATIONS:  MEDICATIONS  (STANDING):  aspirin Suppository 300 milliGRAM(s) Rectal daily  cefTRIAXone   IVPB      cefTRIAXone   IVPB 2000 milliGRAM(s) IV Intermittent every 24 hours  chlorhexidine 2% Cloths 1 Application(s) Topical <User Schedule>  dexMEDEtomidine Infusion 0.2 MICROgram(s)/kG/Hr (3.94 mL/Hr) IV Continuous <Continuous>  docosanol 10% Cream 1 Application(s) Topical every 3 hours  heparin   Injectable 5000 Unit(s) SubCutaneous every 8 hours  insulin glargine Injectable (LANTUS) 15 Unit(s) SubCutaneous every morning  insulin lispro (ADMELOG) corrective regimen sliding scale   SubCutaneous every 6 hours  pantoprazole  Injectable 40 milliGRAM(s) IV Push daily  polyethylene glycol 3350 17 Gram(s) Oral at bedtime    MEDICATIONS  (PRN):  sodium chloride 0.9% lock flush 10 milliLiter(s) IV Push every 1 hour PRN Pre/post blood products, medications, blood draw, and to maintain line patency      LABS:                        10.3   10.49 )-----------( 112      ( 27 Feb 2023 02:15 )             32.2     Hgb Trend: 10.3<--, 9.4<--, 9.7<--, 9.8<--, 10.1<--  02-27    148<H>  |  109<H>  |  45<H>  ----------------------------<  325<H>  2.9<LL>   |  28  |  1.72<H>    Ca    9.4      27 Feb 2023 02:15  Phos  3.1     02-27  Mg     1.9     02-27    TPro  6.5  /  Alb  2.3<L>  /  TBili  0.6  /  DBili  x   /  AST  33  /  ALT  95<H>  /  AlkPhos  165<H>  02-27    PTT - ( 26 Feb 2023 02:00 )  PTT:39.3 sec          MICROBIOLOGY:     Culture - Urine (02.23.23 @ 10:40)    -  Piperacillin/Tazobactam: S <=8    -  Tobramycin: S <=2    -  Trimethoprim/Sulfamethoxazole: S <=0.5/9.5    -  Amoxicillin/Clavulanic Acid: S <=8/4    -  Ampicillin: R >16 These ampicillin results predict results for amoxicillin    -  Amikacin: S <=16    -  Ampicillin/Sulbactam: I 16/8 Enterobacter, Klebsiella aerogenes, Citrobacter, and Serratia may develop resistance during prolonged therapy (3-4 days)    -  Aztreonam: S <=4    -  Cefazolin: S <=2 For uncomplicated UTI with K. pneumoniae, E. coli, or P. mirablis: KWESI <=16 is sensitive and KWESI >=32 is resistant. This also predicts results for oral agents cefaclor, cefdinir, cefpodoxime, cefprozil, cefuroxime axetil, cephalexin and locarbef for uncomplicated UTI. Note that some isolates may be susceptible to these agents while testing resistant to cefazolin.    -  Cefepime: S <=2    -  Cefoxitin: S <=8    -  Cefuroxime: S <=4    -  Ceftriaxone: S <=1 Enterobacter, Klebsiella aerogenes, Citrobacter, and Serratia may develop resistance during prolonged therapy    -  Ciprofloxacin: S <=0.25    -  Ertapenem: S <=0.5    -  Gentamicin: S <=2    -  Imipenem: S <=1    -  Levofloxacin: S <=0.5    -  Meropenem: S <=1    -  Nitrofurantoin: S <=32 Should not be used to treat pyelonephritis    Specimen Source: Clean Catch Clean Catch (Midstream)    Culture Results:   >100,000 CFU/ml Escherichia coli    Organism Identification: Escherichia coli    Organism: Escherichia coli    Method Type: KWESI      Culture - Blood (02.23.23 @ 09:20)    Gram Stain:   Growth in aerobic and anaerobic bottles: Gram Negative Rods    Specimen Source: .Blood Blood-Peripheral    Culture Results:   Growth in aerobic and anaerobic bottles: Escherichia coli  See previous culture [id number]          RADIOLOGY:  [ ] Reviewed and interpreted by me    < from: TTE Echo Complete w/o Contrast w/ Doppler (02.24.23 @ 09:17) >  Summary:   1.Left ventricular ejection fraction, by visual estimation, is 35 to   40%.   2. Moderately decreased global left ventricular systolic function.   3. Spectral Doppler shows impaired relaxation pattern of left   ventricular myocardial filling (Grade I diastolic dysfunction).   4. Trace mitral valve regurgitation.   5. Mild tricuspid regurgitation.   6. Mild pulmonic valve regurgitation.   7. Significant decrease in LVEF as compared to prior study dated 9/8/21.   8. Endocardial visualization was enhanced with intravenous echo contrast.    < end of copied text >

## 2023-02-28 LAB
ANION GAP SERPL CALC-SCNC: 12 MMOL/L — SIGNIFICANT CHANGE UP (ref 5–17)
ANION GAP SERPL CALC-SCNC: 9 MMOL/L — SIGNIFICANT CHANGE UP (ref 5–17)
BUN SERPL-MCNC: 41 MG/DL — HIGH (ref 7–23)
BUN SERPL-MCNC: 49 MG/DL — HIGH (ref 7–23)
CALCIUM SERPL-MCNC: 8.5 MG/DL — SIGNIFICANT CHANGE UP (ref 8.5–10.1)
CALCIUM SERPL-MCNC: 9.4 MG/DL — SIGNIFICANT CHANGE UP (ref 8.5–10.1)
CHLORIDE SERPL-SCNC: 101 MMOL/L — SIGNIFICANT CHANGE UP (ref 96–108)
CHLORIDE SERPL-SCNC: 99 MMOL/L — SIGNIFICANT CHANGE UP (ref 96–108)
CO2 SERPL-SCNC: 30 MMOL/L — SIGNIFICANT CHANGE UP (ref 22–31)
CO2 SERPL-SCNC: 30 MMOL/L — SIGNIFICANT CHANGE UP (ref 22–31)
CREAT SERPL-MCNC: 1.25 MG/DL — SIGNIFICANT CHANGE UP (ref 0.5–1.3)
CREAT SERPL-MCNC: 1.38 MG/DL — HIGH (ref 0.5–1.3)
EGFR: 42 ML/MIN/1.73M2 — LOW
EGFR: 47 ML/MIN/1.73M2 — LOW
GLUCOSE BLDC GLUCOMTR-MCNC: 167 MG/DL — HIGH (ref 70–99)
GLUCOSE BLDC GLUCOMTR-MCNC: 176 MG/DL — HIGH (ref 70–99)
GLUCOSE BLDC GLUCOMTR-MCNC: 232 MG/DL — HIGH (ref 70–99)
GLUCOSE BLDC GLUCOMTR-MCNC: 263 MG/DL — HIGH (ref 70–99)
GLUCOSE SERPL-MCNC: 199 MG/DL — HIGH (ref 70–99)
GLUCOSE SERPL-MCNC: 238 MG/DL — HIGH (ref 70–99)
HCT VFR BLD CALC: 29.6 % — LOW (ref 34.5–45)
HGB BLD-MCNC: 9.5 G/DL — LOW (ref 11.5–15.5)
MAGNESIUM SERPL-MCNC: 1.7 MG/DL — SIGNIFICANT CHANGE UP (ref 1.6–2.6)
MAGNESIUM SERPL-MCNC: 2.2 MG/DL — SIGNIFICANT CHANGE UP (ref 1.6–2.6)
MCHC RBC-ENTMCNC: 27.6 PG — SIGNIFICANT CHANGE UP (ref 27–34)
MCHC RBC-ENTMCNC: 32.1 G/DL — SIGNIFICANT CHANGE UP (ref 32–36)
MCV RBC AUTO: 86 FL — SIGNIFICANT CHANGE UP (ref 80–100)
NRBC # BLD: 0 /100 WBCS — SIGNIFICANT CHANGE UP (ref 0–0)
PHOSPHATE SERPL-MCNC: 2.9 MG/DL — SIGNIFICANT CHANGE UP (ref 2.5–4.5)
PHOSPHATE SERPL-MCNC: 2.9 MG/DL — SIGNIFICANT CHANGE UP (ref 2.5–4.5)
PLATELET # BLD AUTO: 108 K/UL — LOW (ref 150–400)
POTASSIUM SERPL-MCNC: 2.8 MMOL/L — CRITICAL LOW (ref 3.5–5.3)
POTASSIUM SERPL-MCNC: 3.8 MMOL/L — SIGNIFICANT CHANGE UP (ref 3.5–5.3)
POTASSIUM SERPL-SCNC: 2.8 MMOL/L — CRITICAL LOW (ref 3.5–5.3)
POTASSIUM SERPL-SCNC: 3.8 MMOL/L — SIGNIFICANT CHANGE UP (ref 3.5–5.3)
RBC # BLD: 3.44 M/UL — LOW (ref 3.8–5.2)
RBC # FLD: 15 % — HIGH (ref 10.3–14.5)
SODIUM SERPL-SCNC: 138 MMOL/L — SIGNIFICANT CHANGE UP (ref 135–145)
SODIUM SERPL-SCNC: 143 MMOL/L — SIGNIFICANT CHANGE UP (ref 135–145)
WBC # BLD: 11.71 K/UL — HIGH (ref 3.8–10.5)
WBC # FLD AUTO: 11.71 K/UL — HIGH (ref 3.8–10.5)

## 2023-02-28 PROCEDURE — 78708 K FLOW/FUNCT IMAGE W/DRUG: CPT | Mod: 26

## 2023-02-28 PROCEDURE — 99233 SBSQ HOSP IP/OBS HIGH 50: CPT

## 2023-02-28 RX ORDER — LIDOCAINE 4 G/100G
5 CREAM TOPICAL EVERY 6 HOURS
Refills: 0 | Status: DISCONTINUED | OUTPATIENT
Start: 2023-02-28 | End: 2023-03-09

## 2023-02-28 RX ORDER — SODIUM CHLORIDE 9 MG/ML
790 INJECTION INTRAMUSCULAR; INTRAVENOUS; SUBCUTANEOUS ONCE
Refills: 0 | Status: COMPLETED | OUTPATIENT
Start: 2023-02-28 | End: 2023-02-28

## 2023-02-28 RX ORDER — HYDRALAZINE HCL 50 MG
5 TABLET ORAL ONCE
Refills: 0 | Status: COMPLETED | OUTPATIENT
Start: 2023-02-28 | End: 2023-02-28

## 2023-02-28 RX ORDER — FUROSEMIDE 40 MG
40 TABLET ORAL ONCE
Refills: 0 | Status: COMPLETED | OUTPATIENT
Start: 2023-02-28 | End: 2023-02-28

## 2023-02-28 RX ORDER — POTASSIUM CHLORIDE 20 MEQ
40 PACKET (EA) ORAL EVERY 4 HOURS
Refills: 0 | Status: COMPLETED | OUTPATIENT
Start: 2023-02-28 | End: 2023-02-28

## 2023-02-28 RX ORDER — POTASSIUM CHLORIDE 20 MEQ
40 PACKET (EA) ORAL EVERY 4 HOURS
Refills: 0 | Status: DISCONTINUED | OUTPATIENT
Start: 2023-02-28 | End: 2023-02-28

## 2023-02-28 RX ORDER — MAGNESIUM SULFATE 500 MG/ML
2 VIAL (ML) INJECTION ONCE
Refills: 0 | Status: COMPLETED | OUTPATIENT
Start: 2023-02-28 | End: 2023-02-28

## 2023-02-28 RX ORDER — POTASSIUM CHLORIDE 20 MEQ
20 PACKET (EA) ORAL ONCE
Refills: 0 | Status: COMPLETED | OUTPATIENT
Start: 2023-02-28 | End: 2023-02-28

## 2023-02-28 RX ORDER — SODIUM,POTASSIUM PHOSPHATES 278-250MG
1 POWDER IN PACKET (EA) ORAL ONCE
Refills: 0 | Status: COMPLETED | OUTPATIENT
Start: 2023-02-28 | End: 2023-02-28

## 2023-02-28 RX ORDER — POTASSIUM CHLORIDE 20 MEQ
10 PACKET (EA) ORAL
Refills: 0 | Status: COMPLETED | OUTPATIENT
Start: 2023-02-28 | End: 2023-02-28

## 2023-02-28 RX ADMIN — CEFTRIAXONE 100 MILLIGRAM(S): 500 INJECTION, POWDER, FOR SOLUTION INTRAMUSCULAR; INTRAVENOUS at 04:10

## 2023-02-28 RX ADMIN — DOCOSANOL 1 APPLICATION(S): 100 CREAM TOPICAL at 03:09

## 2023-02-28 RX ADMIN — DOCOSANOL 1 APPLICATION(S): 100 CREAM TOPICAL at 05:12

## 2023-02-28 RX ADMIN — HEPARIN SODIUM 5000 UNIT(S): 5000 INJECTION INTRAVENOUS; SUBCUTANEOUS at 13:36

## 2023-02-28 RX ADMIN — Medication 81 MILLIGRAM(S): at 11:28

## 2023-02-28 RX ADMIN — Medication 100 MILLIEQUIVALENT(S): at 03:08

## 2023-02-28 RX ADMIN — DOCOSANOL 1 APPLICATION(S): 100 CREAM TOPICAL at 14:56

## 2023-02-28 RX ADMIN — DOCOSANOL 1 APPLICATION(S): 100 CREAM TOPICAL at 11:31

## 2023-02-28 RX ADMIN — SODIUM CHLORIDE 790 MILLILITER(S): 9 INJECTION INTRAMUSCULAR; INTRAVENOUS; SUBCUTANEOUS at 14:11

## 2023-02-28 RX ADMIN — INSULIN GLARGINE 25 UNIT(S): 100 INJECTION, SOLUTION SUBCUTANEOUS at 08:15

## 2023-02-28 RX ADMIN — DOCOSANOL 1 APPLICATION(S): 100 CREAM TOPICAL at 09:58

## 2023-02-28 RX ADMIN — Medication 6: at 11:28

## 2023-02-28 RX ADMIN — Medication 40 MILLIEQUIVALENT(S): at 05:25

## 2023-02-28 RX ADMIN — Medication 100 MILLIEQUIVALENT(S): at 05:11

## 2023-02-28 RX ADMIN — Medication 5 MILLIGRAM(S): at 06:23

## 2023-02-28 RX ADMIN — Medication 1 PACKET(S): at 14:07

## 2023-02-28 RX ADMIN — Medication 2: at 08:14

## 2023-02-28 RX ADMIN — CHLORHEXIDINE GLUCONATE 1 APPLICATION(S): 213 SOLUTION TOPICAL at 04:12

## 2023-02-28 RX ADMIN — Medication 2: at 17:27

## 2023-02-28 RX ADMIN — Medication 25 GRAM(S): at 03:09

## 2023-02-28 RX ADMIN — HEPARIN SODIUM 5000 UNIT(S): 5000 INJECTION INTRAVENOUS; SUBCUTANEOUS at 21:48

## 2023-02-28 RX ADMIN — Medication 100 MILLIEQUIVALENT(S): at 04:10

## 2023-02-28 RX ADMIN — Medication 40 MILLIEQUIVALENT(S): at 10:42

## 2023-02-28 RX ADMIN — HEPARIN SODIUM 5000 UNIT(S): 5000 INJECTION INTRAVENOUS; SUBCUTANEOUS at 05:12

## 2023-02-28 RX ADMIN — Medication 40 MILLIEQUIVALENT(S): at 03:08

## 2023-02-28 RX ADMIN — DOCOSANOL 1 APPLICATION(S): 100 CREAM TOPICAL at 22:19

## 2023-02-28 RX ADMIN — LIDOCAINE 5 MILLILITER(S): 4 CREAM TOPICAL at 11:46

## 2023-02-28 RX ADMIN — Medication 40 MILLIGRAM(S): at 16:11

## 2023-02-28 RX ADMIN — Medication 20 MILLIEQUIVALENT(S): at 13:37

## 2023-02-28 NOTE — PROGRESS NOTE ADULT - SUBJECTIVE AND OBJECTIVE BOX
Patient seen and examined at bedside, patient without complaints.   Explained reasoning behind obtaining nuclear medicine scan.  Denies bladder distention, N/V, SOB, CP.       MEDICATIONS  (STANDING):  aspirin  chewable 81 milliGRAM(s) Oral daily  cefTRIAXone   IVPB      cefTRIAXone   IVPB 2000 milliGRAM(s) IV Intermittent every 24 hours  chlorhexidine 2% Cloths 1 Application(s) Topical <User Schedule>  docosanol 10% Cream 1 Application(s) Topical every 3 hours  furosemide   Injectable 40 milliGRAM(s) IV Push once  heparin   Injectable 5000 Unit(s) SubCutaneous every 8 hours  insulin glargine Injectable (LANTUS) 25 Unit(s) SubCutaneous every morning  insulin lispro (ADMELOG) corrective regimen sliding scale   SubCutaneous three times a day before meals  midodrine 10 milliGRAM(s) Oral every 8 hours  polyethylene glycol 3350 17 Gram(s) Oral at bedtime    MEDICATIONS  (PRN):  lidocaine 2% Viscous 5 milliLiter(s) Swish and Spit every 6 hours PRN Mouth Care  sodium chloride 0.9% lock flush 10 milliLiter(s) IV Push every 1 hour PRN Pre/post blood products, medications, blood draw, and to maintain line patency    Vital Signs Last 24 Hrs  T(C): 36.9 (28 Feb 2023 12:00), Max: 37.1 (27 Feb 2023 23:35)  T(F): 98.4 (28 Feb 2023 12:00), Max: 98.8 (27 Feb 2023 23:35)  HR: 99 (28 Feb 2023 13:00) (65 - 100)  BP: 146/61 (28 Feb 2023 13:00) (89/52 - 196/84)  BP(mean): 78 (28 Feb 2023 13:00) (61 - 111)  RR: 21 (28 Feb 2023 13:00) (11 - 22)  SpO2: 98% (28 Feb 2023 13:00) (95% - 100%)    Parameters below as of 27 Feb 2023 19:08  Patient On (Oxygen Delivery Method): room air    PHYSICAL EXAM:  General: NAD  : bladder nondistended, no suprapubic tenderness. Hall draining clear yellow urine without sedimentation to gravity.     I&O's Detail    27 Feb 2023 07:01  -  28 Feb 2023 07:00  --------------------------------------------------------  IN:    Dexmedetomidine: 27.6 mL    IV PiggyBack: 500 mL    IV PiggyBack: 200 mL    Oral Fluid: 3909 mL  Total IN: 4636.6 mL    OUT:    Indwelling Catheter - Urethral (mL): 2100 mL  Total OUT: 2100 mL    Total NET: 2536.6 mL      28 Feb 2023 07:01  -  28 Feb 2023 14:29  --------------------------------------------------------  IN:  Total IN: 0 mL    OUT:    Indwelling Catheter - Urethral (mL): 850 mL  Total OUT: 850 mL    Total NET: -850 mL      LABS:                        9.5    11.71 )-----------( 108      ( 28 Feb 2023 02:00 )             29.6     02-28    138  |  99  |  41<H>  ----------------------------<  238<H>  3.8   |  30  |  1.25    Ca    9.4      28 Feb 2023 12:15  Phos  2.9     02-28  Mg     2.2     02-28    TPro  6.5  /  Alb  2.3<L>  /  TBili  0.6  /  DBili  x   /  AST  33  /  ALT  95<H>  /  AlkPhos  165<H>  02-27

## 2023-02-28 NOTE — PROGRESS NOTE ADULT - ASSESSMENT
67F with PMH HTN, DM a/w e coli bacteremia and uremia with mild right hydro and a UTI, COVID. Concern for possible distal urethral stricture. Patient hemodynamically stable, remains off pressors, Mortensen draining 2100cc/day clear yellow urine.    -- NM lasix renal scan TODAY, f/u results  -- PCN vs cysto based on NM renal scan findings  -- continue mortensen, monitor urine output  -- continue antibiotics  -- cont care per ICU team  -- will discuss with Dr. Pak

## 2023-02-28 NOTE — PROGRESS NOTE ADULT - ASSESSMENT
67F w/ DM, HTN. PResents w/ encephalopathy. Admitted to ICU w/ septic shock due to E coli bacteremia and UTI, w/ CT showing R hydronephrosis w/ possible obstruction due to stricture, as well as GEOVANNI, NSTEMI vs demand ischemia and septic cardiomyopathy. Shock state has resolved. Pt remains somewhat encephalopathic and on precedex for agitation. Noted to have COVID19+ but then negative x2.     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; possible ischemic workup when improved  #Pulm - satting well on RA  #ID- E coli UTI and bacteremia in setting of likely obstructive uropathy; repeat blood cx x2 pending to document clearance; plan for 10 day course fo ceftriaxone; pt is otherwise afebrile for ~36 hrs w/ improving leukocytosis; suspect COVID19+ followed by 2 negatives was false positive  #Renal/metabolic - GEOVANNI, likely prerenal ATN, improving slowly; hypoK and hypoMg repleted, repeat BMP this afternoon; will send for NM renal scan w/ lasix once K has been repleted appropriately, urology following for ureteral stricture   #GI- passed dysphagia screen, PO diet  #Heme - noted drop in plts stable, monitor for now  #Endo - continue lantus and med ISS  #PPx - HSQ  #Dispo- remains in ICU for close monitoring; prognosis guarded; full code

## 2023-02-28 NOTE — PROGRESS NOTE ADULT - SUBJECTIVE AND OBJECTIVE BOX
CHIEF COMPLAINT:    Interval Events:    REVIEW OF SYSTEMS:  Constitutional: [ ] fevers [ ] chills [ ] weight loss [ ] weight gain  HEENT: [ ] dry eyes [ ] eye irritation [ ] postnasal drip [ ] nasal congestion  CV: [ ] chest pain [ ] orthopnea [ ] palpitations [ ] murmur  Resp: [ ] cough [ ] shortness of breath [ ] dyspnea [ ] wheezing [ ] sputum [ ] hemoptysis  GI: [ ] nausea [ ] vomiting [ ] diarrhea [ ] constipation [ ] abd pain [ ] dysphagia   : [ ] dysuria [ ] nocturia [ ] hematuria [ ] increased urinary frequency  Musculoskeletal: [ ] back pain [ ] myalgias [ ] arthralgias [ ] fracture  Skin: [ ] rash [ ] itch  Neurological: [ ] headache [ ] dizziness [ ] syncope [ ] weakness [ ] numbness  Hematologic/Lymphatic: [ ] anemia [ ] bleeding problem  Allergic/Immunologic: [ ] itchy eyes [ ] nasal discharge [ ] hives [ ] angioedema  [ ] All other systems negative  [ ] Unable to assess ROS because ________    OBJECTIVE:  ICU Vital Signs Last 24 Hrs  T(C): 36.8 (28 Feb 2023 04:00), Max: 37.1 (27 Feb 2023 23:35)  T(F): 98.2 (28 Feb 2023 04:00), Max: 98.8 (27 Feb 2023 23:35)  HR: 93 (28 Feb 2023 07:11) (63 - 93)  BP: 168/68 (28 Feb 2023 07:00) (89/52 - 196/84)  BP(mean): 94 (28 Feb 2023 07:00) (61 - 111)  ABP: 107/49 (27 Feb 2023 10:00) (107/49 - 111/55)  ABP(mean): 69 (27 Feb 2023 10:00) (69 - 76)  RR: 22 (28 Feb 2023 07:11) (11 - 22)  SpO2: 100% (28 Feb 2023 07:11) (95% - 100%)    O2 Parameters below as of 27 Feb 2023 19:08  Patient On (Oxygen Delivery Method): room air              02-27 @ 07:01  -  02-28 @ 07:00  --------------------------------------------------------  IN: 4636.6 mL / OUT: 2100 mL / NET: 2536.6 mL      CAPILLARY BLOOD GLUCOSE      POCT Blood Glucose.: 170 mg/dL (27 Feb 2023 23:04)      PHYSICAL EXAM:  General:   HEENT:   Neck:   Respiratory:   Cardiovascular:   Abdomen:   Extremities:   Skin:   Neurological:  Psychiatry:    LINES:    HOSPITAL MEDICATIONS:  MEDICATIONS  (STANDING):  aspirin  chewable 81 milliGRAM(s) Oral daily  cefTRIAXone   IVPB      cefTRIAXone   IVPB 2000 milliGRAM(s) IV Intermittent every 24 hours  chlorhexidine 2% Cloths 1 Application(s) Topical <User Schedule>  docosanol 10% Cream 1 Application(s) Topical every 3 hours  heparin   Injectable 5000 Unit(s) SubCutaneous every 8 hours  insulin glargine Injectable (LANTUS) 25 Unit(s) SubCutaneous every morning  insulin lispro (ADMELOG) corrective regimen sliding scale   SubCutaneous three times a day before meals  midodrine 10 milliGRAM(s) Oral every 8 hours  polyethylene glycol 3350 17 Gram(s) Oral at bedtime  potassium chloride    Tablet ER 40 milliEquivalent(s) Oral every 4 hours    MEDICATIONS  (PRN):  sodium chloride 0.9% lock flush 10 milliLiter(s) IV Push every 1 hour PRN Pre/post blood products, medications, blood draw, and to maintain line patency      LABS:                        9.5    11.71 )-----------( 108      ( 28 Feb 2023 02:00 )             29.6     Hgb Trend: 9.5<--, 10.3<--, 9.4<--, 9.7<--, 9.8<--  02-28    143  |  101  |  49<H>  ----------------------------<  199<H>  2.8<LL>   |  30  |  1.38<H>    Ca    8.5      28 Feb 2023 02:00  Phos  2.9     02-28  Mg     1.7     02-28    TPro  6.5  /  Alb  2.3<L>  /  TBili  0.6  /  DBili  x   /  AST  33  /  ALT  95<H>  /  AlkPhos  165<H>  02-27              MICROBIOLOGY:     RADIOLOGY:  [ ] Reviewed and interpreted by me CHIEF COMPLAINT:    Interval Events:  BP labile- remains on midodrine but also received dose of hydralazine  mental status improving  passed bedside dysphagia screen  TLC and a-line removed yesterday    REVIEW OF SYSTEMS:  Constitutional: [- ] fevers [ -] chills [ ] weight loss [ ] weight gain  HEENT: [ ] dry eyes [ ] eye irritation [ ] postnasal drip [ ] nasal congestion  CV: [- ] chest pain [- ] orthopnea [ ] palpitations [ ] murmur  Resp: [- ] cough [ -] shortness of breath [- ] dyspnea [- ] wheezing [ ] sputum [ ] hemoptysis  GI: [- ] nausea [- ] vomiting [ -] diarrhea [- ] constipation [ -] abd pain [ ] dysphagia   : [ ] dysuria [ ] nocturia [ ] hematuria [ ] increased urinary frequency  Musculoskeletal: [- ] back pain [- ] myalgias [ ] arthralgias [ ] fracture  Skin: [ -] rash [ ] itch  Neurological:[-  ] headache [ -] dizziness [- ] syncope [ -] weakness [ ] numbness  Hematologic/Lymphatic: [ ] anemia [ ] bleeding problem  Allergic/Immunologic: [ ] itchy eyes [ ] nasal discharge [ ] hives [ ] angioedema  [x ] All other systems negative    OBJECTIVE:  ICU Vital Signs Last 24 Hrs  T(C): 36.8 (28 Feb 2023 04:00), Max: 37.1 (27 Feb 2023 23:35)  T(F): 98.2 (28 Feb 2023 04:00), Max: 98.8 (27 Feb 2023 23:35)  HR: 93 (28 Feb 2023 07:11) (63 - 93)  BP: 168/68 (28 Feb 2023 07:00) (89/52 - 196/84)  BP(mean): 94 (28 Feb 2023 07:00) (61 - 111)  ABP: 107/49 (27 Feb 2023 10:00) (107/49 - 111/55)  ABP(mean): 69 (27 Feb 2023 10:00) (69 - 76)  RR: 22 (28 Feb 2023 07:11) (11 - 22)  SpO2: 100% (28 Feb 2023 07:11) (95% - 100%)    O2 Parameters below as of 27 Feb 2023 19:08  Patient On (Oxygen Delivery Method): room air              02-27 @ 07:01  -  02-28 @ 07:00  --------------------------------------------------------  IN: 4636.6 mL / OUT: 2100 mL / NET: 2536.6 mL      CAPILLARY BLOOD GLUCOSE      POCT Blood Glucose.: 170 mg/dL (27 Feb 2023 23:04)      PHYSICAL EXAM:  General: NAD, non toxic appearing  HEENT: dry MM, EOMI  Neck: supple  Respiratory: CTA b/l  Cardiovascular: s1s2 RRR  Abdomen: soft, non tender, non distended  Extremities: warm, +1 pitting edema   Skin: s/p L TMA w/ some crusting  Neurological: no focal defitics  Psychiatry: awake and alert    LINES:  [facility]    HOSPITAL MEDICATIONS:  MEDICATIONS  (STANDING):  aspirin  chewable 81 milliGRAM(s) Oral daily  cefTRIAXone   IVPB      cefTRIAXone   IVPB 2000 milliGRAM(s) IV Intermittent every 24 hours  chlorhexidine 2% Cloths 1 Application(s) Topical <User Schedule>  docosanol 10% Cream 1 Application(s) Topical every 3 hours  heparin   Injectable 5000 Unit(s) SubCutaneous every 8 hours  insulin glargine Injectable (LANTUS) 25 Unit(s) SubCutaneous every morning  insulin lispro (ADMELOG) corrective regimen sliding scale   SubCutaneous three times a day before meals  midodrine 10 milliGRAM(s) Oral every 8 hours  polyethylene glycol 3350 17 Gram(s) Oral at bedtime  potassium chloride    Tablet ER 40 milliEquivalent(s) Oral every 4 hours    MEDICATIONS  (PRN):  sodium chloride 0.9% lock flush 10 milliLiter(s) IV Push every 1 hour PRN Pre/post blood products, medications, blood draw, and to maintain line patency      LABS:                        9.5    11.71 )-----------( 108      ( 28 Feb 2023 02:00 )             29.6     Hgb Trend: 9.5<--, 10.3<--, 9.4<--, 9.7<--, 9.8<--  02-28    143  |  101  |  49<H>  ----------------------------<  199<H>  2.8<LL>   |  30  |  1.38<H>    Ca    8.5      28 Feb 2023 02:00  Phos  2.9     02-28  Mg     1.7     02-28    TPro  6.5  /  Alb  2.3<L>  /  TBili  0.6  /  DBili  x   /  AST  33  /  ALT  95<H>  /  AlkPhos  165<H>  02-27              MICROBIOLOGY:   Culture - Urine (02.23.23 @ 10:40)    -  Piperacillin/Tazobactam: S <=8    -  Tobramycin: S <=2    -  Trimethoprim/Sulfamethoxazole: S <=0.5/9.5    -  Amoxicillin/Clavulanic Acid: S <=8/4    -  Ampicillin: R >16 These ampicillin results predict results for amoxicillin    -  Amikacin: S <=16    -  Ampicillin/Sulbactam: I 16/8 Enterobacter, Klebsiella aerogenes, Citrobacter, and Serratia may develop resistance during prolonged therapy (3-4 days)    -  Aztreonam: S <=4    -  Cefazolin: S <=2 For uncomplicated UTI with K. pneumoniae, E. coli, or P. mirablis: KWESI <=16 is sensitive and KWESI >=32 is resistant. This also predicts results for oral agents cefaclor, cefdinir, cefpodoxime, cefprozil, cefuroxime axetil, cephalexin and locarbef for uncomplicated UTI. Note that some isolates may be susceptible to these agents while testing resistant to cefazolin.    -  Cefepime: S <=2    -  Cefoxitin: S <=8    -  Cefuroxime: S <=4    -  Ceftriaxone: S <=1 Enterobacter, Klebsiella aerogenes, Citrobacter, and Serratia may develop resistance during prolonged therapy    -  Ciprofloxacin: S <=0.25    -  Ertapenem: S <=0.5    -  Gentamicin: S <=2    -  Imipenem: S <=1    -  Levofloxacin: S <=0.5    -  Meropenem: S <=1    -  Nitrofurantoin: S <=32 Should not be used to treat pyelonephritis    Specimen Source: Clean Catch Clean Catch (Midstream)    Culture Results:   >100,000 CFU/ml Escherichia coli    Organism Identification: Escherichia coli    Organism: Escherichia coli    Method Type: KWESI      Culture - Blood (02.23.23 @ 09:20)    Gram Stain:   Growth in aerobic and anaerobic bottles: Gram Negative Rods    Specimen Source: .Blood Blood-Peripheral    Culture Results:   Growth in aerobic and anaerobic bottles: Escherichia coli  See previous culture [id number]    RADIOLOGY:  [ ] Reviewed and interpreted by me

## 2023-03-01 LAB
ALBUMIN SERPL ELPH-MCNC: 2.5 G/DL — LOW (ref 3.3–5)
ALP SERPL-CCNC: 162 U/L — HIGH (ref 40–120)
ALT FLD-CCNC: 56 U/L — SIGNIFICANT CHANGE UP (ref 12–78)
ANION GAP SERPL CALC-SCNC: 10 MMOL/L — SIGNIFICANT CHANGE UP (ref 5–17)
AST SERPL-CCNC: 23 U/L — SIGNIFICANT CHANGE UP (ref 15–37)
BILIRUB SERPL-MCNC: 0.4 MG/DL — SIGNIFICANT CHANGE UP (ref 0.2–1.2)
BUN SERPL-MCNC: 31 MG/DL — HIGH (ref 7–23)
CALCIUM SERPL-MCNC: 9.2 MG/DL — SIGNIFICANT CHANGE UP (ref 8.5–10.1)
CHLORIDE SERPL-SCNC: 98 MMOL/L — SIGNIFICANT CHANGE UP (ref 96–108)
CO2 SERPL-SCNC: 31 MMOL/L — SIGNIFICANT CHANGE UP (ref 22–31)
CREAT SERPL-MCNC: 1.03 MG/DL — SIGNIFICANT CHANGE UP (ref 0.5–1.3)
EGFR: 60 ML/MIN/1.73M2 — SIGNIFICANT CHANGE UP
GLUCOSE BLDC GLUCOMTR-MCNC: 173 MG/DL — HIGH (ref 70–99)
GLUCOSE BLDC GLUCOMTR-MCNC: 249 MG/DL — HIGH (ref 70–99)
GLUCOSE BLDC GLUCOMTR-MCNC: 260 MG/DL — HIGH (ref 70–99)
GLUCOSE SERPL-MCNC: 219 MG/DL — HIGH (ref 70–99)
HCT VFR BLD CALC: 34.1 % — LOW (ref 34.5–45)
HGB BLD-MCNC: 10.9 G/DL — LOW (ref 11.5–15.5)
MAGNESIUM SERPL-MCNC: 2.1 MG/DL — SIGNIFICANT CHANGE UP (ref 1.6–2.6)
MCHC RBC-ENTMCNC: 27 PG — SIGNIFICANT CHANGE UP (ref 27–34)
MCHC RBC-ENTMCNC: 32 G/DL — SIGNIFICANT CHANGE UP (ref 32–36)
MCV RBC AUTO: 84.6 FL — SIGNIFICANT CHANGE UP (ref 80–100)
NRBC # BLD: 0 /100 WBCS — SIGNIFICANT CHANGE UP (ref 0–0)
PHOSPHATE SERPL-MCNC: 3.8 MG/DL — SIGNIFICANT CHANGE UP (ref 2.5–4.5)
PLATELET # BLD AUTO: 142 K/UL — LOW (ref 150–400)
POTASSIUM SERPL-MCNC: 3.9 MMOL/L — SIGNIFICANT CHANGE UP (ref 3.5–5.3)
POTASSIUM SERPL-SCNC: 3.9 MMOL/L — SIGNIFICANT CHANGE UP (ref 3.5–5.3)
PROT SERPL-MCNC: 6.5 GM/DL — SIGNIFICANT CHANGE UP (ref 6–8.3)
RAPID RVP RESULT: SIGNIFICANT CHANGE UP
RBC # BLD: 4.03 M/UL — SIGNIFICANT CHANGE UP (ref 3.8–5.2)
RBC # FLD: 14.6 % — HIGH (ref 10.3–14.5)
SARS-COV-2 RNA SPEC QL NAA+PROBE: SIGNIFICANT CHANGE UP
SODIUM SERPL-SCNC: 139 MMOL/L — SIGNIFICANT CHANGE UP (ref 135–145)
WBC # BLD: 9.24 K/UL — SIGNIFICANT CHANGE UP (ref 3.8–10.5)
WBC # FLD AUTO: 9.24 K/UL — SIGNIFICANT CHANGE UP (ref 3.8–10.5)

## 2023-03-01 PROCEDURE — 99233 SBSQ HOSP IP/OBS HIGH 50: CPT

## 2023-03-01 RX ORDER — METOPROLOL TARTRATE 50 MG
12.5 TABLET ORAL
Refills: 0 | Status: DISCONTINUED | OUTPATIENT
Start: 2023-03-01 | End: 2023-03-09

## 2023-03-01 RX ORDER — INSULIN GLARGINE 100 [IU]/ML
30 INJECTION, SOLUTION SUBCUTANEOUS EVERY MORNING
Refills: 0 | Status: DISCONTINUED | OUTPATIENT
Start: 2023-03-01 | End: 2023-03-09

## 2023-03-01 RX ORDER — INSULIN LISPRO 100/ML
3 VIAL (ML) SUBCUTANEOUS
Refills: 0 | Status: DISCONTINUED | OUTPATIENT
Start: 2023-03-01 | End: 2023-03-09

## 2023-03-01 RX ADMIN — HEPARIN SODIUM 5000 UNIT(S): 5000 INJECTION INTRAVENOUS; SUBCUTANEOUS at 13:46

## 2023-03-01 RX ADMIN — Medication 4: at 12:12

## 2023-03-01 RX ADMIN — HEPARIN SODIUM 5000 UNIT(S): 5000 INJECTION INTRAVENOUS; SUBCUTANEOUS at 05:18

## 2023-03-01 RX ADMIN — DOCOSANOL 1 APPLICATION(S): 100 CREAM TOPICAL at 03:30

## 2023-03-01 RX ADMIN — Medication 2: at 15:50

## 2023-03-01 RX ADMIN — DOCOSANOL 1 APPLICATION(S): 100 CREAM TOPICAL at 12:13

## 2023-03-01 RX ADMIN — Medication 12.5 MILLIGRAM(S): at 18:23

## 2023-03-01 RX ADMIN — Medication 3 UNIT(S): at 15:51

## 2023-03-01 RX ADMIN — DOCOSANOL 1 APPLICATION(S): 100 CREAM TOPICAL at 00:00

## 2023-03-01 RX ADMIN — CHLORHEXIDINE GLUCONATE 1 APPLICATION(S): 213 SOLUTION TOPICAL at 05:18

## 2023-03-01 RX ADMIN — Medication 6: at 08:51

## 2023-03-01 RX ADMIN — INSULIN GLARGINE 25 UNIT(S): 100 INJECTION, SOLUTION SUBCUTANEOUS at 08:52

## 2023-03-01 RX ADMIN — POLYETHYLENE GLYCOL 3350 17 GRAM(S): 17 POWDER, FOR SOLUTION ORAL at 22:06

## 2023-03-01 RX ADMIN — CEFTRIAXONE 100 MILLIGRAM(S): 500 INJECTION, POWDER, FOR SOLUTION INTRAMUSCULAR; INTRAVENOUS at 05:17

## 2023-03-01 RX ADMIN — DOCOSANOL 1 APPLICATION(S): 100 CREAM TOPICAL at 08:52

## 2023-03-01 RX ADMIN — Medication 3 UNIT(S): at 12:07

## 2023-03-01 RX ADMIN — DOCOSANOL 1 APPLICATION(S): 100 CREAM TOPICAL at 05:17

## 2023-03-01 RX ADMIN — DOCOSANOL 1 APPLICATION(S): 100 CREAM TOPICAL at 22:06

## 2023-03-01 RX ADMIN — HEPARIN SODIUM 5000 UNIT(S): 5000 INJECTION INTRAVENOUS; SUBCUTANEOUS at 22:06

## 2023-03-01 RX ADMIN — Medication 81 MILLIGRAM(S): at 12:06

## 2023-03-01 RX ADMIN — DOCOSANOL 1 APPLICATION(S): 100 CREAM TOPICAL at 15:49

## 2023-03-01 RX ADMIN — DOCOSANOL 1 APPLICATION(S): 100 CREAM TOPICAL at 18:23

## 2023-03-01 NOTE — PROGRESS NOTE ADULT - ASSESSMENT
67F w/ DM, HTN. PResents w/ encephalopathy. Admitted to ICU w/ septic shock due to E coli bacteremia and UTI, w/ CT showing R hydronephrosis w/ possible obstruction due to stricture, as well as GEOVANNI, NSTEMI vs demand ischemia and septic cardiomyopathy. Shock state has resolved. Pt remains somewhat encephalopathic and on precedex for agitation. Noted to have COVID19+ but then negative x2.     #Neuro - awake and alert  #CV - shock state has resolved, BP now elevated, so d/c midodrine and start home metoprolol; s/p heparin gtt for NSTEMI vs demand ischemia and found to have stress cardiomyopathy; possible ischemic workup when improved  #Pulm - satting well on RA  #ID- E coli UTI and bacteremia in setting of likely obstructive uropathy; repeat blood cx NGTD; plan for 10 day course fo ceftriaxone; pt is otherwise afebrile w/ reso;osman leukocytosis; suspect COVID19+ followed by 2 negatives was false positive  #Renal/metabolic - GEOVANNI, likely prerenal ATN, improving slowly; renal scan performed showing R ureteral stricture, urology following; d/c mortensen  #GI- PO diet  #Heme - plts now recovering   #Endo - add premeal insulin, continue lantus and med ISS  #PPx - HSQ  #Dispo- stable for transfer to medical floors today; prognosis guarded; full code; PT consult

## 2023-03-01 NOTE — CHART NOTE - NSCHARTNOTEFT_GEN_A_CORE
ICU DOWN GRADE NOTE      Patient is a 67y old  Female who presents with a chief complaint of UTI sepsis (01 Mar 2023 07:57)      HPI:  67F w/ DM, HTN. PResents w/ encephalopathy. Admitted to ICU w/ septic shock due to E coli bacteremia and UTI, w/ CT showing R hydronephrosis w/ possible obstruction due to stricture, as well as GEOVANNI, NSTEMI vs demand ischemia and septic cardiomyopathy. Shock state has resolved. Pt remains somewhat encephalopathic and on precedex for agitation. Noted to have COVID19+ but then negative x2.       3/1/23:  #Neuro - awake and alert, mental status much improved with episodic confusion but verbally redirectable  #CV - shock state has resolved, BP now elevated, so d/c midodrine and start home metoprolol; s/p heparin gtt for NSTEMI vs demand ischemia and found to have stress cardiomyopathy; possible ischemic workup when improved  #Pulm - SpO2 >95% on room air, not requiring supplemental O2  #ID- E coli UTI and bacteremia in setting of likely obstructive uropathy; repeat blood cx NGTD; plan for 10 day course fo ceftriaxone; pt is otherwise afebrile w/ resolved leukocytosis; suspect COVID19+ followed by 2 negatives was false positive. Infectious disease requiring 3rd RVP sample to be sent for removal of isolation precautions: PENDING 3rd RVP result  #Renal/metabolic - GEOVANNI, likely prerenal ATN, improving slowly; renal scan performed showing R ureteral stricture, urology following; d/c mortensen  #GI- PO diet  #Heme - plts now recovering   #Endo - add premeal insulin, continue lantus and med ISS  #PPx - HSQ  #Dispo- no longer requiring ICU level of care and is stable for transfer to medical floors today; prognosis guarded; full code; PT consult.  - Verbal Sign out discussed with MD Suazo.               REVIEW OF SYSTEMS:  CONSTITUTIONAL: No fever, chills  HEENT:  No blurry vision No sinus or throat pain  NECK: No pain or stiffness  RESPIRATORY: No cough, wheezing, chills or hemoptysis; No shortness of breath  CARDIOVASCULAR: No chest pain, palpitations  GASTROINTESTINAL: No abdominal pain. No nausea, vomiting, or diarrhea  GENITOURINARY: No dysuria  NEUROLOGICAL: No HA, No focal weakness  SKIN: No itching, burning, rashes, or lesions   MUSCULOSKELETAL: No joint pain or swelling; No muscle, back, or extremity pain    MEDICATIONS:  aspirin  chewable 81 milliGRAM(s) Oral daily  cefTRIAXone   IVPB      cefTRIAXone   IVPB 2000 milliGRAM(s) IV Intermittent every 24 hours  chlorhexidine 2% Cloths 1 Application(s) Topical <User Schedule>  docosanol 10% Cream 1 Application(s) Topical every 3 hours  heparin   Injectable 5000 Unit(s) SubCutaneous every 8 hours  insulin glargine Injectable (LANTUS) 30 Unit(s) SubCutaneous every morning  insulin lispro (ADMELOG) corrective regimen sliding scale   SubCutaneous three times a day before meals  insulin lispro Injectable (ADMELOG) 3 Unit(s) SubCutaneous three times a day before meals  lidocaine 2% Viscous 5 milliLiter(s) Swish and Spit every 6 hours PRN  metoprolol tartrate 12.5 milliGRAM(s) Oral two times a day  polyethylene glycol 3350 17 Gram(s) Oral at bedtime  sodium chloride 0.9% lock flush 10 milliLiter(s) IV Push every 1 hour PRN      T(C): 36.1 (03-01-23 @ 11:09), Max: 37.3 (02-28-23 @ 19:00)  HR: 107 (03-01-23 @ 07:00) (87 - 107)  BP: 170/86 (03-01-23 @ 07:00) (125/66 - 170/86)  RR: 18 (03-01-23 @ 07:00) (14 - 21)  SpO2: 97% (03-01-23 @ 07:00) (95% - 100%)  Wt(kg): --Vital Signs Last 24 Hrs  T(C): 36.1 (01 Mar 2023 11:09), Max: 37.3 (28 Feb 2023 19:00)  T(F): 97 (01 Mar 2023 11:09), Max: 99.1 (28 Feb 2023 19:00)  HR: 107 (01 Mar 2023 07:00) (87 - 107)  BP: 170/86 (01 Mar 2023 07:00) (125/66 - 170/86)  BP(mean): 110 (01 Mar 2023 07:00) (73 - 110)  RR: 18 (01 Mar 2023 07:00) (14 - 21)  SpO2: 97% (01 Mar 2023 07:00) (95% - 100%)      PHYSICAL EXAM:  General: NAD, non toxic appearing  HEENT: dry MM, EOMI, PERRL  Neck: supple, trachea midline  Respiratory: CTA b/l, no use of accessory muscles/ increased WOB  Cardiovascular: s1s2 RRR, no m/r/g  Abdomen: soft, non tender, non distended  Extremities: warm, +1 pitting edema   Skin: s/p L TMA w/ some crusting  Neurological: no focal defitics  Psychiatry: awake and alert    Consultant(s) Notes Reviewed:  [x ] YES  [ ] NO  Care Discussed with Consultants/Other Providers [ x] YES  [ ] NO    LABS:                        10.9   9.24  )-----------( 142      ( 01 Mar 2023 03:35 )             34.1     03-01    139  |  98  |  31<H>  ----------------------------<  219<H>  3.9   |  31  |  1.03    Ca    9.2      01 Mar 2023 03:35  Phos  3.8     03-01  Mg     2.1     03-01    TPro  6.5  /  Alb  2.5<L>  /  TBili  0.4  /  DBili  x   /  AST  23  /  ALT  56  /  AlkPhos  162<H>  03-01        CAPILLARY BLOOD GLUCOSE      POCT Blood Glucose.: 249 mg/dL (01 Mar 2023 11:59)  POCT Blood Glucose.: 260 mg/dL (01 Mar 2023 08:33)  POCT Blood Glucose.: 232 mg/dL (28 Feb 2023 22:14)  POCT Blood Glucose.: 167 mg/dL (28 Feb 2023 17:24)            RADIOLOGY & ADDITIONAL TESTS:    < from: NM Renal Function w/Lasix Single (02.28.23 @ 17:00) >    IMPRESSION: Diuretic renal scan demonstrates:  1.  Bilaterally normal perfusion.  2.  Delayed peak times BILATERALLY, with some spontaneous drainage prior   to diuretic, but incomplete response to diuretic by BOTH kidneys with   incomplete washout seen by the conclusion of the exam. Although there are   findings suggestive of a possible distal RIGHT ureteral stricture, renal   findings are suggestive of some mildly impaired underlying renal   function. Renal findings may berelated to patient's ongoing infection.   Review of laboratory values show a decreased GFR at 47 and most recent   urinalysis showed findings suggestive of UTI. Findings are not strongly   suggestive of a prerenal or postrenal cause.  3.  Differential renal function is 50% BILATERALLY.    --- End of Report ---    < end of copied text >          Imaging Personally Reviewed:  [x ] YES  [ ] NO

## 2023-03-01 NOTE — PROGRESS NOTE ADULT - TIME BILLING
review of medical chart, coordination of care, discussion w/ medical team
review of medical chart, coordination of care, discussion w/ medical team

## 2023-03-01 NOTE — PROGRESS NOTE ADULT - SUBJECTIVE AND OBJECTIVE BOX
CHIEF COMPLAINT:    Interval Events:    REVIEW OF SYSTEMS:  Constitutional: [ ] fevers [ ] chills [ ] weight loss [ ] weight gain  HEENT: [ ] dry eyes [ ] eye irritation [ ] postnasal drip [ ] nasal congestion  CV: [ ] chest pain [ ] orthopnea [ ] palpitations [ ] murmur  Resp: [ ] cough [ ] shortness of breath [ ] dyspnea [ ] wheezing [ ] sputum [ ] hemoptysis  GI: [ ] nausea [ ] vomiting [ ] diarrhea [ ] constipation [ ] abd pain [ ] dysphagia   : [ ] dysuria [ ] nocturia [ ] hematuria [ ] increased urinary frequency  Musculoskeletal: [ ] back pain [ ] myalgias [ ] arthralgias [ ] fracture  Skin: [ ] rash [ ] itch  Neurological: [ ] headache [ ] dizziness [ ] syncope [ ] weakness [ ] numbness  Hematologic/Lymphatic: [ ] anemia [ ] bleeding problem  Allergic/Immunologic: [ ] itchy eyes [ ] nasal discharge [ ] hives [ ] angioedema  [ ] All other systems negative  [ ] Unable to assess ROS because ________    OBJECTIVE:  ICU Vital Signs Last 24 Hrs  T(C): 36.1 (01 Mar 2023 04:00), Max: 37.3 (28 Feb 2023 19:00)  T(F): 97 (01 Mar 2023 04:00), Max: 99.1 (28 Feb 2023 19:00)  HR: 107 (01 Mar 2023 07:00) (87 - 107)  BP: 170/86 (01 Mar 2023 07:00) (125/66 - 172/71)  BP(mean): 110 (01 Mar 2023 07:00) (73 - 110)  ABP: --  ABP(mean): --  RR: 18 (01 Mar 2023 07:00) (14 - 21)  SpO2: 97% (01 Mar 2023 07:00) (95% - 100%)          02-28 @ 07:01  -  03-01 @ 07:00  --------------------------------------------------------  IN: 1510 mL / OUT: 3725 mL / NET: -2215 mL      CAPILLARY BLOOD GLUCOSE      POCT Blood Glucose.: 232 mg/dL (28 Feb 2023 22:14)      PHYSICAL EXAM:  General:   HEENT:   Neck:   Respiratory:   Cardiovascular:   Abdomen:   Extremities:   Skin:   Neurological:  Psychiatry:    LINES:    HOSPITAL MEDICATIONS:  MEDICATIONS  (STANDING):  aspirin  chewable 81 milliGRAM(s) Oral daily  cefTRIAXone   IVPB      cefTRIAXone   IVPB 2000 milliGRAM(s) IV Intermittent every 24 hours  chlorhexidine 2% Cloths 1 Application(s) Topical <User Schedule>  docosanol 10% Cream 1 Application(s) Topical every 3 hours  heparin   Injectable 5000 Unit(s) SubCutaneous every 8 hours  insulin glargine Injectable (LANTUS) 25 Unit(s) SubCutaneous every morning  insulin lispro (ADMELOG) corrective regimen sliding scale   SubCutaneous three times a day before meals  midodrine 10 milliGRAM(s) Oral every 8 hours  polyethylene glycol 3350 17 Gram(s) Oral at bedtime    MEDICATIONS  (PRN):  lidocaine 2% Viscous 5 milliLiter(s) Swish and Spit every 6 hours PRN Mouth Care  sodium chloride 0.9% lock flush 10 milliLiter(s) IV Push every 1 hour PRN Pre/post blood products, medications, blood draw, and to maintain line patency      LABS:                        10.9   9.24  )-----------( 142      ( 01 Mar 2023 03:35 )             34.1     Hgb Trend: 10.9<--, 9.5<--, 10.3<--, 9.4<--, 9.7<--  03-01    139  |  98  |  31<H>  ----------------------------<  219<H>  3.9   |  31  |  1.03    Ca    9.2      01 Mar 2023 03:35  Phos  3.8     03-01  Mg     2.1     03-01    TPro  6.5  /  Alb  2.5<L>  /  TBili  0.4  /  DBili  x   /  AST  23  /  ALT  56  /  AlkPhos  162<H>  03-01              MICROBIOLOGY:     RADIOLOGY:  [ ] Reviewed and interpreted by me CHIEF COMPLAINT:    Interval Events:  had renal scan yesterday afternoon  no complaints    REVIEW OF SYSTEMS:  Constitutional: [ -] fevers [- ] chills [ ] weight loss [ ] weight gain  HEENT: [ ] dry eyes [ ] eye irritation [ ] postnasal drip [ ] nasal congestion  CV: [- ] chest pain [ -] orthopnea [ -] palpitations [ ] murmur  Resp: [ -] cough [- ] shortness of breath [- ] dyspnea [- ] wheezing [ ] sputum [ ] hemoptysis  GI: [ -] nausea [- ] vomiting [ -] diarrhea [- ] constipation [ ] abd pain [ ] dysphagia   : [ ] dysuria [ ] nocturia [ ] hematuria [ ] increased urinary frequency  Musculoskeletal: [ ] back pain [ ] myalgias [ ] arthralgias [ ] fracture  Skin: [ ] rash [ ] itch  Neurological: [ -] headache [ -] dizziness [ -] syncope [ -] weakness [ ] numbness  Hematologic/Lymphatic: [ ] anemia [ ] bleeding problem  Allergic/Immunologic: [ ] itchy eyes [ ] nasal discharge [ ] hives [ ] angioedema  [x ] All other systems negative    OBJECTIVE:  ICU Vital Signs Last 24 Hrs  T(C): 36.1 (01 Mar 2023 04:00), Max: 37.3 (28 Feb 2023 19:00)  T(F): 97 (01 Mar 2023 04:00), Max: 99.1 (28 Feb 2023 19:00)  HR: 107 (01 Mar 2023 07:00) (87 - 107)  BP: 170/86 (01 Mar 2023 07:00) (125/66 - 172/71)  BP(mean): 110 (01 Mar 2023 07:00) (73 - 110)  ABP: --  ABP(mean): --  RR: 18 (01 Mar 2023 07:00) (14 - 21)  SpO2: 97% (01 Mar 2023 07:00) (95% - 100%)          02-28 @ 07:01  -  03-01 @ 07:00  --------------------------------------------------------  IN: 1510 mL / OUT: 3725 mL / NET: -2215 mL      CAPILLARY BLOOD GLUCOSE      POCT Blood Glucose.: 232 mg/dL (28 Feb 2023 22:14)      PHYSICAL EXAM:  General: NAD, non toxic appearing  HEENT: dry MM, EOMI  Neck: supple  Respiratory: CTA b/l  Cardiovascular: s1s2 RRR  Abdomen: soft, non tender, non distended  Extremities: warm, +1 pitting edema   Skin: s/p L TMA w/ some crusting  Neurological: no focal defitics  Psychiatry: awake and alert    LINES:  [facility] MEDICATIONS:  MEDICATIONS  (STANDING):  aspirin  chewable 81 milliGRAM(s) Oral daily  cefTRIAXone   IVPB      cefTRIAXone   IVPB 2000 milliGRAM(s) IV Intermittent every 24 hours  chlorhexidine 2% Cloths 1 Application(s) Topical <User Schedule>  docosanol 10% Cream 1 Application(s) Topical every 3 hours  heparin   Injectable 5000 Unit(s) SubCutaneous every 8 hours  insulin glargine Injectable (LANTUS) 25 Unit(s) SubCutaneous every morning  insulin lispro (ADMELOG) corrective regimen sliding scale   SubCutaneous three times a day before meals  midodrine 10 milliGRAM(s) Oral every 8 hours  polyethylene glycol 3350 17 Gram(s) Oral at bedtime    MEDICATIONS  (PRN):  lidocaine 2% Viscous 5 milliLiter(s) Swish and Spit every 6 hours PRN Mouth Care  sodium chloride 0.9% lock flush 10 milliLiter(s) IV Push every 1 hour PRN Pre/post blood products, medications, blood draw, and to maintain line patency      LABS:                        10.9   9.24  )-----------( 142      ( 01 Mar 2023 03:35 )             34.1     Hgb Trend: 10.9<--, 9.5<--, 10.3<--, 9.4<--, 9.7<--  03-01    139  |  98  |  31<H>  ----------------------------<  219<H>  3.9   |  31  |  1.03    Ca    9.2      01 Mar 2023 03:35  Phos  3.8     03-01  Mg     2.1     03-01    TPro  6.5  /  Alb  2.5<L>  /  TBili  0.4  /  DBili  x   /  AST  23  /  ALT  56  /  AlkPhos  162<H>  03-01              MICROBIOLOGY:   MICROBIOLOGY:   Culture - Urine (02.23.23 @ 10:40)    -  Piperacillin/Tazobactam: S <=8    -  Tobramycin: S <=2    -  Trimethoprim/Sulfamethoxazole: S <=0.5/9.5    -  Amoxicillin/Clavulanic Acid: S <=8/4    -  Ampicillin: R >16 These ampicillin results predict results for amoxicillin    -  Amikacin: S <=16    -  Ampicillin/Sulbactam: I 16/8 Enterobacter, Klebsiella aerogenes, Citrobacter, and Serratia may develop resistance during prolonged therapy (3-4 days)    -  Aztreonam: S <=4    -  Cefazolin: S <=2 For uncomplicated UTI with K. pneumoniae, E. coli, or P. mirablis: KWESI <=16 is sensitive and KWESI >=32 is resistant. This also predicts results for oral agents cefaclor, cefdinir, cefpodoxime, cefprozil, cefuroxime axetil, cephalexin and locarbef for uncomplicated UTI. Note that some isolates may be susceptible to these agents while testing resistant to cefazolin.    -  Cefepime: S <=2    -  Cefoxitin: S <=8    -  Cefuroxime: S <=4    -  Ceftriaxone: S <=1 Enterobacter, Klebsiella aerogenes, Citrobacter, and Serratia may develop resistance during prolonged therapy    -  Ciprofloxacin: S <=0.25    -  Ertapenem: S <=0.5    -  Gentamicin: S <=2    -  Imipenem: S <=1    -  Levofloxacin: S <=0.5    -  Meropenem: S <=1    -  Nitrofurantoin: S <=32 Should not be used to treat pyelonephritis    Specimen Source: Clean Catch Clean Catch (Midstream)    Culture Results:   >100,000 CFU/ml Escherichia coli    Organism Identification: Escherichia coli    Organism: Escherichia coli    Method Type: KWESI      Culture - Blood (02.23.23 @ 09:20)    Gram Stain:   Growth in aerobic and anaerobic bottles: Gram Negative Rods    Specimen Source: .Blood Blood-Peripheral    Culture Results:   Growth in aerobic and anaerobic bottles: Escherichia coli  See previous culture [id number]    Culture - Blood (02.27.23 @ 14:20)    Specimen Source: .Blood Blood-Peripheral    Culture Results:   No growth to date.        RADIOLOGY:  [ ] Reviewed and interpreted by me

## 2023-03-01 NOTE — PROGRESS NOTE ADULT - SUBJECTIVE AND OBJECTIVE BOX
Patient seen and examined at bedside resting comfortably.   Offers no complaints.   Denies fever, chills, N/V/D, CP, SOB.    Vital Signs Last 24 Hrs  T(F): 97 (03-01-23 @ 11:09), Max: 99.1 (02-28-23 @ 19:00)  HR: 110 (03-01-23 @ 13:00)  BP: 156/73 (03-01-23 @ 13:00)  RR: 18 (03-01-23 @ 13:00)  SpO2: 97% (03-01-23 @ 13:00)  POCT Blood Glucose.: 173 mg/dL (01 Mar 2023 15:44)    PHYSICAL EXAM:  GENERAL: Alert, NAD  CHEST/LUNG: Clear to auscultation bilaterally, respirations nonlabored  HEART: Regular rate and rhythm; S1 & S2 appreciated  ABDOMEN: + Bowel sounds, soft, Nontender, Nondistended  : no suprapubic tenderness or distention. Mortensen tubing with clear yellow urine   EXTREMITIES:  no calf tenderness, No edema    I&O's Detail    28 Feb 2023 07:01  -  01 Mar 2023 07:00  --------------------------------------------------------  IN:    Oral Fluid: 720 mL    Sodium Chloride 0.9% Bolus: 790 mL  Total IN: 1510 mL    OUT:    Indwelling Catheter - Urethral (mL): 3725 mL  Total OUT: 3725 mL    Total NET: -2215 mL    LABS:                        10.9   9.24  )-----------( 142      ( 01 Mar 2023 03:35 )             34.1     03-01    139  |  98  |  31<H>  ----------------------------<  219<H>  3.9   |  31  |  1.03    Ca    9.2      01 Mar 2023 03:35  Phos  3.8     03-01  Mg     2.1     03-01    TPro  6.5  /  Alb  2.5<L>  /  TBili  0.4  /  DBili  x   /  AST  23  /  ALT  56  /  AlkPhos  162<H>  03-01    RADIOLOGY & ADDITIONAL STUDIES:    < from: NM Renal Function w/Lasix Single (02.28.23 @ 17:00) >  ACC: [id number] EXAM:  NM KIDNEY IMG LASIX   ORDERED BY: MARLYN ROTH     PROCEDURE DATE:  02/28/2023          INTERPRETATION:  RADIOPHARMACEUTICAL: 10.0 mCi   [id number] (MAG3), I.V.    CLINICAL INFORMATION: 67 year old hospitalized female with with fever,   sepsis, Covid 19 infection and recent CT showing possible ascending UTI   and suspected distal RIGHT-sided ureteral stricture; referred for   evaluation of function and obstruction.    TECHNIQUE:  The patient received approximately 790 cc normal saline I.V.   over about one hour prior to radiopharmaceutical injection. A Mortensen   catheter was already in place as patient was referred from the ICU.   Dynamic images of the posterior abdomen were obtained for approximately   60 minutes following administration of radiopharmaceutical. Furosemide 40   mg I.V. was administered at 20 minutes postinjection.    COMPARISON: No prior Lasix renal scintigraphy available    FINDINGS:  The renal perfusion images demonstrate prompt,good flow to   BOTH kidneys, which are similar in size.    The functional images show good cortical uptake of radiopharmaceutical by   BOTH kidneys.  There is slightly delayed peak time seen on time-activity   curves, and slight delay of appearance of radiotracer in the collecting   systems, versus grossly appreciated at approximately 6-8 minutes.   However, there appears to be some spontaneous drainage BILATERALLY, prior   to Lasix administration. There is some further clearance after   administration of diuretic; however the kidneys failed to completely   clear by the end of the examination. The 20 minute/peak time ratio is   elevated BILATERALLY, greater than 70 minutes in BOTH kidneys. Throughout   the exam, there is persistent activity at the distal RIGHT ureter on   multiple imaging frames. The urinary bladder is drained by a Mortensen   catheter and is otherwise unremarkable.    Following diuretic challenge, the T-1/2 washout from BOTH kidneys remains   elevated; T1/2 washout from the left collecting system is 83 minutes; the   T1/2 washout  from the right collecting system is 83 minutes (normal:10   minutes or less).    Differential renal function: Right kidney: 50 percent; Left kidney: 50   percent    Time-activity curves are concordant with images.    IMPRESSION: Diuretic renal scan demonstrates:  1.  Bilaterally normal perfusion.  2.  Delayed peak times BILATERALLY, with some spontaneous drainage prior   to diuretic, but incomplete response to diuretic by BOTH kidneys with   incomplete washout seen by the conclusion of the exam. Although there are   findings suggestive of a possible distal RIGHT ureteral stricture, renal   findings are suggestive of some mildly impaired underlying renal   function. Renal findings may berelated to patient's ongoing infection.   Review of laboratory values show a decreased GFR at 47 and most recent   urinalysis showed findings suggestive of UTI. Findings are not strongly   suggestive of a prerenal or postrenal cause.  3.  Differential renal function is 50% BILATERALLY.    A/P  67F with PMH HTN, DM a/w e coli bacteremia and uremia with mild right hydro and a UTI, COVID. Concern for possible distal urethral stricture. Patient hemodynamically stable, remains off pressors  Leukocytosis and Cr improving   UCx: E coli    - iv abx  - continue mortensen, monitor urine output  - Dr. Pak to personally review NM renal scan prior to determining further urological intervention   - continue care per primary team  - d/w Dr. Pak

## 2023-03-02 LAB
ALBUMIN SERPL ELPH-MCNC: 2.5 G/DL — LOW (ref 3.3–5)
ALP SERPL-CCNC: 129 U/L — HIGH (ref 40–120)
ALT FLD-CCNC: 42 U/L — SIGNIFICANT CHANGE UP (ref 12–78)
ANION GAP SERPL CALC-SCNC: 10 MMOL/L — SIGNIFICANT CHANGE UP (ref 5–17)
AST SERPL-CCNC: 23 U/L — SIGNIFICANT CHANGE UP (ref 15–37)
BASOPHILS # BLD AUTO: 0 K/UL — SIGNIFICANT CHANGE UP (ref 0–0.2)
BASOPHILS NFR BLD AUTO: 0 % — SIGNIFICANT CHANGE UP (ref 0–2)
BILIRUB SERPL-MCNC: 0.3 MG/DL — SIGNIFICANT CHANGE UP (ref 0.2–1.2)
BUN SERPL-MCNC: 21 MG/DL — SIGNIFICANT CHANGE UP (ref 7–23)
CALCIUM SERPL-MCNC: 9.2 MG/DL — SIGNIFICANT CHANGE UP (ref 8.5–10.1)
CHLORIDE SERPL-SCNC: 97 MMOL/L — SIGNIFICANT CHANGE UP (ref 96–108)
CO2 SERPL-SCNC: 32 MMOL/L — HIGH (ref 22–31)
CREAT SERPL-MCNC: 0.96 MG/DL — SIGNIFICANT CHANGE UP (ref 0.5–1.3)
EGFR: 65 ML/MIN/1.73M2 — SIGNIFICANT CHANGE UP
EOSINOPHIL # BLD AUTO: 0.32 K/UL — SIGNIFICANT CHANGE UP (ref 0–0.5)
EOSINOPHIL NFR BLD AUTO: 4 % — SIGNIFICANT CHANGE UP (ref 0–6)
GLUCOSE BLDC GLUCOMTR-MCNC: 177 MG/DL — HIGH (ref 70–99)
GLUCOSE BLDC GLUCOMTR-MCNC: 181 MG/DL — HIGH (ref 70–99)
GLUCOSE BLDC GLUCOMTR-MCNC: 208 MG/DL — HIGH (ref 70–99)
GLUCOSE BLDC GLUCOMTR-MCNC: 211 MG/DL — HIGH (ref 70–99)
GLUCOSE SERPL-MCNC: 172 MG/DL — HIGH (ref 70–99)
HCT VFR BLD CALC: 34.5 % — SIGNIFICANT CHANGE UP (ref 34.5–45)
HGB BLD-MCNC: 10.7 G/DL — LOW (ref 11.5–15.5)
LYMPHOCYTES # BLD AUTO: 1.85 K/UL — SIGNIFICANT CHANGE UP (ref 1–3.3)
LYMPHOCYTES # BLD AUTO: 23 % — SIGNIFICANT CHANGE UP (ref 13–44)
MAGNESIUM SERPL-MCNC: 2.1 MG/DL — SIGNIFICANT CHANGE UP (ref 1.6–2.6)
MANUAL SMEAR VERIFICATION: SIGNIFICANT CHANGE UP
MCHC RBC-ENTMCNC: 26.9 PG — LOW (ref 27–34)
MCHC RBC-ENTMCNC: 31 G/DL — LOW (ref 32–36)
MCV RBC AUTO: 86.7 FL — SIGNIFICANT CHANGE UP (ref 80–100)
METAMYELOCYTES # FLD: 1 % — HIGH (ref 0–0)
MONOCYTES # BLD AUTO: 0.24 K/UL — SIGNIFICANT CHANGE UP (ref 0–0.9)
MONOCYTES NFR BLD AUTO: 3 % — SIGNIFICANT CHANGE UP (ref 2–14)
NEUTROPHILS # BLD AUTO: 5.56 K/UL — SIGNIFICANT CHANGE UP (ref 1.8–7.4)
NEUTROPHILS NFR BLD AUTO: 60 % — SIGNIFICANT CHANGE UP (ref 43–77)
NEUTS BAND # BLD: 9 % — HIGH (ref 0–8)
NRBC # BLD: 0 /100 — SIGNIFICANT CHANGE UP (ref 0–0)
NRBC # BLD: SIGNIFICANT CHANGE UP /100 WBCS (ref 0–0)
PHOSPHATE SERPL-MCNC: 3.7 MG/DL — SIGNIFICANT CHANGE UP (ref 2.5–4.5)
PLAT MORPH BLD: NORMAL — SIGNIFICANT CHANGE UP
PLATELET # BLD AUTO: 217 K/UL — SIGNIFICANT CHANGE UP (ref 150–400)
POTASSIUM SERPL-MCNC: 3.4 MMOL/L — LOW (ref 3.5–5.3)
POTASSIUM SERPL-SCNC: 3.4 MMOL/L — LOW (ref 3.5–5.3)
PROT SERPL-MCNC: 6.7 GM/DL — SIGNIFICANT CHANGE UP (ref 6–8.3)
RBC # BLD: 3.98 M/UL — SIGNIFICANT CHANGE UP (ref 3.8–5.2)
RBC # FLD: 14.3 % — SIGNIFICANT CHANGE UP (ref 10.3–14.5)
RBC BLD AUTO: NORMAL — SIGNIFICANT CHANGE UP
SODIUM SERPL-SCNC: 139 MMOL/L — SIGNIFICANT CHANGE UP (ref 135–145)
WBC # BLD: 8.06 K/UL — SIGNIFICANT CHANGE UP (ref 3.8–10.5)
WBC # FLD AUTO: 8.06 K/UL — SIGNIFICANT CHANGE UP (ref 3.8–10.5)

## 2023-03-02 PROCEDURE — 99222 1ST HOSP IP/OBS MODERATE 55: CPT

## 2023-03-02 PROCEDURE — 99233 SBSQ HOSP IP/OBS HIGH 50: CPT

## 2023-03-02 RX ORDER — POTASSIUM CHLORIDE 20 MEQ
40 PACKET (EA) ORAL ONCE
Refills: 0 | Status: COMPLETED | OUTPATIENT
Start: 2023-03-02 | End: 2023-03-02

## 2023-03-02 RX ADMIN — INSULIN GLARGINE 30 UNIT(S): 100 INJECTION, SOLUTION SUBCUTANEOUS at 08:17

## 2023-03-02 RX ADMIN — HEPARIN SODIUM 5000 UNIT(S): 5000 INJECTION INTRAVENOUS; SUBCUTANEOUS at 05:21

## 2023-03-02 RX ADMIN — Medication 81 MILLIGRAM(S): at 11:44

## 2023-03-02 RX ADMIN — Medication 40 MILLIEQUIVALENT(S): at 10:59

## 2023-03-02 RX ADMIN — Medication 2: at 16:10

## 2023-03-02 RX ADMIN — CEFTRIAXONE 100 MILLIGRAM(S): 500 INJECTION, POWDER, FOR SOLUTION INTRAMUSCULAR; INTRAVENOUS at 05:21

## 2023-03-02 RX ADMIN — Medication 3 UNIT(S): at 16:11

## 2023-03-02 RX ADMIN — DOCOSANOL 1 APPLICATION(S): 100 CREAM TOPICAL at 14:09

## 2023-03-02 RX ADMIN — HEPARIN SODIUM 5000 UNIT(S): 5000 INJECTION INTRAVENOUS; SUBCUTANEOUS at 21:11

## 2023-03-02 RX ADMIN — DOCOSANOL 1 APPLICATION(S): 100 CREAM TOPICAL at 23:38

## 2023-03-02 RX ADMIN — DOCOSANOL 1 APPLICATION(S): 100 CREAM TOPICAL at 02:19

## 2023-03-02 RX ADMIN — DOCOSANOL 1 APPLICATION(S): 100 CREAM TOPICAL at 08:17

## 2023-03-02 RX ADMIN — DOCOSANOL 1 APPLICATION(S): 100 CREAM TOPICAL at 11:45

## 2023-03-02 RX ADMIN — DOCOSANOL 1 APPLICATION(S): 100 CREAM TOPICAL at 17:53

## 2023-03-02 RX ADMIN — CHLORHEXIDINE GLUCONATE 1 APPLICATION(S): 213 SOLUTION TOPICAL at 05:21

## 2023-03-02 RX ADMIN — Medication 12.5 MILLIGRAM(S): at 17:53

## 2023-03-02 RX ADMIN — Medication 2: at 07:57

## 2023-03-02 RX ADMIN — DOCOSANOL 1 APPLICATION(S): 100 CREAM TOPICAL at 05:22

## 2023-03-02 RX ADMIN — HEPARIN SODIUM 5000 UNIT(S): 5000 INJECTION INTRAVENOUS; SUBCUTANEOUS at 14:07

## 2023-03-02 RX ADMIN — Medication 3 UNIT(S): at 11:42

## 2023-03-02 RX ADMIN — Medication 3 UNIT(S): at 08:34

## 2023-03-02 RX ADMIN — DOCOSANOL 1 APPLICATION(S): 100 CREAM TOPICAL at 00:05

## 2023-03-02 RX ADMIN — DOCOSANOL 1 APPLICATION(S): 100 CREAM TOPICAL at 21:10

## 2023-03-02 RX ADMIN — Medication 12.5 MILLIGRAM(S): at 05:22

## 2023-03-02 RX ADMIN — Medication 4: at 11:41

## 2023-03-02 RX ADMIN — POLYETHYLENE GLYCOL 3350 17 GRAM(S): 17 POWDER, FOR SOLUTION ORAL at 21:11

## 2023-03-02 NOTE — CONSULT NOTE ADULT - SUBJECTIVE AND OBJECTIVE BOX
Full note to follow  Day 8 Rx vs. E coli septicemia due to UTI  NM scan does not seem c/w postrenal obstruction  Presuming urology without concerns for obstructive, plan for 10 days Rx  No objection to change to PO Keflex 500mg PO TID to complete remainder of therapy  Thank you for the courtesy of this referral.  I'll sign off at this time.   Thank you for the courtesy of this referral.    Cornel Johansen MD  Attending Physician  [facility]  Division of Infectous Diseases  [phone]  =================  [facility]  Division of Infectious Diseases  [phone]    NELSON ZEPEDA  67y, Female  [id number]    HPI--      PMH/PSH--  Hypertension    Family history of diabetes mellitus    No significant past surgical history        Allergies--  avocado (Pruritus)  Carrots (Pruritus)  No Known Drug Allergies  Plums (Pruritus)      Medications--  Antibiotics: cefTRIAXone   IVPB      cefTRIAXone   IVPB 2000 milliGRAM(s) IV Intermittent every 24 hours    Immunologic:   Other: aspirin  chewable  chlorhexidine 2% Cloths  docosanol 10% Cream  heparin   Injectable  insulin glargine Injectable (LANTUS)  insulin lispro (ADMELOG) corrective regimen sliding scale  insulin lispro Injectable (ADMELOG)  lidocaine 2% Viscous PRN  metoprolol tartrate  polyethylene glycol 3350  sodium chloride 0.9% lock flush PRN    Antimicrobials last 90 days per EMR: MEDICATIONS  (STANDING):    cefTRIAXone   IVPB   100 mL/Hr IV Intermittent (02-26-23 @ 05:29)    cefTRIAXone   IVPB   100 mL/Hr IV Intermittent (03-02-23 @ 05:21)   100 mL/Hr IV Intermittent (03-01-23 @ 05:17)   100 mL/Hr IV Intermittent (02-28-23 @ 04:10)   100 mL/Hr IV Intermittent (02-27-23 @ 04:18)    cefTRIAXone   IVPB   100 mL/Hr IV Intermittent (02-23-23 @ 10:34)    piperacillin/tazobactam IVPB..   25 mL/Hr IV Intermittent (02-25-23 @ 21:01)   25 mL/Hr IV Intermittent (02-25-23 @ 14:23)   25 mL/Hr IV Intermittent (02-25-23 @ 05:09)   25 mL/Hr IV Intermittent (02-24-23 @ 21:46)   25 mL/Hr IV Intermittent (02-24-23 @ 13:54)   25 mL/Hr IV Intermittent (02-24-23 @ 05:21)   25 mL/Hr IV Intermittent (02-23-23 @ 21:50)    piperacillin/tazobactam IVPB...   200 mL/Hr IV Intermittent (02-23-23 @ 16:54)    vancomycin  IVPB.   250 mL/Hr IV Intermittent (02-23-23 @ 17:28)        Social History--  EtOH: denies   Tobacco: denies   Drug Use: denies     Family/Marital History--        Travel/Environmental/Occupational History:      Review of Systems:  A >=10-point review of systems was obtained.     Pertinent positives and negatives--  Constitutional: No fevers. No Chills. No Rigors.   Eyes:  ENMT:  Cardiovascular: No chest pain. No palpitations.  Respiratory: No shortness of breath. No cough.  Gastrointestinal: No nausea or vomiting. No diarrhea or constipation.   Genitourinary:  Musculoskeletal:  Skin:  Neurologic:  Psychiatric: Pleasant. Appropriate affect.  Endocrine:  Heme/Lymphatic:  Allergy/Immunologic:    Review of systems otherwise negative except as previously noted.    Physical Exam--  Vital Signs: T(F): 98 (03-02-23 @ 11:55), Max: 98.9 (03-01-23 @ 23:50)  HR: 98 (03-02-23 @ 11:55)  BP: 162/98 (03-02-23 @ 11:55)  RR: 18 (03-02-23 @ 11:55)  SpO2: 98% (03-02-23 @ 11:55)  Wt(kg): --  General: Nontoxic-appearing Female in no acute distress.  HEENT: AT/NC. PERRL. EOMI. Anicteric. Conjunctiva pink and moist. Oropharynx clear. Dentition fair.  Neck: Not rigid. No sense of mass.  Nodes: None palpable.  Lungs: Clear bilaterally without rales, wheezing or rhonchi  Heart: Regular rate and rhythm. No Murmur. No rub. No gallop. No palpable thrill.  Abdomen: Bowel sounds present and normoactive. Soft. Nondistended. Nontender. No sense of mass. No organomegaly.  Back: No spinal tenderness. No costovertebral angle tenderness.   Extremities: No cyanosis or clubbing. No edema.   Skin: Warm. Dry. Good turgor. No rash. No vasculitic stigmata.  Psychiatric: Appropriate affect and mood for situation.         Laboratory & Imaging Data--  CBC                        10.7   8.06  )-----------( 217      ( 02 Mar 2023 07:30 )             34.5       Chemistries  03-02    139  |  97  |  21  ----------------------------<  172<H>  3.4<L>   |  32<H>  |  0.96    Ca    9.2      02 Mar 2023 07:30  Phos  3.7     03-02  Mg     2.1     03-02    TPro  6.7  /  Alb  2.5<L>  /  TBili  0.3  /  DBili  x   /  AST  23  /  ALT  42  /  AlkPhos  129<H>  03-02      Culture Data    Culture - Blood (collected 27 Feb 2023 14:20)  Source: .Blood Blood-Peripheral  Preliminary Report (28 Feb 2023 19:01):    No growth to date.    Culture - Blood (collected 27 Feb 2023 14:20)  Source: .Blood Blood-Peripheral  Preliminary Report (28 Feb 2023 19:01):    No growth to date.    Culture - Urine (collected 23 Feb 2023 10:40)  Source: Clean Catch Clean Catch (Midstream)  Final Report (25 Feb 2023 11:14):    >100,000 CFU/ml Escherichia coli  Organism: Escherichia coli (25 Feb 2023 11:14)  Organism: Escherichia coli (25 Feb 2023 11:14)    Culture - Blood (collected 23 Feb 2023 09:20)  Source: .Blood Blood-Peripheral  Gram Stain (25 Feb 2023 10:38):    Growth in aerobic and anaerobic bottles: Gram Negative Rods  Final Report (25 Feb 2023 10:38):    Growth in aerobic and anaerobic bottles: Escherichia coli    See previous culture [id number]    Culture - Blood (collected 23 Feb 2023 09:20)  Source: .Blood Blood-Peripheral  Gram Stain (25 Feb 2023 10:37):    Growth in anaerobic bottle: Gram Negative Rods    Growth in aerobic bottle: Gram Negative Rods  Final Report (25 Feb 2023 10:37):    Growth in aerobic and anaerobic bottles: Escherichia coli    ***Blood Panel PCR results on this specimen are available    approximately 3 hours after the Gram stain result.***    Gram stain, PCR, and/or culture results may not always    correspond due to difference in methodologies.    ************************************************************    This PCR assay was performed by multiplex PCR. This    Assay tests for 66 bacterial and resistance gene targets.    Please refer to the [facility] test directory    at [email or web address] for details.  Organism: Blood Culture PCR  Escherichia coli (25 Feb 2023 10:37)  Organism: Escherichia coli (25 Feb 2023 10:37)  Organism: Blood Culture PCR (25 Feb 2023 10:37)         Full note to follow  Day 8 Rx vs. E coli septicemia due to UTI  NM scan does not seem c/w postrenal obstruction  Presuming urology without concerns for obstructive, plan for 10 days Rx  No objection to change to PO Keflex 500mg PO TID to complete remainder of therapy  Thank you for the courtesy of this referral.  I'll sign off at this time.   Thank you for the courtesy of this referral.    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 Patient is a poor historian. As per HPI:  67F PMH HTN, DM BIBEMS from home for AMS. Per EMS, pt coming from home, family called as pt found confused 0400 this AM. Per EMS, FS read as 'High.' EMS reports pt found in feces / urine.On ED arrival, pt orally febrile 103.5 F tachycardic to 120s, FS 400s.   In ED pt found to have UTI w/ CT abd with "Mild right hydroureteronephrosis with delayed right nephrogram, possibly ascending urinary tract infection. Underlying benign or malignant distal ureteral stricture is difficult to exclude."  CT chest with atelectasis vs pna. CTH neg. Labs significant for neutropenia with bandemia, hyperglycemia in 400s, lactate of 8, and trops peaked to 5K without EKG changes, and GEOVANNI. Pt started on Vanc/Zosyn. Pt also found to be covid + in ED. Pt initially HD stable, but subsequently became transiently Htn to 70s/40s after 4L IVF and hypoxic to 80s on RA requiring 2LNC.  ICU consulted for septic shock 2/2 UTI.    Subjective: pt seen and examined at the bedside. Pt unable to answer questions 2/2 AMS.  Upon ICU evaluation, pt altered, following some commands but combative. Pt BP in low 100s after IVF and satting well on 2LNC.  POCUS with hyperdynamic LV but without wall motion abnormality no pleural or pericardial effusions, IVC indeterminate, and lungs with scattered B lines bilaterally .    (23 Feb 2023 19:00)    Patient with +BC and urine cx for E. coli. CT air droplet in the bladder and with question of obstructive uropathy but NM scan does not appear to be consistent with postrenal obstruction. Patient feels fine. Denies pain or urinary symptoms. No fevers, chills, or rigors. Her only complaint is that she want me "to get her a new foot."    PMH/PSH--  Hypertension    Family history of diabetes mellitus    No significant past surgical history        Allergies--  avocado (Pruritus)  Carrots (Pruritus)  No Known Drug Allergies  Plums (Pruritus)      Medications--  Antibiotics: cefTRIAXone   IVPB      cefTRIAXone   IVPB 2000 milliGRAM(s) IV Intermittent every 24 hours    Immunologic:   Other: aspirin  chewable  chlorhexidine 2% Cloths  docosanol 10% Cream  heparin   Injectable  insulin glargine Injectable (LANTUS)  insulin lispro (ADMELOG) corrective regimen sliding scale  insulin lispro Injectable (ADMELOG)  lidocaine 2% Viscous PRN  metoprolol tartrate  polyethylene glycol 3350  sodium chloride 0.9% lock flush PRN    Antimicrobials last 90 days per EMR: MEDICATIONS  (STANDING):    cefTRIAXone   IVPB   100 mL/Hr IV Intermittent (02-26-23 @ 05:29)    cefTRIAXone   IVPB   100 mL/Hr IV Intermittent (03-02-23 @ 05:21)   100 mL/Hr IV Intermittent (03-01-23 @ 05:17)   100 mL/Hr IV Intermittent (02-28-23 @ 04:10)   100 mL/Hr IV Intermittent (02-27-23 @ 04:18)    cefTRIAXone   IVPB   100 mL/Hr IV Intermittent (02-23-23 @ 10:34)    piperacillin/tazobactam IVPB..   25 mL/Hr IV Intermittent (02-25-23 @ 21:01)   25 mL/Hr IV Intermittent (02-25-23 @ 14:23)   25 mL/Hr IV Intermittent (02-25-23 @ 05:09)   25 mL/Hr IV Intermittent (02-24-23 @ 21:46)   25 mL/Hr IV Intermittent (02-24-23 @ 13:54)   25 mL/Hr IV Intermittent (02-24-23 @ 05:21)   25 mL/Hr IV Intermittent (02-23-23 @ 21:50)    piperacillin/tazobactam IVPB...   200 mL/Hr IV Intermittent (02-23-23 @ 16:54)    vancomycin  IVPB.   250 mL/Hr IV Intermittent (02-23-23 @ 17:28)        Social History--  EtOH: denies active  Tobacco: denies active  Drug Use: denies active    Family/Marital History--  No sick contacts. Not relevant to clinical concern.         Review of Systems:  A >=10-point review of systems was obtained.   Review of systems otherwise negative except as previously noted.    Physical Exam--  Vital Signs: T(F): 98 (03-02-23 @ 11:55), Max: 98.9 (03-01-23 @ 23:50)  HR: 98 (03-02-23 @ 11:55)  BP: 162/98 (03-02-23 @ 11:55)  RR: 18 (03-02-23 @ 11:55)  SpO2: 98% (03-02-23 @ 11:55)  Wt(kg): --  General: Nontoxic-appearing Female in no acute distress.  HEENT: AT/NC. Anicteric. Conjunctiva pink and moist. Oropharynx clear.  Neck: Not rigid. No sense of mass.  Nodes: None palpable.  Lungs: Clear bilaterally without rales, wheezing or rhonchi  Heart: Regular rate and rhythm.   Abdomen: Bowel sounds present and normoactive. Soft. Mildly distended. Nontender. No sense of mass. No organomegaly.  Back: No spinal tenderness. No costovertebral angle tenderness.   Extremities: No cyanosis or clubbing. No edema. L chopart amputation with areas of ulceration (chronic per patient).   Skin: Warm. Dry. Good turgor. No rash. No vasculitic stigmata.  Psychiatric: Grossly appropriate affect and mood for situation.       Laboratory & Imaging Data--  CBC                        10.7   8.06  )-----------( 217      ( 02 Mar 2023 07:30 )             34.5       Chemistries  03-02    139  |  97  |  21  ----------------------------<  172<H>  3.4<L>   |  32<H>  |  0.96    Ca    9.2      02 Mar 2023 07:30  Phos  3.7     03-02  Mg     2.1     03-02    TPro  6.7  /  Alb  2.5<L>  /  TBili  0.3  /  DBili  x   /  AST  23  /  ALT  42  /  AlkPhos  129<H>  03-02      Culture Data    Culture - Blood (collected 27 Feb 2023 14:20)  Source: .Blood Blood-Peripheral  Preliminary Report (28 Feb 2023 19:01):    No growth to date.    Culture - Blood (collected 27 Feb 2023 14:20)  Source: .Blood Blood-Peripheral  Preliminary Report (28 Feb 2023 19:01):    No growth to date.    Culture - Urine (collected 23 Feb 2023 10:40)  Source: Clean Catch Clean Catch (Midstream)  Final Report (25 Feb 2023 11:14):    >100,000 CFU/ml Escherichia coli  Organism: Escherichia coli (25 Feb 2023 11:14)  Organism: Escherichia coli (25 Feb 2023 11:14)    Culture - Blood (collected 23 Feb 2023 09:20)  Source: .Blood Blood-Peripheral  Gram Stain (25 Feb 2023 10:38):    Growth in aerobic and anaerobic bottles: Gram Negative Rods  Final Report (25 Feb 2023 10:38):    Growth in aerobic and anaerobic bottles: Escherichia coli    See previous culture [id number]    Culture - Blood (collected 23 Feb 2023 09:20)  Source: .Blood Blood-Peripheral  Gram Stain (25 Feb 2023 10:37):    Growth in anaerobic bottle: Gram Negative Rods    Growth in aerobic bottle: Gram Negative Rods  Final Report (25 Feb 2023 10:37):    Growth in aerobic and anaerobic bottles: Escherichia coli    ***Blood Panel PCR results on this specimen are available    approximately 3 hours after the Gram stain result.***    Gram stain, PCR, and/or culture results may not always    correspond due to difference in methodologies.    ************************************************************    This PCR assay was performed by multiplex PCR. This    Assay tests for 66 bacterial and resistance gene targets.    Please refer to the [facility] test directory    at [email or web address] for details.  Organism: Blood Culture PCR  Escherichia coli (25 Feb 2023 10:37)  Organism: Escherichia coli (25 Feb 2023 10:37)  Organism: Blood Culture PCR (25 Feb 2023 10:37)      < from: CT Abdomen and Pelvis w/ IV Cont (02.23.23 @ 17:08) >  IMPRESSION:  Droplets of air within the urinary bladder which, in the absence of   instrumentation, may represent urinary tract infection.    Mild right hydroureteronephrosis with delayed right nephrogram, possibly   ascending urinary tract infection. Underlying benign or malignant distal   ureteral stricture is difficult to exclude.    Bibasilar atelectasis or pneumonia.    < end of copied text >    < from: NM Renal Function w/Lasix Single (02.28.23 @ 17:00) >  IMPRESSION: Diuretic renal scan demonstrates:  1.  Bilaterally normal perfusion.  2.  Delayed peak times BILATERALLY, with some spontaneous drainage prior   to diuretic, but incomplete response to diuretic by BOTH kidneys with   incomplete washout seen by the conclusion of the exam. Although there are   findings suggestive of a possible distal RIGHT ureteral stricture, renal   findings are suggestive of some mildly impaired underlying renal   function. Renal findings may berelated to patient's ongoing infection.   Review of laboratory values show a decreased GFR at 47 and most recent   urinalysis showed findings suggestive of UTI. Findings are not strongly   suggestive of a prerenal or postrenal cause.  3.  Differential renal function is 50% BILATERALLY.    < end of copied text >

## 2023-03-02 NOTE — PROGRESS NOTE ADULT - ASSESSMENT
67y Female with h/o HTN, DM BIBEMS from home for AMS in the setting of Urosepsis and (?) COVID+.  Elevated trops in the setting of GEOVANNI.   Pt initially HD stable, but subsequently became transiently BP to 70s/40s after 4L IVF and hypoxic to 80s on RA requiring 2LNC.decompensated hemodynamically requiring pressor support and ICU management, now stable hemodynamically.  Managed on medical floor    Imp:  Stress induced cardiomyopathy in setting of COVID, Urosepsis and GEOVANNI.  Cannot rule out concurrent NSTEMI, although no ECG changes noted, no WMA on echo, and CPK-mass assay reassuring. Patient denied any chest pain or dyspnea.  Not a candidate for aggressive interventions while septic. On IV antibiotics for UTI/bacteremia   Restarted on low dose betablocker  will repeat TTE 67y Female with h/o HTN, DM BIBEMS from home for AMS in the setting of Urosepsis and (?) COVID+.  Elevated trops in the setting of GEOVANNI.   Pt initially HD stable, but subsequently became transiently BP to 70s/40s after 4L IVF and hypoxic to 80s on RA requiring 2LNC.decompensated hemodynamically requiring pressor support and ICU management, now stable hemodynamically.  Managed on medical floor    Imp:  Stress induced cardiomyopathy in setting of COVID, Urosepsis and GEOVANNI.  Cannot rule out concurrent NSTEMI, although no ECG changes noted, no WMA on echo, and CPK-mass assay reassuring. Patient denied any chest pain or dyspnea.  Not a candidate for aggressive interventions while septic. On IV antibiotics for UTI/bacteremia   Restarted on low dose betablocker  lipid panel/TSH  will repeat TTE to reassess LV Function  PT for ambulation as tolerated, wouldn't do stairs/steps until LV function is reevaluated

## 2023-03-02 NOTE — PROGRESS NOTE ADULT - SUBJECTIVE AND OBJECTIVE BOX
Patient is a 67y old  Female who presents with UTI sepsis (02 Mar 2023 15:47)    PAST MEDICAL & SURGICAL HISTORY:  Hypertension    DM    INTERVAL HISTORY: in no acute distress   	  MEDICATIONS:  MEDICATIONS  (STANDING):  aspirin  chewable 81 milliGRAM(s) Oral daily  cefTRIAXone   IVPB      cefTRIAXone   IVPB 2000 milliGRAM(s) IV Intermittent every 24 hours  chlorhexidine 2% Cloths 1 Application(s) Topical <User Schedule>  docosanol 10% Cream 1 Application(s) Topical every 3 hours  heparin   Injectable 5000 Unit(s) SubCutaneous every 8 hours  insulin glargine Injectable (LANTUS) 30 Unit(s) SubCutaneous every morning  insulin lispro (ADMELOG) corrective regimen sliding scale   SubCutaneous three times a day before meals  insulin lispro Injectable (ADMELOG) 3 Unit(s) SubCutaneous three times a day before meals  metoprolol tartrate 12.5 milliGRAM(s) Oral two times a day  polyethylene glycol 3350 17 Gram(s) Oral at bedtime    MEDICATIONS  (PRN):  lidocaine 2% Viscous 5 milliLiter(s) Swish and Spit every 6 hours PRN Mouth Care  sodium chloride 0.9% lock flush 10 milliLiter(s) IV Push every 1 hour PRN Pre/post blood products, medications, blood draw, and to maintain line patency    Vitals:  T(F): 98.5 (03-02-23 @ 17:38), Max: 98.9 (03-01-23 @ 23:50)  HR: 92 (03-02-23 @ 17:46) (92 - 98)  BP: 115/63 (03-02-23 @ 17:46) (105/54 - 162/98)  RR: 18 (03-02-23 @ 17:38) (16 - 18)  SpO2: 96% (03-02-23 @ 17:38) (96% - 98%)    03-01 @ 07:01  -  03-02 @ 07:00  --------------------------------------------------------  IN:    IV PiggyBack: 50 mL  Total IN: 50 mL    OUT:    Indwelling Catheter - Urethral (mL): 1200 mL  Total OUT: 1200 mL    Total NET: -1150 mL    PHYSICAL EXAM:  Neuro: Awake, responsive  CV: S1 S2 RRR  Lungs: diminished to bases   GI: Soft, BS +, ND, NT  Extremities: No edema    RADIOLOGY: < from: Xray Chest 1 View- PORTABLE-Urgent (Xray Chest 1 View- PORTABLE-Urgent .) (02.24.23 @ 03:18) >  IMPRESSION:   RIGHT IJ catheter tip in SVC RIGHT atrium junction.  LEFT lower zone linear airspace consolidation and mild diffuse airspace   disease with vascular congestion..    < end of copied text >    DIAGNOSTIC TESTING:    [x ] Echocardiogram: < from: TTE Echo Complete w/o Contrast w/ Doppler (02.24.23 @ 09:17) >  Left Ventricle: Endocardial visualization was enhanced with intravenous   echo contrast.  Global LV systolic function was moderately decreased. Left ventricular   ejection fraction, by visual estimation, is 35 to 40%. Normal segmental   left ventricular systolic function. Spectral Doppler shows impaired   relaxation pattern of left ventricular myocardial filling (Grade I   diastolic dysfunction). Normal LV filling pressures.  Right Ventricle: Normal right ventricular size and function.  Left Atrium: The left atrium is normal in size.  Right Atrium: The right atrium is normal in size.  Pericardium: There is no evidence of pericardial effusion.  Mitral Valve: Structurally normal mitral valve, with normal leaflet   excursion. Trace mitral valve regurgitation is seen.  Tricuspid Valve: Structurally normal tricuspid valve, with normal leaflet   excursion. Mild tricuspid regurgitation is visualized.  Aortic Valve: Normal trileaflet aortic valve with normal opening. Trivial   aortic valve regurgitation is seen.  Pulmonic Valve: Structurally normal pulmonic valve, with normal leaflet   excursion. Mild pulmonic valve regurgitation.  Aorta: The aortic root and ascending aorta are structurally normal, with   no evidence of dilitation.  Pulmonary Artery: The main pulmonary artery is normal in size.  Venous: The inferior vena cava was dilated, with respiratory size   variation greater than 50%.  In comparison to the previous echocardiogram(s): Prior examinations are   available and were reviewed for comparison purposes. Significant decrease   in LVEF as compared to prior study dated 9/8/21.      Summary:   1.Left ventricular ejection fraction, by visual estimation, is 35 to   40%.   2. Moderately decreased global left ventricular systolic function.   3. Spectral Doppler shows impaired relaxation pattern of left   ventricular myocardial filling (Grade I diastolic dysfunction).   4. Trace mitral valve regurgitation.   5. Mild tricuspid regurgitation.   6. Mild pulmonic valve regurgitation.   7. Significant decrease in LVEF as compared to prior study dated 9/8/21.   8. Endocardial visualization was enhanced with intravenous echo contrast.    < end of copied text >    LABS:	 	    02 Mar 2023 07:30    139    |  97     |  21     ----------------------------<  172    3.4     |  32     |  0.96   01 Mar 2023 03:35    139    |  98     |  31     ----------------------------<  219    3.9     |  31     |  1.03   28 Feb 2023 12:15    138    |  99     |  41     ----------------------------<  238    3.8     |  30     |  1.25     Ca    9.2        02 Mar 2023 07:30  Phos  3.7       02 Mar 2023 07:30  Mg     2.1       02 Mar 2023 07:30    TPro  6.7    /  Alb  2.5    /  TBili  0.3    /  DBili  x      /  AST  23     /  ALT  42     /  AlkPhos  129    02 Mar 2023 07:30                        10.7   8.06  )-----------( 217      ( 02 Mar 2023 07:30 )             34.5 ,                       10.9   9.24  )-----------( 142      ( 01 Mar 2023 03:35 )             34.1 ,                       9.5    11.71 )-----------( 108      ( 28 Feb 2023 02:00 )             29.6

## 2023-03-02 NOTE — PROGRESS NOTE ADULT - ASSESSMENT
67F with PMH HTN, DM a/w e coli bacteremia and uremia with mild right hydro and a UTI, COVID. Concern for possible distal urethral stricture. Patient hemodynamically stable, remains off pressors  Leukocytosis and Cr improving   UCx: E coli    - IV abx  - continue mortensen, monitor urine output  - Will review NM renal scan prior to determining further urological intervention   - continue care per primary team  - Will Dr. Pak

## 2023-03-02 NOTE — CHART NOTE - NSCHARTNOTEFT_GEN_A_CORE
Chart reviewed. PT attempted to see pt for PT eval however [place] [address].3 on Feb 24th, As per cardiologist notes on Feb 27th "Stress induced cardiomyopathy in setting of COVID, Urosepsis and GEOVANNI.  Cannot rule out concurrent NSTEMI, although no ECG changes noted, no WMA on echo, and CPK-mass assay reassuring. Patient denied any chest pain or dyspnea" "CMP in this setting associated with 30-40% mortality, outlook remains guarded"  PT text messaged Dr. Allan thru teams. Awaiting for response. Hence PT eval is on hold until cardiac clearance is obtained

## 2023-03-02 NOTE — CONSULT NOTE ADULT - ASSESSMENT
Day 8 Rx vs. E coli septicemia due to UTI  NM scan does not seem c/w postrenal obstruction  Presuming urology without concerns for obstructive, plan for 10 days Rx  No objection to change to PO Keflex 500mg PO TID to complete remainder of therapy  Thank you for the courtesy of this referral.  I'll sign off at this time.   Thank you for the courtesy of this referral.    Cornel Johansen MD  Attending Physician  [facility]  Division of Infectous Diseases  [phone]

## 2023-03-02 NOTE — PROGRESS NOTE ADULT - SUBJECTIVE AND OBJECTIVE BOX
Patient seen and examined bedside resting comfortably.  No complaints offered.   has primafit in place  Denies hematuria and dysuria.  Denies nausea and vomiting. Tolerating diet.  Denies chest pain, dyspnea, cough.    T(F): 98.5 (03-02-23 @ 17:38), Max: 98.9 (03-01-23 @ 23:50)  HR: 92 (03-02-23 @ 17:46) (92 - 98)  BP: 115/63 (03-02-23 @ 17:46) (105/54 - 162/98)  RR: 18 (03-02-23 @ 17:38) (16 - 18)  SpO2: 96% (03-02-23 @ 17:38) (96% - 98%)  Wt(kg): --  CAPILLARY BLOOD GLUCOSE      POCT Blood Glucose.: 177 mg/dL (02 Mar 2023 15:53)  POCT Blood Glucose.: 211 mg/dL (02 Mar 2023 11:25)  POCT Blood Glucose.: 181 mg/dL (02 Mar 2023 07:36)      PHYSICAL EXAM:  General: NAD, alert and awake  HEENT: NCAT, EOMI, conjunctiva clear  Chest: nonlabored respirations, good inspiratory effort  Abdomen: soft, NTND.   Extremities: no pedal edema or calf tenderness noted   : No CVA or SP tenderness. Primafit in place with clear yellow urine    LABS:                        10.7   8.06  )-----------( 217      ( 02 Mar 2023 07:30 )             34.5   03-02    139  |  97  |  21  ----------------------------<  172<H>  3.4<L>   |  32<H>  |  0.96    Ca    9.2      02 Mar 2023 07:30  Phos  3.7     03-02  Mg     2.1     03-02    TPro  6.7  /  Alb  2.5<L>  /  TBili  0.3  /  DBili  x   /  AST  23  /  ALT  42  /  AlkPhos  129<H>  03-02    I&O's Detail    01 Mar 2023 07:01  -  02 Mar 2023 07:00  --------------------------------------------------------  IN:    IV PiggyBack: 50 mL  Total IN: 50 mL    OUT:    Indwelling Catheter - Urethral (mL): 1200 mL  Total OUT: 1200 mL    Total NET: -1150 mL

## 2023-03-02 NOTE — PROGRESS NOTE ADULT - SUBJECTIVE AND OBJECTIVE BOX
Patient: NELSON ZEPEDA [id number] 67y Female                            Hospitalist Attending Note    Edy complaints.     ____________________PHYSICAL EXAM:  GENERAL:  NAD, alert, interactive, slightly confused.    HEENT: NCAT  CARDIOVASCULAR:  S1, S2  LUNGS: CTAB  ABDOMEN:  soft, (-) tenderness, (-) distension, (+) bowel sounds, (-) guarding, (-) rebound (-) rigidity  EXTREMITIES:  no cyanosis / clubbing / edema.   ____________________     VITALS:  Vital Signs Last 24 Hrs  T(C): 36.9 (02 Mar 2023 17:38), Max: 37.2 (01 Mar 2023 23:50)  T(F): 98.5 (02 Mar 2023 17:38), Max: 98.9 (01 Mar 2023 23:50)  HR: 92 (02 Mar 2023 17:46) (92 - 98)  BP: 115/63 (02 Mar 2023 17:46) (105/54 - 162/98)  BP(mean): --  RR: 18 (02 Mar 2023 17:38) (16 - 18)  SpO2: 96% (02 Mar 2023 17:38) (96% - 98%)    Parameters below as of 02 Mar 2023 17:38  Patient On (Oxygen Delivery Method): room air     Daily     Daily Weight in k.1 (02 Mar 2023 05:05)  CAPILLARY BLOOD GLUCOSE      POCT Blood Glucose.: 177 mg/dL (02 Mar 2023 15:53)  POCT Blood Glucose.: 211 mg/dL (02 Mar 2023 11:25)  POCT Blood Glucose.: 181 mg/dL (02 Mar 2023 07:36)    I&O's Summary    01 Mar 2023 07:01  -  02 Mar 2023 07:00  --------------------------------------------------------  IN: 50 mL / OUT: 1200 mL / NET: -1150 mL        HISTORY:  PAST MEDICAL & SURGICAL HISTORY:  Hypertension      Family history of diabetes mellitus      No significant past surgical history      Allergies    avocado (Pruritus)  Carrots (Pruritus)  No Known Drug Allergies  Plums (Pruritus)    Intolerances       LABS:                        10.7   8.06  )-----------( 217      ( 02 Mar 2023 07:30 )             34.5     03-02    139  |  97  |  21  ----------------------------<  172<H>  3.4<L>   |  32<H>  |  0.96    Ca    9.2      02 Mar 2023 07:30  Phos  3.7     03-02  Mg     2.1     03-02    TPro  6.7  /  Alb  2.5<L>  /  TBili  0.3  /  DBili  x   /  AST  23  /  ALT  42  /  AlkPhos  129<H>  03-02      LIVER FUNCTIONS - ( 02 Mar 2023 07:30 )  Alb: 2.5 g/dL / Pro: 6.7 gm/dL / ALK PHOS: 129 U/L / ALT: 42 U/L / AST: 23 U/L / GGT: x                     MEDICATIONS:  MEDICATIONS  (STANDING):  aspirin  chewable 81 milliGRAM(s) Oral daily  cefTRIAXone   IVPB      cefTRIAXone   IVPB 2000 milliGRAM(s) IV Intermittent every 24 hours  chlorhexidine 2% Cloths 1 Application(s) Topical <User Schedule>  docosanol 10% Cream 1 Application(s) Topical every 3 hours  heparin   Injectable 5000 Unit(s) SubCutaneous every 8 hours  insulin glargine Injectable (LANTUS) 30 Unit(s) SubCutaneous every morning  insulin lispro (ADMELOG) corrective regimen sliding scale   SubCutaneous three times a day before meals  insulin lispro Injectable (ADMELOG) 3 Unit(s) SubCutaneous three times a day before meals  metoprolol tartrate 12.5 milliGRAM(s) Oral two times a day  polyethylene glycol 3350 17 Gram(s) Oral at bedtime    MEDICATIONS  (PRN):  lidocaine 2% Viscous 5 milliLiter(s) Swish and Spit every 6 hours PRN Mouth Care  sodium chloride 0.9% lock flush 10 milliLiter(s) IV Push every 1 hour PRN Pre/post blood products, medications, blood draw, and to maintain line patency

## 2023-03-02 NOTE — PROGRESS NOTE ADULT - ASSESSMENT
67F w/ DM, HTN admitted with septic shock due to E coli bacteremia and UTI.  CT showed R hydronephrosis w/ possible obstruction due to stricture, as well as GEOVANNI, NSTEMI vs demand ischemia and septic cardiomyopathy. Noted to have COVID19+ but then negative x2.     # Septic Shock - Resolved.  # Ecoli Bacteremia / UTI - Seen by ID, discussed with Dr. Johansen.  Continue Abx x 10d, transition to po.  # Obstructive Uropathy / GEOVANNI - Cr improving.   input appreciated.  Renal scan showed Bilaterally normal perfusion.  # Diabetes Type II - monitor fingersticks.  Insulin coverage for hyperglycemia.  # Essential HTN - Monitor BP.  Continue antihypertensives.  # Metabolic Encephalopathy - supportive care.   # Inpatient DVT prophylaxis - subcutaneous Heparin

## 2023-03-03 DIAGNOSIS — N39.0 URINARY TRACT INFECTION, SITE NOT SPECIFIED: ICD-10-CM

## 2023-03-03 DIAGNOSIS — E11.65 TYPE 2 DIABETES MELLITUS WITH HYPERGLYCEMIA: ICD-10-CM

## 2023-03-03 DIAGNOSIS — A41.51 SEPSIS DUE TO ESCHERICHIA COLI [E. COLI]: ICD-10-CM

## 2023-03-03 LAB
ANION GAP SERPL CALC-SCNC: 7 MMOL/L — SIGNIFICANT CHANGE UP (ref 5–17)
BUN SERPL-MCNC: 22 MG/DL — SIGNIFICANT CHANGE UP (ref 7–23)
CALCIUM SERPL-MCNC: 9.1 MG/DL — SIGNIFICANT CHANGE UP (ref 8.5–10.1)
CHLORIDE SERPL-SCNC: 102 MMOL/L — SIGNIFICANT CHANGE UP (ref 96–108)
CHOLEST SERPL-MCNC: 203 MG/DL — HIGH
CO2 SERPL-SCNC: 29 MMOL/L — SIGNIFICANT CHANGE UP (ref 22–31)
CREAT SERPL-MCNC: 1.01 MG/DL — SIGNIFICANT CHANGE UP (ref 0.5–1.3)
EGFR: 61 ML/MIN/1.73M2 — SIGNIFICANT CHANGE UP
GLUCOSE BLDC GLUCOMTR-MCNC: 183 MG/DL — HIGH (ref 70–99)
GLUCOSE BLDC GLUCOMTR-MCNC: 186 MG/DL — HIGH (ref 70–99)
GLUCOSE BLDC GLUCOMTR-MCNC: 267 MG/DL — HIGH (ref 70–99)
GLUCOSE BLDC GLUCOMTR-MCNC: 293 MG/DL — HIGH (ref 70–99)
GLUCOSE SERPL-MCNC: 237 MG/DL — HIGH (ref 70–99)
HCT VFR BLD CALC: 29.1 % — LOW (ref 34.5–45)
HDLC SERPL-MCNC: 47 MG/DL — LOW
HGB BLD-MCNC: 9.2 G/DL — LOW (ref 11.5–15.5)
LIPID PNL WITH DIRECT LDL SERPL: 85 MG/DL — SIGNIFICANT CHANGE UP
MCHC RBC-ENTMCNC: 27.5 PG — SIGNIFICANT CHANGE UP (ref 27–34)
MCHC RBC-ENTMCNC: 31.6 G/DL — LOW (ref 32–36)
MCV RBC AUTO: 87.1 FL — SIGNIFICANT CHANGE UP (ref 80–100)
NON HDL CHOLESTEROL: 156 MG/DL — HIGH
NRBC # BLD: 0 /100 WBCS — SIGNIFICANT CHANGE UP (ref 0–0)
PLATELET # BLD AUTO: 278 K/UL — SIGNIFICANT CHANGE UP (ref 150–400)
POTASSIUM SERPL-MCNC: 3.7 MMOL/L — SIGNIFICANT CHANGE UP (ref 3.5–5.3)
POTASSIUM SERPL-SCNC: 3.7 MMOL/L — SIGNIFICANT CHANGE UP (ref 3.5–5.3)
RBC # BLD: 3.34 M/UL — LOW (ref 3.8–5.2)
RBC # FLD: 14.4 % — SIGNIFICANT CHANGE UP (ref 10.3–14.5)
SODIUM SERPL-SCNC: 138 MMOL/L — SIGNIFICANT CHANGE UP (ref 135–145)
TRIGL SERPL-MCNC: 354 MG/DL — HIGH
TSH SERPL-MCNC: 1.46 UU/ML — SIGNIFICANT CHANGE UP (ref 0.36–3.74)
WBC # BLD: 7.49 K/UL — SIGNIFICANT CHANGE UP (ref 3.8–10.5)
WBC # FLD AUTO: 7.49 K/UL — SIGNIFICANT CHANGE UP (ref 3.8–10.5)

## 2023-03-03 PROCEDURE — 99233 SBSQ HOSP IP/OBS HIGH 50: CPT

## 2023-03-03 PROCEDURE — 93306 TTE W/DOPPLER COMPLETE: CPT | Mod: 26

## 2023-03-03 RX ADMIN — CHLORHEXIDINE GLUCONATE 1 APPLICATION(S): 213 SOLUTION TOPICAL at 05:44

## 2023-03-03 RX ADMIN — Medication 6: at 08:13

## 2023-03-03 RX ADMIN — DOCOSANOL 1 APPLICATION(S): 100 CREAM TOPICAL at 08:18

## 2023-03-03 RX ADMIN — HEPARIN SODIUM 5000 UNIT(S): 5000 INJECTION INTRAVENOUS; SUBCUTANEOUS at 21:40

## 2023-03-03 RX ADMIN — DOCOSANOL 1 APPLICATION(S): 100 CREAM TOPICAL at 11:17

## 2023-03-03 RX ADMIN — CEFTRIAXONE 100 MILLIGRAM(S): 500 INJECTION, POWDER, FOR SOLUTION INTRAMUSCULAR; INTRAVENOUS at 03:59

## 2023-03-03 RX ADMIN — Medication 3 UNIT(S): at 16:26

## 2023-03-03 RX ADMIN — HEPARIN SODIUM 5000 UNIT(S): 5000 INJECTION INTRAVENOUS; SUBCUTANEOUS at 05:45

## 2023-03-03 RX ADMIN — Medication 12.5 MILLIGRAM(S): at 18:00

## 2023-03-03 RX ADMIN — Medication 3 UNIT(S): at 11:15

## 2023-03-03 RX ADMIN — HEPARIN SODIUM 5000 UNIT(S): 5000 INJECTION INTRAVENOUS; SUBCUTANEOUS at 13:05

## 2023-03-03 RX ADMIN — Medication 12.5 MILLIGRAM(S): at 05:45

## 2023-03-03 RX ADMIN — Medication 3 UNIT(S): at 08:14

## 2023-03-03 RX ADMIN — Medication 6: at 11:14

## 2023-03-03 RX ADMIN — DOCOSANOL 1 APPLICATION(S): 100 CREAM TOPICAL at 18:00

## 2023-03-03 RX ADMIN — Medication 2: at 16:23

## 2023-03-03 RX ADMIN — INSULIN GLARGINE 30 UNIT(S): 100 INJECTION, SOLUTION SUBCUTANEOUS at 08:16

## 2023-03-03 RX ADMIN — DOCOSANOL 1 APPLICATION(S): 100 CREAM TOPICAL at 16:21

## 2023-03-03 RX ADMIN — DOCOSANOL 1 APPLICATION(S): 100 CREAM TOPICAL at 05:44

## 2023-03-03 RX ADMIN — DOCOSANOL 1 APPLICATION(S): 100 CREAM TOPICAL at 03:06

## 2023-03-03 RX ADMIN — Medication 81 MILLIGRAM(S): at 11:17

## 2023-03-03 RX ADMIN — POLYETHYLENE GLYCOL 3350 17 GRAM(S): 17 POWDER, FOR SOLUTION ORAL at 21:40

## 2023-03-03 NOTE — PROGRESS NOTE ADULT - ASSESSMENT
67F with PMH HTN, DM a/w e coli bacteremia and uremia with mild right hydro and a UTI, COVID. Concern for possible distal urethral stricture. Patient hemodynamically stable, remains off pressors  Leukocytosis and Cr improving   UCx: E coli    NM renal scan reviewed by Dr Pak -Patient needs CT Urogram   - IV abx  - monitor urine output  - continue care per primary team  - Will Dr. Pak

## 2023-03-03 NOTE — PHYSICAL THERAPY INITIAL EVALUATION ADULT - TRANSFER TRAINING, PT EVAL
Pt will be able to improve functional transfers (bed<>chair) to supervision Pt will be able to improve functional transfers (bed<>chair) to supervision in 4 weeks

## 2023-03-03 NOTE — PROGRESS NOTE ADULT - SUBJECTIVE AND OBJECTIVE BOX
Patient seen and examined bedside resting comfortably.  Has primafit in place with clear yellow urine  Denies nausea and vomiting. Tolerating diet.  Denies chest pain, dyspnea, cough.    T(F): 98.3 (03-03-23 @ 11:59), Max: 98.7 (03-03-23 @ 05:00)  HR: 94 (03-03-23 @ 11:59) (92 - 97)  BP: 121/81 (03-03-23 @ 11:59) (105/54 - 143/77)  RR: 18 (03-03-23 @ 11:59) (16 - 18)  SpO2: 96% (03-03-23 @ 11:59) (96% - 98%)  Wt(kg): --  CAPILLARY BLOOD GLUCOSE      POCT Blood Glucose.: 293 mg/dL (03 Mar 2023 11:01)  POCT Blood Glucose.: 267 mg/dL (03 Mar 2023 07:39)  POCT Blood Glucose.: 208 mg/dL (02 Mar 2023 21:02)  POCT Blood Glucose.: 177 mg/dL (02 Mar 2023 15:53)      PHYSICAL EXAM:  General: NAD, alert and awake  HEENT: NCAT, EOMI, conjunctiva clear  Chest: nonlabored respirations, good inspiratory effort  Abdomen: soft, NTND.   Extremities: no pedal edema or calf tenderness noted   : No CVA or SP tenderness.     LABS:                        9.2    7.49  )-----------( 278      ( 03 Mar 2023 12:05 )             29.1   03-03    138  |  102  |  22  ----------------------------<  237<H>  3.7   |  29  |  1.01    Ca    9.1      03 Mar 2023 12:05  Phos  3.7     03-02  Mg     2.1     03-02    TPro  6.7  /  Alb  2.5<L>  /  TBili  0.3  /  DBili  x   /  AST  23  /  ALT  42  /  AlkPhos  129<H>  03-02    I&O's Detail    02 Mar 2023 07:01  -  03 Mar 2023 07:00  --------------------------------------------------------  IN:    IV PiggyBack: 50 mL    Oral Fluid: 240 mL  Total IN: 290 mL    OUT:    Voided (mL): 500 mL  Total OUT: 500 mL    Total NET: -210 mL

## 2023-03-03 NOTE — PROGRESS NOTE ADULT - SUBJECTIVE AND OBJECTIVE BOX
Patient: NELSON ZEPEDA [id number] 67y Female                            Hospitalist Attending Note    No complaints.   Yary Jones at bedside.  No chest pain / palpitations.     ____________________PHYSICAL EXAM:  GENERAL:  NAD, alert, interactive, slightly confused.    HEENT: NCAT  CARDIOVASCULAR:  S1, S2  LUNGS: CTAB  ABDOMEN:  soft, (-) tenderness, (-) distension, (+) bowel sounds, (-) guarding, (-) rebound (-) rigidity  EXTREMITIES:  no cyanosis / clubbing / edema.   ____________________    VITALS:  Vital Signs Last 24 Hrs  T(C): 37.1 (03 Mar 2023 16:37), Max: 37.1 (03 Mar 2023 05:00)  T(F): 98.7 (03 Mar 2023 16:37), Max: 98.7 (03 Mar 2023 05:00)  HR: 97 (03 Mar 2023 16:37) (92 - 97)  BP: 133/79 (03 Mar 2023 16:37) (105/54 - 143/77)  BP(mean): --  RR: 16 (03 Mar 2023 16:37) (16 - 18)  SpO2: 96% (03 Mar 2023 16:37) (96% - 98%)    Parameters below as of 03 Mar 2023 16:37  Patient On (Oxygen Delivery Method): room air     Daily     Daily   CAPILLARY BLOOD GLUCOSE      POCT Blood Glucose.: 186 mg/dL (03 Mar 2023 15:55)  POCT Blood Glucose.: 293 mg/dL (03 Mar 2023 11:01)  POCT Blood Glucose.: 267 mg/dL (03 Mar 2023 07:39)  POCT Blood Glucose.: 208 mg/dL (02 Mar 2023 21:02)    I&O's Summary    02 Mar 2023 07:01  -  03 Mar 2023 07:00  --------------------------------------------------------  IN: 290 mL / OUT: 500 mL / NET: -210 mL        LABS:                        9.2    7.49  )-----------( 278      ( 03 Mar 2023 12:05 )             29.1     03-03    138  |  102  |  22  ----------------------------<  237<H>  3.7   |  29  |  1.01    Ca    9.1      03 Mar 2023 12:05  Phos  3.7     03-02  Mg     2.1     03-02    TPro  6.7  /  Alb  2.5<L>  /  TBili  0.3  /  DBili  x   /  AST  23  /  ALT  42  /  AlkPhos  129<H>  03-02      LIVER FUNCTIONS - ( 02 Mar 2023 07:30 )  Alb: 2.5 g/dL / Pro: 6.7 gm/dL / ALK PHOS: 129 U/L / ALT: 42 U/L / AST: 23 U/L / GGT: x                     MEDICATIONS:  aspirin  chewable 81 milliGRAM(s) Oral daily  cefTRIAXone   IVPB      cefTRIAXone   IVPB 2000 milliGRAM(s) IV Intermittent every 24 hours  chlorhexidine 2% Cloths 1 Application(s) Topical <User Schedule>  docosanol 10% Cream 1 Application(s) Topical every 3 hours  heparin   Injectable 5000 Unit(s) SubCutaneous every 8 hours  insulin glargine Injectable (LANTUS) 30 Unit(s) SubCutaneous every morning  insulin lispro (ADMELOG) corrective regimen sliding scale   SubCutaneous three times a day before meals  insulin lispro Injectable (ADMELOG) 3 Unit(s) SubCutaneous three times a day before meals  lidocaine 2% Viscous 5 milliLiter(s) Swish and Spit every 6 hours PRN  metoprolol tartrate 12.5 milliGRAM(s) Oral two times a day  polyethylene glycol 3350 17 Gram(s) Oral at bedtime  sodium chloride 0.9% lock flush 10 milliLiter(s) IV Push every 1 hour PRN

## 2023-03-03 NOTE — PHYSICAL THERAPY INITIAL EVALUATION ADULT - BALANCE TRAINING, PT EVAL
Pt will be able to maintain static and dynamic sitting balance to good to improve independent bed<>chair transfers within 4-6 weeks

## 2023-03-03 NOTE — PROGRESS NOTE ADULT - ASSESSMENT
67y Female with h/o HTN, DM BIBEMS from home for AMS in the setting of Urosepsis and (?) COVID+.  Elevated trops in the setting of GEVOANNI.   Pt initially HD stable, but subsequently became transiently BP to 70s/40s after 4L IVF and hypoxic to 80s on RA requiring 2LNC.decompensated hemodynamically requiring pressor support and ICU management, now stable hemodynamically.  Managed on medical floor    Imp:  Stress induced cardiomyopathy in setting of COVID, Urosepsis and GEOVANNI.  Cannot rule out concurrent NSTEMI, although no ECG changes noted, no WMA on echo, and CPK-mass assay reassuring. Patient denied any chest pain or dyspnea.  Not a candidate for aggressive cardiac  interventions while septic.   On IV antibiotics for UTI/bacteremia; urology following.   Restarted on low dose betablocker  Lipid panel  will repeat TTE to reassess LV Function  PT for ambulation as tolerated, wouldn't do stairs/steps until LV function is reevaluated

## 2023-03-03 NOTE — DIETITIAN NUTRITION RISK NOTIFICATION - TREATMENT: THE FOLLOWING DIET HAS BEEN RECOMMENDED
Diet, Easy to Chew:   Consistent Carbohydrate {Evening Snack}  Supplement Feeding Modality:  Oral  Glucerna Shake Cans or Servings Per Day:  1       Frequency:  Daily (03-03-23 @ 12:34) [Pending Verification By Attending]  Diet, Consistent Carbohydrate w/Evening Snack (02-27-23 @ 14:30) [Active]

## 2023-03-03 NOTE — PHYSICAL THERAPY INITIAL EVALUATION ADULT - PLANNED THERAPY INTERVENTIONS, PT EVAL
balance training/bed mobility training/gait training/strengthening/transfer training balance training/bed mobility training/strengthening/transfer training

## 2023-03-03 NOTE — PHYSICAL THERAPY INITIAL EVALUATION ADULT - STRENGTHENING, PT EVAL
Pt will be able to improve b/l LE strength to WFL within 4-6 weeks to improve overall functional mobility including transfers to decrease the risk of falls. Pt will be able to improve b/l LE strength to  4/5 within 4-6 weeks to improve overall functional mobility including transfers to decrease the risk of falls.

## 2023-03-03 NOTE — PROGRESS NOTE ADULT - ASSESSMENT
67F w/ DM, HTN admitted with septic shock due to E coli bacteremia and UTI.  CT showed R hydronephrosis w/ possible obstruction due to stricture, as well as GEOVANNI, NSTEMI vs demand ischemia and septic cardiomyopathy. Noted to have COVID19+ but then negative x2.     # Septic Shock - Resolved.  # NSTEMI  Septic Cardiomyopathy - Cardiology following.  Repeat TTE.  Further evaluation per Cardiology.    # Ecoli Bacteremia / UTI - Seen by ID, discussed with Dr. Johansen.  Continue Abx x 10d, transition to po.  # Obstructive Uropathy / GEOVANNI - Cr improving.   input appreciated.  Renal scan showed Bilaterally normal perfusion.  # Diabetes Type II - monitor fingersticks.  Insulin coverage for hyperglycemia.  # Essential HTN - Monitor BP.  Continue antihypertensives.  # Metabolic Encephalopathy - improved   # Inpatient DVT prophylaxis - subcutaneous Heparin

## 2023-03-03 NOTE — CHART NOTE - NSCHARTNOTEFT_GEN_A_CORE
Pt with PMHx of HTN & DM; admitted with septic shock due to E. coli bacteremia & UTI (on abx). Also with R hydronephrosis with possible obstruction due to stricture, as well as GEOVANNI, NSTEMI vs. demand ischemia, septic cardiomyopathy & metabolic encephalopathy. Noted to have COVID19+, but then negative x2. Septic shock resolved.    Factors impacting intake: [ ] none [ ] nausea  [ ] vomiting [ ] diarrhea [ ] constipation  [ ]chewing problems [ ] swallowing issues  [x] other: AMS    Diet Prescription: Diet, Consistent Carbohydrate w/Evening Snack (02-27-23 @ 14:30)    Intake: Pt consuming mostly 51-75% of documented meals as per flow sheet    Current Weight: 72.1 kg (03/02), 78.8 kg (02/23)  % Weight Change: 8.5% decrease x 1 week; likely fluid-related    2+ edema b/l arms    Physical appearance: Pt with muscle wasting & fat depletion in following regions: temples(mild), orbital(moderate), clavicle(mild)    Pertinent Medications: MEDICATIONS  (STANDING):  aspirin  chewable 81 milliGRAM(s) Oral daily  cefTRIAXone   IVPB      cefTRIAXone   IVPB 2000 milliGRAM(s) IV Intermittent every 24 hours  chlorhexidine 2% Cloths 1 Application(s) Topical <User Schedule>  docosanol 10% Cream 1 Application(s) Topical every 3 hours  heparin   Injectable 5000 Unit(s) SubCutaneous every 8 hours  insulin glargine Injectable (LANTUS) 30 Unit(s) SubCutaneous every morning  insulin lispro (ADMELOG) corrective regimen sliding scale   SubCutaneous three times a day before meals  insulin lispro Injectable (ADMELOG) 3 Unit(s) SubCutaneous three times a day before meals  metoprolol tartrate 12.5 milliGRAM(s) Oral two times a day  polyethylene glycol 3350 17 Gram(s) Oral at bedtime    MEDICATIONS  (PRN):  lidocaine 2% Viscous 5 milliLiter(s) Swish and Spit every 6 hours PRN Mouth Care  sodium chloride 0.9% lock flush 10 milliLiter(s) IV Push every 1 hour PRN Pre/post blood products, medications, blood draw, and to maintain line patency    Pertinent Labs: 03-02 Na139 mmol/L Glu 172 mg/dL<H> K+ 3.4 mmol/L<L> Cr  0.96 mg/dL BUN 21 mg/dL 03-02 Phos 3.7 mg/dL 03-02 Alb 2.5 g/dL<L>  02-24 HgbA1c 13.5%<H> 03-02 & 03-03: POCT Blood Glucose (211, 177, 208, 267, 293 mg/dL)    Skin: No pressure ulcers noted as per flow sheets    Estimated Needs:   [x] no change since previous assessment on 02/24  [ ] recalculated:     Previous Nutrition Diagnosis:   Inadequate Energy Intake  Etiology: inability to consume sufficient energy  Signs/symptoms: NPO x 2 days  Goal: Once diet advanced pt to meet >75% estimated nutrient needs via tolerated route    Nutrition Diagnosis is [ ] ongoing  [ ] resolved [x] not applicable - see new nutrition diagnosis below    New Nutrition Diagnosis: [x] Moderate malnutrition in context of acute illness     Related to: Inadequate protein-energy intake related to UTI, septic shock    As evidenced by: <75% of nutrition needs >1 week; physical signs of mild muscle wasting    Goal: Pt to consume >75% of meals & supplement      Interventions:   Recommend  [x] Change Diet To: Easy to Chew, Consistent Carbohydrate (with evening snack)   [x] Nutrition Supplement: Glucerna x 1/day (220 kcal & 10 g protein)  [ ] Nutrition Support  [ ] Other:     Monitoring and Evaluation:   [ x ] PO intake [ x ] Tolerance to diet prescription [ x ] weights [ x ] labs (glucose) [ x ] follow up per protocol  [ ] other: Pt with PMHx of HTN & DM; admitted with septic shock due to E. coli bacteremia & UTI (on abx). Also with R hydronephrosis with possible obstruction due to stricture, as well as GEOVANNI, NSTEMI vs. demand ischemia, septic cardiomyopathy & metabolic encephalopathy. Noted to have COVID19+, but then negative x2. Septic shock resolved.    Factors impacting intake: [ ] none [ ] nausea  [ ] vomiting [ ] diarrhea [ ] constipation  [ ]chewing problems [ ] swallowing issues  [x] other: AMS    Diet Prescription: Diet, Consistent Carbohydrate w/Evening Snack (02-27-23 @ 14:30)    Intake: Pt consuming mostly 51-75% of documented meals as per flow sheet    Current Weight: 72.1 kg (03/02), 78.8 kg (02/23)  % Weight Change: 8.5% decrease x 1 week; ?fluid-related, some likely wt loss due to decreased po intake x 1 week    2+ edema b/l arms    Physical appearance: Pt with muscle wasting & fat depletion in following regions: temples(mild), orbital(moderate), clavicle(mild)    Pertinent Medications: MEDICATIONS  (STANDING):  aspirin  chewable 81 milliGRAM(s) Oral daily  cefTRIAXone   IVPB      cefTRIAXone   IVPB 2000 milliGRAM(s) IV Intermittent every 24 hours  chlorhexidine 2% Cloths 1 Application(s) Topical <User Schedule>  docosanol 10% Cream 1 Application(s) Topical every 3 hours  heparin   Injectable 5000 Unit(s) SubCutaneous every 8 hours  insulin glargine Injectable (LANTUS) 30 Unit(s) SubCutaneous every morning  insulin lispro (ADMELOG) corrective regimen sliding scale   SubCutaneous three times a day before meals  insulin lispro Injectable (ADMELOG) 3 Unit(s) SubCutaneous three times a day before meals  metoprolol tartrate 12.5 milliGRAM(s) Oral two times a day  polyethylene glycol 3350 17 Gram(s) Oral at bedtime    MEDICATIONS  (PRN):  lidocaine 2% Viscous 5 milliLiter(s) Swish and Spit every 6 hours PRN Mouth Care  sodium chloride 0.9% lock flush 10 milliLiter(s) IV Push every 1 hour PRN Pre/post blood products, medications, blood draw, and to maintain line patency    Pertinent Labs: 03-02 Na139 mmol/L Glu 172 mg/dL<H> K+ 3.4 mmol/L<L> Cr  0.96 mg/dL BUN 21 mg/dL 03-02 Phos 3.7 mg/dL 03-02 Alb 2.5 g/dL<L>  02-24 HgbA1c 13.5%<H> 03-02 & 03-03: POCT Blood Glucose (211, 177, 208, 267, 293 mg/dL)    Skin: No pressure ulcers noted as per flow sheets    Estimated Needs:   [x] no change since previous assessment on 02/24  [ ] recalculated:     Previous Nutrition Diagnosis:   Inadequate Energy Intake  Etiology: inability to consume sufficient energy  Signs/symptoms: NPO x 2 days  Goal: Once diet advanced pt to meet >75% estimated nutrient needs via tolerated route    Nutrition Diagnosis is [ ] ongoing  [ ] resolved [x] not applicable - see new nutrition diagnosis below    New Nutrition Diagnosis: [x] Moderate malnutrition in context of acute illness     Related to: Inadequate protein-energy intake related to UTI, septic shock    As evidenced by: <75% of nutrition needs >1 week; physical signs of mild muscle wasting    Goal: Pt to consume >75% of meals & supplement      Interventions:   Recommend  [x] Change Diet To: Easy to Chew, Consistent Carbohydrate (with evening snack)   [x] Nutrition Supplement: Glucerna x 1/day (220 kcal & 10 g protein)  [ ] Nutrition Support  [ ] Other:     Monitoring and Evaluation:   [ x ] PO intake [ x ] Tolerance to diet prescription [ x ] weights [ x ] labs (glucose) [ x ] follow up per protocol  [ ] other:

## 2023-03-03 NOTE — PROGRESS NOTE ADULT - SUBJECTIVE AND OBJECTIVE BOX
Patient is a 67y old  Female who presents with a chief complaint of UTI sepsis (03 Mar 2023 13:08)      PAST MEDICAL & SURGICAL HISTORY:  Hypertension      Family history of diabetes mellitus      No significant past surgical history    INTERVAL HISTORY:   	  MEDICATIONS:  MEDICATIONS  (STANDING):  aspirin  chewable 81 milliGRAM(s) Oral daily  cefTRIAXone   IVPB      cefTRIAXone   IVPB 2000 milliGRAM(s) IV Intermittent every 24 hours  chlorhexidine 2% Cloths 1 Application(s) Topical <User Schedule>  docosanol 10% Cream 1 Application(s) Topical every 3 hours  heparin   Injectable 5000 Unit(s) SubCutaneous every 8 hours  insulin glargine Injectable (LANTUS) 30 Unit(s) SubCutaneous every morning  insulin lispro (ADMELOG) corrective regimen sliding scale   SubCutaneous three times a day before meals  insulin lispro Injectable (ADMELOG) 3 Unit(s) SubCutaneous three times a day before meals  metoprolol tartrate 12.5 milliGRAM(s) Oral two times a day  polyethylene glycol 3350 17 Gram(s) Oral at bedtime    MEDICATIONS  (PRN):  lidocaine 2% Viscous 5 milliLiter(s) Swish and Spit every 6 hours PRN Mouth Care  sodium chloride 0.9% lock flush 10 milliLiter(s) IV Push every 1 hour PRN Pre/post blood products, medications, blood draw, and to maintain line patency      Vitals:  T(F): 98.3 (03-03-23 @ 11:59), Max: 98.7 (03-03-23 @ 05:00)  HR: 94 (03-03-23 @ 11:59) (92 - 97)  BP: 121/81 (03-03-23 @ 11:59) (105/54 - 143/77)  RR: 18 (03-03-23 @ 11:59) (16 - 18)  SpO2: 96% (03-03-23 @ 11:59) (96% - 98%)  Wt(kg): --    03-02 @ 07:01  -  03-03 @ 07:00  --------------------------------------------------------  IN:    IV PiggyBack: 50 mL    Oral Fluid: 240 mL  Total IN: 290 mL    OUT:    Voided (mL): 500 mL  Total OUT: 500 mL    Total NET: -210 mL      PHYSICAL EXAM:  Neuro: Awake, responsive  CV: S1 S2 RRR  Lungs: CTABL  GI: Soft, BS +, ND, NT  Extremities: edema       RADIOLOGY:   < from: NM Renal Function w/Lasix Single (02.28.23 @ 17:00) >  IMPRESSION: Diuretic renal scan demonstrates:  1.  Bilaterally normal perfusion.  2.  Delayed peak times BILATERALLY, with some spontaneous drainage prior   to diuretic, but incomplete response to diuretic by BOTH kidneys with   incomplete washout seen by the conclusion of the exam. Although there are   findings suggestive of a possible distal RIGHT ureteral stricture, renal   findings are suggestive of some mildly impaired underlying renal   function. Renal findings may berelated to patient's ongoing infection.   Review of laboratory values show a decreased GFR at 47 and most recent   urinalysis showed findings suggestive of UTI. Findings are not strongly   suggestive of a prerenal or postrenal cause.  3.  Differential renal function is 50% BILATERALLY.    < end of copied text >      DIAGNOSTIC TESTING:    [x ] Echocardiogram:  < from: TTE Echo Complete w/o Contrast w/ Doppler (02.24.23 @ 09:17) >  Summary:   1.Left ventricular ejection fraction, by visual estimation, is 35 to   40%.   2. Moderately decreased global left ventricular systolic function.   3. Spectral Doppler shows impaired relaxation pattern of left   ventricular myocardial filling (Grade I diastolic dysfunction).   4. Trace mitral valve regurgitation.   5. Mild tricuspid regurgitation.   6. Mild pulmonic valve regurgitation.   7. Significant decrease in LVEF as compared to prior study dated 9/8/21.   8. Endocardial visualization was enhanced with intravenous echo contrast.    < end of copied text >     LABS:	 	    CARDIAC MARKERS:    03 Mar 2023 12:05    138    |  102    |  22     ----------------------------<  237    3.7     |  29     |  1.01   02 Mar 2023 07:30    139    |  97     |  21     ----------------------------<  172    3.4     |  32     |  0.96   01 Mar 2023 03:35    139    |  98     |  31     ----------------------------<  219    3.9     |  31     |  1.03     Ca    9.1        03 Mar 2023 12:05  Phos  3.7       02 Mar 2023 07:30  Mg     2.1       02 Mar 2023 07:30    TPro  6.7    /  Alb  2.5    /  TBili  0.3    /  DBili  x      /  AST  23     /  ALT  42     /  AlkPhos  129    02 Mar 2023 07:30                          9.2    7.49  )-----------( 278      ( 03 Mar 2023 12:05 )             29.1 ,                       10.7   8.06  )-----------( 217      ( 02 Mar 2023 07:30 )             34.5 ,                       10.9   9.24  )-----------( 142      ( 01 Mar 2023 03:35 )             34.1   proBNP:   Lipid Profile:   HgA1c:   TSH: Thyroid Stimulating Hormone, Serum: 1.460 uU/mL (03-03 @ 12:05)

## 2023-03-03 NOTE — PHYSICAL THERAPY INITIAL EVALUATION ADULT - PERTINENT HX OF CURRENT PROBLEM, REHAB EVAL
67F w/ DM, HTN admitted with septic shock due to E coli bacteremia and UTI.  CT showed R hydronephrosis w/ possible obstruction due to stricture, as well as GEOVANNI, NSTEMI vs demand ischemia and septic cardiomyopathy. Noted to have COVID19+ but then negative x2. 67F w/ DM, HTN admitted with septic shock due to E coli bacteremia and UTI.  CT showed R hydronephrosis w/ possible obstruction due to stricture, as well as GEOVANNI, NSTEMI vs demand ischemia and septic cardiomyopathy. Noted to have COVID19+ but then negative x2. Had left ankle rotationplasty a year ago at some other [facility]

## 2023-03-03 NOTE — PROGRESS NOTE ADULT - NS ATTEND AMEND GEN_ALL_CORE FT
67-year-old with hypertension, EF 35 to 40%, anemia, admitted with urosepsis, altered mental status and elevated troponin with acute renal insufficiency.  Continue IV antibiotics.  Sepsis management.  Repeat echo pending. Supportive and conservative cardiac care management in the meantime.

## 2023-03-04 LAB
ALBUMIN SERPL ELPH-MCNC: 2.3 G/DL — LOW (ref 3.3–5)
ALP SERPL-CCNC: 104 U/L — SIGNIFICANT CHANGE UP (ref 40–120)
ALT FLD-CCNC: 31 U/L — SIGNIFICANT CHANGE UP (ref 12–78)
ANION GAP SERPL CALC-SCNC: 8 MMOL/L — SIGNIFICANT CHANGE UP (ref 5–17)
AST SERPL-CCNC: 18 U/L — SIGNIFICANT CHANGE UP (ref 15–37)
BILIRUB SERPL-MCNC: 0.3 MG/DL — SIGNIFICANT CHANGE UP (ref 0.2–1.2)
BUN SERPL-MCNC: 21 MG/DL — SIGNIFICANT CHANGE UP (ref 7–23)
CALCIUM SERPL-MCNC: 9.1 MG/DL — SIGNIFICANT CHANGE UP (ref 8.5–10.1)
CHLORIDE SERPL-SCNC: 105 MMOL/L — SIGNIFICANT CHANGE UP (ref 96–108)
CO2 SERPL-SCNC: 28 MMOL/L — SIGNIFICANT CHANGE UP (ref 22–31)
CREAT SERPL-MCNC: 0.87 MG/DL — SIGNIFICANT CHANGE UP (ref 0.5–1.3)
CULTURE RESULTS: SIGNIFICANT CHANGE UP
CULTURE RESULTS: SIGNIFICANT CHANGE UP
EGFR: 73 ML/MIN/1.73M2 — SIGNIFICANT CHANGE UP
GLUCOSE BLDC GLUCOMTR-MCNC: 112 MG/DL — HIGH (ref 70–99)
GLUCOSE BLDC GLUCOMTR-MCNC: 195 MG/DL — HIGH (ref 70–99)
GLUCOSE BLDC GLUCOMTR-MCNC: 208 MG/DL — HIGH (ref 70–99)
GLUCOSE BLDC GLUCOMTR-MCNC: 308 MG/DL — HIGH (ref 70–99)
GLUCOSE SERPL-MCNC: 214 MG/DL — HIGH (ref 70–99)
HCT VFR BLD CALC: 29.8 % — LOW (ref 34.5–45)
HGB BLD-MCNC: 9.3 G/DL — LOW (ref 11.5–15.5)
MAGNESIUM SERPL-MCNC: 2.2 MG/DL — SIGNIFICANT CHANGE UP (ref 1.6–2.6)
MCHC RBC-ENTMCNC: 27.7 PG — SIGNIFICANT CHANGE UP (ref 27–34)
MCHC RBC-ENTMCNC: 31.2 G/DL — LOW (ref 32–36)
MCV RBC AUTO: 88.7 FL — SIGNIFICANT CHANGE UP (ref 80–100)
NRBC # BLD: 0 /100 WBCS — SIGNIFICANT CHANGE UP (ref 0–0)
PHOSPHATE SERPL-MCNC: 3.2 MG/DL — SIGNIFICANT CHANGE UP (ref 2.5–4.5)
PLATELET # BLD AUTO: 323 K/UL — SIGNIFICANT CHANGE UP (ref 150–400)
POTASSIUM SERPL-MCNC: 4 MMOL/L — SIGNIFICANT CHANGE UP (ref 3.5–5.3)
POTASSIUM SERPL-SCNC: 4 MMOL/L — SIGNIFICANT CHANGE UP (ref 3.5–5.3)
PROT SERPL-MCNC: 6.1 GM/DL — SIGNIFICANT CHANGE UP (ref 6–8.3)
RBC # BLD: 3.36 M/UL — LOW (ref 3.8–5.2)
RBC # FLD: 14.5 % — SIGNIFICANT CHANGE UP (ref 10.3–14.5)
SODIUM SERPL-SCNC: 141 MMOL/L — SIGNIFICANT CHANGE UP (ref 135–145)
SPECIMEN SOURCE: SIGNIFICANT CHANGE UP
SPECIMEN SOURCE: SIGNIFICANT CHANGE UP
WBC # BLD: 6.2 K/UL — SIGNIFICANT CHANGE UP (ref 3.8–10.5)
WBC # FLD AUTO: 6.2 K/UL — SIGNIFICANT CHANGE UP (ref 3.8–10.5)

## 2023-03-04 PROCEDURE — 99232 SBSQ HOSP IP/OBS MODERATE 35: CPT

## 2023-03-04 PROCEDURE — 74177 CT ABD & PELVIS W/CONTRAST: CPT | Mod: 26

## 2023-03-04 RX ORDER — CEPHALEXIN 500 MG
500 CAPSULE ORAL THREE TIMES A DAY
Refills: 0 | Status: COMPLETED | OUTPATIENT
Start: 2023-03-04 | End: 2023-03-07

## 2023-03-04 RX ADMIN — HEPARIN SODIUM 5000 UNIT(S): 5000 INJECTION INTRAVENOUS; SUBCUTANEOUS at 16:13

## 2023-03-04 RX ADMIN — CEFTRIAXONE 100 MILLIGRAM(S): 500 INJECTION, POWDER, FOR SOLUTION INTRAMUSCULAR; INTRAVENOUS at 04:09

## 2023-03-04 RX ADMIN — HEPARIN SODIUM 5000 UNIT(S): 5000 INJECTION INTRAVENOUS; SUBCUTANEOUS at 05:33

## 2023-03-04 RX ADMIN — Medication 500 MILLIGRAM(S): at 21:19

## 2023-03-04 RX ADMIN — Medication 2: at 09:04

## 2023-03-04 RX ADMIN — Medication 3 UNIT(S): at 16:56

## 2023-03-04 RX ADMIN — HEPARIN SODIUM 5000 UNIT(S): 5000 INJECTION INTRAVENOUS; SUBCUTANEOUS at 21:19

## 2023-03-04 RX ADMIN — Medication 3 UNIT(S): at 14:50

## 2023-03-04 RX ADMIN — INSULIN GLARGINE 30 UNIT(S): 100 INJECTION, SOLUTION SUBCUTANEOUS at 09:07

## 2023-03-04 RX ADMIN — POLYETHYLENE GLYCOL 3350 17 GRAM(S): 17 POWDER, FOR SOLUTION ORAL at 21:19

## 2023-03-04 RX ADMIN — Medication 12.5 MILLIGRAM(S): at 05:34

## 2023-03-04 RX ADMIN — Medication 3 UNIT(S): at 09:06

## 2023-03-04 RX ADMIN — CHLORHEXIDINE GLUCONATE 1 APPLICATION(S): 213 SOLUTION TOPICAL at 05:33

## 2023-03-04 RX ADMIN — Medication 8: at 14:41

## 2023-03-04 RX ADMIN — Medication 12.5 MILLIGRAM(S): at 18:11

## 2023-03-04 RX ADMIN — Medication 500 MILLIGRAM(S): at 17:30

## 2023-03-04 RX ADMIN — Medication 81 MILLIGRAM(S): at 14:51

## 2023-03-04 RX ADMIN — Medication 2: at 16:56

## 2023-03-04 NOTE — PROGRESS NOTE ADULT - SUBJECTIVE AND OBJECTIVE BOX
CHIEF COMPLAINT:  Patient is a 67y old  Female who presents with a chief complaint of UTI sepsis (04 Mar 2023 11:14)      HPI:  67F PMH HTN, DM BIBEMS from home for AMS. Per EMS, pt coming from home, family called as pt found confused 0400 this AM. Per EMS, FS read as 'High.' EMS reports pt found in feces / urine.On ED arrival, pt orally febrile 103.5 F tachycardic to 120s, FS 400s.   In ED pt found to have UTI w/ CT abd with "Mild right hydroureteronephrosis with delayed right nephrogram, possibly ascending urinary tract infection. Underlying benign or malignant distal ureteral stricture is difficult to exclude."  CT chest with atelectasis vs pna. CTH neg. Labs significant for neutropenia with bandemia, hyperglycemia in 400s, lactate of 8, and trops peaked to 5K without EKG changes, and GEOVANNI. Pt started on Vanc/Zosyn. Pt also found to be covid + in ED. Pt initially HD stable, but subsequently became transiently Htn to 70s/40s after 4L IVF and hypoxic to 80s on RA requiring 2LNC.  ICU consulted for septic shock 2/2 UTI.    Subjective: pt seen and examined at the bedside. Pt unable to answer questions 2/2 AMS.  Upon ICU evaluation, pt altered, following some commands but combative. Pt BP in low 100s after IVF and satting well on 2LNC.  POCUS with hyperdynamic LV but without wall motion abnormality no pleural or pericardial effusions, IVC indeterminate, and lungs with scattered B lines bilaterally .        (23 Feb 2023 19:00)      REVIEW OF SYSTEMS:  General:  No wt loss, fevers, chills, night sweats  Eyes:  Good vision, no reported pain  ENT:  No sore throat, pain, runny nose, dysphagia  CV:  No pain, palpitations, hypo/hypertension  Resp:  No dyspnea, cough, tachypnea, wheezing  GI:  No pain, nausea, vomiting, diarrhea, constipation  :  No pain, bleeding, incontinence, nocturia  Muscle:  No pain, weakness  Breast:  No pain, abscess, mass, discharge  Neuro:  No weakness, tingling, memory problems  Psych:  No fatigue, insomnia, mood problems, depression  Endocrine:  No polyuria, polydipsia, cold/heat intolerance  Heme:  No petechiae, ecchymosis, easy bruisability  Skin:  No rash, tattoos, scars, edema    PHYSICAL EXAM:  Vital Signs:  Vital Signs Last 24 Hrs  T(C): 37.1 (04 Mar 2023 11:25), Max: 37.1 (03 Mar 2023 16:37)  T(F): 98.8 (04 Mar 2023 11:25), Max: 98.8 (04 Mar 2023 11:25)  HR: 71 (04 Mar 2023 11:25) (71 - 97)  BP: 155/80 (04 Mar 2023 11:25) (133/79 - 155/80)  RR: 18 (04 Mar 2023 11:25) (16 - 18)  SpO2: 98% (04 Mar 2023 11:25) (95% - 98%)    Parameters below as of 04 Mar 2023 11:25  Patient On (Oxygen Delivery Method): room air      I&O's Summary    03 Mar 2023 07:01  -  04 Mar 2023 07:00  --------------------------------------------------------  IN: 50 mL / OUT: 1300 mL / NET: -1250 mL      I&O's Detail    03 Mar 2023 07:01  -  04 Mar 2023 07:00  --------------------------------------------------------  IN:    IV PiggyBack: 50 mL  Total IN: 50 mL    OUT:    Voided (mL): 1300 mL  Total OUT: 1300 mL    Total NET: -1250 mL      Constitutional: well developed, normal appearance, well groomed, well nourished, no deformities and no acute distress.   Eyes: the conjunctiva exhibited no abnormalities and the eyelids demonstrated no xanthelasmas.   HEENT: normal oral mucosa, no oral pallor and no oral cyanosis.   Neck: normal jugular venous A waves present, normal jugular venous V waves present and no jugular venous miramontes A waves.   Pulmonary: no respiratory distress, normal respiratory rhythm and effort, no accessory muscle use and lungs were clear to auscultation bilaterally.   Cardiovascular: heart rate and rhythm were normal, normal S1 and S2 and no murmur, gallop, rub, heave or thrill are present.   Abdomen: soft, non-tender, no hepato-splenomegaly and no abdominal mass palpated.   Musculoskeletal: the gait could not be assessed..   Extremities: no clubbing of the fingernails, no localized cyanosis, no petechial hemorrhages and no ischemic changes.   Skin: normal skin color and pigmentation, no rash, no venous stasis, no skin lesions, no skin ulcer and no xanthoma was observed.   Psychiatric: oriented to person, place, and time, the affect was normal, the mood was normal and not feeling anxious.      LABORATORY:                          9.3    6.20  )-----------( 323      ( 04 Mar 2023 06:50 )             29.8     03-04    141  |  105  |  21  ----------------------------<  214<H>  4.0   |  28  |  0.87    Ca    9.1      04 Mar 2023 06:50  Phos  3.2     03-04  Mg     2.2     03-04    TPro  6.1  /  Alb  2.3<L>  /  TBili  0.3  /  DBili  x   /  AST  18  /  ALT  31  /  AlkPhos  104  03-04          CAPILLARY BLOOD GLUCOSE      POCT Blood Glucose.: 308 mg/dL (04 Mar 2023 11:39)  POCT Blood Glucose.: 208 mg/dL (04 Mar 2023 08:04)  POCT Blood Glucose.: 183 mg/dL (03 Mar 2023 21:48)  POCT Blood Glucose.: 186 mg/dL (03 Mar 2023 15:55)    LIVER FUNCTIONS - ( 04 Mar 2023 06:50 )  Alb: 2.3 g/dL / Pro: 6.1 gm/dL / ALK PHOS: 104 U/L / ALT: 31 U/L / AST: 18 U/L / GGT: x               BNP    IMAGING:  < from: 12 Lead ECG (02.24.23 @ 17:33) >  Sinus tachycardia  Nonspecific ST and T wave abnormality  Abnormal ECG  When compared with ECG of 24-FEB-2023 17:33,  Sinus rhythm has replaced Junctional rhythm    < end of copied text >    < from: TTE Echo Complete w/o Contrast w/ Doppler (02.24.23 @ 09:17) >   1.Left ventricular ejection fraction, by visual estimation, is 35 to   40%.   2. Moderately decreased global left ventricular systolic function.   3. Spectral Doppler shows impaired relaxation pattern of left   ventricular myocardial filling (Grade I diastolic dysfunction).   4. Trace mitral valve regurgitation.   5. Mild tricuspid regurgitation.   6. Mild pulmonic valve regurgitation.   7. Significant decrease in LVEF as compared to prior study dated 9/8/21.   8. Endocardial visualization was enhanced with intravenous echo contrast.    < end of copied text >    < from: Xray Chest 1 View- PORTABLE-Urgent (Xray Chest 1 View- PORTABLE-Urgent .) (02.24.23 @ 03:18) >    IMPRESSION:   RIGHT IJ catheter tip in SVC RIGHT atrium junction.  LEFT lower zone linear airspace consolidation and mild diffuse airspace   disease with vascular congestion..    < end of copied text >    ASSESSMENT:      PLAN:     MEDICATIONS  (STANDING):  aspirin  chewable 81 milliGRAM(s) Oral daily  cephalexin 500 milliGRAM(s) Oral three times a day  chlorhexidine 2% Cloths 1 Application(s) Topical <User Schedule>  heparin   Injectable 5000 Unit(s) SubCutaneous every 8 hours  insulin glargine Injectable (LANTUS) 30 Unit(s) SubCutaneous every morning  insulin lispro (ADMELOG) corrective regimen sliding scale   SubCutaneous three times a day before meals  insulin lispro Injectable (ADMELOG) 3 Unit(s) SubCutaneous three times a day before meals  metoprolol tartrate 12.5 milliGRAM(s) Oral two times a day  polyethylene glycol 3350 17 Gram(s) Oral at bedtime      Giovanni Barlow MD, FACC, FASE, FASNC, FACP  Director, Heart Failure Services  [facility]  , Department of Cardiology  [facility]     CHIEF COMPLAINT:  Patient is a 67y old  Female who presents with a chief complaint of UTI sepsis (04 Mar 2023 11:14)      HPI:  67-year-old female with admission for urosepsis and confusion.  She was febrile and tachycardic elevated troponin likely related to demand ischemia.  Acute renal insufficiency noted as well.  Hypotensive now symptoms have improved.        REVIEW OF SYSTEMS:  General:  No wt loss, fevers, chills, night sweats  Eyes:  Good vision, no reported pain  ENT:  No sore throat, pain, runny nose, dysphagia  CV:  No pain, palpitations, hypo/hypertension  Resp:  No dyspnea, cough, tachypnea, wheezing  GI:  No pain, nausea, vomiting, diarrhea, constipation  :  No pain, bleeding, incontinence, nocturia  Muscle:  No pain, weakness  Breast:  No pain, abscess, mass, discharge  Neuro:  No weakness, tingling, memory problems  Psych:  No fatigue, insomnia, mood problems, depression  Endocrine:  No polyuria, polydipsia, cold/heat intolerance  Heme:  No petechiae, ecchymosis, easy bruisability  Skin:  No rash, edema    PHYSICAL EXAM:  Vital Signs:  Vital Signs Last 24 Hrs  T(C): 37.1 (04 Mar 2023 11:25), Max: 37.1 (03 Mar 2023 16:37)  T(F): 98.8 (04 Mar 2023 11:25), Max: 98.8 (04 Mar 2023 11:25)  HR: 71 (04 Mar 2023 11:25) (71 - 97)  BP: 155/80 (04 Mar 2023 11:25) (133/79 - 155/80)  RR: 18 (04 Mar 2023 11:25) (16 - 18)  SpO2: 98% (04 Mar 2023 11:25) (95% - 98%)    Parameters below as of 04 Mar 2023 11:25  Patient On (Oxygen Delivery Method): room air      I&O's Summary    03 Mar 2023 07:01  -  04 Mar 2023 07:00  --------------------------------------------------------  IN: 50 mL / OUT: 1300 mL / NET: -1250 mL      I&O's Detail    03 Mar 2023 07:01  -  04 Mar 2023 07:00  --------------------------------------------------------  IN:    IV PiggyBack: 50 mL  Total IN: 50 mL    OUT:    Voided (mL): 1300 mL  Total OUT: 1300 mL    Total NET: -1250 mL      Constitutional: well developed, normal appearance, well groomed, well nourished, no deformities and no acute distress.   Eyes: the conjunctiva exhibited no abnormalities and the eyelids demonstrated no xanthelasmas.   HEENT: normal oral mucosa, no oral pallor and no oral cyanosis.   Neck: normal jugular venous A waves present, normal jugular venous V waves present and no jugular venous miramontes A waves.   Pulmonary: no respiratory distress, normal respiratory rhythm and effort, no accessory muscle use and lungs were clear to auscultation bilaterally.   Cardiovascular: heart rate and rhythm were normal, normal S1 and S2 and no murmur, gallop, rub, heave or thrill are present.   Abdomen: soft, non-tender, no hepato-splenomegaly and no abdominal mass palpated.   Musculoskeletal: the gait could not be assessed..   Extremities: no clubbing of the fingernails, no localized cyanosis, no petechial hemorrhages and no ischemic changes.   Skin: normal skin color and pigmentation, no rash, no venous stasis, no skin lesions, no skin ulcer and no xanthoma was observed.       LABORATORY:                          9.3    6.20  )-----------( 323      ( 04 Mar 2023 06:50 )             29.8     03-04    141  |  105  |  21  ----------------------------<  214<H>  4.0   |  28  |  0.87    Ca    9.1      04 Mar 2023 06:50  Phos  3.2     03-04  Mg     2.2     03-04    TPro  6.1  /  Alb  2.3<L>  /  TBili  0.3  /  DBili  x   /  AST  18  /  ALT  31  /  AlkPhos  104  03-04          CAPILLARY BLOOD GLUCOSE  POCT Blood Glucose.: 308 mg/dL (04 Mar 2023 11:39)  POCT Blood Glucose.: 208 mg/dL (04 Mar 2023 08:04)  POCT Blood Glucose.: 183 mg/dL (03 Mar 2023 21:48)  POCT Blood Glucose.: 186 mg/dL (03 Mar 2023 15:55)    LIVER FUNCTIONS - ( 04 Mar 2023 06:50 )  Alb: 2.3 g/dL / Pro: 6.1 gm/dL / ALK PHOS: 104 U/L / ALT: 31 U/L / AST: 18 U/L / GGT: x             IMAGING:  < from: 12 Lead ECG (02.24.23 @ 17:33) >  Sinus tachycardia  Nonspecific ST and T wave abnormality  Abnormal ECG  When compared with ECG of 24-FEB-2023 17:33,  Sinus rhythm has replaced Junctional rhythm    < end of copied text >    < from: TTE Echo Complete w/o Contrast w/ Doppler (02.24.23 @ 09:17) >   1.Left ventricular ejection fraction, by visual estimation, is 35 to   40%.   2. Moderately decreased global left ventricular systolic function.   3. Spectral Doppler shows impaired relaxation pattern of left   ventricular myocardial filling (Grade I diastolic dysfunction).   4. Trace mitral valve regurgitation.   5. Mild tricuspid regurgitation.   6. Mild pulmonic valve regurgitation.   7. Significant decrease in LVEF as compared to prior study dated 9/8/21.   8. Endocardial visualization was enhanced with intravenous echo contrast.    < end of copied text >    < from: Xray Chest 1 View- PORTABLE-Urgent (Xray Chest 1 View- PORTABLE-Urgent .) (02.24.23 @ 03:18) >    IMPRESSION:   RIGHT IJ catheter tip in SVC RIGHT atrium junction.  LEFT lower zone linear airspace consolidation and mild diffuse airspace   disease with vascular congestion..    < end of copied text >    ASSESSMENT:  67-year-old female with admission for urosepsis and confusion.  She was febrile and tachycardic elevated troponin likely related to demand ischemia.  Acute renal insufficiency noted as well.  Hypotensive now symptoms have improved. EF noted to be 35 to 40% on recent echo.    PLAN:     Continue DVT prevention. No clear evidence of heart failure present.  Hold off on diuresis. Continue aspirin and metoprolol  for cardiac risk reduction.  Broad-spectrum antibiotics for her sepsis.  Insulin for diabetic management.      Giovanni Barlow MD, FACC, LUCI, SUNITHA, FACP  Director, Heart Failure Services  [facility]  , Department of Cardiology  [facility]

## 2023-03-04 NOTE — PROGRESS NOTE ADULT - ASSESSMENT
67F w/ DM, HTN admitted with septic shock due to E coli bacteremia and UTI.  CT showed R hydronephrosis w/ possible obstruction due to stricture, as well as GEOVANNI, NSTEMI vs demand ischemia and septic cardiomyopathy. Noted to have COVID19+ but then negative x2.     # Septic Shock - Resolved.  # NSTEMI  Septic Cardiomyopathy - Cardiology following.  Repeat TTE results pending.  Further evaluation per Cardiology.    # Ecoli Bacteremia / UTI - Seen by ID, discussed with Dr. Johansen.  Continue Abx x 10d -> 3/8.  Change to po.    # Obstructive Uropathy / GEOVANNI - Cr improving.   input appreciated.  Renal scan showed Bilaterally normal perfusion.  # Diabetes Type II - monitor fingersticks.  Insulin coverage for hyperglycemia.  # Essential HTN - Monitor BP.  Continue antihypertensives.  # Metabolic Encephalopathy - improved   # Inpatient DVT prophylaxis - subcutaneous Heparin

## 2023-03-04 NOTE — PROGRESS NOTE ADULT - SUBJECTIVE AND OBJECTIVE BOX
Patient seen and examined bedside resting comfortably.  No complaints offered.   Voiding without difficulty via primafit   Denies hematuria and dysuria.  Denies nausea and vomiting. Tolerating diet.    T(F): 98.8 (03-04-23 @ 11:25), Max: 98.8 (03-04-23 @ 11:25)  HR: 71 (03-04-23 @ 11:25) (71 - 97)  BP: 155/80 (03-04-23 @ 11:25) (121/81 - 155/80)  RR: 18 (03-04-23 @ 11:25) (16 - 18)  SpO2: 98% (03-04-23 @ 11:25) (95% - 98%)  Wt(kg): --  CAPILLARY BLOOD GLUCOSE  MEDICATIONS  (STANDING):  aspirin  chewable 81 milliGRAM(s) Oral daily  cephalexin 500 milliGRAM(s) Oral three times a day  chlorhexidine 2% Cloths 1 Application(s) Topical <User Schedule>  heparin   Injectable 5000 Unit(s) SubCutaneous every 8 hours  insulin glargine Injectable (LANTUS) 30 Unit(s) SubCutaneous every morning  insulin lispro (ADMELOG) corrective regimen sliding scale   SubCutaneous three times a day before meals  insulin lispro Injectable (ADMELOG) 3 Unit(s) SubCutaneous three times a day before meals  metoprolol tartrate 12.5 milliGRAM(s) Oral two times a day  polyethylene glycol 3350 17 Gram(s) Oral at bedtime    MEDICATIONS  (PRN):  lidocaine 2% Viscous 5 milliLiter(s) Swish and Spit every 6 hours PRN Mouth Care  sodium chloride 0.9% lock flush 10 milliLiter(s) IV Push every 1 hour PRN Pre/post blood products, medications, blood draw, and to maintain line patency      PHYSICAL EXAM:  General: NAD, alert and awake  HEENT: NCAT, EOMI, conjunctiva clear  Chest: nonlabored respirations, good inspiratory effort  Abdomen: soft, NTND.   : no cva tenderness     LABS:                        9.3    6.20  )-----------( 323      ( 04 Mar 2023 06:50 )             29.8   03-04    141  |  105  |  21  ----------------------------<  214<H>  4.0   |  28  |  0.87    Ca    9.1      04 Mar 2023 06:50  Phos  3.2     03-04  Mg     2.2     03-04    TPro  6.1  /  Alb  2.3<L>  /  TBili  0.3  /  DBili  x   /  AST  18  /  ALT  31  /  AlkPhos  104  03-04    Culture - Urine (02.23.23 @ 10:40)   - Amikacin: S <=16   - Amoxicillin/Clavulanic Acid: S <=8/4   - Ampicillin: R >16 These ampicillin results predict results for amoxicillin   - Ampicillin/Sulbactam: I 16/8 Enterobacter, Klebsiella aerogenes, Citrobacter, and Serratia may develop resistance during prolonged therapy (3-4 days)   - Aztreonam: S <=4   - Cefazolin: S <=2 For uncomplicated UTI with K. pneumoniae, E. coli, or P. mirablis: KWESI <=16 is sensitive and KWESI >=32 is resistant. This also predicts results for oral agents cefaclor, cefdinir, cefpodoxime, cefprozil, cefuroxime axetil, cephalexin and locarbef for uncomplicated UTI. Note that some isolates may be susceptible to these agents while testing resistant to cefazolin.   - Cefepime: S <=2   - Cefoxitin: S <=8   - Ceftriaxone: S <=1 Enterobacter, Klebsiella aerogenes, Citrobacter, and Serratia may develop resistance during prolonged therapy   - Cefuroxime: S <=4   - Ciprofloxacin: S <=0.25   - Ertapenem: S <=0.5   - Gentamicin: S <=2   - Imipenem: S <=1   - Levofloxacin: S <=0.5   - Meropenem: S <=1   - Nitrofurantoin: S <=32 Should not be used to treat pyelonephritis   - Piperacillin/Tazobactam: S <=8   - Tobramycin: S <=2   - Trimethoprim/Sulfamethoxazole: S <=0.5/9.5   Specimen Source: Clean Catch Clean Catch (Midstream)   Culture Results:   >100,000 CFU/ml Escherichia coli   Organism Identification: Escherichia coli   Organism: Escherichia coli     I&O's Detail    03 Mar 2023 07:01  -  04 Mar 2023 07:00  --------------------------------------------------------  IN:    IV PiggyBack: 50 mL  Total IN: 50 mL    OUT:    Voided (mL): 1300 mL  Total OUT: 1300 mL    Total NET: -1250 mL    < from: NM Renal Function w/Lasix Single (02.28.23 @ 17:00) >  IMPRESSION: Diuretic renal scan demonstrates:  1.  Bilaterally normal perfusion.  2.  Delayed peak times BILATERALLY, with some spontaneous drainage prior   to diuretic, but incomplete response to diuretic by BOTH kidneys with   incomplete washout seen by the conclusion of the exam. Although there are   findings suggestive of a possible distal RIGHT ureteral stricture, renal   findings are suggestive of some mildly impaired underlying renal   function. Renal findings may berelated to patient's ongoing infection.   Review of laboratory values show a decreased GFR at 47 and most recent   urinalysis showed findings suggestive of UTI. Findings are not strongly   suggestive of a prerenal or postrenal cause.  3.  Differential renal function is 50% BILATERALLY.    < end of copied text >      Impression: 67y Female  PMH HTN, DM a/w e coli bacteremia and uremia with mild right hydro and a UTI, COVID. Concern for possible distal urethral stricture. Patient hemodynamically stable, remains off pressors  Leukocytosis and Cr improving   UCx: E coli    NM renal scan reviewed by Dr Pak -Patient needs CT Urogram   - IV abx  - monitor urine output  - continue care per primary team  - Will Dr. Pak

## 2023-03-04 NOTE — PROGRESS NOTE ADULT - SUBJECTIVE AND OBJECTIVE BOX
Patient: NELSON ZEPEDA [id number] 67y Female                            Hospitalist Attending Note    No complaints.   No chest pain / palpitations.   NO fever / chills.      ____________________PHYSICAL EXAM:  GENERAL:  NAD, alert, interactive, slightly confused.    HEENT: NCAT  CARDIOVASCULAR:  S1, S2  LUNGS: CTAB  ABDOMEN:  soft, (-) tenderness, (-) distension, (+) bowel sounds, (-) guarding, (-) rebound (-) rigidity  EXTREMITIES:  no cyanosis / clubbing / edema.   ____________________    VITALS:  Vital Signs Last 24 Hrs  T(C): 36.8 (04 Mar 2023 05:00), Max: 37.1 (03 Mar 2023 16:37)  T(F): 98.3 (04 Mar 2023 05:00), Max: 98.7 (03 Mar 2023 16:37)  HR: 91 (04 Mar 2023 05:00) (90 - 97)  BP: 153/77 (04 Mar 2023 05:00) (121/81 - 153/77)  BP(mean): --  RR: 18 (04 Mar 2023 05:00) (16 - 18)  SpO2: 97% (04 Mar 2023 05:00) (95% - 97%)    Parameters below as of 04 Mar 2023 05:00  Patient On (Oxygen Delivery Method): room air     Daily     Daily   CAPILLARY BLOOD GLUCOSE      POCT Blood Glucose.: 208 mg/dL (04 Mar 2023 08:04)  POCT Blood Glucose.: 183 mg/dL (03 Mar 2023 21:48)  POCT Blood Glucose.: 186 mg/dL (03 Mar 2023 15:55)    I&O's Summary    03 Mar 2023 07:01  -  04 Mar 2023 07:00  --------------------------------------------------------  IN: 50 mL / OUT: 1300 mL / NET: -1250 mL        LABS:                        9.3    6.20  )-----------( 323      ( 04 Mar 2023 06:50 )             29.8     03-04    141  |  105  |  21  ----------------------------<  214<H>  4.0   |  28  |  0.87    Ca    9.1      04 Mar 2023 06:50  Phos  3.2     03-04  Mg     2.2     03-04    TPro  6.1  /  Alb  2.3<L>  /  TBili  0.3  /  DBili  x   /  AST  18  /  ALT  31  /  AlkPhos  104  03-04      LIVER FUNCTIONS - ( 04 Mar 2023 06:50 )  Alb: 2.3 g/dL / Pro: 6.1 gm/dL / ALK PHOS: 104 U/L / ALT: 31 U/L / AST: 18 U/L / GGT: x                     MEDICATIONS:  aspirin  chewable 81 milliGRAM(s) Oral daily  cefTRIAXone   IVPB      cefTRIAXone   IVPB 2000 milliGRAM(s) IV Intermittent every 24 hours  chlorhexidine 2% Cloths 1 Application(s) Topical <User Schedule>  heparin   Injectable 5000 Unit(s) SubCutaneous every 8 hours  insulin glargine Injectable (LANTUS) 30 Unit(s) SubCutaneous every morning  insulin lispro (ADMELOG) corrective regimen sliding scale   SubCutaneous three times a day before meals  insulin lispro Injectable (ADMELOG) 3 Unit(s) SubCutaneous three times a day before meals  lidocaine 2% Viscous 5 milliLiter(s) Swish and Spit every 6 hours PRN  metoprolol tartrate 12.5 milliGRAM(s) Oral two times a day  polyethylene glycol 3350 17 Gram(s) Oral at bedtime  sodium chloride 0.9% lock flush 10 milliLiter(s) IV Push every 1 hour PRN

## 2023-03-05 LAB
ANION GAP SERPL CALC-SCNC: 6 MMOL/L — SIGNIFICANT CHANGE UP (ref 5–17)
BUN SERPL-MCNC: 19 MG/DL — SIGNIFICANT CHANGE UP (ref 7–23)
CALCIUM SERPL-MCNC: 9.3 MG/DL — SIGNIFICANT CHANGE UP (ref 8.5–10.1)
CHLORIDE SERPL-SCNC: 104 MMOL/L — SIGNIFICANT CHANGE UP (ref 96–108)
CO2 SERPL-SCNC: 28 MMOL/L — SIGNIFICANT CHANGE UP (ref 22–31)
CREAT SERPL-MCNC: 0.81 MG/DL — SIGNIFICANT CHANGE UP (ref 0.5–1.3)
EGFR: 80 ML/MIN/1.73M2 — SIGNIFICANT CHANGE UP
GLUCOSE BLDC GLUCOMTR-MCNC: 176 MG/DL — HIGH (ref 70–99)
GLUCOSE BLDC GLUCOMTR-MCNC: 220 MG/DL — HIGH (ref 70–99)
GLUCOSE BLDC GLUCOMTR-MCNC: 251 MG/DL — HIGH (ref 70–99)
GLUCOSE BLDC GLUCOMTR-MCNC: 264 MG/DL — HIGH (ref 70–99)
GLUCOSE SERPL-MCNC: 163 MG/DL — HIGH (ref 70–99)
HCT VFR BLD CALC: 30.7 % — LOW (ref 34.5–45)
HGB BLD-MCNC: 9.6 G/DL — LOW (ref 11.5–15.5)
MCHC RBC-ENTMCNC: 27.5 PG — SIGNIFICANT CHANGE UP (ref 27–34)
MCHC RBC-ENTMCNC: 31.3 G/DL — LOW (ref 32–36)
MCV RBC AUTO: 88 FL — SIGNIFICANT CHANGE UP (ref 80–100)
NRBC # BLD: 0 /100 WBCS — SIGNIFICANT CHANGE UP (ref 0–0)
PLATELET # BLD AUTO: 402 K/UL — HIGH (ref 150–400)
POTASSIUM SERPL-MCNC: 4 MMOL/L — SIGNIFICANT CHANGE UP (ref 3.5–5.3)
POTASSIUM SERPL-SCNC: 4 MMOL/L — SIGNIFICANT CHANGE UP (ref 3.5–5.3)
RBC # BLD: 3.49 M/UL — LOW (ref 3.8–5.2)
RBC # FLD: 14.3 % — SIGNIFICANT CHANGE UP (ref 10.3–14.5)
SODIUM SERPL-SCNC: 138 MMOL/L — SIGNIFICANT CHANGE UP (ref 135–145)
WBC # BLD: 6.37 K/UL — SIGNIFICANT CHANGE UP (ref 3.8–10.5)
WBC # FLD AUTO: 6.37 K/UL — SIGNIFICANT CHANGE UP (ref 3.8–10.5)

## 2023-03-05 PROCEDURE — 99232 SBSQ HOSP IP/OBS MODERATE 35: CPT

## 2023-03-05 RX ADMIN — Medication 12.5 MILLIGRAM(S): at 18:37

## 2023-03-05 RX ADMIN — Medication 3 UNIT(S): at 14:08

## 2023-03-05 RX ADMIN — HEPARIN SODIUM 5000 UNIT(S): 5000 INJECTION INTRAVENOUS; SUBCUTANEOUS at 14:11

## 2023-03-05 RX ADMIN — Medication 3 UNIT(S): at 08:13

## 2023-03-05 RX ADMIN — Medication 2: at 08:13

## 2023-03-05 RX ADMIN — HEPARIN SODIUM 5000 UNIT(S): 5000 INJECTION INTRAVENOUS; SUBCUTANEOUS at 21:44

## 2023-03-05 RX ADMIN — Medication 12.5 MILLIGRAM(S): at 05:57

## 2023-03-05 RX ADMIN — Medication 6: at 14:07

## 2023-03-05 RX ADMIN — Medication 81 MILLIGRAM(S): at 14:10

## 2023-03-05 RX ADMIN — Medication 500 MILLIGRAM(S): at 05:57

## 2023-03-05 RX ADMIN — Medication 500 MILLIGRAM(S): at 14:10

## 2023-03-05 RX ADMIN — Medication 4: at 18:38

## 2023-03-05 RX ADMIN — Medication 500 MILLIGRAM(S): at 21:44

## 2023-03-05 RX ADMIN — CHLORHEXIDINE GLUCONATE 1 APPLICATION(S): 213 SOLUTION TOPICAL at 06:00

## 2023-03-05 RX ADMIN — INSULIN GLARGINE 30 UNIT(S): 100 INJECTION, SOLUTION SUBCUTANEOUS at 10:51

## 2023-03-05 RX ADMIN — Medication 3 UNIT(S): at 18:39

## 2023-03-05 RX ADMIN — HEPARIN SODIUM 5000 UNIT(S): 5000 INJECTION INTRAVENOUS; SUBCUTANEOUS at 05:56

## 2023-03-05 NOTE — CHART NOTE - NSCHARTNOTESELECT_GEN_ALL_CORE
Cardiology/Off Service Note
POCUS note/Event Note
Event Note
Nutrition Services
Screen
Transfer Note

## 2023-03-05 NOTE — PROGRESS NOTE ADULT - SUBJECTIVE AND OBJECTIVE BOX
Patient: NELSON ZEPEDA [id number] 67y Female                            Hospitalist Attending Note    No complaints.   No chest pain / palpitations.   NO fever / chills.      ____________________PHYSICAL EXAM:  GENERAL:  NAD, alert, interactive, slightly confused.    HEENT: NCAT  CARDIOVASCULAR:  S1, S2  LUNGS: CTAB  ABDOMEN:  soft, (-) tenderness, (-) distension, (+) bowel sounds, (-) guarding, (-) rebound (-) rigidity  EXTREMITIES:  no cyanosis / clubbing / edema.   ____________________    VITALS:  Vital Signs Last 24 Hrs  T(C): 37.3 (05 Mar 2023 18:00), Max: 37.3 (05 Mar 2023 18:00)  T(F): 99.1 (05 Mar 2023 18:00), Max: 99.1 (05 Mar 2023 18:00)  HR: 91 (05 Mar 2023 18:00) (84 - 99)  BP: 152/78 (05 Mar 2023 18:00) (147/76 - 165/78)  BP(mean): --  RR: 18 (05 Mar 2023 18:00) (18 - 18)  SpO2: 95% (05 Mar 2023 18:00) (95% - 98%)    Parameters below as of 05 Mar 2023 18:00  Patient On (Oxygen Delivery Method): room air     Daily     Daily   CAPILLARY BLOOD GLUCOSE      POCT Blood Glucose.: 220 mg/dL (05 Mar 2023 15:50)  POCT Blood Glucose.: 251 mg/dL (05 Mar 2023 11:28)  POCT Blood Glucose.: 176 mg/dL (05 Mar 2023 07:49)  POCT Blood Glucose.: 112 mg/dL (04 Mar 2023 21:42)    I&O's Summary    04 Mar 2023 07:01  -  05 Mar 2023 07:00  --------------------------------------------------------  IN: 0 mL / OUT: 750 mL / NET: -750 mL        LABS:                        9.6    6.37  )-----------( 402      ( 05 Mar 2023 05:45 )             30.7     03-05    138  |  104  |  19  ----------------------------<  163<H>  4.0   |  28  |  0.81    Ca    9.3      05 Mar 2023 05:45  Phos  3.2     03-04  Mg     2.2     03-04    TPro  6.1  /  Alb  2.3<L>  /  TBili  0.3  /  DBili  x   /  AST  18  /  ALT  31  /  AlkPhos  104  03-04      LIVER FUNCTIONS - ( 04 Mar 2023 06:50 )  Alb: 2.3 g/dL / Pro: 6.1 gm/dL / ALK PHOS: 104 U/L / ALT: 31 U/L / AST: 18 U/L / GGT: x                     MEDICATIONS:  aspirin  chewable 81 milliGRAM(s) Oral daily  cephalexin 500 milliGRAM(s) Oral three times a day  chlorhexidine 2% Cloths 1 Application(s) Topical <User Schedule>  heparin   Injectable 5000 Unit(s) SubCutaneous every 8 hours  insulin glargine Injectable (LANTUS) 30 Unit(s) SubCutaneous every morning  insulin lispro (ADMELOG) corrective regimen sliding scale   SubCutaneous three times a day before meals  insulin lispro Injectable (ADMELOG) 3 Unit(s) SubCutaneous three times a day before meals  lidocaine 2% Viscous 5 milliLiter(s) Swish and Spit every 6 hours PRN  metoprolol tartrate 12.5 milliGRAM(s) Oral two times a day  polyethylene glycol 3350 17 Gram(s) Oral at bedtime  sodium chloride 0.9% lock flush 10 milliLiter(s) IV Push every 1 hour PRN

## 2023-03-05 NOTE — PROGRESS NOTE ADULT - ASSESSMENT
67F w/ DM, HTN admitted with septic shock due to E coli bacteremia and UTI.  CT showed R hydronephrosis w/ possible obstruction due to stricture, as well as GEOVANNI, NSTEMI vs demand ischemia and septic cardiomyopathy. Noted to have COVID19+ but then negative x2. Initial TTE on 2/24 showed EF 35-40%, repeat TTE 3/3 showed EF 60-65%.      # Septic Shock - Resolved.  # NSTEMI  Septic Cardiomyopathy - Cardiology following.  Initial TTE on 2/24 showed EF 35-40%, repeat TTE 3/3 showed EF 60-65%.  Cardiology following.  # Ecoli Bacteremia / UTI - Seen by ID, discussed with Dr. Johansen.  Continue Abx x 10d -> 3/8.  Change to po.    # Obstructive Uropathy / GEOVANNI - Cr improving.   input appreciated.  Renal scan showed Bilaterally normal perfusion.  # Diabetes Type II - monitor fingersticks.  Insulin coverage for hyperglycemia.  # Essential HTN - Monitor BP.  Continue antihypertensives.  # Metabolic Encephalopathy - improved   # Inpatient DVT prophylaxis - subcutaneous Heparin     Plan d/c home in am.

## 2023-03-05 NOTE — PROGRESS NOTE ADULT - SUBJECTIVE AND OBJECTIVE BOX
UROLOGY PROGRESS NOTE:     Subjective: Patient seen and examined at bedside. No major events overnight. Pt states she would like to go home      Objective:  Vital signs  T(F): , Max: 98.8 (03-04-23 @ 11:25)  HR: 86 (03-05-23 @ 05:17)  BP: 165/78 (03-05-23 @ 05:17)  SpO2: 96% (03-05-23 @ 05:17)  Wt(kg): --    Output     I&O's Detail    04 Mar 2023 07:01  -  05 Mar 2023 07:00  --------------------------------------------------------  IN:  Total IN: 0 mL    OUT:    Voided (mL): 750 mL  Total OUT: 750 mL    Total NET: -750 mL          Physical Exam:  Gen: no acute distress  Back: no cvat b/l  Abd: obese, soft, nt/nd    Labs:                        9.6    6.37  )-----------( 402      ( 05 Mar 2023 05:45 )             30.7     03-05    138  |  104  |  19  ----------------------------<  163<H>  4.0   |  28  |  0.81    Ca    9.3      05 Mar 2023 05:45  Phos  3.2     03-04  Mg     2.2     03-04    TPro  6.1  /  Alb  2.3<L>  /  TBili  0.3  /  DBili  x   /  AST  18  /  ALT  31  /  AlkPhos  104  03-04          Urine Cx: 2/23 +e.coli    ACC: [id number] EXAM:  CT ABDOMEN AND PELVIS IC   ORDERED BY: DENNIS GUERRERO     PROCEDURE DATE:  03/04/2023          INTERPRETATION:  CLINICAL INFORMATION: Ureteral stricture. UTI. Sepsis.    COMPARISON: CT abdomen pelvis 2/23/2023.    CONTRAST/COMPLICATIONS:  IV Contrast: Omnipaque 350  90 cc administered   10 cc discarded  Oral Contrast: NONE  Complications: None reported at time of study completion    PROCEDURE:  CT of the Abdomen and Pelvis was performed.  Precontrast, Nephrographic and Excretory phases were acquired (CT   UROGRAM).  10 mg of Lasix was administered.  Sagittal and coronal reformats were performed.    FINDINGS:  LOWER CHEST: Bibasilar atelectasis and probable linear scarring is   unchanged.    LIVER: Small hepatic cysts, unchanged.  BILE DUCTS: Normal caliber.  GALLBLADDER: Within normal limits.  SPLEEN: Within normal limits.  PANCREAS: Within normal limits.  ADRENALS: Within normal limits.  KIDNEYS/URETERS: Left kidney is unremarkable. No hydronephrosis or   nephrolithiasis. Previously seen right hydronephrosis and hydroureter has   markedly improved. Minimal fullness to the right collecting system and   ureter is seen relative to the left. Intravenous contrast is seen within   the distal right ureter to just proximal to the right ureteral pelvic   junction. On the venous phase a small focus of enhancement within the   ureter is seen (8:124). This could represent a small urothelial lesion.    BLADDER: Tiny droplet of air is seen within the urinary bladder. No   urothelial lesion seen.  REPRODUCTIVE ORGANS: Uterus and adnexa within normal limits.    BOWEL: No hiatal hernia. No bowel obstruction. Appendix is normal.  PERITONEUM: No ascites.  VESSELS: Atherosclerotic changes.  RETROPERITONEUM/LYMPH NODES: No lymphadenopathy.  ABDOMINAL WALL: Within normal limits.  BONES: Degenerative changes.    IMPRESSION:    Previously seen right hydronephrosis and hydroureter has markedly   improved. Minimal fullness to the right collecting system and ureter is   seen relative to the left. Intravenous contrast is seen within the distal   right ureter to just proximal to the right ureteral pelvic junction. On   the venous phase a small focus of enhancement within the ureter is seen   (8:124). This could represent a small urothelial lesion. There are direct   visualization.    Tiny droplet of air is seen within the urinary bladder. No urothelial   lesion seen. Question recent instrumentation versus gas producing   organism..      --- End of Report ---    REBECA CANO MD; Attending Radiologist  This document has been electronically signed. Mar  5 2023 12:53AM

## 2023-03-05 NOTE — CHART NOTE - NSCHARTNOTEFT_GEN_A_CORE
Patient is being managed for sepsis and seems to be improving.  EF has also improved on follow-up echo.  Signing off.  Follow-up as outpatient and call back if needed.

## 2023-03-05 NOTE — PROGRESS NOTE ADULT - ASSESSMENT
Impression: 67y Female  PMH HTN, DM a/w e coli bacteremia and uremia with mild right hydro and a UTI, COVID. Concern for possible distal ureteral  stricture. Patient hemodynamically stable, remains off pressors  Leukocytosis and Cr improving   UCx: E coli      -CT Urogram reviewed. +significant improvement in R hydro. contrast makes it way down to distal R ureter just proximal to UVJ- ?stricture vs lesion  -will discuss with Dr Pak if patient would benefit from PCN vs ureteroscopy +/- biopsy, R stent placement  -complete care as per primary team  will discuss with urology attending, full recs to follow

## 2023-03-06 LAB
GLUCOSE BLDC GLUCOMTR-MCNC: 196 MG/DL — HIGH (ref 70–99)
GLUCOSE BLDC GLUCOMTR-MCNC: 222 MG/DL — HIGH (ref 70–99)
GLUCOSE BLDC GLUCOMTR-MCNC: 225 MG/DL — HIGH (ref 70–99)
GLUCOSE BLDC GLUCOMTR-MCNC: 233 MG/DL — HIGH (ref 70–99)

## 2023-03-06 PROCEDURE — 99232 SBSQ HOSP IP/OBS MODERATE 35: CPT

## 2023-03-06 RX ADMIN — Medication 4: at 11:46

## 2023-03-06 RX ADMIN — Medication 500 MILLIGRAM(S): at 13:36

## 2023-03-06 RX ADMIN — CHLORHEXIDINE GLUCONATE 1 APPLICATION(S): 213 SOLUTION TOPICAL at 05:50

## 2023-03-06 RX ADMIN — Medication 500 MILLIGRAM(S): at 05:41

## 2023-03-06 RX ADMIN — Medication 3 UNIT(S): at 17:09

## 2023-03-06 RX ADMIN — HEPARIN SODIUM 5000 UNIT(S): 5000 INJECTION INTRAVENOUS; SUBCUTANEOUS at 21:17

## 2023-03-06 RX ADMIN — HEPARIN SODIUM 5000 UNIT(S): 5000 INJECTION INTRAVENOUS; SUBCUTANEOUS at 13:37

## 2023-03-06 RX ADMIN — Medication 4: at 08:16

## 2023-03-06 RX ADMIN — Medication 81 MILLIGRAM(S): at 13:37

## 2023-03-06 RX ADMIN — Medication 2: at 17:08

## 2023-03-06 RX ADMIN — Medication 3 UNIT(S): at 08:17

## 2023-03-06 RX ADMIN — HEPARIN SODIUM 5000 UNIT(S): 5000 INJECTION INTRAVENOUS; SUBCUTANEOUS at 05:40

## 2023-03-06 RX ADMIN — Medication 3 UNIT(S): at 11:47

## 2023-03-06 RX ADMIN — Medication 12.5 MILLIGRAM(S): at 17:11

## 2023-03-06 RX ADMIN — Medication 500 MILLIGRAM(S): at 21:17

## 2023-03-06 RX ADMIN — INSULIN GLARGINE 30 UNIT(S): 100 INJECTION, SOLUTION SUBCUTANEOUS at 08:29

## 2023-03-06 RX ADMIN — Medication 12.5 MILLIGRAM(S): at 05:41

## 2023-03-06 NOTE — PROGRESS NOTE ADULT - SUBJECTIVE AND OBJECTIVE BOX
Patient: NELSON ZEPEDA [id number] 67y Female                            Hospitalist Attending Note    No complaints.   No chest pain / palpitations.   No fever / chills.    Seen with RN.    ____________________PHYSICAL EXAM:  GENERAL:  NAD, alert, interactive, slightly confused.    HEENT: NCAT  CARDIOVASCULAR:  S1, S2  LUNGS: CTAB  ABDOMEN:  soft, (-) tenderness, (-) distension, (+) bowel sounds, (-) guarding, (-) rebound (-) rigidity  EXTREMITIES:  no cyanosis / clubbing / edema.   Sacral ~6x2cm, ~1x2cm  Stage II sacral decubitus ulcer  ____________________      VITALS:  Vital Signs Last 24 Hrs  T(C): 36.8 (06 Mar 2023 11:00), Max: 37.3 (05 Mar 2023 18:00)  T(F): 98.2 (06 Mar 2023 11:00), Max: 99.1 (05 Mar 2023 18:00)  HR: 87 (06 Mar 2023 11:00) (87 - 95)  BP: 138/73 (06 Mar 2023 11:00) (128/70 - 159/78)  BP(mean): --  RR: 18 (06 Mar 2023 11:00) (18 - 18)  SpO2: 97% (06 Mar 2023 11:00) (95% - 98%)    Parameters below as of 06 Mar 2023 05:00  Patient On (Oxygen Delivery Method): room air     Daily     Daily   CAPILLARY BLOOD GLUCOSE      POCT Blood Glucose.: 233 mg/dL (06 Mar 2023 11:20)  POCT Blood Glucose.: 225 mg/dL (06 Mar 2023 07:41)  POCT Blood Glucose.: 264 mg/dL (05 Mar 2023 21:26)  POCT Blood Glucose.: 220 mg/dL (05 Mar 2023 15:50)    I&O's Summary      LABS:                        9.6    6.37  )-----------( 402      ( 05 Mar 2023 05:45 )             30.7     03-05    138  |  104  |  19  ----------------------------<  163<H>  4.0   |  28  |  0.81    Ca    9.3      05 Mar 2023 05:45                    MEDICATIONS:  aspirin  chewable 81 milliGRAM(s) Oral daily  cephalexin 500 milliGRAM(s) Oral three times a day  chlorhexidine 2% Cloths 1 Application(s) Topical <User Schedule>  heparin   Injectable 5000 Unit(s) SubCutaneous every 8 hours  insulin glargine Injectable (LANTUS) 30 Unit(s) SubCutaneous every morning  insulin lispro (ADMELOG) corrective regimen sliding scale   SubCutaneous three times a day before meals  insulin lispro Injectable (ADMELOG) 3 Unit(s) SubCutaneous three times a day before meals  lidocaine 2% Viscous 5 milliLiter(s) Swish and Spit every 6 hours PRN  metoprolol tartrate 12.5 milliGRAM(s) Oral two times a day  polyethylene glycol 3350 17 Gram(s) Oral at bedtime  sodium chloride 0.9% lock flush 10 milliLiter(s) IV Push every 1 hour PRN

## 2023-03-06 NOTE — PHYSICAL THERAPY INITIAL EVALUATION ADULT - PERTINENT HX OF CURRENT PROBLEM, REHAB EVAL
As per pt wound on the left foot, b/l GLuteal region,more than a year. no RN to see the wound, just clean, put some dressing at home by family members

## 2023-03-06 NOTE — PROGRESS NOTE ADULT - ASSESSMENT
67F w/ DM, HTN admitted with septic shock due to E coli bacteremia and UTI.  CT showed R hydronephrosis w/ possible obstruction due to stricture, as well as GEOVANNI, NSTEMI vs demand ischemia and septic cardiomyopathy. Noted to have COVID19+ but then negative x2. Initial TTE on 2/24 showed EF 35-40%, repeat TTE 3/3 showed EF 60-65%.        # Sacral Decubitus Ulcer - Now developed stage II decubitus ulcer.  D/w daughter regarding [facility] vs [facility].  Pt had refused [facility].  Daughter to discuss with mother.    # Septic Shock - Resolved.  # NSTEMI  Septic Cardiomyopathy - Cardiology following.  Initial TTE on 2/24 showed EF 35-40%, repeat TTE 3/3 showed EF 60-65%.  Cardiology following.  # Ecoli Bacteremia / UTI - Seen by ID, discussed with Dr. Johansen.  Continue Abx x 10d -> 3/8.  Change to po.    # Obstructive Uropathy / GEOVANNI - Cr improving.   input appreciated.  Renal scan showed Bilaterally normal perfusion.  # Diabetes Type II - monitor fingersticks.  Insulin coverage for hyperglycemia.  # Essential HTN - Monitor BP.  Continue antihypertensives.  # Metabolic Encephalopathy - improved   # Inpatient DVT prophylaxis - subcutaneous Heparin     d/c to [facility] vs home d/w daughter carmen [phone]

## 2023-03-06 NOTE — PHYSICAL THERAPY INITIAL EVALUATION ADULT - CRITERIA FOR SKILLED THERAPEUTIC INTERVENTIONS
impairments found/anticipated equipment needs at discharge/anticipated discharge recommendation
Sub Acute Rehab/impairments found/anticipated discharge recommendation

## 2023-03-06 NOTE — PROGRESS NOTE ADULT - ASSESSMENT
67y Female PMH HTN, DM admitted with E. coli bacteremia, uremia with mild right hydro, GEOVANNI, UTI, COVID. Patient remains afebrile, stable off pressors. No leukocytosis, GEOVANNI resolved, creatinine .81 this AM. Per Dr. Pak, patient should undergo cystoscopy, ureteroscopy and possible biopsy for further evaluation.    - OR planning inpatient vs outpatient: Per Dr. Pak, if patient remains inpatient can undergo procedures this week, if not can be performed outpatient  - continue care management per primary medical team  - discussed with Dr. Pak

## 2023-03-06 NOTE — PHYSICAL THERAPY INITIAL EVALUATION ADULT - MODALITIES TREATMENT COMMENTS
1. Left foot- Unstageable pressure injury with 100% necrotic tissue with hyperkeratosis 2.8x1xo.1 cms, 2. Right buttock Pressure injury stage 2-4.5x2.7x0.1, 3. Left buttock stage 2 pressure injury -2.2x1.3x0.1 cms, dressings as per recommendations in POC.

## 2023-03-06 NOTE — PROGRESS NOTE ADULT - SUBJECTIVE AND OBJECTIVE BOX
Patient seen and examined at bedside.  Complains of prolonged hospitalization, expresses desire to go home.  Reports she is feeling "well overall".  Denies pain, burning with urination, increased urinary frequency, malodorous urine, fever, chills.     MEDICATIONS  (STANDING):  aspirin  chewable 81 milliGRAM(s) Oral daily  cephalexin 500 milliGRAM(s) Oral three times a day  chlorhexidine 2% Cloths 1 Application(s) Topical <User Schedule>  heparin   Injectable 5000 Unit(s) SubCutaneous every 8 hours  insulin glargine Injectable (LANTUS) 30 Unit(s) SubCutaneous every morning  insulin lispro (ADMELOG) corrective regimen sliding scale   SubCutaneous three times a day before meals  insulin lispro Injectable (ADMELOG) 3 Unit(s) SubCutaneous three times a day before meals  metoprolol tartrate 12.5 milliGRAM(s) Oral two times a day  polyethylene glycol 3350 17 Gram(s) Oral at bedtime    MEDICATIONS  (PRN):  lidocaine 2% Viscous 5 milliLiter(s) Swish and Spit every 6 hours PRN Mouth Care  sodium chloride 0.9% lock flush 10 milliLiter(s) IV Push every 1 hour PRN Pre/post blood products, medications, blood draw, and to maintain line patency      Vital Signs Last 24 Hrs  T(C): 36.8 (06 Mar 2023 11:00), Max: 37.3 (05 Mar 2023 18:00)  T(F): 98.2 (06 Mar 2023 11:00), Max: 99.1 (05 Mar 2023 18:00)  HR: 87 (06 Mar 2023 11:00) (87 - 95)  BP: 138/73 (06 Mar 2023 11:00) (128/70 - 159/78)  BP(mean): --  RR: 18 (06 Mar 2023 11:00) (18 - 18)  SpO2: 97% (06 Mar 2023 11:00) (95% - 98%)    Parameters below as of 06 Mar 2023 05:00  Patient On (Oxygen Delivery Method): room air      PHYSICAL EXAM:  General: NAD  Neuro:  Alert & oriented x 3  HEENT: NCAT, EOMI, conjunctiva clear  CV: +S1+S2   Lung: respirations nonlabored, good inspiratory effort  Abdomen: soft, NTND  : bladder soft, nondistended, non-tender to palpation.   Extremities: no pedal edema or calf tenderness noted     I&O's Detail      LABS:                        9.6    6.37  )-----------( 402      ( 05 Mar 2023 05:45 )             30.7     03-05    138  |  104  |  19  ----------------------------<  163<H>  4.0   |  28  |  0.81    Ca    9.3      05 Mar 2023 05:45        < from: CT Abdomen and Pelvis w/ IV Cont (03.04.23 @ 20:25) >    ACC: [id number] EXAM:  CT ABDOMEN AND PELVIS IC   ORDERED BY: DENNIS GUERRERO     PROCEDURE DATE:  03/04/2023      INTERPRETATION:  CLINICAL INFORMATION: Ureteral stricture. UTI. Sepsis.    COMPARISON: CT abdomen pelvis 2/23/2023.    CONTRAST/COMPLICATIONS:  IV Contrast: Omnipaque 350  90 cc administered   10 cc discarded  Oral Contrast: NONE  Complications: None reported at time of study completion    PROCEDURE:  CT of the Abdomen and Pelvis was performed.  Precontrast, Nephrographic and Excretory phases were acquired (CT   UROGRAM).  10 mg of Lasix was administered.  Sagittal and coronal reformats were performed.    FINDINGS:  LOWER CHEST: Bibasilar atelectasis and probable linear scarring is   unchanged.    LIVER: Small hepatic cysts, unchanged.  BILE DUCTS: Normal caliber.  GALLBLADDER: Within normal limits.  SPLEEN: Within normal limits.  PANCREAS: Within normal limits.  ADRENALS: Within normal limits.  KIDNEYS/URETERS: Left kidney is unremarkable. No hydronephrosis or   nephrolithiasis. Previously seen right hydronephrosis and hydroureter has   markedly improved. Minimal fullness to the right collecting system and   ureter is seen relative to the left. Intravenous contrast is seen within   the distal right ureter to just proximal to the right ureteral pelvic   junction. On the venous phase a small focus of enhancement within the   ureter is seen (8:124). This could represent a small urothelial lesion.    BLADDER: Tiny droplet of air is seen within the urinary bladder. No   urothelial lesion seen.  REPRODUCTIVE ORGANS: Uterus and adnexa within normal limits.    BOWEL: No hiatal hernia. No bowel obstruction. Appendix is normal.  PERITONEUM: No ascites.  VESSELS: Atherosclerotic changes.  RETROPERITONEUM/LYMPH NODES: No lymphadenopathy.  ABDOMINAL WALL: Within normal limits.  BONES: Degenerative changes.    IMPRESSION:    Previously seen right hydronephrosis and hydroureter has markedly   improved. Minimal fullness to the right collecting system and ureter is   seen relative to the left. Intravenous contrast is seen within the distal   right ureter to just proximal to the right ureteral pelvic junction. On   the venous phase a small focus of enhancement within the ureter is seen   (8:124). This could represent a small urothelial lesion. There are direct   visualization.    Tiny droplet of air is seen within the urinary bladder. No urothelial   lesion seen. Question recent instrumentation versus gas producing   organism..      --- End of Report ---      REBECA CANO MD; Attending Radiologist  This document has been electronically signed. Mar  5 2023 12:53AM    < end of copied text >

## 2023-03-06 NOTE — PHYSICAL THERAPY INITIAL EVALUATION ADULT - GENERAL OBSERVATIONS, REHAB EVAL
Pt received in supine, AxOX3, NAD, As per pt wound on the left foot, b/l GLuteal region, no RN to see the wound, just clean, put some dressing at home by family members
Pt was found semi supine in bed in NAD, +heplock, +primafit, agreeable to PT lizeth Sanabria at bedside, LEONARD Arvizu aware.

## 2023-03-06 NOTE — PHYSICAL THERAPY INITIAL EVALUATION ADULT - ADDITIONAL COMMENTS
Pls refer to functional eval on March 3rd
Pt's 21 y.o grandson provided information:  Pt lives with daughter and grandchildren in a  with 3 steps to enter, no rails and 2 flight of stairs once inside to get to the 2nd floor where the patient's bathroom and bed room are located. Pt's grandson states that there are family members available (including his 20 year old sister) to help patient as needed (with wheelchair) with stair negotiation, ambulation, and transfers. Pt's grandson states that they are working towards listing his sister as patient's care worker/HHA. Pt's grandson also states that the family has been helping patient with her care since she had the left foot amputation.

## 2023-03-07 LAB
ANION GAP SERPL CALC-SCNC: 6 MMOL/L — SIGNIFICANT CHANGE UP (ref 5–17)
BUN SERPL-MCNC: 22 MG/DL — SIGNIFICANT CHANGE UP (ref 7–23)
CALCIUM SERPL-MCNC: 9 MG/DL — SIGNIFICANT CHANGE UP (ref 8.5–10.1)
CHLORIDE SERPL-SCNC: 107 MMOL/L — SIGNIFICANT CHANGE UP (ref 96–108)
CO2 SERPL-SCNC: 25 MMOL/L — SIGNIFICANT CHANGE UP (ref 22–31)
CREAT SERPL-MCNC: 0.86 MG/DL — SIGNIFICANT CHANGE UP (ref 0.5–1.3)
EGFR: 74 ML/MIN/1.73M2 — SIGNIFICANT CHANGE UP
GLUCOSE BLDC GLUCOMTR-MCNC: 136 MG/DL — HIGH (ref 70–99)
GLUCOSE BLDC GLUCOMTR-MCNC: 166 MG/DL — HIGH (ref 70–99)
GLUCOSE BLDC GLUCOMTR-MCNC: 232 MG/DL — HIGH (ref 70–99)
GLUCOSE BLDC GLUCOMTR-MCNC: 246 MG/DL — HIGH (ref 70–99)
GLUCOSE SERPL-MCNC: 218 MG/DL — HIGH (ref 70–99)
HCT VFR BLD CALC: 28.2 % — LOW (ref 34.5–45)
HGB BLD-MCNC: 8.7 G/DL — LOW (ref 11.5–15.5)
MCHC RBC-ENTMCNC: 27.3 PG — SIGNIFICANT CHANGE UP (ref 27–34)
MCHC RBC-ENTMCNC: 30.9 G/DL — LOW (ref 32–36)
MCV RBC AUTO: 88.4 FL — SIGNIFICANT CHANGE UP (ref 80–100)
NRBC # BLD: 0 /100 WBCS — SIGNIFICANT CHANGE UP (ref 0–0)
PLATELET # BLD AUTO: 447 K/UL — HIGH (ref 150–400)
POTASSIUM SERPL-MCNC: 4.3 MMOL/L — SIGNIFICANT CHANGE UP (ref 3.5–5.3)
POTASSIUM SERPL-SCNC: 4.3 MMOL/L — SIGNIFICANT CHANGE UP (ref 3.5–5.3)
RBC # BLD: 3.19 M/UL — LOW (ref 3.8–5.2)
RBC # FLD: 14.2 % — SIGNIFICANT CHANGE UP (ref 10.3–14.5)
SODIUM SERPL-SCNC: 138 MMOL/L — SIGNIFICANT CHANGE UP (ref 135–145)
WBC # BLD: 5.52 K/UL — SIGNIFICANT CHANGE UP (ref 3.8–10.5)
WBC # FLD AUTO: 5.52 K/UL — SIGNIFICANT CHANGE UP (ref 3.8–10.5)

## 2023-03-07 PROCEDURE — 99232 SBSQ HOSP IP/OBS MODERATE 35: CPT

## 2023-03-07 RX ADMIN — Medication 3 UNIT(S): at 17:05

## 2023-03-07 RX ADMIN — Medication 81 MILLIGRAM(S): at 11:45

## 2023-03-07 RX ADMIN — Medication 3 UNIT(S): at 11:45

## 2023-03-07 RX ADMIN — Medication 12.5 MILLIGRAM(S): at 05:15

## 2023-03-07 RX ADMIN — Medication 4: at 08:05

## 2023-03-07 RX ADMIN — Medication 3 UNIT(S): at 08:05

## 2023-03-07 RX ADMIN — INSULIN GLARGINE 30 UNIT(S): 100 INJECTION, SOLUTION SUBCUTANEOUS at 08:06

## 2023-03-07 RX ADMIN — CHLORHEXIDINE GLUCONATE 1 APPLICATION(S): 213 SOLUTION TOPICAL at 05:15

## 2023-03-07 RX ADMIN — HEPARIN SODIUM 5000 UNIT(S): 5000 INJECTION INTRAVENOUS; SUBCUTANEOUS at 05:15

## 2023-03-07 RX ADMIN — Medication 4: at 11:44

## 2023-03-07 RX ADMIN — HEPARIN SODIUM 5000 UNIT(S): 5000 INJECTION INTRAVENOUS; SUBCUTANEOUS at 21:34

## 2023-03-07 RX ADMIN — HEPARIN SODIUM 5000 UNIT(S): 5000 INJECTION INTRAVENOUS; SUBCUTANEOUS at 13:44

## 2023-03-07 RX ADMIN — Medication 12.5 MILLIGRAM(S): at 17:10

## 2023-03-07 RX ADMIN — Medication 500 MILLIGRAM(S): at 05:15

## 2023-03-07 NOTE — PROGRESS NOTE ADULT - ASSESSMENT
67F w/ DM, HTN admitted with septic shock due to E coli bacteremia and UTI.  CT showed R hydronephrosis w/ possible obstruction due to stricture, as well as GEOVANNI, NSTEMI vs demand ischemia and septic cardiomyopathy. Noted to have COVID19+ but then negative x2. Initial TTE on 2/24 showed EF 35-40%, repeat TTE 3/3 showed EF 60-65%.  Now developed stage II decubitus ulcer.  Wound care consulted.per urology ?stricture vs lesion.  Pt would benefit from PCN vs ureteroscopy +/- biopsy, R stent placement.  Can be performed as outpatient.       # Sacral Decubitus Ulcer - Now developed stage II decubitus ulcer.  Wound care consulted.  # R hydronephrosis - per urology ?stricture vs lesion.  Pt would benefit from PCN vs ureteroscopy +/- biopsy, R stent placement.  Can be performed as outpatient.    # Septic Shock - Resolved.  # NSTEMI  Septic Cardiomyopathy - Cardiology following.  Initial TTE on 2/24 showed EF 35-40%, repeat TTE 3/3 showed EF 60-65%.  Cardiology following.  # Ecoli Bacteremia / UTI - Seen by ID, discussed with Dr. Johansen.  Continue Abx x 10d -> 3/8.  Change to po.    # Obstructive Uropathy / GEOVANNI - Cr improving.   input appreciated.  Renal scan showed Bilaterally normal perfusion.  # Diabetes Type II - monitor fingersticks.  Insulin coverage for hyperglycemia.  # Essential HTN - Monitor BP.  Continue antihypertensives.  # Metabolic Encephalopathy - improved   # Inpatient DVT prophylaxis - subcutaneous Heparin     d/c to [facility] d/w daughter carmen [phone] on 3/6.

## 2023-03-07 NOTE — PROGRESS NOTE ADULT - SUBJECTIVE AND OBJECTIVE BOX
Patient: NELSON ZEPEDA [id number] 67y Female                            Hospitalist Attending Note    No complaints.   No chest pain / palpitations.   No fever / chills.        ____________________PHYSICAL EXAM:  GENERAL:  NAD, alert, interactive, slightly confused.    HEENT: NCAT  CARDIOVASCULAR:  S1, S2  LUNGS: CTAB  ABDOMEN:  soft, (-) tenderness, (-) distension, (+) bowel sounds, (-) guarding, (-) rebound (-) rigidity  EXTREMITIES:  no cyanosis / clubbing / edema.  s/p L TMA.  Small dry eschar.    Sacral ~6x2cm, ~1x2cm  Stage II sacral decubitus ulcer  ____________________    VITALS:  Vital Signs Last 24 Hrs  T(C): 36.7 (07 Mar 2023 17:17), Max: 37.4 (06 Mar 2023 23:47)  T(F): 98.1 (07 Mar 2023 17:17), Max: 99.4 (06 Mar 2023 23:47)  HR: 95 (07 Mar 2023 17:17) (85 - 95)  BP: 174/82 (07 Mar 2023 17:17) (111/61 - 174/82)  BP(mean): --  RR: 18 (07 Mar 2023 17:17) (16 - 18)  SpO2: 97% (07 Mar 2023 17:17) (96% - 97%)    Parameters below as of 07 Mar 2023 17:17  Patient On (Oxygen Delivery Method): room air     Daily     Daily   CAPILLARY BLOOD GLUCOSE      POCT Blood Glucose.: 136 mg/dL (07 Mar 2023 16:31)  POCT Blood Glucose.: 232 mg/dL (07 Mar 2023 11:39)  POCT Blood Glucose.: 246 mg/dL (07 Mar 2023 07:50)  POCT Blood Glucose.: 222 mg/dL (06 Mar 2023 21:15)    I&O's Summary    06 Mar 2023 07:01  -  07 Mar 2023 07:00  --------------------------------------------------------  IN: 120 mL / OUT: 1450 mL / NET: -1330 mL        LABS:                        8.7    5.52  )-----------( 447      ( 07 Mar 2023 07:35 )             28.2     03-07    138  |  107  |  22  ----------------------------<  218<H>  4.3   |  25  |  0.86    Ca    9.0      07 Mar 2023 07:35                    MEDICATIONS:  aspirin  chewable 81 milliGRAM(s) Oral daily  chlorhexidine 2% Cloths 1 Application(s) Topical <User Schedule>  heparin   Injectable 5000 Unit(s) SubCutaneous every 8 hours  insulin glargine Injectable (LANTUS) 30 Unit(s) SubCutaneous every morning  insulin lispro (ADMELOG) corrective regimen sliding scale   SubCutaneous three times a day before meals  insulin lispro Injectable (ADMELOG) 3 Unit(s) SubCutaneous three times a day before meals  lidocaine 2% Viscous 5 milliLiter(s) Swish and Spit every 6 hours PRN  metoprolol tartrate 12.5 milliGRAM(s) Oral two times a day  polyethylene glycol 3350 17 Gram(s) Oral at bedtime  sodium chloride 0.9% lock flush 10 milliLiter(s) IV Push every 1 hour PRN

## 2023-03-08 LAB
FLUAV AG NPH QL: SIGNIFICANT CHANGE UP
FLUBV AG NPH QL: SIGNIFICANT CHANGE UP
GLUCOSE BLDC GLUCOMTR-MCNC: 162 MG/DL — HIGH (ref 70–99)
GLUCOSE BLDC GLUCOMTR-MCNC: 175 MG/DL — HIGH (ref 70–99)
GLUCOSE BLDC GLUCOMTR-MCNC: 188 MG/DL — HIGH (ref 70–99)
GLUCOSE BLDC GLUCOMTR-MCNC: 264 MG/DL — HIGH (ref 70–99)
SARS-COV-2 RNA SPEC QL NAA+PROBE: SIGNIFICANT CHANGE UP

## 2023-03-08 PROCEDURE — 99232 SBSQ HOSP IP/OBS MODERATE 35: CPT

## 2023-03-08 RX ADMIN — Medication 3 UNIT(S): at 07:34

## 2023-03-08 RX ADMIN — Medication 2: at 07:34

## 2023-03-08 RX ADMIN — Medication 81 MILLIGRAM(S): at 11:38

## 2023-03-08 RX ADMIN — CHLORHEXIDINE GLUCONATE 1 APPLICATION(S): 213 SOLUTION TOPICAL at 05:35

## 2023-03-08 RX ADMIN — Medication 3 UNIT(S): at 16:34

## 2023-03-08 RX ADMIN — Medication 12.5 MILLIGRAM(S): at 17:33

## 2023-03-08 RX ADMIN — Medication 6: at 11:36

## 2023-03-08 RX ADMIN — HEPARIN SODIUM 5000 UNIT(S): 5000 INJECTION INTRAVENOUS; SUBCUTANEOUS at 22:29

## 2023-03-08 RX ADMIN — Medication 2: at 16:33

## 2023-03-08 RX ADMIN — HEPARIN SODIUM 5000 UNIT(S): 5000 INJECTION INTRAVENOUS; SUBCUTANEOUS at 05:32

## 2023-03-08 RX ADMIN — Medication 12.5 MILLIGRAM(S): at 05:33

## 2023-03-08 RX ADMIN — HEPARIN SODIUM 5000 UNIT(S): 5000 INJECTION INTRAVENOUS; SUBCUTANEOUS at 15:04

## 2023-03-08 RX ADMIN — INSULIN GLARGINE 30 UNIT(S): 100 INJECTION, SOLUTION SUBCUTANEOUS at 07:35

## 2023-03-08 RX ADMIN — Medication 3 UNIT(S): at 11:37

## 2023-03-08 NOTE — CONSULT NOTE ADULT - CONSULT REASON
Mild right hydroureteronephrosis with delayed right nephrogram, possibly ascending urinary tract infection. Possible benign or malignant distal  ureteral stricture
Urosepsis, septic shock, elevated troponins
left foot wound
Duration of antibiotics

## 2023-03-08 NOTE — PROGRESS NOTE ADULT - SUBJECTIVE AND OBJECTIVE BOX
CHIEF COMPLAINT: Follow up of sacral ulcer  no fever  no vomiting   no diarrhea   no sob reported       PHYSICAL EXAM:    GENERAL: Elderly, pleasant no acute distress  CHEST/LUNG:  No wheezing, no crackles   HEART: Regular rate and rhythm; No murmurs  ABDOMEN: Soft, Nontender, Nondistended; Bowel sounds present  EXTREMITIES:  No clubbing, cyanosis, or edema.   Left foot- Unstageable pressure injury with 100% necrotic tissue with hyperkeratosis 2.8x1xo.1 cms, 2. Right buttock Pressure injury stage 2-4.5x2.7x0.1, 3. Left buttock stage 2 pressure injury -2.2x1.3x0.1 cms        OBJECTIVE DATA:   Vital Signs Last 24 Hrs  T(C): 36.7 (08 Mar 2023 10:29), Max: 36.7 (07 Mar 2023 17:17)  T(F): 98 (08 Mar 2023 10:29), Max: 98.1 (07 Mar 2023 17:17)  HR: 89 (08 Mar 2023 10:29) (81 - 95)  BP: 116/63 (08 Mar 2023 10:29) (116/63 - 174/82)  BP(mean): --  RR: 18 (08 Mar 2023 10:29) (16 - 18)  SpO2: 96% (08 Mar 2023 10:29) (94% - 97%)    Parameters below as of 08 Mar 2023 10:29  Patient On (Oxygen Delivery Method): room air               Daily     Daily   LABS:                        8.7    5.52  )-----------( 447      ( 07 Mar 2023 07:35 )             28.2             03-07    138  |  107  |  22  ----------------------------<  218<H>  4.3   |  25  |  0.86    Ca    9.0      07 Mar 2023 07:35                          CAPILLARY BLOOD GLUCOSE      POCT Blood Glucose.: 264 mg/dL (08 Mar 2023 11:13)      MEDICATIONS  (STANDING):  aspirin  chewable 81 milliGRAM(s) Oral daily  chlorhexidine 2% Cloths 1 Application(s) Topical <User Schedule>  heparin   Injectable 5000 Unit(s) SubCutaneous every 8 hours  insulin glargine Injectable (LANTUS) 30 Unit(s) SubCutaneous every morning  insulin lispro (ADMELOG) corrective regimen sliding scale   SubCutaneous three times a day before meals  insulin lispro Injectable (ADMELOG) 3 Unit(s) SubCutaneous three times a day before meals  metoprolol tartrate 12.5 milliGRAM(s) Oral two times a day  polyethylene glycol 3350 17 Gram(s) Oral at bedtime    MEDICATIONS  (PRN):  lidocaine 2% Viscous 5 milliLiter(s) Swish and Spit every 6 hours PRN Mouth Care  sodium chloride 0.9% lock flush 10 milliLiter(s) IV Push every 1 hour PRN Pre/post blood products, medications, blood draw, and to maintain line patency

## 2023-03-08 NOTE — CONSULT NOTE ADULT - SUBJECTIVE AND OBJECTIVE BOX
Patient is a 67y old  Female who presents with a chief complaint of UTI sepsis (08 Mar 2023 15:05)      HPI:  67F PMH HTN, DM BIBEMS from home for AMS. Per EMS, pt coming from home, family called as pt found confused 0400 this AM. Per EMS, FS read as 'High.' EMS reports pt found in feces / urine.On ED arrival, pt orally febrile 103.5 F tachycardic to 120s, FS 400s.   In ED pt found to have UTI w/ CT abd with "Mild right hydroureteronephrosis with delayed right nephrogram, possibly ascending urinary tract infection. Underlying benign or malignant distal ureteral stricture is difficult to exclude."  CT chest with atelectasis vs pna. CTH neg. Labs significant for neutropenia with bandemia, hyperglycemia in 400s, lactate of 8, and trops peaked to 5K without EKG changes, and GEOVANNI. Pt started on Vanc/Zosyn. Pt also found to be covid + in ED. Pt initially HD stable, but subsequently became transiently Htn to 70s/40s after 4L IVF and hypoxic to 80s on RA requiring 2LNC.  ICU consulted for septic shock 2/2 UTI.    Subjective: pt seen and examined at the bedside. Pt unable to answer questions 2/2 AMS.  Upon ICU evaluation, pt altered, following some commands but combative. Pt BP in low 100s after IVF and satting well on 2LNC.  POCUS with hyperdynamic LV but without wall motion abnormality no pleural or pericardial effusions, IVC indeterminate, and lungs with scattered B lines bilaterally .        (23 Feb 2023 19:00)      PAST MEDICAL & SURGICAL HISTORY:  Hypertension      Family history of diabetes mellitus      No significant past surgical history          MEDICATIONS  (STANDING):  aspirin  chewable 81 milliGRAM(s) Oral daily  chlorhexidine 2% Cloths 1 Application(s) Topical <User Schedule>  heparin   Injectable 5000 Unit(s) SubCutaneous every 8 hours  insulin glargine Injectable (LANTUS) 30 Unit(s) SubCutaneous every morning  insulin lispro (ADMELOG) corrective regimen sliding scale   SubCutaneous three times a day before meals  insulin lispro Injectable (ADMELOG) 3 Unit(s) SubCutaneous three times a day before meals  metoprolol tartrate 12.5 milliGRAM(s) Oral two times a day  polyethylene glycol 3350 17 Gram(s) Oral at bedtime    MEDICATIONS  (PRN):  lidocaine 2% Viscous 5 milliLiter(s) Swish and Spit every 6 hours PRN Mouth Care  sodium chloride 0.9% lock flush 10 milliLiter(s) IV Push every 1 hour PRN Pre/post blood products, medications, blood draw, and to maintain line patency      Allergies    avocado (Pruritus)  Carrots (Pruritus)  No Known Drug Allergies  Plums (Pruritus)    Intolerances        VITALS:    Vital Signs Last 24 Hrs  T(C): 36.8 (08 Mar 2023 15:45), Max: 36.8 (08 Mar 2023 15:45)  T(F): 98.3 (08 Mar 2023 15:45), Max: 98.3 (08 Mar 2023 15:45)  HR: 83 (08 Mar 2023 15:45) (81 - 95)  BP: 128/70 (08 Mar 2023 15:45) (116/63 - 174/82)  BP(mean): --  RR: 18 (08 Mar 2023 15:45) (16 - 18)  SpO2: 97% (08 Mar 2023 15:45) (94% - 97%)    Parameters below as of 08 Mar 2023 15:45  Patient On (Oxygen Delivery Method): room air        LABS:                          8.7    5.52  )-----------( 447      ( 07 Mar 2023 07:35 )             28.2       03-07    138  |  107  |  22  ----------------------------<  218<H>  4.3   |  25  |  0.86    Ca    9.0      07 Mar 2023 07:35        CAPILLARY BLOOD GLUCOSE      POCT Blood Glucose.: 175 mg/dL (08 Mar 2023 16:08)  POCT Blood Glucose.: 264 mg/dL (08 Mar 2023 11:13)  POCT Blood Glucose.: 188 mg/dL (08 Mar 2023 07:24)  POCT Blood Glucose.: 166 mg/dL (07 Mar 2023 21:24)  POCT Blood Glucose.: 136 mg/dL (07 Mar 2023 16:31)          LOWER EXTREMITY PHYSICAL EXAM:    Vascular: RF DP/PT 0/4, Temperature gradient LF warm to touch   Neuro: Epicritic sensation absent to the level of digits B/L.  Musculoskeletal/Ortho: s/p LF choparts amputation   Skin: no open wounds on b/l feet, left foot distal hyperkeratosis    RADIOLOGY & ADDITIONAL STUDIES:

## 2023-03-08 NOTE — CONSULT NOTE ADULT - ASSESSMENT
68yo s/p LF Choparts amp on 9/10/21  - pt seen and evaluated  - afebrile, no leukocytosis   - s/p LF choparts amputation, no open wounds on b/l feet, left foot distal hyperkeratosis  - no acute signs of infection noted  - patient to follow up outpatient with Dr. Robb in wound  - pod stable for discharge, no acute intervention  - Discussed w/ attending

## 2023-03-08 NOTE — PROGRESS NOTE ADULT - ASSESSMENT
67F w/ DM, HTN admitted with septic shock due to E coli bacteremia and UTI.  CT showed R hydronephrosis w/ possible obstruction due to stricture, as well as GEOVANNI, NSTEMI vs demand ischemia and septic cardiomyopathy. Noted to have COVID19+ but then negative x2. Initial TTE on 2/24 showed EF 35-40%, repeat TTE 3/3 showed EF 60-65%.  Now developed stage II decubitus ulcer.  Wound care consulted.per urology ?stricture vs lesion.  Pt would benefit from PCN vs ureteroscopy +/- biopsy, R stent placement.  Can be performed as outpatient.       # Sacral Decubitus Ulcer - developed stage II decubitus ulcer.  Wound care consulted.  # R hydronephrosis - per urology ?stricture vs lesion.  Pt would benefit from PCN vs ureteroscopy +/- biopsy, R stent placement.  Can be performed as outpatient.    # Septic Shock - Resolved.  # NSTEMI  Septic Cardiomyopathy - Cardiology following.  Initial TTE on 2/24 showed EF 35-40%, repeat TTE 3/3 showed EF 60-65%.  Cardiology on board.  # Ecoli Bacteremia / UTI - Finished antibiotic  # Obstructive Uropathy / GEOVANNI - Cr improving.   input appreciated.  Renal scan showed Bilaterally normal perfusion.  # Diabetes Type II - monitor fingersticks.  Insulin coverage for hyperglycemia.  # Essential HTN - Monitor BP.  Continue antihypertensives.  # Metabolic Encephalopathy - improved   # Inpatient DVT prophylaxis - subcutaneous Heparin     d/c to [facility] d/w daughter carmen [phone] on 3/8      Medically ready for discharge. I discussed with podiatrist service to evaluate patient for Left foot ulcer.

## 2023-03-09 ENCOUNTER — TRANSCRIPTION ENCOUNTER (OUTPATIENT)
Age: 68
End: 2023-03-09

## 2023-03-09 VITALS
SYSTOLIC BLOOD PRESSURE: 154 MMHG | HEART RATE: 92 BPM | RESPIRATION RATE: 18 BRPM | DIASTOLIC BLOOD PRESSURE: 81 MMHG | OXYGEN SATURATION: 95 % | TEMPERATURE: 99 F

## 2023-03-09 LAB
GLUCOSE BLDC GLUCOMTR-MCNC: 130 MG/DL — HIGH (ref 70–99)
GLUCOSE BLDC GLUCOMTR-MCNC: 200 MG/DL — HIGH (ref 70–99)
GLUCOSE BLDC GLUCOMTR-MCNC: 235 MG/DL — HIGH (ref 70–99)

## 2023-03-09 PROCEDURE — 99239 HOSP IP/OBS DSCHRG MGMT >30: CPT

## 2023-03-09 RX ORDER — INSULIN GLARGINE 100 [IU]/ML
30 INJECTION, SOLUTION SUBCUTANEOUS
Qty: 0 | Refills: 0 | DISCHARGE
Start: 2023-03-09

## 2023-03-09 RX ADMIN — Medication 3 UNIT(S): at 07:53

## 2023-03-09 RX ADMIN — Medication 12.5 MILLIGRAM(S): at 17:11

## 2023-03-09 RX ADMIN — INSULIN GLARGINE 30 UNIT(S): 100 INJECTION, SOLUTION SUBCUTANEOUS at 08:10

## 2023-03-09 RX ADMIN — Medication 3 UNIT(S): at 11:20

## 2023-03-09 RX ADMIN — HEPARIN SODIUM 5000 UNIT(S): 5000 INJECTION INTRAVENOUS; SUBCUTANEOUS at 13:18

## 2023-03-09 RX ADMIN — Medication 3 UNIT(S): at 16:38

## 2023-03-09 RX ADMIN — Medication 12.5 MILLIGRAM(S): at 05:43

## 2023-03-09 RX ADMIN — HEPARIN SODIUM 5000 UNIT(S): 5000 INJECTION INTRAVENOUS; SUBCUTANEOUS at 05:43

## 2023-03-09 RX ADMIN — Medication 81 MILLIGRAM(S): at 11:15

## 2023-03-09 RX ADMIN — Medication 4: at 11:19

## 2023-03-09 RX ADMIN — Medication 2: at 07:52

## 2023-03-09 RX ADMIN — CHLORHEXIDINE GLUCONATE 1 APPLICATION(S): 213 SOLUTION TOPICAL at 05:42

## 2023-03-09 NOTE — PROGRESS NOTE ADULT - NUTRITIONAL ASSESSMENT
This patient has been assessed with a concern for Malnutrition and has been determined to have a diagnosis/diagnoses of Moderate protein-calorie malnutrition.    This patient is being managed with:   Diet Easy to Chew-  Consistent Carbohydrate {Evening Snack}  Supplement Feeding Modality:  Oral  Glucerna Shake Cans or Servings Per Day:  1       Frequency:  Daily  Entered: Mar  3 2023 12:33PM    
This patient has been assessed with a concern for Malnutrition and has been determined to have a diagnosis/diagnoses of Moderate protein-calorie malnutrition.    This patient is being managed with:   Diet Consistent Carbohydrate w/Evening Snack-  Entered: Feb 27 2023  2:28PM    The following pending diet order is being considered for treatment of Moderate protein-calorie malnutrition:  Diet Easy to Chew-  Consistent Carbohydrate {Evening Snack}  Supplement Feeding Modality:  Oral  Glucerna Shake Cans or Servings Per Day:  1       Frequency:  Daily  Entered: Mar  3 2023 12:33PM  
This patient has been assessed with a concern for Malnutrition and has been determined to have a diagnosis/diagnoses of Moderate protein-calorie malnutrition.    This patient is being managed with:   Diet Easy to Chew-  Consistent Carbohydrate {Evening Snack}  Supplement Feeding Modality:  Oral  Glucerna Shake Cans or Servings Per Day:  1       Frequency:  Daily  Entered: Mar  3 2023 12:33PM    

## 2023-03-09 NOTE — DISCHARGE NOTE PROVIDER - PROVIDER TOKENS
FREE:[LAST:[follow up with your PCP in a week post rehab discharge],PHONE:[(   )    -],FAX:[(   )    -]],PROVIDER:[TOKEN:[[id number]],FOLLOWUP:[1 month]]

## 2023-03-09 NOTE — DISCHARGE NOTE NURSING/CASE MANAGEMENT/SOCIAL WORK - NSDCPEFALRISK_GEN_ALL_CORE
For information on Fall & Injury Prevention, visit: [email or web address] OR  [email or web address] OR  https://www.cdc.gov/steadi/patient.html

## 2023-03-09 NOTE — DISCHARGE NOTE PROVIDER - CARE PROVIDER_API CALL
follow up with your PCP in a week post rehab discharge,   Phone: (   )    -  Fax: (   )    -  Follow Up Time:     Ross Pak)  Urology  [address] [place] [address]  Phone: [phone]  Fax: [phone]  Follow Up Time: 1 month

## 2023-03-09 NOTE — DISCHARGE NOTE PROVIDER - HOSPITAL COURSE
HPI: 67F w/ DM, HTN admitted with septic shock due to E coli bacteremia and UTI.  CT showed R hydronephrosis w/ possible obstruction due to stricture, as well as GEOVANNI, NSTEMI vs demand ischemia and septic cardiomyopathy. Noted to have COVID19+ but then negative x2. Initial TTE on 2/24 showed EF 35-40%, repeat TTE 3/3 showed EF 60-65%.  Now developed stage II decubitus ulcer.  Wound care consulted.per urology ?stricture vs lesion.  Pt would benefit from PCN vs ureteroscopy +/- biopsy, R stent placement.  Can be performed as outpatient.     Course:     # Sacral Decubitus Ulcer - developed stage II decubitus ulcer.  Wound care consult recs appreciated.   # R hydronephrosis - per urology ?stricture vs lesion.  Pt would benefit from PCN vs ureteroscopy +/- biopsy, R stent placement.  Can be performed as outpatient.    # sepsis with Septic Shock withnUTI - Resolved.  # NSTEMI  Septic Cardiomyopathy - Cardiology followed.  Initial TTE on 2/24 showed EF 35-40%, repeat TTE 3/3 showed EF 60-65%.  Cardiology on board.  # Ecoli Bacteremia / UTI - Finished antibiotic  # Obstructive Uropathy / GEOVANNI - Cr improved.   input appreciated.  Renal scan showed Bilaterally normal perfusion.  # Diabetes Type II - monitor fingersticks.  Insulin coverage for hyperglycemia.  # Essential HTN - Monitor BP.  Continue antihypertensives.  # Metabolic Encephalopathy - improved       d/c to [facility] d/w daughter carmen [phone] on 3/8      Discussed with podiatry service about left foot ulcer. no acute intervention. patient is medically ready for discharge.     Seen and examined by me today. Vitals stable.     All questions welcomed and answered appropriately. Patient verbalized understanding of post discharge physician's follows up and discharge instructions.   DC time spent by me face to face excluding billable procedures 38 mins

## 2023-03-09 NOTE — PROGRESS NOTE ADULT - SUBJECTIVE AND OBJECTIVE BOX
CHIEF COMPLAINT: Follow up of sacral ulcer  no fever  no vomiting   no diarrhea  no sob reported       PHYSICAL EXAM:    GENERAL: Elderly, pleasant no acute distress  CHEST/LUNG:  No wheezing, no crackles   HEART: Regular rate and rhythm; No murmurs  ABDOMEN: Soft, Nontender, Nondistended; Bowel sounds present  EXTREMITIES:  No clubbing, cyanosis, or edema.   Left foot- Unstageable pressure injury with 100% necrotic tissue with hyperkeratosis 2.8x1xo.1 cms, 2. Right buttock Pressure injury stage 2-4.5x2.7x0.1, 3. Left buttock stage 2 pressure injury -2.2x1.3x0.1 cms      OBJECTIVE DATA:     Vital Signs Last 24 Hrs  T(C): 36.9 (09 Mar 2023 10:41), Max: 36.9 (09 Mar 2023 10:41)  T(F): 98.4 (09 Mar 2023 10:41), Max: 98.4 (09 Mar 2023 10:41)  HR: 81 (09 Mar 2023 10:41) (81 - 94)  BP: 126/59 (09 Mar 2023 10:41) (126/59 - 149/81)  BP(mean): --  RR: 18 (09 Mar 2023 10:41) (16 - 18)  SpO2: 95% (09 Mar 2023 10:41) (95% - 98%)    Parameters below as of 09 Mar 2023 10:41  Patient On (Oxygen Delivery Method): room air    Daily Weight in k.4 (09 Mar 2023 05:13)      CAPILLARY BLOOD GLUCOSE      POCT Blood Glucose.: 235 mg/dL (09 Mar 2023 11:01)      MEDICATIONS  (STANDING):  aspirin  chewable 81 milliGRAM(s) Oral daily  chlorhexidine 2% Cloths 1 Application(s) Topical <User Schedule>  heparin   Injectable 5000 Unit(s) SubCutaneous every 8 hours  insulin glargine Injectable (LANTUS) 30 Unit(s) SubCutaneous every morning  insulin lispro (ADMELOG) corrective regimen sliding scale   SubCutaneous three times a day before meals  insulin lispro Injectable (ADMELOG) 3 Unit(s) SubCutaneous three times a day before meals  metoprolol tartrate 12.5 milliGRAM(s) Oral two times a day  polyethylene glycol 3350 17 Gram(s) Oral at bedtime    MEDICATIONS  (PRN):  lidocaine 2% Viscous 5 milliLiter(s) Swish and Spit every 6 hours PRN Mouth Care  sodium chloride 0.9% lock flush 10 milliLiter(s) IV Push every 1 hour PRN Pre/post blood products, medications, blood draw, and to maintain line patency

## 2023-03-09 NOTE — DISCHARGE NOTE PROVIDER - NSDCFUADDINST_GEN_ALL_CORE_FT
1) It is important to see your primary physician as well as other necessary consultants within  7-10 days post rehab discharge to perform a comprehensive medical review.  Call their offices for an appointment as soon as you leave the hospital.  If you do not have a primary physician or unable to reach your PCP, contact the [facility] Physician Referral Service at [phone].  Your medical issues appear to be stable at this time, but if your symptoms recur or worsen, contact your physicians and/or return to the hospital if necessary.  If you encounter any issues or questions with your medication, call your physicians before stopping the medication.    2) Please access [facility] Patient portal (as instructed on the discharge paperwork) to access your medical records at any time after discharge.

## 2023-03-09 NOTE — DISCHARGE NOTE PROVIDER - NSDCMRMEDTOKEN_GEN_ALL_CORE_FT
aspirin 81 mg oral delayed release tablet: 1 tab(s) orally once a day  gabapentin 100 mg oral capsule: 1 cap(s) orally 3 times a day  insulin glargine 100 units/mL subcutaneous solution: 30 unit(s) subcutaneous once a day  metFORMIN 500 mg oral tablet: 1 tab(s) orally 2 times a day  metoprolol: 12.5 milligram(s) orally 2 times a day  polyethylene glycol 3350 oral powder for reconstitution: 17 gram(s) orally once a day  senna oral tablet: 2 tab(s) orally once a day (at bedtime)

## 2023-03-09 NOTE — DISCHARGE NOTE NURSING/CASE MANAGEMENT/SOCIAL WORK - PATIENT PORTAL LINK FT
You can access the FollowMyHealth Patient Portal offered by [facility] by registering at the following website: [email or web address]. By joining [facility]’s FollowMyHealth portal, you will also be able to view your health information using other applications (apps) compatible with our system.

## 2023-03-09 NOTE — PROGRESS NOTE ADULT - REASON FOR ADMISSION
UTI sepsis

## 2023-03-09 NOTE — DISCHARGE NOTE NURSING/CASE MANAGEMENT/SOCIAL WORK - NSFLUVACAGEDISCH_IMM_ALL_CORE
#1. Application started for medical marijuana for reflex sympathetic dystrophy with posttraumatic stress disorder.  #2. Continue baclofen 10 mg three times daily, Lyrica 150 mg four times daily, and Norco 10/325 mg four times daily as needed for chronic left arm pain.  #3. Continue Cymbalta 60 mg daily which can improve both left arm pain and mood with posttraumatic stress disorder.  #4. Continue sumatriptan 100 mg as needed for rescue drug for headaches, likely migraines.  #5. Advised for patient to follow with [facility] if he can withstand the drive.  #6. Follow up for left arm pain, posttraumatic stress, and headaches following a work related injury in 6 months.    Adult

## 2023-03-09 NOTE — PROGRESS NOTE ADULT - PROVIDER SPECIALTY LIST ADULT
Critical Care
Critical Care
Hospitalist
Urology
Cardiology
Critical Care
Hospitalist
Urology
Cardiology
Critical Care
Hospitalist
Urology
Vascular Surgery
Hospitalist
Urology

## 2023-03-13 DIAGNOSIS — G93.41 METABOLIC ENCEPHALOPATHY: ICD-10-CM

## 2023-03-13 DIAGNOSIS — E83.42 HYPOMAGNESEMIA: ICD-10-CM

## 2023-03-13 DIAGNOSIS — N17.0 ACUTE KIDNEY FAILURE WITH TUBULAR NECROSIS: ICD-10-CM

## 2023-03-13 DIAGNOSIS — J96.01 ACUTE RESPIRATORY FAILURE WITH HYPOXIA: ICD-10-CM

## 2023-03-13 DIAGNOSIS — A41.9 SEPSIS, UNSPECIFIED ORGANISM: ICD-10-CM

## 2023-03-13 DIAGNOSIS — Z79.82 LONG TERM (CURRENT) USE OF ASPIRIN: ICD-10-CM

## 2023-03-13 DIAGNOSIS — J98.11 ATELECTASIS: ICD-10-CM

## 2023-03-13 DIAGNOSIS — E11.319 TYPE 2 DIABETES MELLITUS WITH UNSPECIFIED DIABETIC RETINOPATHY WITHOUT MACULAR EDEMA: ICD-10-CM

## 2023-03-13 DIAGNOSIS — E87.6 HYPOKALEMIA: ICD-10-CM

## 2023-03-13 DIAGNOSIS — D70.9 NEUTROPENIA, UNSPECIFIED: ICD-10-CM

## 2023-03-13 DIAGNOSIS — I51.81 TAKOTSUBO SYNDROME: ICD-10-CM

## 2023-03-13 DIAGNOSIS — Z78.1 PHYSICAL RESTRAINT STATUS: ICD-10-CM

## 2023-03-13 DIAGNOSIS — B96.20 UNSPECIFIED ESCHERICHIA COLI [E. COLI] AS THE CAUSE OF DISEASES CLASSIFIED ELSEWHERE: ICD-10-CM

## 2023-03-13 DIAGNOSIS — Z83.3 FAMILY HISTORY OF DIABETES MELLITUS: ICD-10-CM

## 2023-03-13 DIAGNOSIS — I21.4 NON-ST ELEVATION (NSTEMI) MYOCARDIAL INFARCTION: ICD-10-CM

## 2023-03-13 DIAGNOSIS — E87.20 ACIDOSIS, UNSPECIFIED: ICD-10-CM

## 2023-03-13 DIAGNOSIS — Z79.4 LONG TERM (CURRENT) USE OF INSULIN: ICD-10-CM

## 2023-03-13 DIAGNOSIS — E87.0 HYPEROSMOLALITY AND HYPERNATREMIA: ICD-10-CM

## 2023-03-13 DIAGNOSIS — Z86.14 PERSONAL HISTORY OF METHICILLIN RESISTANT STAPHYLOCOCCUS AUREUS INFECTION: ICD-10-CM

## 2023-03-13 DIAGNOSIS — Z79.84 LONG TERM (CURRENT) USE OF ORAL HYPOGLYCEMIC DRUGS: ICD-10-CM

## 2023-03-13 DIAGNOSIS — E11.65 TYPE 2 DIABETES MELLITUS WITH HYPERGLYCEMIA: ICD-10-CM

## 2023-03-13 DIAGNOSIS — E44.0 MODERATE PROTEIN-CALORIE MALNUTRITION: ICD-10-CM

## 2023-03-13 DIAGNOSIS — N13.6 PYONEPHROSIS: ICD-10-CM

## 2023-03-13 DIAGNOSIS — J81.0 ACUTE PULMONARY EDEMA: ICD-10-CM

## 2023-03-13 DIAGNOSIS — Z91.018 ALLERGY TO OTHER FOODS: ICD-10-CM

## 2023-03-13 DIAGNOSIS — Z89.432 ACQUIRED ABSENCE OF LEFT FOOT: ICD-10-CM

## 2023-03-13 DIAGNOSIS — I10 ESSENTIAL (PRIMARY) HYPERTENSION: ICD-10-CM

## 2023-03-13 DIAGNOSIS — L89.152 PRESSURE ULCER OF SACRAL REGION, STAGE 2: ICD-10-CM

## 2023-03-13 DIAGNOSIS — R65.21 SEVERE SEPSIS WITH SEPTIC SHOCK: ICD-10-CM

## 2023-11-10 NOTE — PATIENT PROFILE ADULT - FALLEN IN THE PAST
Patient accompanied by wife comes to ED with right eye pain since last night. Patient states eyes are slight blurry with clear drainage. Denies injury or trauma.  hx glaucoma, cataracts, DM
[place]
Male Completion Statement: - After discussing his treatment course we decided to discontinue isotretinoin therapy at this time.\\n- He shouldn't donate blood for one month after the last dose. He should call with any new symptoms of depression.\\n- Discussed maintenance options.

## 2023-12-03 NOTE — DIETITIAN INITIAL EVALUATION ADULT. - PHYSCIAL ASSESSMENT
Fever   UTI   Pyelonephritis Elevated liver enzymes BMI=22.9(09/10), 09/10, 1+ generalized edema , 1+ edema of hands-> 2+ edema of hands noted 09/13/debilitated/other (specify)

## 2023-12-29 NOTE — ED PROVIDER NOTE - TOBACCO USE
Patient called to schedule follow up and was scheduled on  @ 3:50pm w/RE per pt availability. Confirmed lab orders in system have .   MA - Please review and reinstate/extend lab orders to reflect  appt.   Never smoker

## 2024-04-28 NOTE — ED ADULT NURSE NOTE - PRO INTERPRETER NEED 2
The patient's goals for the shift include      The clinical goals for the shift include free from falls    Problem: Skin  Goal: Decreased wound size/increased tissue granulation at next dressing change  Outcome: Progressing  Goal: Participates in plan/prevention/treatment measures  Outcome: Progressing  Goal: Prevent/manage excess moisture  Outcome: Progressing  Goal: Prevent/minimize sheer/friction injuries  Outcome: Progressing  Goal: Promote/optimize nutrition  Outcome: Progressing  Goal: Promote skin healing  Outcome: Progressing     Problem: Fall/Injury  Goal: Not fall by end of shift  Outcome: Progressing  Goal: Be free from injury by end of the shift  Outcome: Progressing  Goal: Verbalize understanding of personal risk factors for fall in the hospital  Outcome: Progressing  Goal: Verbalize understanding of risk factor reduction measures to prevent injury from fall in the home  Outcome: Progressing  Goal: Use assistive devices by end of the shift  Outcome: Progressing  Goal: Pace activities to prevent fatigue by end of the shift  Outcome: Progressing     Problem: Pain  Goal: Takes deep breaths with improved pain control throughout the shift  Outcome: Progressing  Goal: Turns in bed with improved pain control throughout the shift  Outcome: Progressing  Goal: Walks with improved pain control throughout the shift  Outcome: Progressing  Goal: Performs ADL's with improved pain control throughout shift  Outcome: Progressing  Goal: Participates in PT with improved pain control throughout the shift  Outcome: Progressing  Goal: Free from opioid side effects throughout the shift  Outcome: Progressing  Goal: Free from acute confusion related to pain meds throughout the shift  Outcome: Progressing     Problem: Pain - Adult  Goal: Verbalizes/displays adequate comfort level or baseline comfort level  Outcome: Progressing     Problem: Safety - Adult  Goal: Free from fall injury  Outcome: Progressing     Problem: Discharge  Planning  Goal: Discharge to home or other facility with appropriate resources  Outcome: Progressing     Problem: Chronic Conditions and Co-morbidities  Goal: Patient's chronic conditions and co-morbidity symptoms are monitored and maintained or improved  Outcome: Progressing        English

## 2025-01-06 NOTE — PROGRESS NOTE ADULT - PROBLEM SELECTOR PROBLEM 1
Hyperglycemia
Gas gangrene
Hyperglycemia
15
Gas gangrene

## 2025-04-15 NOTE — DIETITIAN INITIAL EVALUATION ADULT - WEIGHT IN KG
soft, nontender, nondistended, no guarding or rigidity, no masses palpable, normal bowel sounds, no hepatosplenomegaly, no rebound tenderness 72.6

## 2025-06-23 ENCOUNTER — INPATIENT (INPATIENT)
Facility: HOSPITAL | Age: 70
LOS: 11 days | Discharge: HOME HEALTH | End: 2025-07-05
Attending: INTERNAL MEDICINE | Admitting: INTERNAL MEDICINE
Payer: MEDICARE

## 2025-06-23 VITALS
WEIGHT: 179.9 LBS | TEMPERATURE: 98 F | DIASTOLIC BLOOD PRESSURE: 78 MMHG | HEART RATE: 106 BPM | OXYGEN SATURATION: 98 % | RESPIRATION RATE: 20 BRPM | SYSTOLIC BLOOD PRESSURE: 154 MMHG | HEIGHT: 63 IN

## 2025-06-23 PROCEDURE — 99291 CRITICAL CARE FIRST HOUR: CPT

## 2025-06-24 ENCOUNTER — RESULT REVIEW (OUTPATIENT)
Age: 70
End: 2025-06-24

## 2025-06-24 DIAGNOSIS — N30.00 ACUTE CYSTITIS WITHOUT HEMATURIA: ICD-10-CM

## 2025-06-24 DIAGNOSIS — R41.82 ALTERED MENTAL STATUS, UNSPECIFIED: ICD-10-CM

## 2025-06-24 DIAGNOSIS — R79.89 OTHER SPECIFIED ABNORMAL FINDINGS OF BLOOD CHEMISTRY: ICD-10-CM

## 2025-06-24 DIAGNOSIS — I10 ESSENTIAL (PRIMARY) HYPERTENSION: ICD-10-CM

## 2025-06-24 DIAGNOSIS — N17.9 ACUTE KIDNEY FAILURE, UNSPECIFIED: ICD-10-CM

## 2025-06-24 DIAGNOSIS — E11.65 TYPE 2 DIABETES MELLITUS WITH HYPERGLYCEMIA: ICD-10-CM

## 2025-06-24 LAB
A1C WITH ESTIMATED AVERAGE GLUCOSE RESULT: 15.1 % — HIGH (ref 4–5.6)
ACETONE SERPL-MCNC: ABNORMAL
ACETONE SERPL-MCNC: ABNORMAL
ALBUMIN SERPL ELPH-MCNC: 2.2 G/DL — LOW (ref 3.3–5)
ALP SERPL-CCNC: 130 U/L — HIGH (ref 40–120)
ALT FLD-CCNC: 35 U/L — SIGNIFICANT CHANGE UP (ref 12–78)
ANION GAP SERPL CALC-SCNC: 10 MMOL/L — SIGNIFICANT CHANGE UP (ref 5–17)
ANION GAP SERPL CALC-SCNC: 15 MMOL/L — SIGNIFICANT CHANGE UP (ref 5–17)
ANION GAP SERPL CALC-SCNC: 15 MMOL/L — SIGNIFICANT CHANGE UP (ref 5–17)
APPEARANCE UR: CLEAR — SIGNIFICANT CHANGE UP
AST SERPL-CCNC: 24 U/L — SIGNIFICANT CHANGE UP (ref 15–37)
BACTERIA # UR AUTO: ABNORMAL /HPF
BASE EXCESS BLDV CALC-SCNC: -4.9 MMOL/L — LOW (ref -2–3)
BASOPHILS # BLD AUTO: 0.07 K/UL — SIGNIFICANT CHANGE UP (ref 0–0.2)
BASOPHILS NFR BLD AUTO: 0.8 % — SIGNIFICANT CHANGE UP (ref 0–2)
BILIRUB SERPL-MCNC: 0.7 MG/DL — SIGNIFICANT CHANGE UP (ref 0.2–1.2)
BILIRUB UR-MCNC: NEGATIVE — SIGNIFICANT CHANGE UP
BLOOD GAS COMMENTS, VENOUS: SIGNIFICANT CHANGE UP
BUN SERPL-MCNC: 35 MG/DL — HIGH (ref 7–23)
BUN SERPL-MCNC: 39 MG/DL — HIGH (ref 7–23)
BUN SERPL-MCNC: 41 MG/DL — HIGH (ref 7–23)
CALCIUM SERPL-MCNC: 9.3 MG/DL — SIGNIFICANT CHANGE UP (ref 8.5–10.1)
CALCIUM SERPL-MCNC: 9.3 MG/DL — SIGNIFICANT CHANGE UP (ref 8.5–10.1)
CALCIUM SERPL-MCNC: 9.5 MG/DL — SIGNIFICANT CHANGE UP (ref 8.5–10.1)
CHLORIDE SERPL-SCNC: 102 MMOL/L — SIGNIFICANT CHANGE UP (ref 96–108)
CHLORIDE SERPL-SCNC: 107 MMOL/L — SIGNIFICANT CHANGE UP (ref 96–108)
CHLORIDE SERPL-SCNC: 98 MMOL/L — SIGNIFICANT CHANGE UP (ref 96–108)
CK MB BLD-MCNC: 3.6 % — HIGH (ref 0–3.5)
CK MB BLD-MCNC: 4.2 % — HIGH (ref 0–3.5)
CK MB CFR SERPL CALC: 1.5 NG/ML — SIGNIFICANT CHANGE UP (ref 0.5–3.6)
CK MB CFR SERPL CALC: 1.6 NG/ML — SIGNIFICANT CHANGE UP (ref 0.5–3.6)
CK SERPL-CCNC: 38 U/L — SIGNIFICANT CHANGE UP (ref 26–192)
CK SERPL-CCNC: 42 U/L — SIGNIFICANT CHANGE UP (ref 26–192)
CK SERPL-CCNC: 48 U/L — SIGNIFICANT CHANGE UP (ref 26–192)
CO2 BLDV-SCNC: 22 MMOL/L — SIGNIFICANT CHANGE UP (ref 22–26)
CO2 SERPL-SCNC: 15 MMOL/L — LOW (ref 22–31)
CO2 SERPL-SCNC: 17 MMOL/L — LOW (ref 22–31)
CO2 SERPL-SCNC: 21 MMOL/L — LOW (ref 22–31)
COLOR SPEC: YELLOW — SIGNIFICANT CHANGE UP
CREAT SERPL-MCNC: 1.48 MG/DL — HIGH (ref 0.5–1.3)
CREAT SERPL-MCNC: 1.5 MG/DL — HIGH (ref 0.5–1.3)
CREAT SERPL-MCNC: 1.76 MG/DL — HIGH (ref 0.5–1.3)
DIFF PNL FLD: ABNORMAL
EGFR: 31 ML/MIN/1.73M2 — LOW
EGFR: 31 ML/MIN/1.73M2 — LOW
EGFR: 37 ML/MIN/1.73M2 — LOW
EGFR: 37 ML/MIN/1.73M2 — LOW
EGFR: 38 ML/MIN/1.73M2 — LOW
EGFR: 38 ML/MIN/1.73M2 — LOW
EOSINOPHIL # BLD AUTO: 0.01 K/UL — SIGNIFICANT CHANGE UP (ref 0–0.5)
EOSINOPHIL NFR BLD AUTO: 0.1 % — SIGNIFICANT CHANGE UP (ref 0–6)
EPI CELLS # UR: PRESENT
ESTIMATED AVERAGE GLUCOSE: 387 MG/DL — HIGH (ref 68–114)
FLUAV AG NPH QL: SIGNIFICANT CHANGE UP
FLUBV AG NPH QL: SIGNIFICANT CHANGE UP
GAS PNL BLDV: SIGNIFICANT CHANGE UP
GLUCOSE BLDC GLUCOMTR-MCNC: 284 MG/DL — HIGH (ref 70–99)
GLUCOSE BLDC GLUCOMTR-MCNC: 318 MG/DL — HIGH (ref 70–99)
GLUCOSE BLDC GLUCOMTR-MCNC: 349 MG/DL — HIGH (ref 70–99)
GLUCOSE BLDC GLUCOMTR-MCNC: 372 MG/DL — HIGH (ref 70–99)
GLUCOSE SERPL-MCNC: 315 MG/DL — HIGH (ref 70–99)
GLUCOSE SERPL-MCNC: 393 MG/DL — HIGH (ref 70–99)
GLUCOSE SERPL-MCNC: 433 MG/DL — HIGH (ref 70–99)
GLUCOSE UR QL: >=1000 MG/DL
HCO3 BLDV-SCNC: 21 MMOL/L — LOW (ref 22–28)
HCT VFR BLD CALC: 32.1 % — LOW (ref 34.5–45)
HGB BLD-MCNC: 10 G/DL — LOW (ref 11.5–15.5)
HOROWITZ INDEX BLDV+IHG-RTO: SIGNIFICANT CHANGE UP
IMM GRANULOCYTES NFR BLD AUTO: 0.7 % — SIGNIFICANT CHANGE UP (ref 0–0.9)
KETONES UR QL: 40 MG/DL
LACTATE SERPL-SCNC: 1.1 MMOL/L — SIGNIFICANT CHANGE UP (ref 0.7–2)
LEUKOCYTE ESTERASE UR-ACNC: ABNORMAL
LIDOCAIN IGE QN: 9 U/L — LOW (ref 13–75)
LYMPHOCYTES # BLD AUTO: 0.93 K/UL — LOW (ref 1–3.3)
LYMPHOCYTES # BLD AUTO: 11.2 % — LOW (ref 13–44)
MAGNESIUM SERPL-MCNC: 2.2 MG/DL — SIGNIFICANT CHANGE UP (ref 1.6–2.6)
MAGNESIUM SERPL-MCNC: 2.2 MG/DL — SIGNIFICANT CHANGE UP (ref 1.6–2.6)
MCHC RBC-ENTMCNC: 27.1 PG — SIGNIFICANT CHANGE UP (ref 27–34)
MCHC RBC-ENTMCNC: 31.2 G/DL — LOW (ref 32–36)
MCV RBC AUTO: 87 FL — SIGNIFICANT CHANGE UP (ref 80–100)
MONOCYTES # BLD AUTO: 0.43 K/UL — SIGNIFICANT CHANGE UP (ref 0–0.9)
MONOCYTES NFR BLD AUTO: 5.2 % — SIGNIFICANT CHANGE UP (ref 2–14)
NEUTROPHILS # BLD AUTO: 6.77 K/UL — SIGNIFICANT CHANGE UP (ref 1.8–7.4)
NEUTROPHILS NFR BLD AUTO: 82 % — HIGH (ref 43–77)
NITRITE UR-MCNC: NEGATIVE — SIGNIFICANT CHANGE UP
NRBC BLD AUTO-RTO: 0 /100 WBCS — SIGNIFICANT CHANGE UP (ref 0–0)
NT-PROBNP SERPL-SCNC: HIGH PG/ML (ref 0–125)
PCO2 BLDV: 39 MMHG — LOW (ref 42–55)
PH BLDV: 7.33 — SIGNIFICANT CHANGE UP (ref 7.32–7.43)
PH UR: 5.5 — SIGNIFICANT CHANGE UP (ref 5–8)
PHOSPHATE SERPL-MCNC: 2 MG/DL — LOW (ref 2.5–4.5)
PHOSPHATE SERPL-MCNC: 2.5 MG/DL — SIGNIFICANT CHANGE UP (ref 2.5–4.5)
PLATELET # BLD AUTO: 171 K/UL — SIGNIFICANT CHANGE UP (ref 150–400)
PO2 BLDV: 29 MMHG — SIGNIFICANT CHANGE UP (ref 25–45)
POTASSIUM SERPL-MCNC: 3.5 MMOL/L — SIGNIFICANT CHANGE UP (ref 3.5–5.3)
POTASSIUM SERPL-MCNC: 3.8 MMOL/L — SIGNIFICANT CHANGE UP (ref 3.5–5.3)
POTASSIUM SERPL-MCNC: 4.1 MMOL/L — SIGNIFICANT CHANGE UP (ref 3.5–5.3)
POTASSIUM SERPL-SCNC: 3.5 MMOL/L — SIGNIFICANT CHANGE UP (ref 3.5–5.3)
POTASSIUM SERPL-SCNC: 3.8 MMOL/L — SIGNIFICANT CHANGE UP (ref 3.5–5.3)
POTASSIUM SERPL-SCNC: 4.1 MMOL/L — SIGNIFICANT CHANGE UP (ref 3.5–5.3)
PROCALCITONIN SERPL-MCNC: 45.8 NG/ML — HIGH (ref 0.02–0.1)
PROT SERPL-MCNC: 6.7 GM/DL — SIGNIFICANT CHANGE UP (ref 6–8.3)
PROT UR-MCNC: 300 MG/DL
RBC # BLD: 3.69 M/UL — LOW (ref 3.8–5.2)
RBC # FLD: 14.9 % — HIGH (ref 10.3–14.5)
RBC CASTS # UR COMP ASSIST: 0 /HPF — SIGNIFICANT CHANGE UP (ref 0–4)
RSV RNA NPH QL NAA+NON-PROBE: SIGNIFICANT CHANGE UP
SAO2 % BLDV: 48 % — LOW (ref 94–98)
SARS-COV-2 RNA SPEC QL NAA+PROBE: SIGNIFICANT CHANGE UP
SODIUM SERPL-SCNC: 130 MMOL/L — LOW (ref 135–145)
SODIUM SERPL-SCNC: 132 MMOL/L — LOW (ref 135–145)
SODIUM SERPL-SCNC: 138 MMOL/L — SIGNIFICANT CHANGE UP (ref 135–145)
SOURCE RESPIRATORY: SIGNIFICANT CHANGE UP
SP GR SPEC: 1.02 — SIGNIFICANT CHANGE UP (ref 1–1.03)
TROPONIN I, HIGH SENSITIVITY RESULT: 3395.7 NG/L — HIGH
TROPONIN I, HIGH SENSITIVITY RESULT: 3690.1 NG/L — HIGH
TROPONIN I, HIGH SENSITIVITY RESULT: 4078.4 NG/L — HIGH
TROPONIN I, HIGH SENSITIVITY RESULT: 4350.1 NG/L — HIGH
UROBILINOGEN FLD QL: 0.2 MG/DL — SIGNIFICANT CHANGE UP (ref 0.2–1)
WBC # BLD: 8.27 K/UL — SIGNIFICANT CHANGE UP (ref 3.8–10.5)
WBC # FLD AUTO: 8.27 K/UL — SIGNIFICANT CHANGE UP (ref 3.8–10.5)
WBC UR QL: ABNORMAL /HPF (ref 0–5)

## 2025-06-24 PROCEDURE — 93306 TTE W/DOPPLER COMPLETE: CPT | Mod: 26

## 2025-06-24 PROCEDURE — 71045 X-RAY EXAM CHEST 1 VIEW: CPT | Mod: 26

## 2025-06-24 PROCEDURE — 99223 1ST HOSP IP/OBS HIGH 75: CPT

## 2025-06-24 PROCEDURE — 70450 CT HEAD/BRAIN W/O DYE: CPT | Mod: 26

## 2025-06-24 PROCEDURE — 99284 EMERGENCY DEPT VISIT MOD MDM: CPT

## 2025-06-24 PROCEDURE — 93010 ELECTROCARDIOGRAM REPORT: CPT

## 2025-06-24 RX ORDER — SODIUM CHLORIDE 9 G/1000ML
1000 INJECTION, SOLUTION INTRAVENOUS
Refills: 0 | Status: DISCONTINUED | OUTPATIENT
Start: 2025-06-24 | End: 2025-07-05

## 2025-06-24 RX ORDER — INSULIN LISPRO 100 U/ML
INJECTION, SOLUTION INTRAVENOUS; SUBCUTANEOUS
Refills: 0 | Status: DISCONTINUED | OUTPATIENT
Start: 2025-06-24 | End: 2025-07-05

## 2025-06-24 RX ORDER — ONDANSETRON HCL/PF 4 MG/2 ML
4 VIAL (ML) INJECTION EVERY 8 HOURS
Refills: 0 | Status: DISCONTINUED | OUTPATIENT
Start: 2025-06-24 | End: 2025-07-05

## 2025-06-24 RX ORDER — GLUCAGON 3 MG/1
1 POWDER NASAL ONCE
Refills: 0 | Status: DISCONTINUED | OUTPATIENT
Start: 2025-06-24 | End: 2025-07-05

## 2025-06-24 RX ORDER — INSULIN LISPRO 100 U/ML
INJECTION, SOLUTION INTRAVENOUS; SUBCUTANEOUS AT BEDTIME
Refills: 0 | Status: DISCONTINUED | OUTPATIENT
Start: 2025-06-24 | End: 2025-07-05

## 2025-06-24 RX ORDER — DEXTROSE 50 % IN WATER 50 %
25 SYRINGE (ML) INTRAVENOUS ONCE
Refills: 0 | Status: DISCONTINUED | OUTPATIENT
Start: 2025-06-24 | End: 2025-07-05

## 2025-06-24 RX ORDER — CEFTRIAXONE 500 MG/1
1000 INJECTION, POWDER, FOR SOLUTION INTRAMUSCULAR; INTRAVENOUS ONCE
Refills: 0 | Status: COMPLETED | OUTPATIENT
Start: 2025-06-24 | End: 2025-06-24

## 2025-06-24 RX ORDER — METOPROLOL SUCCINATE 50 MG/1
1 TABLET, EXTENDED RELEASE ORAL
Refills: 0 | DISCHARGE

## 2025-06-24 RX ORDER — METOPROLOL SUCCINATE 50 MG/1
25 TABLET, EXTENDED RELEASE ORAL
Refills: 0 | Status: DISCONTINUED | OUTPATIENT
Start: 2025-06-24 | End: 2025-07-05

## 2025-06-24 RX ORDER — SENNA 187 MG
2 TABLET ORAL AT BEDTIME
Refills: 0 | Status: DISCONTINUED | OUTPATIENT
Start: 2025-06-24 | End: 2025-07-05

## 2025-06-24 RX ORDER — GABAPENTIN 400 MG/1
100 CAPSULE ORAL THREE TIMES A DAY
Refills: 0 | Status: DISCONTINUED | OUTPATIENT
Start: 2025-06-24 | End: 2025-07-05

## 2025-06-24 RX ORDER — POLYETHYLENE GLYCOL 3350 17 G/17G
17 POWDER, FOR SOLUTION ORAL DAILY
Refills: 0 | Status: DISCONTINUED | OUTPATIENT
Start: 2025-06-24 | End: 2025-07-05

## 2025-06-24 RX ORDER — ATORVASTATIN CALCIUM 80 MG/1
40 TABLET, FILM COATED ORAL AT BEDTIME
Refills: 0 | Status: DISCONTINUED | OUTPATIENT
Start: 2025-06-24 | End: 2025-07-05

## 2025-06-24 RX ORDER — ENOXAPARIN SODIUM 100 MG/ML
40 INJECTION SUBCUTANEOUS EVERY 24 HOURS
Refills: 0 | Status: DISCONTINUED | OUTPATIENT
Start: 2025-06-24 | End: 2025-07-01

## 2025-06-24 RX ORDER — DEXTROSE 50 % IN WATER 50 %
15 SYRINGE (ML) INTRAVENOUS ONCE
Refills: 0 | Status: DISCONTINUED | OUTPATIENT
Start: 2025-06-24 | End: 2025-07-05

## 2025-06-24 RX ORDER — INSULIN GLARGINE-YFGN 100 [IU]/ML
10 INJECTION, SOLUTION SUBCUTANEOUS ONCE
Refills: 0 | Status: COMPLETED | OUTPATIENT
Start: 2025-06-24 | End: 2025-06-24

## 2025-06-24 RX ORDER — ACETAMINOPHEN 500 MG/5ML
650 LIQUID (ML) ORAL EVERY 6 HOURS
Refills: 0 | Status: DISCONTINUED | OUTPATIENT
Start: 2025-06-24 | End: 2025-07-05

## 2025-06-24 RX ORDER — INSULIN LISPRO 100 U/ML
5 INJECTION, SOLUTION INTRAVENOUS; SUBCUTANEOUS
Refills: 0 | Status: DISCONTINUED | OUTPATIENT
Start: 2025-06-24 | End: 2025-06-25

## 2025-06-24 RX ORDER — ASPIRIN 325 MG
324 TABLET ORAL ONCE
Refills: 0 | Status: COMPLETED | OUTPATIENT
Start: 2025-06-24 | End: 2025-06-24

## 2025-06-24 RX ORDER — SODIUM CHLORIDE 9 G/1000ML
1000 INJECTION, SOLUTION INTRAVENOUS
Refills: 0 | Status: DISCONTINUED | OUTPATIENT
Start: 2025-06-24 | End: 2025-06-25

## 2025-06-24 RX ORDER — ASPIRIN 325 MG
81 TABLET ORAL DAILY
Refills: 0 | Status: DISCONTINUED | OUTPATIENT
Start: 2025-06-25 | End: 2025-07-05

## 2025-06-24 RX ORDER — AMLODIPINE BESYLATE 10 MG/1
1 TABLET ORAL
Refills: 0 | DISCHARGE

## 2025-06-24 RX ORDER — AMLODIPINE BESYLATE 10 MG/1
5 TABLET ORAL DAILY
Refills: 0 | Status: DISCONTINUED | OUTPATIENT
Start: 2025-06-24 | End: 2025-07-05

## 2025-06-24 RX ORDER — MELATONIN 5 MG
3 TABLET ORAL AT BEDTIME
Refills: 0 | Status: DISCONTINUED | OUTPATIENT
Start: 2025-06-24 | End: 2025-07-05

## 2025-06-24 RX ORDER — CEFTRIAXONE 500 MG/1
1000 INJECTION, POWDER, FOR SOLUTION INTRAMUSCULAR; INTRAVENOUS ONCE
Refills: 0 | Status: DISCONTINUED | OUTPATIENT
Start: 2025-06-24 | End: 2025-06-24

## 2025-06-24 RX ORDER — CEFTRIAXONE 500 MG/1
1000 INJECTION, POWDER, FOR SOLUTION INTRAMUSCULAR; INTRAVENOUS EVERY 24 HOURS
Refills: 0 | Status: COMPLETED | OUTPATIENT
Start: 2025-06-25 | End: 2025-06-29

## 2025-06-24 RX ORDER — INSULIN GLARGINE-YFGN 100 [IU]/ML
15 INJECTION, SOLUTION SUBCUTANEOUS EVERY MORNING
Refills: 0 | Status: DISCONTINUED | OUTPATIENT
Start: 2025-06-25 | End: 2025-06-25

## 2025-06-24 RX ORDER — DEXTROSE 50 % IN WATER 50 %
12.5 SYRINGE (ML) INTRAVENOUS ONCE
Refills: 0 | Status: DISCONTINUED | OUTPATIENT
Start: 2025-06-24 | End: 2025-07-05

## 2025-06-24 RX ADMIN — METOPROLOL SUCCINATE 25 MILLIGRAM(S): 50 TABLET, EXTENDED RELEASE ORAL at 18:30

## 2025-06-24 RX ADMIN — INSULIN LISPRO 8: 100 INJECTION, SOLUTION INTRAVENOUS; SUBCUTANEOUS at 12:25

## 2025-06-24 RX ADMIN — Medication 8 UNIT(S)/HR: at 05:38

## 2025-06-24 RX ADMIN — INSULIN LISPRO 6: 100 INJECTION, SOLUTION INTRAVENOUS; SUBCUTANEOUS at 18:16

## 2025-06-24 RX ADMIN — GABAPENTIN 100 MILLIGRAM(S): 400 CAPSULE ORAL at 21:30

## 2025-06-24 RX ADMIN — SODIUM CHLORIDE 150 MILLILITER(S): 9 INJECTION, SOLUTION INTRAVENOUS at 12:25

## 2025-06-24 RX ADMIN — CEFTRIAXONE 1000 MILLIGRAM(S): 500 INJECTION, POWDER, FOR SOLUTION INTRAMUSCULAR; INTRAVENOUS at 06:31

## 2025-06-24 RX ADMIN — ATORVASTATIN CALCIUM 40 MILLIGRAM(S): 80 TABLET, FILM COATED ORAL at 21:29

## 2025-06-24 RX ADMIN — INSULIN LISPRO 3: 100 INJECTION, SOLUTION INTRAVENOUS; SUBCUTANEOUS at 21:29

## 2025-06-24 RX ADMIN — INSULIN LISPRO 5 UNIT(S): 100 INJECTION, SOLUTION INTRAVENOUS; SUBCUTANEOUS at 12:26

## 2025-06-24 RX ADMIN — ENOXAPARIN SODIUM 40 MILLIGRAM(S): 100 INJECTION SUBCUTANEOUS at 12:25

## 2025-06-24 RX ADMIN — Medication 2 TABLET(S): at 21:29

## 2025-06-24 RX ADMIN — Medication 2000 MILLILITER(S): at 02:20

## 2025-06-24 RX ADMIN — Medication 324 MILLIGRAM(S): at 10:24

## 2025-06-24 RX ADMIN — INSULIN LISPRO 5 UNIT(S): 100 INJECTION, SOLUTION INTRAVENOUS; SUBCUTANEOUS at 18:15

## 2025-06-24 RX ADMIN — INSULIN GLARGINE-YFGN 10 UNIT(S): 100 INJECTION, SOLUTION SUBCUTANEOUS at 07:39

## 2025-06-24 RX ADMIN — Medication 3 MILLIGRAM(S): at 21:29

## 2025-06-25 LAB
ALBUMIN SERPL ELPH-MCNC: 1.7 G/DL — LOW (ref 3.3–5)
ALP SERPL-CCNC: 95 U/L — SIGNIFICANT CHANGE UP (ref 40–120)
ALT FLD-CCNC: 21 U/L — SIGNIFICANT CHANGE UP (ref 12–78)
ANION GAP SERPL CALC-SCNC: 8 MMOL/L — SIGNIFICANT CHANGE UP (ref 5–17)
AST SERPL-CCNC: 14 U/L — LOW (ref 15–37)
BILIRUB SERPL-MCNC: 0.4 MG/DL — SIGNIFICANT CHANGE UP (ref 0.2–1.2)
BUN SERPL-MCNC: 36 MG/DL — HIGH (ref 7–23)
CALCIUM SERPL-MCNC: 8.6 MG/DL — SIGNIFICANT CHANGE UP (ref 8.5–10.1)
CHLORIDE SERPL-SCNC: 108 MMOL/L — SIGNIFICANT CHANGE UP (ref 96–108)
CK MB BLD-MCNC: <2.5 % — SIGNIFICANT CHANGE UP (ref 0–3.5)
CK MB CFR SERPL CALC: <1 NG/ML — SIGNIFICANT CHANGE UP (ref 0.5–3.6)
CK SERPL-CCNC: 40 U/L — SIGNIFICANT CHANGE UP (ref 26–192)
CO2 SERPL-SCNC: 19 MMOL/L — LOW (ref 22–31)
CREAT SERPL-MCNC: 1.63 MG/DL — HIGH (ref 0.5–1.3)
EGFR: 34 ML/MIN/1.73M2 — LOW
EGFR: 34 ML/MIN/1.73M2 — LOW
GLUCOSE BLDC GLUCOMTR-MCNC: 146 MG/DL — HIGH (ref 70–99)
GLUCOSE BLDC GLUCOMTR-MCNC: 155 MG/DL — HIGH (ref 70–99)
GLUCOSE BLDC GLUCOMTR-MCNC: 304 MG/DL — HIGH (ref 70–99)
GLUCOSE BLDC GLUCOMTR-MCNC: 313 MG/DL — HIGH (ref 70–99)
GLUCOSE SERPL-MCNC: 286 MG/DL — HIGH (ref 70–99)
HCT VFR BLD CALC: 27.2 % — LOW (ref 34.5–45)
HGB BLD-MCNC: 9 G/DL — LOW (ref 11.5–15.5)
M PNEUMO IGM SER-ACNC: 0.07 INDEX — SIGNIFICANT CHANGE UP (ref 0–0.9)
MAGNESIUM SERPL-MCNC: 2.1 MG/DL — SIGNIFICANT CHANGE UP (ref 1.6–2.6)
MCHC RBC-ENTMCNC: 27.9 PG — SIGNIFICANT CHANGE UP (ref 27–34)
MCHC RBC-ENTMCNC: 33.1 G/DL — SIGNIFICANT CHANGE UP (ref 32–36)
MCV RBC AUTO: 84.2 FL — SIGNIFICANT CHANGE UP (ref 80–100)
MYCOPLASMA AG SPEC QL: NEGATIVE — SIGNIFICANT CHANGE UP
NRBC BLD AUTO-RTO: 0 /100 WBCS — SIGNIFICANT CHANGE UP (ref 0–0)
PHOSPHATE SERPL-MCNC: 2.2 MG/DL — LOW (ref 2.5–4.5)
PLATELET # BLD AUTO: 217 K/UL — SIGNIFICANT CHANGE UP (ref 150–400)
POTASSIUM SERPL-MCNC: 3.7 MMOL/L — SIGNIFICANT CHANGE UP (ref 3.5–5.3)
POTASSIUM SERPL-SCNC: 3.7 MMOL/L — SIGNIFICANT CHANGE UP (ref 3.5–5.3)
PROT SERPL-MCNC: 5.9 GM/DL — LOW (ref 6–8.3)
RBC # BLD: 3.23 M/UL — LOW (ref 3.8–5.2)
RBC # FLD: 15.3 % — HIGH (ref 10.3–14.5)
SODIUM SERPL-SCNC: 135 MMOL/L — SIGNIFICANT CHANGE UP (ref 135–145)
TROPONIN I, HIGH SENSITIVITY RESULT: 3753.3 NG/L — HIGH
WBC # BLD: 6.51 K/UL — SIGNIFICANT CHANGE UP (ref 3.8–10.5)
WBC # FLD AUTO: 6.51 K/UL — SIGNIFICANT CHANGE UP (ref 3.8–10.5)

## 2025-06-25 PROCEDURE — 36410 VNPNXR 3YR/> PHY/QHP DX/THER: CPT

## 2025-06-25 PROCEDURE — 99233 SBSQ HOSP IP/OBS HIGH 50: CPT

## 2025-06-25 PROCEDURE — 36000 PLACE NEEDLE IN VEIN: CPT

## 2025-06-25 PROCEDURE — 76937 US GUIDE VASCULAR ACCESS: CPT | Mod: 26

## 2025-06-25 RX ORDER — SOD PHOS DI, MONO/K PHOS MONO 250 MG
1 TABLET ORAL
Refills: 0 | Status: COMPLETED | OUTPATIENT
Start: 2025-06-25 | End: 2025-06-26

## 2025-06-25 RX ORDER — INSULIN LISPRO 100 U/ML
7 INJECTION, SOLUTION INTRAVENOUS; SUBCUTANEOUS
Refills: 0 | Status: DISCONTINUED | OUTPATIENT
Start: 2025-06-25 | End: 2025-07-05

## 2025-06-25 RX ORDER — INSULIN GLARGINE-YFGN 100 [IU]/ML
20 INJECTION, SOLUTION SUBCUTANEOUS AT BEDTIME
Refills: 0 | Status: DISCONTINUED | OUTPATIENT
Start: 2025-06-25 | End: 2025-07-05

## 2025-06-25 RX ADMIN — SODIUM CHLORIDE 150 MILLILITER(S): 9 INJECTION, SOLUTION INTRAVENOUS at 09:03

## 2025-06-25 RX ADMIN — INSULIN LISPRO 8: 100 INJECTION, SOLUTION INTRAVENOUS; SUBCUTANEOUS at 12:04

## 2025-06-25 RX ADMIN — GABAPENTIN 100 MILLIGRAM(S): 400 CAPSULE ORAL at 21:28

## 2025-06-25 RX ADMIN — Medication 2 TABLET(S): at 21:28

## 2025-06-25 RX ADMIN — INSULIN LISPRO 8: 100 INJECTION, SOLUTION INTRAVENOUS; SUBCUTANEOUS at 08:49

## 2025-06-25 RX ADMIN — METOPROLOL SUCCINATE 25 MILLIGRAM(S): 50 TABLET, EXTENDED RELEASE ORAL at 17:29

## 2025-06-25 RX ADMIN — ATORVASTATIN CALCIUM 40 MILLIGRAM(S): 80 TABLET, FILM COATED ORAL at 21:28

## 2025-06-25 RX ADMIN — GABAPENTIN 100 MILLIGRAM(S): 400 CAPSULE ORAL at 05:34

## 2025-06-25 RX ADMIN — Medication 1 PACKET(S): at 17:29

## 2025-06-25 RX ADMIN — Medication 81 MILLIGRAM(S): at 12:06

## 2025-06-25 RX ADMIN — METOPROLOL SUCCINATE 25 MILLIGRAM(S): 50 TABLET, EXTENDED RELEASE ORAL at 05:34

## 2025-06-25 RX ADMIN — INSULIN GLARGINE-YFGN 15 UNIT(S): 100 INJECTION, SOLUTION SUBCUTANEOUS at 09:03

## 2025-06-25 RX ADMIN — INSULIN LISPRO 5 UNIT(S): 100 INJECTION, SOLUTION INTRAVENOUS; SUBCUTANEOUS at 08:49

## 2025-06-25 RX ADMIN — AMLODIPINE BESYLATE 5 MILLIGRAM(S): 10 TABLET ORAL at 05:33

## 2025-06-25 RX ADMIN — INSULIN LISPRO 5 UNIT(S): 100 INJECTION, SOLUTION INTRAVENOUS; SUBCUTANEOUS at 12:04

## 2025-06-25 RX ADMIN — INSULIN LISPRO 7 UNIT(S): 100 INJECTION, SOLUTION INTRAVENOUS; SUBCUTANEOUS at 17:31

## 2025-06-25 RX ADMIN — ENOXAPARIN SODIUM 40 MILLIGRAM(S): 100 INJECTION SUBCUTANEOUS at 12:06

## 2025-06-25 RX ADMIN — CEFTRIAXONE 100 MILLIGRAM(S): 500 INJECTION, POWDER, FOR SOLUTION INTRAMUSCULAR; INTRAVENOUS at 05:33

## 2025-06-25 RX ADMIN — INSULIN LISPRO 2: 100 INJECTION, SOLUTION INTRAVENOUS; SUBCUTANEOUS at 17:30

## 2025-06-26 LAB
AMMONIA BLD-MCNC: <10 UMOL/L — LOW (ref 11–32)
ANION GAP SERPL CALC-SCNC: 7 MMOL/L — SIGNIFICANT CHANGE UP (ref 5–17)
APTT BLD: 26.2 SEC — SIGNIFICANT CHANGE UP (ref 26.1–36.8)
BUN SERPL-MCNC: 35 MG/DL — HIGH (ref 7–23)
CALCIUM SERPL-MCNC: 8.8 MG/DL — SIGNIFICANT CHANGE UP (ref 8.5–10.1)
CHLORIDE SERPL-SCNC: 104 MMOL/L — SIGNIFICANT CHANGE UP (ref 96–108)
CHOLEST SERPL-MCNC: 208 MG/DL — HIGH
CK MB BLD-MCNC: <2.4 % — SIGNIFICANT CHANGE UP (ref 0–3.5)
CK MB CFR SERPL CALC: <1 NG/ML — SIGNIFICANT CHANGE UP (ref 0.5–3.6)
CK SERPL-CCNC: 42 U/L — SIGNIFICANT CHANGE UP (ref 26–192)
CO2 SERPL-SCNC: 23 MMOL/L — SIGNIFICANT CHANGE UP (ref 22–31)
CREAT SERPL-MCNC: 1.55 MG/DL — HIGH (ref 0.5–1.3)
CULTURE RESULTS: SIGNIFICANT CHANGE UP
EGFR: 36 ML/MIN/1.73M2 — LOW
EGFR: 36 ML/MIN/1.73M2 — LOW
GLUCOSE BLDC GLUCOMTR-MCNC: 108 MG/DL — HIGH (ref 70–99)
GLUCOSE BLDC GLUCOMTR-MCNC: 119 MG/DL — HIGH (ref 70–99)
GLUCOSE BLDC GLUCOMTR-MCNC: 144 MG/DL — HIGH (ref 70–99)
GLUCOSE BLDC GLUCOMTR-MCNC: 144 MG/DL — HIGH (ref 70–99)
GLUCOSE BLDC GLUCOMTR-MCNC: 156 MG/DL — HIGH (ref 70–99)
GLUCOSE BLDC GLUCOMTR-MCNC: 169 MG/DL — HIGH (ref 70–99)
GLUCOSE BLDC GLUCOMTR-MCNC: 180 MG/DL — HIGH (ref 70–99)
GLUCOSE BLDC GLUCOMTR-MCNC: 188 MG/DL — HIGH (ref 70–99)
GLUCOSE SERPL-MCNC: 175 MG/DL — HIGH (ref 70–99)
HCT VFR BLD CALC: 27.8 % — LOW (ref 34.5–45)
HDLC SERPL-MCNC: 32 MG/DL — LOW
HGB BLD-MCNC: 9.3 G/DL — LOW (ref 11.5–15.5)
INR BLD: 1.06 RATIO — SIGNIFICANT CHANGE UP (ref 0.85–1.16)
LDLC SERPL-MCNC: 134 MG/DL — HIGH
LEGIONELLA AG UR QL: NEGATIVE — SIGNIFICANT CHANGE UP
LIPID PNL WITH DIRECT LDL SERPL: 134 MG/DL — HIGH
MAGNESIUM SERPL-MCNC: 2 MG/DL — SIGNIFICANT CHANGE UP (ref 1.6–2.6)
MCHC RBC-ENTMCNC: 27.6 PG — SIGNIFICANT CHANGE UP (ref 27–34)
MCHC RBC-ENTMCNC: 33.5 G/DL — SIGNIFICANT CHANGE UP (ref 32–36)
MCV RBC AUTO: 82.5 FL — SIGNIFICANT CHANGE UP (ref 80–100)
NONHDLC SERPL-MCNC: 177 MG/DL — HIGH
NRBC BLD AUTO-RTO: 0 /100 WBCS — SIGNIFICANT CHANGE UP (ref 0–0)
PHOSPHATE SERPL-MCNC: 2.9 MG/DL — SIGNIFICANT CHANGE UP (ref 2.5–4.5)
PLATELET # BLD AUTO: 231 K/UL — SIGNIFICANT CHANGE UP (ref 150–400)
POTASSIUM SERPL-MCNC: 3.7 MMOL/L — SIGNIFICANT CHANGE UP (ref 3.5–5.3)
POTASSIUM SERPL-SCNC: 3.7 MMOL/L — SIGNIFICANT CHANGE UP (ref 3.5–5.3)
PROTHROM AB SERPL-ACNC: 12 SEC — SIGNIFICANT CHANGE UP (ref 9.9–13.4)
RBC # BLD: 3.37 M/UL — LOW (ref 3.8–5.2)
RBC # FLD: 15.5 % — HIGH (ref 10.3–14.5)
S PNEUM AG UR QL: NEGATIVE — SIGNIFICANT CHANGE UP
SODIUM SERPL-SCNC: 134 MMOL/L — LOW (ref 135–145)
SPECIMEN SOURCE: SIGNIFICANT CHANGE UP
TRIGL SERPL-MCNC: 234 MG/DL — HIGH
TROPONIN I, HIGH SENSITIVITY RESULT: 2605 NG/L — HIGH
WBC # BLD: 7.3 K/UL — SIGNIFICANT CHANGE UP (ref 3.8–10.5)
WBC # FLD AUTO: 7.3 K/UL — SIGNIFICANT CHANGE UP (ref 3.8–10.5)

## 2025-06-26 PROCEDURE — 99291 CRITICAL CARE FIRST HOUR: CPT

## 2025-06-26 PROCEDURE — 70496 CT ANGIOGRAPHY HEAD: CPT | Mod: 26

## 2025-06-26 PROCEDURE — 93010 ELECTROCARDIOGRAM REPORT: CPT

## 2025-06-26 PROCEDURE — 0042T: CPT

## 2025-06-26 PROCEDURE — 99222 1ST HOSP IP/OBS MODERATE 55: CPT

## 2025-06-26 PROCEDURE — 99233 SBSQ HOSP IP/OBS HIGH 50: CPT

## 2025-06-26 PROCEDURE — 70450 CT HEAD/BRAIN W/O DYE: CPT | Mod: 26,XU

## 2025-06-26 PROCEDURE — 70498 CT ANGIOGRAPHY NECK: CPT | Mod: 26

## 2025-06-26 RX ADMIN — METOPROLOL SUCCINATE 25 MILLIGRAM(S): 50 TABLET, EXTENDED RELEASE ORAL at 17:37

## 2025-06-26 RX ADMIN — ENOXAPARIN SODIUM 40 MILLIGRAM(S): 100 INJECTION SUBCUTANEOUS at 14:20

## 2025-06-26 RX ADMIN — METOPROLOL SUCCINATE 25 MILLIGRAM(S): 50 TABLET, EXTENDED RELEASE ORAL at 05:35

## 2025-06-26 RX ADMIN — INSULIN LISPRO 7 UNIT(S): 100 INJECTION, SOLUTION INTRAVENOUS; SUBCUTANEOUS at 17:36

## 2025-06-26 RX ADMIN — Medication 2 TABLET(S): at 22:03

## 2025-06-26 RX ADMIN — INSULIN LISPRO 2: 100 INJECTION, SOLUTION INTRAVENOUS; SUBCUTANEOUS at 08:12

## 2025-06-26 RX ADMIN — Medication 650 MILLIGRAM(S): at 05:34

## 2025-06-26 RX ADMIN — ATORVASTATIN CALCIUM 40 MILLIGRAM(S): 80 TABLET, FILM COATED ORAL at 22:03

## 2025-06-26 RX ADMIN — INSULIN GLARGINE-YFGN 20 UNIT(S): 100 INJECTION, SOLUTION SUBCUTANEOUS at 00:11

## 2025-06-26 RX ADMIN — Medication 81 MILLIGRAM(S): at 14:22

## 2025-06-26 RX ADMIN — Medication 650 MILLIGRAM(S): at 06:29

## 2025-06-26 RX ADMIN — AMLODIPINE BESYLATE 5 MILLIGRAM(S): 10 TABLET ORAL at 05:35

## 2025-06-26 RX ADMIN — CEFTRIAXONE 100 MILLIGRAM(S): 500 INJECTION, POWDER, FOR SOLUTION INTRAMUSCULAR; INTRAVENOUS at 04:58

## 2025-06-26 RX ADMIN — INSULIN LISPRO 7 UNIT(S): 100 INJECTION, SOLUTION INTRAVENOUS; SUBCUTANEOUS at 08:13

## 2025-06-26 RX ADMIN — INSULIN GLARGINE-YFGN 20 UNIT(S): 100 INJECTION, SOLUTION SUBCUTANEOUS at 22:02

## 2025-06-26 RX ADMIN — POLYETHYLENE GLYCOL 3350 17 GRAM(S): 17 POWDER, FOR SOLUTION ORAL at 06:30

## 2025-06-26 RX ADMIN — Medication 1 PACKET(S): at 05:35

## 2025-06-26 RX ADMIN — GABAPENTIN 100 MILLIGRAM(S): 400 CAPSULE ORAL at 05:35

## 2025-06-27 LAB
A1C WITH ESTIMATED AVERAGE GLUCOSE RESULT: 15 % — HIGH (ref 4–5.6)
ALBUMIN SERPL ELPH-MCNC: 1.9 G/DL — LOW (ref 3.3–5)
ALP SERPL-CCNC: 106 U/L — SIGNIFICANT CHANGE UP (ref 40–120)
ALT FLD-CCNC: 24 U/L — SIGNIFICANT CHANGE UP (ref 12–78)
ANION GAP SERPL CALC-SCNC: 9 MMOL/L — SIGNIFICANT CHANGE UP (ref 5–17)
AST SERPL-CCNC: 16 U/L — SIGNIFICANT CHANGE UP (ref 15–37)
BILIRUB SERPL-MCNC: 0.4 MG/DL — SIGNIFICANT CHANGE UP (ref 0.2–1.2)
BUN SERPL-MCNC: 37 MG/DL — HIGH (ref 7–23)
CALCIUM SERPL-MCNC: 8.7 MG/DL — SIGNIFICANT CHANGE UP (ref 8.5–10.1)
CHLORIDE SERPL-SCNC: 104 MMOL/L — SIGNIFICANT CHANGE UP (ref 96–108)
CO2 SERPL-SCNC: 22 MMOL/L — SIGNIFICANT CHANGE UP (ref 22–31)
CREAT SERPL-MCNC: 1.75 MG/DL — HIGH (ref 0.5–1.3)
EGFR: 31 ML/MIN/1.73M2 — LOW
EGFR: 31 ML/MIN/1.73M2 — LOW
ESTIMATED AVERAGE GLUCOSE: 384 MG/DL — HIGH (ref 68–114)
GLUCOSE BLDC GLUCOMTR-MCNC: 126 MG/DL — HIGH (ref 70–99)
GLUCOSE BLDC GLUCOMTR-MCNC: 196 MG/DL — HIGH (ref 70–99)
GLUCOSE BLDC GLUCOMTR-MCNC: 211 MG/DL — HIGH (ref 70–99)
GLUCOSE BLDC GLUCOMTR-MCNC: 79 MG/DL — SIGNIFICANT CHANGE UP (ref 70–99)
GLUCOSE SERPL-MCNC: 199 MG/DL — HIGH (ref 70–99)
HCT VFR BLD CALC: 26.9 % — LOW (ref 34.5–45)
HGB BLD-MCNC: 8.5 G/DL — LOW (ref 11.5–15.5)
MCHC RBC-ENTMCNC: 26.9 PG — LOW (ref 27–34)
MCHC RBC-ENTMCNC: 31.6 G/DL — LOW (ref 32–36)
MCV RBC AUTO: 85.1 FL — SIGNIFICANT CHANGE UP (ref 80–100)
NRBC BLD AUTO-RTO: 0 /100 WBCS — SIGNIFICANT CHANGE UP (ref 0–0)
PLATELET # BLD AUTO: 237 K/UL — SIGNIFICANT CHANGE UP (ref 150–400)
POTASSIUM SERPL-MCNC: 3.7 MMOL/L — SIGNIFICANT CHANGE UP (ref 3.5–5.3)
POTASSIUM SERPL-SCNC: 3.7 MMOL/L — SIGNIFICANT CHANGE UP (ref 3.5–5.3)
PROT SERPL-MCNC: 5.6 GM/DL — LOW (ref 6–8.3)
RBC # BLD: 3.16 M/UL — LOW (ref 3.8–5.2)
RBC # FLD: 15.6 % — HIGH (ref 10.3–14.5)
SODIUM SERPL-SCNC: 135 MMOL/L — SIGNIFICANT CHANGE UP (ref 135–145)
WBC # BLD: 6.99 K/UL — SIGNIFICANT CHANGE UP (ref 3.8–10.5)
WBC # FLD AUTO: 6.99 K/UL — SIGNIFICANT CHANGE UP (ref 3.8–10.5)

## 2025-06-27 PROCEDURE — 99232 SBSQ HOSP IP/OBS MODERATE 35: CPT

## 2025-06-27 PROCEDURE — 70450 CT HEAD/BRAIN W/O DYE: CPT | Mod: 26

## 2025-06-27 RX ADMIN — INSULIN LISPRO 7 UNIT(S): 100 INJECTION, SOLUTION INTRAVENOUS; SUBCUTANEOUS at 08:56

## 2025-06-27 RX ADMIN — INSULIN LISPRO 2: 100 INJECTION, SOLUTION INTRAVENOUS; SUBCUTANEOUS at 12:36

## 2025-06-27 RX ADMIN — METOPROLOL SUCCINATE 25 MILLIGRAM(S): 50 TABLET, EXTENDED RELEASE ORAL at 17:06

## 2025-06-27 RX ADMIN — GABAPENTIN 100 MILLIGRAM(S): 400 CAPSULE ORAL at 05:44

## 2025-06-27 RX ADMIN — ATORVASTATIN CALCIUM 40 MILLIGRAM(S): 80 TABLET, FILM COATED ORAL at 23:02

## 2025-06-27 RX ADMIN — Medication 81 MILLIGRAM(S): at 12:38

## 2025-06-27 RX ADMIN — POLYETHYLENE GLYCOL 3350 17 GRAM(S): 17 POWDER, FOR SOLUTION ORAL at 12:45

## 2025-06-27 RX ADMIN — CEFTRIAXONE 100 MILLIGRAM(S): 500 INJECTION, POWDER, FOR SOLUTION INTRAMUSCULAR; INTRAVENOUS at 05:43

## 2025-06-27 RX ADMIN — ENOXAPARIN SODIUM 40 MILLIGRAM(S): 100 INJECTION SUBCUTANEOUS at 12:38

## 2025-06-27 RX ADMIN — INSULIN LISPRO 7 UNIT(S): 100 INJECTION, SOLUTION INTRAVENOUS; SUBCUTANEOUS at 17:05

## 2025-06-27 RX ADMIN — INSULIN LISPRO 7 UNIT(S): 100 INJECTION, SOLUTION INTRAVENOUS; SUBCUTANEOUS at 12:36

## 2025-06-27 RX ADMIN — AMLODIPINE BESYLATE 5 MILLIGRAM(S): 10 TABLET ORAL at 05:44

## 2025-06-27 RX ADMIN — METOPROLOL SUCCINATE 25 MILLIGRAM(S): 50 TABLET, EXTENDED RELEASE ORAL at 05:44

## 2025-06-27 RX ADMIN — INSULIN LISPRO 4: 100 INJECTION, SOLUTION INTRAVENOUS; SUBCUTANEOUS at 08:55

## 2025-06-28 LAB
ANION GAP SERPL CALC-SCNC: 8 MMOL/L — SIGNIFICANT CHANGE UP (ref 5–17)
BUN SERPL-MCNC: 46 MG/DL — HIGH (ref 7–23)
CALCIUM SERPL-MCNC: 8.4 MG/DL — LOW (ref 8.5–10.1)
CHLORIDE SERPL-SCNC: 100 MMOL/L — SIGNIFICANT CHANGE UP (ref 96–108)
CO2 SERPL-SCNC: 25 MMOL/L — SIGNIFICANT CHANGE UP (ref 22–31)
CREAT SERPL-MCNC: 2.56 MG/DL — HIGH (ref 0.5–1.3)
EGFR: 20 ML/MIN/1.73M2 — LOW
EGFR: 20 ML/MIN/1.73M2 — LOW
GLUCOSE BLDC GLUCOMTR-MCNC: 196 MG/DL — HIGH (ref 70–99)
GLUCOSE BLDC GLUCOMTR-MCNC: 201 MG/DL — HIGH (ref 70–99)
GLUCOSE BLDC GLUCOMTR-MCNC: 205 MG/DL — HIGH (ref 70–99)
GLUCOSE BLDC GLUCOMTR-MCNC: 234 MG/DL — HIGH (ref 70–99)
GLUCOSE BLDC GLUCOMTR-MCNC: 236 MG/DL — HIGH (ref 70–99)
GLUCOSE SERPL-MCNC: 192 MG/DL — HIGH (ref 70–99)
POTASSIUM SERPL-MCNC: 4 MMOL/L — SIGNIFICANT CHANGE UP (ref 3.5–5.3)
POTASSIUM SERPL-SCNC: 4 MMOL/L — SIGNIFICANT CHANGE UP (ref 3.5–5.3)
SODIUM SERPL-SCNC: 133 MMOL/L — LOW (ref 135–145)

## 2025-06-28 PROCEDURE — 99232 SBSQ HOSP IP/OBS MODERATE 35: CPT

## 2025-06-28 RX ADMIN — INSULIN LISPRO 7 UNIT(S): 100 INJECTION, SOLUTION INTRAVENOUS; SUBCUTANEOUS at 16:55

## 2025-06-28 RX ADMIN — Medication 81 MILLIGRAM(S): at 11:43

## 2025-06-28 RX ADMIN — INSULIN LISPRO 2: 100 INJECTION, SOLUTION INTRAVENOUS; SUBCUTANEOUS at 11:43

## 2025-06-28 RX ADMIN — INSULIN GLARGINE-YFGN 20 UNIT(S): 100 INJECTION, SOLUTION SUBCUTANEOUS at 21:42

## 2025-06-28 RX ADMIN — ATORVASTATIN CALCIUM 40 MILLIGRAM(S): 80 TABLET, FILM COATED ORAL at 21:41

## 2025-06-28 RX ADMIN — INSULIN LISPRO 4: 100 INJECTION, SOLUTION INTRAVENOUS; SUBCUTANEOUS at 08:38

## 2025-06-28 RX ADMIN — CEFTRIAXONE 100 MILLIGRAM(S): 500 INJECTION, POWDER, FOR SOLUTION INTRAMUSCULAR; INTRAVENOUS at 06:02

## 2025-06-28 RX ADMIN — POLYETHYLENE GLYCOL 3350 17 GRAM(S): 17 POWDER, FOR SOLUTION ORAL at 11:43

## 2025-06-28 RX ADMIN — METOPROLOL SUCCINATE 25 MILLIGRAM(S): 50 TABLET, EXTENDED RELEASE ORAL at 06:02

## 2025-06-28 RX ADMIN — ENOXAPARIN SODIUM 40 MILLIGRAM(S): 100 INJECTION SUBCUTANEOUS at 11:43

## 2025-06-28 RX ADMIN — INSULIN LISPRO 7 UNIT(S): 100 INJECTION, SOLUTION INTRAVENOUS; SUBCUTANEOUS at 11:44

## 2025-06-28 RX ADMIN — METOPROLOL SUCCINATE 25 MILLIGRAM(S): 50 TABLET, EXTENDED RELEASE ORAL at 17:00

## 2025-06-28 RX ADMIN — INSULIN LISPRO 4: 100 INJECTION, SOLUTION INTRAVENOUS; SUBCUTANEOUS at 16:54

## 2025-06-28 RX ADMIN — AMLODIPINE BESYLATE 5 MILLIGRAM(S): 10 TABLET ORAL at 06:03

## 2025-06-28 RX ADMIN — INSULIN LISPRO 7 UNIT(S): 100 INJECTION, SOLUTION INTRAVENOUS; SUBCUTANEOUS at 08:38

## 2025-06-29 LAB
ANION GAP SERPL CALC-SCNC: 11 MMOL/L — SIGNIFICANT CHANGE UP (ref 5–17)
BUN SERPL-MCNC: 56 MG/DL — HIGH (ref 7–23)
CALCIUM SERPL-MCNC: 8.9 MG/DL — SIGNIFICANT CHANGE UP (ref 8.5–10.1)
CHLORIDE SERPL-SCNC: 102 MMOL/L — SIGNIFICANT CHANGE UP (ref 96–108)
CO2 SERPL-SCNC: 19 MMOL/L — LOW (ref 22–31)
CREAT SERPL-MCNC: 2.93 MG/DL — HIGH (ref 0.5–1.3)
CULTURE RESULTS: SIGNIFICANT CHANGE UP
CULTURE RESULTS: SIGNIFICANT CHANGE UP
EGFR: 17 ML/MIN/1.73M2 — LOW
EGFR: 17 ML/MIN/1.73M2 — LOW
GLUCOSE BLDC GLUCOMTR-MCNC: 161 MG/DL — HIGH (ref 70–99)
GLUCOSE BLDC GLUCOMTR-MCNC: 163 MG/DL — HIGH (ref 70–99)
GLUCOSE BLDC GLUCOMTR-MCNC: 219 MG/DL — HIGH (ref 70–99)
GLUCOSE BLDC GLUCOMTR-MCNC: 235 MG/DL — HIGH (ref 70–99)
GLUCOSE SERPL-MCNC: 211 MG/DL — HIGH (ref 70–99)
POTASSIUM SERPL-MCNC: 4.5 MMOL/L — SIGNIFICANT CHANGE UP (ref 3.5–5.3)
POTASSIUM SERPL-SCNC: 4.5 MMOL/L — SIGNIFICANT CHANGE UP (ref 3.5–5.3)
SODIUM SERPL-SCNC: 132 MMOL/L — LOW (ref 135–145)
SPECIMEN SOURCE: SIGNIFICANT CHANGE UP
SPECIMEN SOURCE: SIGNIFICANT CHANGE UP

## 2025-06-29 PROCEDURE — 99232 SBSQ HOSP IP/OBS MODERATE 35: CPT

## 2025-06-29 RX ADMIN — INSULIN LISPRO 7 UNIT(S): 100 INJECTION, SOLUTION INTRAVENOUS; SUBCUTANEOUS at 16:56

## 2025-06-29 RX ADMIN — AMLODIPINE BESYLATE 5 MILLIGRAM(S): 10 TABLET ORAL at 05:13

## 2025-06-29 RX ADMIN — INSULIN LISPRO 4: 100 INJECTION, SOLUTION INTRAVENOUS; SUBCUTANEOUS at 08:08

## 2025-06-29 RX ADMIN — Medication 50 MILLILITER(S): at 11:44

## 2025-06-29 RX ADMIN — POLYETHYLENE GLYCOL 3350 17 GRAM(S): 17 POWDER, FOR SOLUTION ORAL at 11:42

## 2025-06-29 RX ADMIN — METOPROLOL SUCCINATE 25 MILLIGRAM(S): 50 TABLET, EXTENDED RELEASE ORAL at 05:12

## 2025-06-29 RX ADMIN — INSULIN LISPRO 2: 100 INJECTION, SOLUTION INTRAVENOUS; SUBCUTANEOUS at 16:51

## 2025-06-29 RX ADMIN — ENOXAPARIN SODIUM 40 MILLIGRAM(S): 100 INJECTION SUBCUTANEOUS at 11:42

## 2025-06-29 RX ADMIN — INSULIN LISPRO 7 UNIT(S): 100 INJECTION, SOLUTION INTRAVENOUS; SUBCUTANEOUS at 08:09

## 2025-06-29 RX ADMIN — Medication 81 MILLIGRAM(S): at 11:42

## 2025-06-29 RX ADMIN — CEFTRIAXONE 100 MILLIGRAM(S): 500 INJECTION, POWDER, FOR SOLUTION INTRAMUSCULAR; INTRAVENOUS at 05:08

## 2025-06-29 RX ADMIN — METOPROLOL SUCCINATE 25 MILLIGRAM(S): 50 TABLET, EXTENDED RELEASE ORAL at 17:30

## 2025-06-29 RX ADMIN — Medication 50 MILLILITER(S): at 19:38

## 2025-06-29 RX ADMIN — INSULIN LISPRO 7 UNIT(S): 100 INJECTION, SOLUTION INTRAVENOUS; SUBCUTANEOUS at 11:43

## 2025-06-29 RX ADMIN — INSULIN LISPRO 4: 100 INJECTION, SOLUTION INTRAVENOUS; SUBCUTANEOUS at 11:43

## 2025-06-29 RX ADMIN — Medication 50 MILLILITER(S): at 01:04

## 2025-06-29 RX ADMIN — INSULIN GLARGINE-YFGN 20 UNIT(S): 100 INJECTION, SOLUTION SUBCUTANEOUS at 21:30

## 2025-06-29 RX ADMIN — Medication 2 TABLET(S): at 21:30

## 2025-06-30 LAB
ALBUMIN SERPL ELPH-MCNC: 2.2 G/DL — LOW (ref 3.3–5)
ALP SERPL-CCNC: 124 U/L — HIGH (ref 40–120)
ALT FLD-CCNC: 36 U/L — SIGNIFICANT CHANGE UP (ref 12–78)
ANION GAP SERPL CALC-SCNC: 10 MMOL/L — SIGNIFICANT CHANGE UP (ref 5–17)
AST SERPL-CCNC: 23 U/L — SIGNIFICANT CHANGE UP (ref 15–37)
BILIRUB SERPL-MCNC: 0.4 MG/DL — SIGNIFICANT CHANGE UP (ref 0.2–1.2)
BUN SERPL-MCNC: 53 MG/DL — HIGH (ref 7–23)
CALCIUM SERPL-MCNC: 9.4 MG/DL — SIGNIFICANT CHANGE UP (ref 8.5–10.1)
CHLORIDE SERPL-SCNC: 107 MMOL/L — SIGNIFICANT CHANGE UP (ref 96–108)
CO2 SERPL-SCNC: 20 MMOL/L — LOW (ref 22–31)
CREAT SERPL-MCNC: 2.91 MG/DL — HIGH (ref 0.5–1.3)
EGFR: 17 ML/MIN/1.73M2 — LOW
EGFR: 17 ML/MIN/1.73M2 — LOW
GLUCOSE BLDC GLUCOMTR-MCNC: 130 MG/DL — HIGH (ref 70–99)
GLUCOSE BLDC GLUCOMTR-MCNC: 143 MG/DL — HIGH (ref 70–99)
GLUCOSE BLDC GLUCOMTR-MCNC: 150 MG/DL — HIGH (ref 70–99)
GLUCOSE BLDC GLUCOMTR-MCNC: 157 MG/DL — HIGH (ref 70–99)
GLUCOSE SERPL-MCNC: 100 MG/DL — HIGH (ref 70–99)
HCT VFR BLD CALC: 27.7 % — LOW (ref 34.5–45)
HGB BLD-MCNC: 8.9 G/DL — LOW (ref 11.5–15.5)
MCHC RBC-ENTMCNC: 27.3 PG — SIGNIFICANT CHANGE UP (ref 27–34)
MCHC RBC-ENTMCNC: 32.1 G/DL — SIGNIFICANT CHANGE UP (ref 32–36)
MCV RBC AUTO: 85 FL — SIGNIFICANT CHANGE UP (ref 80–100)
NRBC BLD AUTO-RTO: 0 /100 WBCS — SIGNIFICANT CHANGE UP (ref 0–0)
PLATELET # BLD AUTO: 273 K/UL — SIGNIFICANT CHANGE UP (ref 150–400)
POTASSIUM SERPL-MCNC: 4.5 MMOL/L — SIGNIFICANT CHANGE UP (ref 3.5–5.3)
POTASSIUM SERPL-SCNC: 4.5 MMOL/L — SIGNIFICANT CHANGE UP (ref 3.5–5.3)
PROT SERPL-MCNC: 6.4 GM/DL — SIGNIFICANT CHANGE UP (ref 6–8.3)
RBC # BLD: 3.26 M/UL — LOW (ref 3.8–5.2)
RBC # FLD: 15.3 % — HIGH (ref 10.3–14.5)
SODIUM SERPL-SCNC: 137 MMOL/L — SIGNIFICANT CHANGE UP (ref 135–145)
WBC # BLD: 8.14 K/UL — SIGNIFICANT CHANGE UP (ref 3.8–10.5)
WBC # FLD AUTO: 8.14 K/UL — SIGNIFICANT CHANGE UP (ref 3.8–10.5)

## 2025-06-30 PROCEDURE — 99232 SBSQ HOSP IP/OBS MODERATE 35: CPT

## 2025-06-30 RX ADMIN — INSULIN GLARGINE-YFGN 20 UNIT(S): 100 INJECTION, SOLUTION SUBCUTANEOUS at 21:42

## 2025-06-30 RX ADMIN — POLYETHYLENE GLYCOL 3350 17 GRAM(S): 17 POWDER, FOR SOLUTION ORAL at 12:11

## 2025-06-30 RX ADMIN — METOPROLOL SUCCINATE 25 MILLIGRAM(S): 50 TABLET, EXTENDED RELEASE ORAL at 05:49

## 2025-06-30 RX ADMIN — AMLODIPINE BESYLATE 5 MILLIGRAM(S): 10 TABLET ORAL at 05:48

## 2025-06-30 RX ADMIN — INSULIN LISPRO 7 UNIT(S): 100 INJECTION, SOLUTION INTRAVENOUS; SUBCUTANEOUS at 17:13

## 2025-06-30 RX ADMIN — ATORVASTATIN CALCIUM 40 MILLIGRAM(S): 80 TABLET, FILM COATED ORAL at 21:42

## 2025-06-30 RX ADMIN — Medication 81 MILLIGRAM(S): at 12:10

## 2025-06-30 RX ADMIN — ENOXAPARIN SODIUM 40 MILLIGRAM(S): 100 INJECTION SUBCUTANEOUS at 12:10

## 2025-06-30 RX ADMIN — Medication 50 MILLILITER(S): at 19:18

## 2025-06-30 RX ADMIN — INSULIN LISPRO 7 UNIT(S): 100 INJECTION, SOLUTION INTRAVENOUS; SUBCUTANEOUS at 08:55

## 2025-06-30 RX ADMIN — Medication 2 TABLET(S): at 21:42

## 2025-06-30 RX ADMIN — INSULIN LISPRO 2: 100 INJECTION, SOLUTION INTRAVENOUS; SUBCUTANEOUS at 17:13

## 2025-06-30 RX ADMIN — GABAPENTIN 100 MILLIGRAM(S): 400 CAPSULE ORAL at 05:49

## 2025-06-30 RX ADMIN — INSULIN LISPRO 7 UNIT(S): 100 INJECTION, SOLUTION INTRAVENOUS; SUBCUTANEOUS at 12:10

## 2025-06-30 RX ADMIN — METOPROLOL SUCCINATE 25 MILLIGRAM(S): 50 TABLET, EXTENDED RELEASE ORAL at 17:12

## 2025-07-01 LAB
24R-OH-CALCIDIOL SERPL-MCNC: 8.2 NG/ML — LOW
ALBUMIN SERPL ELPH-MCNC: 2.2 G/DL — LOW (ref 3.3–5)
ALP SERPL-CCNC: 119 U/L — SIGNIFICANT CHANGE UP (ref 40–120)
ALT FLD-CCNC: 32 U/L — SIGNIFICANT CHANGE UP (ref 12–78)
ANION GAP SERPL CALC-SCNC: 10 MMOL/L — SIGNIFICANT CHANGE UP (ref 5–17)
AST SERPL-CCNC: 17 U/L — SIGNIFICANT CHANGE UP (ref 15–37)
BILIRUB SERPL-MCNC: 0.3 MG/DL — SIGNIFICANT CHANGE UP (ref 0.2–1.2)
BUN SERPL-MCNC: 50 MG/DL — HIGH (ref 7–23)
CALCIUM SERPL-MCNC: 9 MG/DL — SIGNIFICANT CHANGE UP (ref 8.5–10.1)
CHLORIDE SERPL-SCNC: 105 MMOL/L — SIGNIFICANT CHANGE UP (ref 96–108)
CO2 SERPL-SCNC: 23 MMOL/L — SIGNIFICANT CHANGE UP (ref 22–31)
CREAT SERPL-MCNC: 2.76 MG/DL — HIGH (ref 0.5–1.3)
CRP SERPL-MCNC: 48 MG/L — HIGH
EGFR: 18 ML/MIN/1.73M2 — LOW
EGFR: 18 ML/MIN/1.73M2 — LOW
ERYTHROCYTE [SEDIMENTATION RATE] IN BLOOD: 116 MM/HR — HIGH (ref 0–20)
FOLATE SERPL-MCNC: 9.9 NG/ML — SIGNIFICANT CHANGE UP
GLUCOSE BLDC GLUCOMTR-MCNC: 116 MG/DL — HIGH (ref 70–99)
GLUCOSE BLDC GLUCOMTR-MCNC: 125 MG/DL — HIGH (ref 70–99)
GLUCOSE BLDC GLUCOMTR-MCNC: 161 MG/DL — HIGH (ref 70–99)
GLUCOSE BLDC GLUCOMTR-MCNC: 198 MG/DL — HIGH (ref 70–99)
GLUCOSE SERPL-MCNC: 137 MG/DL — HIGH (ref 70–99)
HCT VFR BLD CALC: 26.3 % — LOW (ref 34.5–45)
HGB BLD-MCNC: 8.4 G/DL — LOW (ref 11.5–15.5)
MCHC RBC-ENTMCNC: 27.4 PG — SIGNIFICANT CHANGE UP (ref 27–34)
MCHC RBC-ENTMCNC: 31.9 G/DL — LOW (ref 32–36)
MCV RBC AUTO: 85.7 FL — SIGNIFICANT CHANGE UP (ref 80–100)
NRBC BLD AUTO-RTO: 0 /100 WBCS — SIGNIFICANT CHANGE UP (ref 0–0)
PLATELET # BLD AUTO: 459 K/UL — HIGH (ref 150–400)
POTASSIUM SERPL-MCNC: 4.2 MMOL/L — SIGNIFICANT CHANGE UP (ref 3.5–5.3)
POTASSIUM SERPL-SCNC: 4.2 MMOL/L — SIGNIFICANT CHANGE UP (ref 3.5–5.3)
PROT SERPL-MCNC: 6.2 GM/DL — SIGNIFICANT CHANGE UP (ref 6–8.3)
RBC # BLD: 3.07 M/UL — LOW (ref 3.8–5.2)
RBC # FLD: 15.3 % — HIGH (ref 10.3–14.5)
SODIUM SERPL-SCNC: 138 MMOL/L — SIGNIFICANT CHANGE UP (ref 135–145)
TSH SERPL-MCNC: 1.27 UU/ML — SIGNIFICANT CHANGE UP (ref 0.36–3.74)
VIT B12 SERPL-MCNC: 1541 PG/ML — HIGH (ref 232–1245)
WBC # BLD: 7.54 K/UL — SIGNIFICANT CHANGE UP (ref 3.8–10.5)
WBC # FLD AUTO: 7.54 K/UL — SIGNIFICANT CHANGE UP (ref 3.8–10.5)

## 2025-07-01 PROCEDURE — 99232 SBSQ HOSP IP/OBS MODERATE 35: CPT

## 2025-07-01 RX ORDER — HEPARIN SODIUM 1000 [USP'U]/ML
5000 INJECTION INTRAVENOUS; SUBCUTANEOUS EVERY 8 HOURS
Refills: 0 | Status: DISCONTINUED | OUTPATIENT
Start: 2025-07-02 | End: 2025-07-05

## 2025-07-01 RX ADMIN — INSULIN LISPRO 2: 100 INJECTION, SOLUTION INTRAVENOUS; SUBCUTANEOUS at 11:42

## 2025-07-01 RX ADMIN — METOPROLOL SUCCINATE 25 MILLIGRAM(S): 50 TABLET, EXTENDED RELEASE ORAL at 05:58

## 2025-07-01 RX ADMIN — INSULIN LISPRO 7 UNIT(S): 100 INJECTION, SOLUTION INTRAVENOUS; SUBCUTANEOUS at 09:30

## 2025-07-01 RX ADMIN — METOPROLOL SUCCINATE 25 MILLIGRAM(S): 50 TABLET, EXTENDED RELEASE ORAL at 17:09

## 2025-07-01 RX ADMIN — POLYETHYLENE GLYCOL 3350 17 GRAM(S): 17 POWDER, FOR SOLUTION ORAL at 11:43

## 2025-07-01 RX ADMIN — AMLODIPINE BESYLATE 5 MILLIGRAM(S): 10 TABLET ORAL at 05:58

## 2025-07-01 RX ADMIN — ENOXAPARIN SODIUM 40 MILLIGRAM(S): 100 INJECTION SUBCUTANEOUS at 11:42

## 2025-07-01 RX ADMIN — INSULIN LISPRO 7 UNIT(S): 100 INJECTION, SOLUTION INTRAVENOUS; SUBCUTANEOUS at 11:42

## 2025-07-01 RX ADMIN — INSULIN GLARGINE-YFGN 20 UNIT(S): 100 INJECTION, SOLUTION SUBCUTANEOUS at 21:53

## 2025-07-01 RX ADMIN — ATORVASTATIN CALCIUM 40 MILLIGRAM(S): 80 TABLET, FILM COATED ORAL at 21:52

## 2025-07-01 RX ADMIN — INSULIN LISPRO 7 UNIT(S): 100 INJECTION, SOLUTION INTRAVENOUS; SUBCUTANEOUS at 17:09

## 2025-07-01 RX ADMIN — GABAPENTIN 100 MILLIGRAM(S): 400 CAPSULE ORAL at 21:52

## 2025-07-01 RX ADMIN — GABAPENTIN 100 MILLIGRAM(S): 400 CAPSULE ORAL at 14:09

## 2025-07-01 RX ADMIN — Medication 81 MILLIGRAM(S): at 11:41

## 2025-07-01 RX ADMIN — Medication 2 TABLET(S): at 21:49

## 2025-07-02 LAB
ANION GAP SERPL CALC-SCNC: 6 MMOL/L — SIGNIFICANT CHANGE UP (ref 5–17)
BUN SERPL-MCNC: 52 MG/DL — HIGH (ref 7–23)
CALCIUM SERPL-MCNC: 9.6 MG/DL — SIGNIFICANT CHANGE UP (ref 8.5–10.1)
CHLORIDE SERPL-SCNC: 107 MMOL/L — SIGNIFICANT CHANGE UP (ref 96–108)
CO2 SERPL-SCNC: 25 MMOL/L — SIGNIFICANT CHANGE UP (ref 22–31)
CREAT SERPL-MCNC: 2.42 MG/DL — HIGH (ref 0.5–1.3)
EGFR: 21 ML/MIN/1.73M2 — LOW
EGFR: 21 ML/MIN/1.73M2 — LOW
GLUCOSE BLDC GLUCOMTR-MCNC: 171 MG/DL — HIGH (ref 70–99)
GLUCOSE BLDC GLUCOMTR-MCNC: 195 MG/DL — HIGH (ref 70–99)
GLUCOSE BLDC GLUCOMTR-MCNC: 224 MG/DL — HIGH (ref 70–99)
GLUCOSE BLDC GLUCOMTR-MCNC: 310 MG/DL — HIGH (ref 70–99)
GLUCOSE SERPL-MCNC: 173 MG/DL — HIGH (ref 70–99)
POTASSIUM SERPL-MCNC: 4.4 MMOL/L — SIGNIFICANT CHANGE UP (ref 3.5–5.3)
POTASSIUM SERPL-SCNC: 4.4 MMOL/L — SIGNIFICANT CHANGE UP (ref 3.5–5.3)
SODIUM SERPL-SCNC: 138 MMOL/L — SIGNIFICANT CHANGE UP (ref 135–145)
T PALLIDUM AB TITR SER: NEGATIVE — SIGNIFICANT CHANGE UP

## 2025-07-02 PROCEDURE — 99232 SBSQ HOSP IP/OBS MODERATE 35: CPT

## 2025-07-02 RX ORDER — ERGOCALCIFEROL 1.25 MG/1
50000 CAPSULE ORAL ONCE
Refills: 0 | Status: COMPLETED | OUTPATIENT
Start: 2025-07-02 | End: 2025-07-02

## 2025-07-02 RX ADMIN — Medication 2 TABLET(S): at 22:09

## 2025-07-02 RX ADMIN — HEPARIN SODIUM 5000 UNIT(S): 1000 INJECTION INTRAVENOUS; SUBCUTANEOUS at 22:08

## 2025-07-02 RX ADMIN — ATORVASTATIN CALCIUM 40 MILLIGRAM(S): 80 TABLET, FILM COATED ORAL at 22:08

## 2025-07-02 RX ADMIN — HEPARIN SODIUM 5000 UNIT(S): 1000 INJECTION INTRAVENOUS; SUBCUTANEOUS at 05:23

## 2025-07-02 RX ADMIN — GABAPENTIN 100 MILLIGRAM(S): 400 CAPSULE ORAL at 05:23

## 2025-07-02 RX ADMIN — INSULIN LISPRO 7 UNIT(S): 100 INJECTION, SOLUTION INTRAVENOUS; SUBCUTANEOUS at 16:38

## 2025-07-02 RX ADMIN — Medication 81 MILLIGRAM(S): at 12:11

## 2025-07-02 RX ADMIN — INSULIN LISPRO 7 UNIT(S): 100 INJECTION, SOLUTION INTRAVENOUS; SUBCUTANEOUS at 07:53

## 2025-07-02 RX ADMIN — INSULIN LISPRO 7 UNIT(S): 100 INJECTION, SOLUTION INTRAVENOUS; SUBCUTANEOUS at 11:29

## 2025-07-02 RX ADMIN — INSULIN LISPRO 8: 100 INJECTION, SOLUTION INTRAVENOUS; SUBCUTANEOUS at 11:28

## 2025-07-02 RX ADMIN — ERGOCALCIFEROL 50000 UNIT(S): 1.25 CAPSULE ORAL at 12:12

## 2025-07-02 RX ADMIN — GABAPENTIN 100 MILLIGRAM(S): 400 CAPSULE ORAL at 22:08

## 2025-07-02 RX ADMIN — AMLODIPINE BESYLATE 5 MILLIGRAM(S): 10 TABLET ORAL at 05:25

## 2025-07-02 RX ADMIN — INSULIN LISPRO 2: 100 INJECTION, SOLUTION INTRAVENOUS; SUBCUTANEOUS at 07:53

## 2025-07-02 RX ADMIN — INSULIN LISPRO 2: 100 INJECTION, SOLUTION INTRAVENOUS; SUBCUTANEOUS at 16:37

## 2025-07-02 RX ADMIN — METOPROLOL SUCCINATE 25 MILLIGRAM(S): 50 TABLET, EXTENDED RELEASE ORAL at 18:19

## 2025-07-02 RX ADMIN — POLYETHYLENE GLYCOL 3350 17 GRAM(S): 17 POWDER, FOR SOLUTION ORAL at 12:11

## 2025-07-02 RX ADMIN — INSULIN GLARGINE-YFGN 20 UNIT(S): 100 INJECTION, SOLUTION SUBCUTANEOUS at 22:07

## 2025-07-02 RX ADMIN — METOPROLOL SUCCINATE 25 MILLIGRAM(S): 50 TABLET, EXTENDED RELEASE ORAL at 05:23

## 2025-07-02 RX ADMIN — HEPARIN SODIUM 5000 UNIT(S): 1000 INJECTION INTRAVENOUS; SUBCUTANEOUS at 14:42

## 2025-07-03 LAB
ALBUMIN SERPL ELPH-MCNC: 2.1 G/DL — LOW (ref 3.3–5)
ALP SERPL-CCNC: 113 U/L — SIGNIFICANT CHANGE UP (ref 40–120)
ALT FLD-CCNC: 29 U/L — SIGNIFICANT CHANGE UP (ref 12–78)
ANION GAP SERPL CALC-SCNC: 7 MMOL/L — SIGNIFICANT CHANGE UP (ref 5–17)
AST SERPL-CCNC: 18 U/L — SIGNIFICANT CHANGE UP (ref 15–37)
BILIRUB SERPL-MCNC: 0.3 MG/DL — SIGNIFICANT CHANGE UP (ref 0.2–1.2)
BUN SERPL-MCNC: 46 MG/DL — HIGH (ref 7–23)
CALCIUM SERPL-MCNC: 8.7 MG/DL — SIGNIFICANT CHANGE UP (ref 8.5–10.1)
CHLORIDE SERPL-SCNC: 104 MMOL/L — SIGNIFICANT CHANGE UP (ref 96–108)
CO2 SERPL-SCNC: 25 MMOL/L — SIGNIFICANT CHANGE UP (ref 22–31)
CREAT SERPL-MCNC: 2.06 MG/DL — HIGH (ref 0.5–1.3)
EGFR: 26 ML/MIN/1.73M2 — LOW
EGFR: 26 ML/MIN/1.73M2 — LOW
GLUCOSE BLDC GLUCOMTR-MCNC: 152 MG/DL — HIGH (ref 70–99)
GLUCOSE BLDC GLUCOMTR-MCNC: 165 MG/DL — HIGH (ref 70–99)
GLUCOSE BLDC GLUCOMTR-MCNC: 171 MG/DL — HIGH (ref 70–99)
GLUCOSE BLDC GLUCOMTR-MCNC: 173 MG/DL — HIGH (ref 70–99)
GLUCOSE BLDC GLUCOMTR-MCNC: 234 MG/DL — HIGH (ref 70–99)
GLUCOSE SERPL-MCNC: 225 MG/DL — HIGH (ref 70–99)
HCT VFR BLD CALC: 26.7 % — LOW (ref 34.5–45)
HGB BLD-MCNC: 8.3 G/DL — LOW (ref 11.5–15.5)
IGA FLD-MCNC: 156 MG/DL — SIGNIFICANT CHANGE UP (ref 84–499)
IGG FLD-MCNC: 686 MG/DL — SIGNIFICANT CHANGE UP (ref 610–1660)
IGM SERPL-MCNC: 36 MG/DL — SIGNIFICANT CHANGE UP (ref 35–242)
KAPPA LC SER QL IFE: 164.96 MG/DL — HIGH (ref 0.33–1.94)
KAPPA/LAMBDA FREE LIGHT CHAIN RATIO, SERUM: 64.69 RATIO — HIGH (ref 0.26–1.65)
LAMBDA LC SER QL IFE: 2.55 MG/DL — SIGNIFICANT CHANGE UP (ref 0.57–2.63)
MCHC RBC-ENTMCNC: 27 PG — SIGNIFICANT CHANGE UP (ref 27–34)
MCHC RBC-ENTMCNC: 31.1 G/DL — LOW (ref 32–36)
MCV RBC AUTO: 87 FL — SIGNIFICANT CHANGE UP (ref 80–100)
NRBC BLD AUTO-RTO: 0 /100 WBCS — SIGNIFICANT CHANGE UP (ref 0–0)
PLATELET # BLD AUTO: 499 K/UL — HIGH (ref 150–400)
POTASSIUM SERPL-MCNC: 4.4 MMOL/L — SIGNIFICANT CHANGE UP (ref 3.5–5.3)
POTASSIUM SERPL-SCNC: 4.4 MMOL/L — SIGNIFICANT CHANGE UP (ref 3.5–5.3)
PROT ?TM UR-MCNC: 99 MG/DL — HIGH (ref 0–12)
PROT SERPL-MCNC: 5.5 G/DL — LOW (ref 6–8.3)
PROT SERPL-MCNC: 5.7 G/DL — LOW (ref 6–8.3)
PROT SERPL-MCNC: 6.4 GM/DL — SIGNIFICANT CHANGE UP (ref 6–8.3)
RBC # BLD: 3.07 M/UL — LOW (ref 3.8–5.2)
RBC # FLD: 14.9 % — HIGH (ref 10.3–14.5)
SODIUM SERPL-SCNC: 136 MMOL/L — SIGNIFICANT CHANGE UP (ref 135–145)
WBC # BLD: 6.71 K/UL — SIGNIFICANT CHANGE UP (ref 3.8–10.5)
WBC # FLD AUTO: 6.71 K/UL — SIGNIFICANT CHANGE UP (ref 3.8–10.5)

## 2025-07-03 PROCEDURE — 99232 SBSQ HOSP IP/OBS MODERATE 35: CPT

## 2025-07-03 RX ADMIN — POLYETHYLENE GLYCOL 3350 17 GRAM(S): 17 POWDER, FOR SOLUTION ORAL at 11:39

## 2025-07-03 RX ADMIN — INSULIN GLARGINE-YFGN 20 UNIT(S): 100 INJECTION, SOLUTION SUBCUTANEOUS at 22:13

## 2025-07-03 RX ADMIN — INSULIN LISPRO 2: 100 INJECTION, SOLUTION INTRAVENOUS; SUBCUTANEOUS at 09:10

## 2025-07-03 RX ADMIN — METOPROLOL SUCCINATE 25 MILLIGRAM(S): 50 TABLET, EXTENDED RELEASE ORAL at 06:15

## 2025-07-03 RX ADMIN — INSULIN LISPRO 7 UNIT(S): 100 INJECTION, SOLUTION INTRAVENOUS; SUBCUTANEOUS at 16:16

## 2025-07-03 RX ADMIN — HEPARIN SODIUM 5000 UNIT(S): 1000 INJECTION INTRAVENOUS; SUBCUTANEOUS at 06:15

## 2025-07-03 RX ADMIN — HEPARIN SODIUM 5000 UNIT(S): 1000 INJECTION INTRAVENOUS; SUBCUTANEOUS at 13:59

## 2025-07-03 RX ADMIN — AMLODIPINE BESYLATE 5 MILLIGRAM(S): 10 TABLET ORAL at 06:15

## 2025-07-03 RX ADMIN — Medication 81 MILLIGRAM(S): at 11:39

## 2025-07-03 RX ADMIN — INSULIN LISPRO 7 UNIT(S): 100 INJECTION, SOLUTION INTRAVENOUS; SUBCUTANEOUS at 09:11

## 2025-07-03 RX ADMIN — INSULIN LISPRO 7 UNIT(S): 100 INJECTION, SOLUTION INTRAVENOUS; SUBCUTANEOUS at 11:37

## 2025-07-03 RX ADMIN — Medication 50 MILLILITER(S): at 17:28

## 2025-07-03 RX ADMIN — INSULIN LISPRO 2: 100 INJECTION, SOLUTION INTRAVENOUS; SUBCUTANEOUS at 16:15

## 2025-07-03 RX ADMIN — METOPROLOL SUCCINATE 25 MILLIGRAM(S): 50 TABLET, EXTENDED RELEASE ORAL at 17:28

## 2025-07-03 RX ADMIN — INSULIN LISPRO 4: 100 INJECTION, SOLUTION INTRAVENOUS; SUBCUTANEOUS at 11:37

## 2025-07-04 LAB
ALBUMIN SERPL ELPH-MCNC: 2.3 G/DL — LOW (ref 3.3–5)
ALP SERPL-CCNC: 107 U/L — SIGNIFICANT CHANGE UP (ref 40–120)
ALT FLD-CCNC: 30 U/L — SIGNIFICANT CHANGE UP (ref 12–78)
ANION GAP SERPL CALC-SCNC: 6 MMOL/L — SIGNIFICANT CHANGE UP (ref 5–17)
AST SERPL-CCNC: 14 U/L — LOW (ref 15–37)
BILIRUB SERPL-MCNC: 0.3 MG/DL — SIGNIFICANT CHANGE UP (ref 0.2–1.2)
BUN SERPL-MCNC: 46 MG/DL — HIGH (ref 7–23)
CALCIUM SERPL-MCNC: 9.1 MG/DL — SIGNIFICANT CHANGE UP (ref 8.5–10.1)
CHLORIDE SERPL-SCNC: 107 MMOL/L — SIGNIFICANT CHANGE UP (ref 96–108)
CO2 SERPL-SCNC: 25 MMOL/L — SIGNIFICANT CHANGE UP (ref 22–31)
CREAT SERPL-MCNC: 2.09 MG/DL — HIGH (ref 0.5–1.3)
EGFR: 25 ML/MIN/1.73M2 — LOW
EGFR: 25 ML/MIN/1.73M2 — LOW
GLUCOSE BLDC GLUCOMTR-MCNC: 108 MG/DL — HIGH (ref 70–99)
GLUCOSE BLDC GLUCOMTR-MCNC: 174 MG/DL — HIGH (ref 70–99)
GLUCOSE BLDC GLUCOMTR-MCNC: 219 MG/DL — HIGH (ref 70–99)
GLUCOSE BLDC GLUCOMTR-MCNC: 295 MG/DL — HIGH (ref 70–99)
GLUCOSE SERPL-MCNC: 191 MG/DL — HIGH (ref 70–99)
HCT VFR BLD CALC: 26.7 % — LOW (ref 34.5–45)
HGB BLD-MCNC: 8.3 G/DL — LOW (ref 11.5–15.5)
M PROTEIN 24H UR ELPH-MRATE: PRESENT
MCHC RBC-ENTMCNC: 27.2 PG — SIGNIFICANT CHANGE UP (ref 27–34)
MCHC RBC-ENTMCNC: 31.1 G/DL — LOW (ref 32–36)
MCV RBC AUTO: 87.5 FL — SIGNIFICANT CHANGE UP (ref 80–100)
NRBC BLD AUTO-RTO: 0 /100 WBCS — SIGNIFICANT CHANGE UP (ref 0–0)
PLATELET # BLD AUTO: 556 K/UL — HIGH (ref 150–400)
POTASSIUM SERPL-MCNC: 4.3 MMOL/L — SIGNIFICANT CHANGE UP (ref 3.5–5.3)
POTASSIUM SERPL-SCNC: 4.3 MMOL/L — SIGNIFICANT CHANGE UP (ref 3.5–5.3)
PROT SERPL-MCNC: 6.3 GM/DL — SIGNIFICANT CHANGE UP (ref 6–8.3)
PYRIDOXAL PHOS SERPL-MCNC: 5.4 UG/L — SIGNIFICANT CHANGE UP (ref 3.4–65.2)
RBC # BLD: 3.05 M/UL — LOW (ref 3.8–5.2)
RBC # FLD: 14.6 % — HIGH (ref 10.3–14.5)
SODIUM SERPL-SCNC: 138 MMOL/L — SIGNIFICANT CHANGE UP (ref 135–145)
WBC # BLD: 6.51 K/UL — SIGNIFICANT CHANGE UP (ref 3.8–10.5)
WBC # FLD AUTO: 6.51 K/UL — SIGNIFICANT CHANGE UP (ref 3.8–10.5)

## 2025-07-04 PROCEDURE — 99232 SBSQ HOSP IP/OBS MODERATE 35: CPT

## 2025-07-04 RX ADMIN — METOPROLOL SUCCINATE 25 MILLIGRAM(S): 50 TABLET, EXTENDED RELEASE ORAL at 06:23

## 2025-07-04 RX ADMIN — Medication 81 MILLIGRAM(S): at 12:13

## 2025-07-04 RX ADMIN — INSULIN LISPRO 7 UNIT(S): 100 INJECTION, SOLUTION INTRAVENOUS; SUBCUTANEOUS at 16:10

## 2025-07-04 RX ADMIN — INSULIN LISPRO 7 UNIT(S): 100 INJECTION, SOLUTION INTRAVENOUS; SUBCUTANEOUS at 11:59

## 2025-07-04 RX ADMIN — INSULIN LISPRO 2: 100 INJECTION, SOLUTION INTRAVENOUS; SUBCUTANEOUS at 16:07

## 2025-07-04 RX ADMIN — AMLODIPINE BESYLATE 5 MILLIGRAM(S): 10 TABLET ORAL at 06:04

## 2025-07-04 RX ADMIN — METOPROLOL SUCCINATE 25 MILLIGRAM(S): 50 TABLET, EXTENDED RELEASE ORAL at 17:34

## 2025-07-04 RX ADMIN — INSULIN GLARGINE-YFGN 20 UNIT(S): 100 INJECTION, SOLUTION SUBCUTANEOUS at 21:31

## 2025-07-04 RX ADMIN — HEPARIN SODIUM 5000 UNIT(S): 1000 INJECTION INTRAVENOUS; SUBCUTANEOUS at 13:37

## 2025-07-04 RX ADMIN — INSULIN LISPRO 7 UNIT(S): 100 INJECTION, SOLUTION INTRAVENOUS; SUBCUTANEOUS at 08:26

## 2025-07-04 RX ADMIN — INSULIN LISPRO 6: 100 INJECTION, SOLUTION INTRAVENOUS; SUBCUTANEOUS at 11:58

## 2025-07-04 RX ADMIN — HEPARIN SODIUM 5000 UNIT(S): 1000 INJECTION INTRAVENOUS; SUBCUTANEOUS at 06:04

## 2025-07-05 ENCOUNTER — TRANSCRIPTION ENCOUNTER (OUTPATIENT)
Age: 70
End: 2025-07-05

## 2025-07-05 VITALS
TEMPERATURE: 98 F | RESPIRATION RATE: 17 BRPM | HEART RATE: 87 BPM | DIASTOLIC BLOOD PRESSURE: 71 MMHG | SYSTOLIC BLOOD PRESSURE: 151 MMHG | OXYGEN SATURATION: 95 %

## 2025-07-05 LAB
ALBUMIN SERPL ELPH-MCNC: 2.3 G/DL — LOW (ref 3.3–5)
ALP SERPL-CCNC: 97 U/L — SIGNIFICANT CHANGE UP (ref 40–120)
ALT FLD-CCNC: 28 U/L — SIGNIFICANT CHANGE UP (ref 12–78)
ANION GAP SERPL CALC-SCNC: 6 MMOL/L — SIGNIFICANT CHANGE UP (ref 5–17)
AST SERPL-CCNC: 30 U/L — SIGNIFICANT CHANGE UP (ref 15–37)
BILIRUB SERPL-MCNC: 0.5 MG/DL — SIGNIFICANT CHANGE UP (ref 0.2–1.2)
BUN SERPL-MCNC: 43 MG/DL — HIGH (ref 7–23)
CALCIUM SERPL-MCNC: 9.3 MG/DL — SIGNIFICANT CHANGE UP (ref 8.5–10.1)
CHLORIDE SERPL-SCNC: 108 MMOL/L — SIGNIFICANT CHANGE UP (ref 96–108)
CO2 SERPL-SCNC: 24 MMOL/L — SIGNIFICANT CHANGE UP (ref 22–31)
CREAT SERPL-MCNC: 1.95 MG/DL — HIGH (ref 0.5–1.3)
EGFR: 27 ML/MIN/1.73M2 — LOW
EGFR: 27 ML/MIN/1.73M2 — LOW
GLUCOSE BLDC GLUCOMTR-MCNC: 120 MG/DL — HIGH (ref 70–99)
GLUCOSE BLDC GLUCOMTR-MCNC: 129 MG/DL — HIGH (ref 70–99)
GLUCOSE BLDC GLUCOMTR-MCNC: 138 MG/DL — HIGH (ref 70–99)
GLUCOSE SERPL-MCNC: 122 MG/DL — HIGH (ref 70–99)
HCT VFR BLD CALC: 25.7 % — LOW (ref 34.5–45)
HGB BLD-MCNC: 8.2 G/DL — LOW (ref 11.5–15.5)
MCHC RBC-ENTMCNC: 27.5 PG — SIGNIFICANT CHANGE UP (ref 27–34)
MCHC RBC-ENTMCNC: 31.9 G/DL — LOW (ref 32–36)
MCV RBC AUTO: 86.2 FL — SIGNIFICANT CHANGE UP (ref 80–100)
NRBC BLD AUTO-RTO: 0 /100 WBCS — SIGNIFICANT CHANGE UP (ref 0–0)
PLATELET # BLD AUTO: 570 K/UL — HIGH (ref 150–400)
POTASSIUM SERPL-MCNC: 4.8 MMOL/L — SIGNIFICANT CHANGE UP (ref 3.5–5.3)
POTASSIUM SERPL-SCNC: 4.8 MMOL/L — SIGNIFICANT CHANGE UP (ref 3.5–5.3)
PROT SERPL-MCNC: 6.5 GM/DL — SIGNIFICANT CHANGE UP (ref 6–8.3)
RBC # BLD: 2.98 M/UL — LOW (ref 3.8–5.2)
RBC # FLD: 14.5 % — SIGNIFICANT CHANGE UP (ref 10.3–14.5)
SODIUM SERPL-SCNC: 138 MMOL/L — SIGNIFICANT CHANGE UP (ref 135–145)
WBC # BLD: 6.8 K/UL — SIGNIFICANT CHANGE UP (ref 3.8–10.5)
WBC # FLD AUTO: 6.8 K/UL — SIGNIFICANT CHANGE UP (ref 3.8–10.5)

## 2025-07-05 PROCEDURE — 99239 HOSP IP/OBS DSCHRG MGMT >30: CPT

## 2025-07-05 RX ORDER — INSULIN LISPRO 100 U/ML
7 INJECTION, SOLUTION INTRAVENOUS; SUBCUTANEOUS
Qty: 1 | Refills: 0
Start: 2025-07-05

## 2025-07-05 RX ORDER — ATORVASTATIN CALCIUM 80 MG/1
1 TABLET, FILM COATED ORAL
Qty: 30 | Refills: 0
Start: 2025-07-05 | End: 2025-08-03

## 2025-07-05 RX ORDER — INSULIN GLARGINE-YFGN 100 [IU]/ML
20 INJECTION, SOLUTION SUBCUTANEOUS
Qty: 1 | Refills: 0
Start: 2025-07-05

## 2025-07-05 RX ORDER — GLIMEPIRIDE 4 MG/1
1 TABLET ORAL
Refills: 0 | DISCHARGE

## 2025-07-05 RX ADMIN — Medication 81 MILLIGRAM(S): at 11:10

## 2025-07-05 RX ADMIN — AMLODIPINE BESYLATE 5 MILLIGRAM(S): 10 TABLET ORAL at 05:45

## 2025-07-05 RX ADMIN — INSULIN LISPRO 7 UNIT(S): 100 INJECTION, SOLUTION INTRAVENOUS; SUBCUTANEOUS at 11:10

## 2025-07-05 RX ADMIN — METOPROLOL SUCCINATE 25 MILLIGRAM(S): 50 TABLET, EXTENDED RELEASE ORAL at 05:44

## 2025-07-05 RX ADMIN — INSULIN LISPRO 7 UNIT(S): 100 INJECTION, SOLUTION INTRAVENOUS; SUBCUTANEOUS at 08:06

## 2025-07-06 RX ORDER — INSULIN ASPART 100 [IU]/ML
7 INJECTION, SOLUTION INTRAVENOUS; SUBCUTANEOUS
Qty: 1 | Refills: 0
Start: 2025-07-06 | End: 2025-08-04

## 2025-07-07 LAB
% ALBUMIN: 47.4 % — SIGNIFICANT CHANGE UP
% ALBUMIN: 48.1 % — SIGNIFICANT CHANGE UP
% ALPHA 1: 10 % — SIGNIFICANT CHANGE UP
% ALPHA 1: 9.3 % — SIGNIFICANT CHANGE UP
% ALPHA 2: 16.6 % — SIGNIFICANT CHANGE UP
% ALPHA 2: 17 % — SIGNIFICANT CHANGE UP
% BETA: 13.3 % — SIGNIFICANT CHANGE UP
% BETA: 14.3 % — SIGNIFICANT CHANGE UP
% GAMMA: 11.6 % — SIGNIFICANT CHANGE UP
% GAMMA: 12.4 % — SIGNIFICANT CHANGE UP
% M SPIKE: SIGNIFICANT CHANGE UP
% M SPIKE: SIGNIFICANT CHANGE UP
ALBUMIN SERPL ELPH-MCNC: 2.6 G/DL — LOW (ref 3.6–5.5)
ALBUMIN SERPL ELPH-MCNC: 2.7 G/DL — LOW (ref 3.6–5.5)
ALBUMIN/GLOB SERPL ELPH: 0.9 RATIO — SIGNIFICANT CHANGE UP
ALBUMIN/GLOB SERPL ELPH: 0.9 RATIO — SIGNIFICANT CHANGE UP
ALPHA1 GLOB SERPL ELPH-MCNC: 0.5 G/DL — HIGH (ref 0.1–0.4)
ALPHA1 GLOB SERPL ELPH-MCNC: 0.6 G/DL — HIGH (ref 0.1–0.4)
ALPHA2 GLOB SERPL ELPH-MCNC: 0.9 G/DL — SIGNIFICANT CHANGE UP (ref 0.5–1)
ALPHA2 GLOB SERPL ELPH-MCNC: 1 G/DL — SIGNIFICANT CHANGE UP (ref 0.5–1)
B-GLOBULIN SERPL ELPH-MCNC: 0.8 G/DL — SIGNIFICANT CHANGE UP (ref 0.5–1)
B-GLOBULIN SERPL ELPH-MCNC: 0.8 G/DL — SIGNIFICANT CHANGE UP (ref 0.5–1)
GAMMA GLOBULIN: 0.7 G/DL — SIGNIFICANT CHANGE UP (ref 0.6–1.6)
GAMMA GLOBULIN: 0.7 G/DL — SIGNIFICANT CHANGE UP (ref 0.6–1.6)
HOMOCYSTEINE LEVEL: 23.7 UMOL/L — HIGH (ref 0–17.2)
INTERPRETATION SERPL IFE-IMP: SIGNIFICANT CHANGE UP
INTERPRETATION SERPL IFE-IMP: SIGNIFICANT CHANGE UP
M-SPIKE: SIGNIFICANT CHANGE UP (ref 0–0)
M-SPIKE: SIGNIFICANT CHANGE UP (ref 0–0)
METHYLMALONATE SERPL-SCNC: 292 NMOL/L — SIGNIFICANT CHANGE UP (ref 0–378)
PROT PATTERN SERPL ELPH-IMP: SIGNIFICANT CHANGE UP
PROT PATTERN SERPL ELPH-IMP: SIGNIFICANT CHANGE UP
PROT SERPL-MCNC: 5.5 G/DL — LOW (ref 6–8.3)
PROT SERPL-MCNC: 5.7 G/DL — LOW (ref 6–8.3)

## 2025-07-08 LAB — T4 FREE SERPL DIALY-MCNC: 1.2 NG/DL — SIGNIFICANT CHANGE UP

## 2025-07-09 DIAGNOSIS — Z91.199 PATIENT'S NONCOMPLIANCE WITH OTHER MEDICAL TREATMENT AND REGIMEN DUE TO UNSPECIFIED REASON: ICD-10-CM

## 2025-07-09 DIAGNOSIS — Z89.432 ACQUIRED ABSENCE OF LEFT FOOT: ICD-10-CM

## 2025-07-09 DIAGNOSIS — E11.65 TYPE 2 DIABETES MELLITUS WITH HYPERGLYCEMIA: ICD-10-CM

## 2025-07-09 DIAGNOSIS — N39.0 URINARY TRACT INFECTION, SITE NOT SPECIFIED: ICD-10-CM

## 2025-07-09 DIAGNOSIS — Z79.82 LONG TERM (CURRENT) USE OF ASPIRIN: ICD-10-CM

## 2025-07-09 DIAGNOSIS — Z79.4 LONG TERM (CURRENT) USE OF INSULIN: ICD-10-CM

## 2025-07-09 DIAGNOSIS — E11.51 TYPE 2 DIABETES MELLITUS WITH DIABETIC PERIPHERAL ANGIOPATHY WITHOUT GANGRENE: ICD-10-CM

## 2025-07-09 DIAGNOSIS — R41.82 ALTERED MENTAL STATUS, UNSPECIFIED: ICD-10-CM

## 2025-07-09 DIAGNOSIS — Z91.018 ALLERGY TO OTHER FOODS: ICD-10-CM

## 2025-07-09 DIAGNOSIS — I12.9 HYPERTENSIVE CHRONIC KIDNEY DISEASE WITH STAGE 1 THROUGH STAGE 4 CHRONIC KIDNEY DISEASE, OR UNSPECIFIED CHRONIC KIDNEY DISEASE: ICD-10-CM

## 2025-07-09 DIAGNOSIS — E11.22 TYPE 2 DIABETES MELLITUS WITH DIABETIC CHRONIC KIDNEY DISEASE: ICD-10-CM

## 2025-07-09 DIAGNOSIS — N17.9 ACUTE KIDNEY FAILURE, UNSPECIFIED: ICD-10-CM

## 2025-07-09 DIAGNOSIS — G93.41 METABOLIC ENCEPHALOPATHY: ICD-10-CM

## 2025-07-15 NOTE — PATIENT PROFILE ADULT - NSPRONUTRITIONRISK_GEN_A_NUR
NP \ Anemia due to GI blood loss \ Michelle Vazquez, DO     LVM for pt to c/b to [facility]-X[facility]   Pressure injury stage 2 or greater